# Patient Record
Sex: FEMALE | Race: WHITE | Employment: OTHER | URBAN - METROPOLITAN AREA
[De-identification: names, ages, dates, MRNs, and addresses within clinical notes are randomized per-mention and may not be internally consistent; named-entity substitution may affect disease eponyms.]

---

## 2017-08-21 ENCOUNTER — HOSPITAL ENCOUNTER (OUTPATIENT)
Dept: OTHER | Age: 67
Discharge: HOME | End: 2017-08-21
Attending: INTERNAL MEDICINE | Admitting: INTERNAL MEDICINE

## 2017-08-21 LAB
A/G RATIO: 1.7 (ref 1.1–2.2)
ALBUMIN SERPL-MCNC: 4 G/DL (ref 3.4–5)
ALP BLD-CCNC: 88 U/L (ref 40–129)
ALT SERPL-CCNC: 13 U/L (ref 10–40)
ANION GAP SERPL CALCULATED.3IONS-SCNC: 11 MMOL/L (ref 3–16)
AST SERPL-CCNC: 19 U/L (ref 15–37)
BILIRUB SERPL-MCNC: 0.6 MG/DL (ref 0–1)
BUN BLDV-MCNC: 9 MG/DL (ref 7–20)
CALCIUM SERPL-MCNC: 9.4 MG/DL (ref 8.3–10.6)
CHLORIDE BLD-SCNC: 104 MMOL/L (ref 99–110)
CHOLESTEROL, FASTING: 158 MG/DL (ref 0–199)
CO2: 25 MMOL/L (ref 21–32)
CREAT SERPL-MCNC: 0.7 MG/DL (ref 0.6–1.2)
GFR AFRICAN AMERICAN: >60
GFR NON-AFRICAN AMERICAN: >60
GLOBULIN: 2.3 G/DL
GLUCOSE FASTING: 99 MG/DL (ref 70–99)
HCT VFR BLD CALC: 44.1 % (ref 36–48)
HDLC SERPL-MCNC: 35 MG/DL (ref 40–60)
HEMOGLOBIN: 14.9 G/DL (ref 12–16)
LDL CHOLESTEROL CALCULATED: 95 MG/DL
MCH RBC QN AUTO: 30.9 PG (ref 26–34)
MCHC RBC AUTO-ENTMCNC: 33.8 G/DL (ref 31–36)
MCV RBC AUTO: 91.3 FL (ref 80–100)
PDW BLD-RTO: 13.4 % (ref 12.4–15.4)
PLATELET # BLD: 218 K/UL (ref 135–450)
PMV BLD AUTO: 7.9 FL (ref 5–10.5)
POTASSIUM SERPL-SCNC: 4.7 MMOL/L (ref 3.5–5.1)
RBC # BLD: 4.83 M/UL (ref 4–5.2)
SODIUM BLD-SCNC: 140 MMOL/L (ref 136–145)
TOTAL PROTEIN: 6.3 G/DL (ref 6.4–8.2)
TRIGLYCERIDE, FASTING: 139 MG/DL (ref 0–150)
TSH SERPL DL<=0.05 MIU/L-ACNC: 0.99 UIU/ML (ref 0.27–4.2)
VITAMIN B-12: 354 PG/ML (ref 211–911)
VLDLC SERPL CALC-MCNC: 28 MG/DL
WBC # BLD: 5.7 K/UL (ref 4–11)

## 2018-02-26 ENCOUNTER — OFFICE VISIT (OUTPATIENT)
Dept: ORTHOPEDIC SURGERY | Age: 68
End: 2018-02-26

## 2018-02-26 VITALS
SYSTOLIC BLOOD PRESSURE: 141 MMHG | WEIGHT: 148 LBS | HEART RATE: 73 BPM | BODY MASS INDEX: 25.27 KG/M2 | DIASTOLIC BLOOD PRESSURE: 65 MMHG | HEIGHT: 64 IN

## 2018-02-26 DIAGNOSIS — M75.82 TENDINITIS OF LEFT ROTATOR CUFF: Primary | ICD-10-CM

## 2018-02-26 DIAGNOSIS — M25.512 LEFT SHOULDER PAIN, UNSPECIFIED CHRONICITY: ICD-10-CM

## 2018-02-26 PROCEDURE — G8484 FLU IMMUNIZE NO ADMIN: HCPCS | Performed by: ORTHOPAEDIC SURGERY

## 2018-02-26 PROCEDURE — G8400 PT W/DXA NO RESULTS DOC: HCPCS | Performed by: ORTHOPAEDIC SURGERY

## 2018-02-26 PROCEDURE — G8419 CALC BMI OUT NRM PARAM NOF/U: HCPCS | Performed by: ORTHOPAEDIC SURGERY

## 2018-02-26 PROCEDURE — 1090F PRES/ABSN URINE INCON ASSESS: CPT | Performed by: ORTHOPAEDIC SURGERY

## 2018-02-26 PROCEDURE — 20611 DRAIN/INJ JOINT/BURSA W/US: CPT | Performed by: ORTHOPAEDIC SURGERY

## 2018-02-26 PROCEDURE — 3014F SCREEN MAMMO DOC REV: CPT | Performed by: ORTHOPAEDIC SURGERY

## 2018-02-26 PROCEDURE — 1036F TOBACCO NON-USER: CPT | Performed by: ORTHOPAEDIC SURGERY

## 2018-02-26 PROCEDURE — 99214 OFFICE O/P EST MOD 30 MIN: CPT | Performed by: ORTHOPAEDIC SURGERY

## 2018-02-26 PROCEDURE — 4040F PNEUMOC VAC/ADMIN/RCVD: CPT | Performed by: ORTHOPAEDIC SURGERY

## 2018-02-26 PROCEDURE — 3017F COLORECTAL CA SCREEN DOC REV: CPT | Performed by: ORTHOPAEDIC SURGERY

## 2018-02-26 PROCEDURE — G8427 DOCREV CUR MEDS BY ELIG CLIN: HCPCS | Performed by: ORTHOPAEDIC SURGERY

## 2018-02-26 PROCEDURE — 1123F ACP DISCUSS/DSCN MKR DOCD: CPT | Performed by: ORTHOPAEDIC SURGERY

## 2018-02-26 RX ORDER — LEVOTHYROXINE SODIUM 137 UG/1
TABLET ORAL
COMMUNITY
Start: 2017-10-10 | End: 2018-02-26 | Stop reason: ALTCHOICE

## 2018-02-26 RX ORDER — ATORVASTATIN CALCIUM 10 MG/1
20 TABLET, FILM COATED ORAL
Status: ON HOLD | COMMUNITY
Start: 2017-07-17 | End: 2022-08-31 | Stop reason: HOSPADM

## 2018-02-26 RX ORDER — MELOXICAM 15 MG/1
TABLET ORAL
COMMUNITY
Start: 2017-04-24 | End: 2018-07-05

## 2018-02-26 NOTE — PROGRESS NOTES
4CC BUPIVACAINE  NDC#-6487-9669-37  LOT#- [id number]  EXP: 2/2019    4CC XYLOCAINE  NDC#-50705-746-61  LOT#- [id number]  EXP: 9/2021    2CC DEPO  NDC#-8070-8804-27  LOT#- [id number]  EXP: 4/2020    SITE: LEFT SHOULDER

## 2018-02-26 NOTE — PROGRESS NOTES
CHIEF COMPLAINT:    Chief Complaint   Patient presents with    Shoulder Pain     LEFT SHOULDER PAIN. PT STATES PAIN ON & OFF FOR ABOUT 2 YEARS, GETTING A LITTLE WORSE. HARD TO LAY ON IT & CERTAIN ROM       HISTORY OF PRESENT ILLNESS:                The patient is a 69 y.o. female   Past Medical History:   Diagnosis Date    Arthritis     [facility]     Hyperlipidemia     Thyroid disease       This is a pleasant lady presents today with associated 2 year history of LEFT shoulder pain. She has difficulty sleeping and getting dressed. She was reaching. The pain radiates over the brachium but not into the hand. No real numbness or tingling. She started he is somewhat symptoms on the right side sutures favoring the LEFT now.     The pain assessment was noted & is as follows:  Pain Assessment  Location of Pain: Shoulder  Location Modifiers: Left  Severity of Pain: 4  Quality of Pain: Aching, Dull, Sharp  Duration of Pain: Persistent  Frequency of Pain: Intermittent  Aggravating Factors: Bending, Other (Comment) (CERTAIN ROM)  Limiting Behavior: Some  Relieving Factors: Rest  Result of Injury: No  Work-Related Injury: No  Are there other pain locations you wish to document?: No]      Work Status/Functionality:     Past Medical History: Medical history form was reviewed today & can be found in the media tab  Past Medical History:   Diagnosis Date    Arthritis     [facility]     Hyperlipidemia     Thyroid disease       Past Surgical History:     Past Surgical History:   Procedure Laterality Date    APPENDECTOMY  2009    CHOLECYSTECTOMY  2009    COLON SURGERY  2009    KIDNEY STONE SURGERY      OVARY REMOVAL       Current Medications:     Current Outpatient Prescriptions:     atorvastatin (LIPITOR) 10 MG tablet, Take 20 mg by mouth, Disp: , Rfl:     meloxicam (MOBIC) 15 MG tablet, TAKE ONE TABLET BY MOUTH DAILY, Disp: , Rfl:     levothyroxine (SYNTHROID) 137 MCG tablet, , Disp: , Rfl:    citalopram (CELEXA) 20 MG tablet, , Disp: , Rfl:     Omega-3 Fatty Acids (FISH OIL) 500 MG CAPS, Take 1 g by mouth, Disp: , Rfl:     PROBIOTIC PRODUCT PO, Take by mouth, Disp: , Rfl:   Allergies:  Tetracyclines & related  Social History:    reports that she quit smoking about 17 years ago. She has never used smokeless tobacco. She reports that she drinks alcohol. Family History:   Family History   Problem Relation Age of Onset    High Blood Pressure Sister     Cancer Sister     High Blood Pressure Brother     Heart Attack Brother     Cancer Brother        REVIEW OF SYSTEMS:   For new problems, a full review of systems will be found scanned in the patient's chart. CONSTITUTIONAL: Denies unexplained weight loss, fevers, chills   NEUROLOGICAL: Denies unsteady gait or progressive weakness  SKIN: Denies skin changes, delayed healing, rash, itching       PHYSICAL EXAM:    Vitals: Blood pressure (!) 141/65, pulse 73, height 5' 4\" (1.626 m), weight 148 lb (67.1 kg). GENERAL EXAM:  · General Apparence: Patient is adequately groomed with no evidence of malnutrition. · Orientation: The patient is oriented to time, place and person. · Mood & Affect:The patient's mood and affect are appropriate       LEFT shoulder PHYSICAL EXAMINATION:  · Inspection:  No visible asymmetry or deformity. · Palpation:  Tenderness subacromial region and down into the brachium. No tenderness over the neck. · Range of Motion: she has good range of motion which is pain with abduction external rotation and abduction internal rotation. There is also palpable crepitus with range of motion LEFT and right shoulder. · Strength: no gross motor weakness    · Special Tests:  Positive superstar sign. Positive speed's test.  Negative apprehension signbilateral            · Skin:  There are no rashes, ulcerations or lesions.     · Gait & station:       · Additional Examinations:        Neck: Examination of the neck does not show any history, exam and medical decision making and agree with all pertinent clinical information. I have also reviewed and agree with the past medical, family and social history unless otherwise noted. This dictation was performed with a verbal recognition program (DRAGON) and it was checked for errors. It is possible that there are still dictated errors within this office note. If so, please bring any errors to my attention for an addendum. All efforts were made to ensure that this office note is accurate.           Izabel Fierro MD

## 2018-10-25 ENCOUNTER — HOSPITAL ENCOUNTER (OUTPATIENT)
Age: 68
Setting detail: SPECIMEN
Discharge: HOME | End: 2018-10-25
Payer: MEDICARE

## 2018-10-25 LAB
A/G RATIO: 1.7 (ref 1.1–2.2)
ALBUMIN SERPL-MCNC: 4.4 G/DL (ref 3.4–5)
ALP BLD-CCNC: 99 U/L (ref 40–129)
ALT SERPL-CCNC: 10 U/L (ref 10–40)
ANION GAP SERPL CALCULATED.3IONS-SCNC: 9 MMOL/L (ref 3–16)
AST SERPL-CCNC: 16 U/L (ref 15–37)
BILIRUB SERPL-MCNC: 0.4 MG/DL (ref 0–1)
BUN BLDV-MCNC: 9 MG/DL (ref 7–20)
CALCIUM SERPL-MCNC: 9.9 MG/DL (ref 8.3–10.6)
CHLORIDE BLD-SCNC: 104 MMOL/L (ref 99–110)
CHOLESTEROL, FASTING: 172 MG/DL (ref 0–199)
CO2: 29 MMOL/L (ref 21–32)
CREAT SERPL-MCNC: 0.7 MG/DL (ref 0.6–1.2)
GFR AFRICAN AMERICAN: >60
GFR NON-AFRICAN AMERICAN: >60
GLOBULIN: 2.6 G/DL
GLUCOSE FASTING: 106 MG/DL (ref 70–99)
HCT VFR BLD CALC: 46.8 % (ref 36–48)
HDLC SERPL-MCNC: 39 MG/DL (ref 40–60)
HEMOGLOBIN: 15.7 G/DL (ref 12–16)
LDL CHOLESTEROL CALCULATED: 104 MG/DL
MCH RBC QN AUTO: 31.1 PG (ref 26–34)
MCHC RBC AUTO-ENTMCNC: 33.6 G/DL (ref 31–36)
MCV RBC AUTO: 92.7 FL (ref 80–100)
PDW BLD-RTO: 13.5 % (ref 12.4–15.4)
PLATELET # BLD: 252 K/UL (ref 135–450)
PMV BLD AUTO: 8.5 FL (ref 5–10.5)
POTASSIUM SERPL-SCNC: 4.4 MMOL/L (ref 3.5–5.1)
RBC # BLD: 5.05 M/UL (ref 4–5.2)
SODIUM BLD-SCNC: 142 MMOL/L (ref 136–145)
TOTAL PROTEIN: 7 G/DL (ref 6.4–8.2)
TRIGLYCERIDE, FASTING: 147 MG/DL (ref 0–150)
TSH SERPL DL<=0.05 MIU/L-ACNC: 1.99 UIU/ML (ref 0.27–4.2)
VLDLC SERPL CALC-MCNC: 29 MG/DL
WBC # BLD: 6.6 K/UL (ref 4–11)

## 2018-10-25 PROCEDURE — 80061 LIPID PANEL: CPT

## 2018-10-25 PROCEDURE — 85027 COMPLETE CBC AUTOMATED: CPT

## 2018-10-25 PROCEDURE — 82607 VITAMIN B-12: CPT

## 2018-10-25 PROCEDURE — 80053 COMPREHEN METABOLIC PANEL: CPT

## 2018-10-25 PROCEDURE — 36415 COLL VENOUS BLD VENIPUNCTURE: CPT

## 2018-10-25 PROCEDURE — 84443 ASSAY THYROID STIM HORMONE: CPT

## 2018-10-26 LAB — VITAMIN B-12: 299 PG/ML (ref 211–911)

## 2019-04-03 ENCOUNTER — OFFICE VISIT (OUTPATIENT)
Dept: ORTHOPEDIC SURGERY | Age: 69
End: 2019-04-03
Payer: MEDICARE

## 2019-04-03 VITALS
SYSTOLIC BLOOD PRESSURE: 142 MMHG | HEART RATE: 78 BPM | WEIGHT: 147.93 LBS | DIASTOLIC BLOOD PRESSURE: 88 MMHG | HEIGHT: 64 IN | BODY MASS INDEX: 25.25 KG/M2

## 2019-04-03 DIAGNOSIS — M51.36 DDD (DEGENERATIVE DISC DISEASE), LUMBAR: ICD-10-CM

## 2019-04-03 DIAGNOSIS — M54.5 LOW BACK PAIN, UNSPECIFIED BACK PAIN LATERALITY, UNSPECIFIED CHRONICITY, WITH SCIATICA PRESENCE UNSPECIFIED: Primary | ICD-10-CM

## 2019-04-03 DIAGNOSIS — M54.16 LUMBAR RADICULITIS: ICD-10-CM

## 2019-04-03 PROCEDURE — 1123F ACP DISCUSS/DSCN MKR DOCD: CPT | Performed by: PHYSICIAN ASSISTANT

## 2019-04-03 PROCEDURE — G8400 PT W/DXA NO RESULTS DOC: HCPCS | Performed by: PHYSICIAN ASSISTANT

## 2019-04-03 PROCEDURE — G8419 CALC BMI OUT NRM PARAM NOF/U: HCPCS | Performed by: PHYSICIAN ASSISTANT

## 2019-04-03 PROCEDURE — G8427 DOCREV CUR MEDS BY ELIG CLIN: HCPCS | Performed by: PHYSICIAN ASSISTANT

## 2019-04-03 PROCEDURE — 3017F COLORECTAL CA SCREEN DOC REV: CPT | Performed by: PHYSICIAN ASSISTANT

## 2019-04-03 PROCEDURE — 4040F PNEUMOC VAC/ADMIN/RCVD: CPT | Performed by: PHYSICIAN ASSISTANT

## 2019-04-03 PROCEDURE — 1036F TOBACCO NON-USER: CPT | Performed by: PHYSICIAN ASSISTANT

## 2019-04-03 PROCEDURE — 1090F PRES/ABSN URINE INCON ASSESS: CPT | Performed by: PHYSICIAN ASSISTANT

## 2019-04-03 PROCEDURE — 99213 OFFICE O/P EST LOW 20 MIN: CPT | Performed by: PHYSICIAN ASSISTANT

## 2019-04-03 RX ORDER — MELOXICAM 15 MG/1
TABLET ORAL
Status: ON HOLD | COMMUNITY
Start: 2019-02-06 | End: 2022-06-22

## 2019-04-03 RX ORDER — METHYLPREDNISOLONE 4 MG/1
TABLET ORAL
Qty: 1 KIT | Refills: 0 | Status: ON HOLD | OUTPATIENT
Start: 2019-04-03 | End: 2019-04-15

## 2019-04-03 NOTE — PROGRESS NOTES
New Patient: SPINE    CHIEF COMPLAINT:    Chief Complaint   Patient presents with    Back Pain     Pain goes down left leg. HISTORY OF PRESENT ILLNESS:                The patient is a 77 y.o. female history of colon cancer here for a history of chronic aching low back pain with a 6 month history of pain radiating into the left buttock posterior lateral thigh and more recently to the calf and foot. Symptoms are increased with walking standing or prolonged sitting. Relief with resting flat. Conservative care includes previous PT, NSAIDs. Denies any significant improvement at this time. She denies any progressive extremity weakness or recent bowel or bladder changes. No recent trauma. Pain Assessment  Location of Pain: Back  Severity of Pain: 2  Quality of Pain: Sharp, Dull, Aching  Duration of Pain: Persistent  Frequency of Pain: Constant  Aggravating Factors: Stairs, Walking, Standing, Squatting, Kneeling, Exercise, Straightening, Stretching, Bending  Limiting Behavior: Yes  Relieving Factors: Rest  Result of Injury: No  Work-Related Injury: No  Are there other pain locations you wish to document?: No    The pain assessment was noted & reviewed in the medical record today.      Current/Past Treatment:   · Physical Therapy: yes  · Chiropractic:   no  · Injection:   no  Medications:            NSAIDS: YES            Muscle relaxer:              Steriods:              Neuropathic medications:              Opioids:            Other:   · Surgery/Consult:    Work Status/Functionality: homemaker     Past Medical History: Medical history form was reviewed today & scanned into the media tab  Past Medical History:   Diagnosis Date    Arthritis     [facility]     Hyperlipidemia     Thyroid disease       Past Surgical History:     Past Surgical History:   Procedure Laterality Date    APPENDECTOMY  2009    CHOLECYSTECTOMY  2009    COLON SURGERY  2009    KIDNEY STONE SURGERY      OVARY REMOVAL Current Medications:     Current Outpatient Medications:     methylPREDNISolone (MEDROL, BOBBI,) 4 MG tablet, Take by mouth., Disp: 1 kit, Rfl: 0    atorvastatin (LIPITOR) 10 MG tablet, Take 20 mg by mouth, Disp: , Rfl:     levothyroxine (SYNTHROID) 137 MCG tablet, , Disp: , Rfl:     citalopram (CELEXA) 20 MG tablet, , Disp: , Rfl:     Omega-3 Fatty Acids (FISH OIL) 500 MG CAPS, Take 1 g by mouth, Disp: , Rfl:     PROBIOTIC PRODUCT PO, Take by mouth, Disp: , Rfl:     meloxicam (MOBIC) 15 MG tablet, , Disp: , Rfl:   Allergies:  Tetracyclines & related  Social History:    reports that she quit smoking about 19 years ago. She has never used smokeless tobacco. She reports that she drinks alcohol. Family History:   Family History   Problem Relation Age of Onset    High Blood Pressure Sister     Cancer Sister     High Blood Pressure Brother     Heart Attack Brother     Cancer Brother        REVIEW OF SYSTEMS: Full ROS noted & scanned   CONSTITUTIONAL: Denies unexplained weight loss, fevers, chills or fatigue  NEUROLOGICAL: Denies unsteady gait or progressive weakness  MUSCULOSKELETAL: Denies joint swelling or redness  PSYCHOLOGICAL: Denies anxiety, depression   SKIN: Denies skin changes, delayed healing, rash, itching   HEMATOLOGIC: Denies easy bleeding or bruising  ENDOCRINE: Denies excessive thirst, urination, heat/cold  RESPIRATORY: Denies current dyspnea, cough  GI: Denies nausea, vomiting, diarrhea   : Denies bowel or bladder issues       PHYSICAL EXAM:    Vitals: Blood pressure (!) 142/88, pulse 78, height 5' 4.49\" (1.638 m), weight 147 lb 14.9 oz (67.1 kg). GENERAL EXAM:  · General Apparence: Patient is adequately groomed with no evidence of malnutrition. · Orientation: The patient is oriented to time, place and person.    · Mood & Affect:The patient's mood and affect are appropriate   · Lymphatic: The lymphatic examination bilaterally reveals all areas to be without enlargement or induration  · Sensation: Sensation is intact without deficit  · Coordination/Balance: Good coordination   LUMBAR/SACRAL EXAMINATION:  · Inspection: Local inspection shows no step-off or bruising. Lumbar alignment is normal.  Sagittal and Coronal balance is neutral.      · Palpation:   No evidence of tenderness at the midline. No tenderness bilaterally at the paraspinal or trochanters. There is no step-off or paraspinal spasm. · Range of Motion:  30° of flexion, 10° extension  · Strength:   Strength testing is 5/5 in all muscle groups tested. · Special Tests:   Straight leg raise positive on the left. Leg length and pelvis level.  0 out of 5 Mateo's signs. · Skin: There are no rashes, ulcerations or lesions. · Reflexes: Reflexes are symmetrically 2+ at the patellar and 1+ ankle tendons. Clonus absent bilaterally at the feet. · Gait & station: Mildly antalgic unassisted  · Additional Examinations:   · RIGHT LOWER EXTREMITY: Inspection/examination of the right lower extremity does not show any tenderness, deformity or injury. Range of motion is full. There is no gross instability. There are no rashes, ulcerations or lesions. Strength and tone are normal.  ·   · LEFT LOWER EXTREMITY:  Inspection/examination of the left lower extremity does not show any tenderness, deformity or injury. Range of motion is full. There is no gross instability. There are no rashes, ulcerations or lesions.   Strength and tone are normal.    Diagnostic Testin views lumbar spine  shows severe DDD L5-S1, L>R hip OA        Impression:  1) Chronic LBP, 6mo left sciatica  2) Severe DDD L5-S1  3) H/o colon cancer       Plan:   1) Lumbar MRI WO  2) MDP  3) PT HEP provided, declining formal PT  4) F/u to review MRI        [facility]

## 2019-04-05 ENCOUNTER — HOSPITAL ENCOUNTER (OUTPATIENT)
Dept: MRI IMAGING | Age: 69
Discharge: HOME | End: 2019-04-05
Payer: MEDICARE

## 2019-04-05 DIAGNOSIS — M51.36 DDD (DEGENERATIVE DISC DISEASE), LUMBAR: ICD-10-CM

## 2019-04-05 DIAGNOSIS — M54.16 LUMBAR RADICULITIS: ICD-10-CM

## 2019-04-05 DIAGNOSIS — M54.5 LOW BACK PAIN, UNSPECIFIED BACK PAIN LATERALITY, UNSPECIFIED CHRONICITY, WITH SCIATICA PRESENCE UNSPECIFIED: ICD-10-CM

## 2019-04-05 PROCEDURE — 72148 MRI LUMBAR SPINE W/O DYE: CPT

## 2019-04-12 ENCOUNTER — OFFICE VISIT (OUTPATIENT)
Dept: ORTHOPEDIC SURGERY | Age: 69
End: 2019-04-12
Payer: MEDICARE

## 2019-04-12 VITALS
DIASTOLIC BLOOD PRESSURE: 73 MMHG | HEIGHT: 64 IN | SYSTOLIC BLOOD PRESSURE: 124 MMHG | HEART RATE: 70 BPM | WEIGHT: 147.93 LBS | BODY MASS INDEX: 25.25 KG/M2

## 2019-04-12 DIAGNOSIS — M54.5 LOW BACK PAIN, UNSPECIFIED BACK PAIN LATERALITY, UNSPECIFIED CHRONICITY, WITH SCIATICA PRESENCE UNSPECIFIED: Primary | ICD-10-CM

## 2019-04-12 DIAGNOSIS — M51.36 DDD (DEGENERATIVE DISC DISEASE), LUMBAR: ICD-10-CM

## 2019-04-12 DIAGNOSIS — M54.16 LUMBAR RADICULITIS: ICD-10-CM

## 2019-04-12 PROCEDURE — 99214 OFFICE O/P EST MOD 30 MIN: CPT | Performed by: PHYSICAL MEDICINE & REHABILITATION

## 2019-04-12 PROCEDURE — 4040F PNEUMOC VAC/ADMIN/RCVD: CPT | Performed by: PHYSICAL MEDICINE & REHABILITATION

## 2019-04-12 PROCEDURE — G8419 CALC BMI OUT NRM PARAM NOF/U: HCPCS | Performed by: PHYSICAL MEDICINE & REHABILITATION

## 2019-04-12 PROCEDURE — G8400 PT W/DXA NO RESULTS DOC: HCPCS | Performed by: PHYSICAL MEDICINE & REHABILITATION

## 2019-04-12 PROCEDURE — 1090F PRES/ABSN URINE INCON ASSESS: CPT | Performed by: PHYSICAL MEDICINE & REHABILITATION

## 2019-04-12 PROCEDURE — G8427 DOCREV CUR MEDS BY ELIG CLIN: HCPCS | Performed by: PHYSICAL MEDICINE & REHABILITATION

## 2019-04-12 PROCEDURE — 1123F ACP DISCUSS/DSCN MKR DOCD: CPT | Performed by: PHYSICAL MEDICINE & REHABILITATION

## 2019-04-12 PROCEDURE — 3017F COLORECTAL CA SCREEN DOC REV: CPT | Performed by: PHYSICAL MEDICINE & REHABILITATION

## 2019-04-12 PROCEDURE — 1036F TOBACCO NON-USER: CPT | Performed by: PHYSICAL MEDICINE & REHABILITATION

## 2019-04-12 NOTE — LETTER
[facility]  Surgery Precert & Billing Form:    DEMOGRAPHICS:                                                                                                       Patient Name:  Baldo Cervantes  Patient :  1950   Patient SS#:      Patient Phone:  [phone] (home)  Alt.  Patient Phone:    Patient Address:  [address] [place] [id number]    PCP:  Remy Ruth MD  Insurance: MEDICARE  DIAGNOSIS & PROCEDURE:                                                                                      Diagnosis: M54.16, M51.26, M48.062  Operation: left L5-S1 TX BENTLEY     SURGERY  INFORMATION  Date of Surgery:   4/15/19  Location:    [facility]  Type:    Outpatient  23 hour hold:  No  Surgeon:          Sarah Escobedo MD  19     BILLING INFORMATION:                                                                                                Physician Procedure                                            CPT Codes                      PA, or Fellow Procedure                                      CPT Codes                      Precert information:
______________________________________________________________________    POST-OPERATIVE ORDERS - DR. TAVERAS      1. Admit to Post Op Phase 2     2. Implement Standards of Care for Phase 2 Post Op     3. Check Site - May discharge when site is free of bleeding     4. Discharge to home after meets Phase 2 criteria     5. Discharge cervical patients after 30 minutes and when meets Phase 2 criteria. 6. Give discharge instruction sheet     7. For Diabetic patient, if blood sugar less than 80 in preop,          Recheck blood sugar in Post Op. 8. Discontinue IV     9.  For Nausea may give Zofran 4 MG IV/IM/ODT           ______________________________________________________________________    Marcel Thacker     1950 4/12/19 11:07 AM

## 2019-04-12 NOTE — PROGRESS NOTES
Follow-up: SPINE    CHIEF COMPLAINT:    Chief Complaint   Patient presents with    Back Pain     F/u lumbar MRI       HISTORY OF PRESENT ILLNESS:                The patient is a 77 y.o. female history of colon cancer 6 months of right radiating leg pain. She is a patient initially evaluated by Francesca [facility]. She is here to follow-up after MRI. She reports radiating leg pain on the left with standing or walking in her left buttock left posterior thigh and calf and into the foot. It limits her community ambulation   Pain Assessment  Location of Pain: Back  Severity of Pain: 5  Quality of Pain: Aching  Duration of Pain: Persistent  Frequency of Pain: Constant  Aggravating Factors: Standing, Walking, Other (Comment)  Limiting Behavior: Yes  Relieving Factors: Rest  Result of Injury: No  Work-Related Injury: No  Are there other pain locations you wish to document?: No    The pain assessment was noted & reviewed in the medical record today.      Current/Past Treatment:   · Physical Therapy: yes  · Chiropractic:   no  · Injection:   no  Medications:            NSAIDS: YES            Muscle relaxer:              Steriods:   MDP            Neuropathic medications:              Opioids:            Other:   · Surgery/Consult:    Work Status/Functionality: homemaker     Past Medical History: Medical history form was reviewed today & scanned into the media tab  Past Medical History:   Diagnosis Date    Arthritis     [facility] ([facility]     Hyperlipidemia     Thyroid disease       Past Surgical History:     Past Surgical History:   Procedure Laterality Date    APPENDECTOMY  2009    CHOLECYSTECTOMY  2009    COLON SURGERY  2009    KIDNEY STONE SURGERY      OVARY REMOVAL       Current Medications:     Current Outpatient Medications:     meloxicam (MOBIC) 15 MG tablet, , Disp: , Rfl:     methylPREDNISolone (MEDROL, BOBBI,) 4 MG tablet, Take by mouth., Disp: 1 kit, Rfl: 0    atorvastatin (LIPITOR) 10 MG tablet, Take 20 mg by mouth, Disp: , Rfl:     levothyroxine (SYNTHROID) 137 MCG tablet,   , Disp: , Rfl:     citalopram (CELEXA) 20 MG tablet,   , Disp: , Rfl:     Omega-3 Fatty Acids (FISH OIL) 500 MG CAPS, Take 1 g by mouth, Disp: , Rfl:     PROBIOTIC PRODUCT PO, Take by mouth, Disp: , Rfl:   Allergies:  Tetracyclines & related  Social History:    reports that she quit smoking about 19 years ago. She has never used smokeless tobacco. She reports that she drinks alcohol. Family History:   Family History   Problem Relation Age of Onset    High Blood Pressure Sister     Cancer Sister     High Blood Pressure Brother     Heart Attack Brother     Cancer Brother        REVIEW OF SYSTEMS: Full ROS noted & scanned   CONSTITUTIONAL: Denies unexplained weight loss, fevers, chills or fatigue  NEUROLOGICAL: Denies unsteady gait or progressive weakness  MUSCULOSKELETAL: Denies joint swelling or redness  PSYCHOLOGICAL: Denies anxiety, depression   SKIN: Denies skin changes, delayed healing, rash, itching   HEMATOLOGIC: Denies easy bleeding or bruising  ENDOCRINE: Denies excessive thirst, urination, heat/cold  RESPIRATORY: Denies current dyspnea, cough  GI: Denies nausea, vomiting, diarrhea   : Denies bowel or bladder issues       PHYSICAL EXAM:    Vitals: Blood pressure 124/73, pulse 70, height 5' 4.49\" (1.638 m), weight 147 lb 14.9 oz (67.1 kg). GENERAL EXAM:  · General Apparence: Patient is adequately groomed with no evidence of malnutrition. · Orientation: The patient is oriented to time, place and person. · Mood & Affect:The patient's mood and affect are appropriate   · Lymphatic: The lymphatic examination bilaterally reveals all areas to be without enlargement or induration  · Sensation: Sensation is intact without deficit  · Coordination/Balance: Good coordination   LUMBAR/SACRAL EXAMINATION:  · Inspection: Local inspection shows no step-off or bruising.   Lumbar alignment is normal.  Sagittal and Coronal balance is

## 2019-04-12 NOTE — H&P
well as alternatives to the procedure have been discussed with the patient and or family. The patient and or next of kin understands and agrees to proceed.     Kumar Cheema M.D.

## 2019-04-15 ENCOUNTER — TELEPHONE (OUTPATIENT)
Dept: ORTHOPEDIC SURGERY | Age: 69
End: 2019-04-15

## 2019-04-15 ENCOUNTER — HOSPITAL ENCOUNTER (OUTPATIENT)
Age: 69
Setting detail: OUTPATIENT SURGERY
Discharge: HOME | End: 2019-04-15
Attending: PHYSICAL MEDICINE & REHABILITATION | Admitting: PHYSICAL MEDICINE & REHABILITATION
Payer: MEDICARE

## 2019-04-15 VITALS
SYSTOLIC BLOOD PRESSURE: 130 MMHG | HEIGHT: 64 IN | BODY MASS INDEX: 25.27 KG/M2 | TEMPERATURE: 97.5 F | DIASTOLIC BLOOD PRESSURE: 70 MMHG | WEIGHT: 148 LBS | HEART RATE: 73 BPM | OXYGEN SATURATION: 98 % | RESPIRATION RATE: 14 BRPM

## 2019-04-15 PROCEDURE — 3600000002 HC SURGERY LEVEL 2 BASE: Performed by: PHYSICAL MEDICINE & REHABILITATION

## 2019-04-15 PROCEDURE — 2500000003 HC RX 250 WO HCPCS: Performed by: PHYSICAL MEDICINE & REHABILITATION

## 2019-04-15 PROCEDURE — 6360000002 HC RX W HCPCS: Performed by: PHYSICAL MEDICINE & REHABILITATION

## 2019-04-15 PROCEDURE — 7100000010 HC PHASE II RECOVERY - FIRST 15 MIN: Performed by: PHYSICAL MEDICINE & REHABILITATION

## 2019-04-15 PROCEDURE — 2709999900 HC NON-CHARGEABLE SUPPLY: Performed by: PHYSICAL MEDICINE & REHABILITATION

## 2019-04-15 PROCEDURE — 6360000004 HC RX CONTRAST MEDICATION: Performed by: PHYSICAL MEDICINE & REHABILITATION

## 2019-04-15 ASSESSMENT — PAIN SCALES - GENERAL: PAINLEVEL_OUTOF10: 0

## 2019-04-15 ASSESSMENT — PAIN - FUNCTIONAL ASSESSMENT
PAIN_FUNCTIONAL_ASSESSMENT: PREVENTS OR INTERFERES WITH MANY ACTIVE NOT PASSIVE ACTIVITIES
PAIN_FUNCTIONAL_ASSESSMENT: 0-10

## 2019-04-15 ASSESSMENT — PAIN DESCRIPTION - DESCRIPTORS: DESCRIPTORS: ACHING;DULL;TINGLING

## 2019-04-15 NOTE — PROGRESS NOTES
Discharge instructions given to pt and verbalized understanding, states pain is at a tolerable level, no numbness or weakness noted,vitals stable, pt wheeled out to car without complications, son driving home

## 2019-04-15 NOTE — OP NOTE
Patient:  Kanika Jeff Record #:  [id number]   Date:  4/15/2019  Physician:  Naya Hendrix M.D. Facility: [facility]       Pre-op diagnosis: Lumbar radiculitis, lumbar spondylosis  Post-op diagnosis:  same  Procedure: Left L5-S1 transforaminal epidural injection #1 with flouroscopic guidance     Procedure Note:    The patient was admitted through pre-op and written consent was obtained. The patient was advised of the risks and benefits of the procedure, including but not limited to the following: bleeding, pain, infection, temporary paralysis, nerve damage and spinal headache. The patient was given the opportunity to ask questions. There were no contraindications for this procedure. The appropriate area was prepped and draped in a sterile fashion. Landmarks were identified and marked. A 23G spinal needle was advanced to the left L5 neural foramen using fluoroscopic guidance with ideal needle tip placement confirmed by multiple views. Injection of contrast showed epidural flow. There were no signs of intravascular or intrathecal injection. 80 mg depomedrol and 1cc 1% lidocaine were then injected. There were no complications and the patient tolerated the procedure well. The patient was transferred to the recovery area and monitored. Discharge instructions were given. The patient is to contact me for any post-procedure concerns. The patient is to follow up as scheduled.     Naya Hendrix MD

## 2019-04-15 NOTE — TELEPHONE ENCOUNTER
DOS   04/15/2019  CPT   73484  DX   M54.16    M51.26   M48.062  OP SX AUTH  NPR   LEFT  LEVELS   L5 - S1   PROCEDURE   EPIDURAL INJECTION   [facility] [address]  INSURANCE:   MEDICARE

## 2019-05-02 ENCOUNTER — OFFICE VISIT (OUTPATIENT)
Dept: ORTHOPEDIC SURGERY | Age: 69
End: 2019-05-02
Payer: MEDICARE

## 2019-05-02 VITALS
HEART RATE: 81 BPM | SYSTOLIC BLOOD PRESSURE: 142 MMHG | DIASTOLIC BLOOD PRESSURE: 78 MMHG | WEIGHT: 147.93 LBS | BODY MASS INDEX: 25.25 KG/M2 | HEIGHT: 64 IN

## 2019-05-02 DIAGNOSIS — M51.36 DDD (DEGENERATIVE DISC DISEASE), LUMBAR: Primary | ICD-10-CM

## 2019-05-02 DIAGNOSIS — M54.16 LUMBAR RADICULITIS: ICD-10-CM

## 2019-05-02 PROCEDURE — G8427 DOCREV CUR MEDS BY ELIG CLIN: HCPCS | Performed by: PHYSICIAN ASSISTANT

## 2019-05-02 PROCEDURE — 99212 OFFICE O/P EST SF 10 MIN: CPT | Performed by: PHYSICIAN ASSISTANT

## 2019-05-02 PROCEDURE — 3017F COLORECTAL CA SCREEN DOC REV: CPT | Performed by: PHYSICIAN ASSISTANT

## 2019-05-02 PROCEDURE — G8400 PT W/DXA NO RESULTS DOC: HCPCS | Performed by: PHYSICIAN ASSISTANT

## 2019-05-02 PROCEDURE — 1123F ACP DISCUSS/DSCN MKR DOCD: CPT | Performed by: PHYSICIAN ASSISTANT

## 2019-05-02 PROCEDURE — 1090F PRES/ABSN URINE INCON ASSESS: CPT | Performed by: PHYSICIAN ASSISTANT

## 2019-05-02 PROCEDURE — 4040F PNEUMOC VAC/ADMIN/RCVD: CPT | Performed by: PHYSICIAN ASSISTANT

## 2019-05-02 PROCEDURE — 1036F TOBACCO NON-USER: CPT | Performed by: PHYSICIAN ASSISTANT

## 2019-05-02 PROCEDURE — G8419 CALC BMI OUT NRM PARAM NOF/U: HCPCS | Performed by: PHYSICIAN ASSISTANT

## 2019-05-02 NOTE — PROGRESS NOTES
[facility] ([facility]     Hyperlipidemia     Thyroid disease       Past Surgical History:     Past Surgical History:   Procedure Laterality Date    APPENDECTOMY  2009    CHOLECYSTECTOMY  2009    COLON SURGERY  2009    EPIDURAL STEROID INJECTION Left 4/15/2019    LEFT LUMBAR FIVE SACRAL ONE EPIDURAL STEROID INJECTION SITE CONFIRMED BY FLUOROSCOPY performed by Jaida Joiner MD at [address] [place] [address],[address]      OVARY REMOVAL       Current Medications:     Current Outpatient Medications:     aspirin 81 MG tablet, Take 81 mg by mouth daily, Disp: , Rfl:     meloxicam (MOBIC) 15 MG tablet, , Disp: , Rfl:     atorvastatin (LIPITOR) 10 MG tablet, Take 20 mg by mouth, Disp: , Rfl:     levothyroxine (SYNTHROID) 137 MCG tablet,   , Disp: , Rfl:     citalopram (CELEXA) 20 MG tablet,   , Disp: , Rfl:     Omega-3 Fatty Acids (FISH OIL) 500 MG CAPS, Take 1 g by mouth, Disp: , Rfl:     PROBIOTIC PRODUCT PO, Take by mouth, Disp: , Rfl:   Allergies:  Adhesive tape and Tetracyclines & related  Social History:    reports that she quit smoking about 19 years ago. She has never used smokeless tobacco. She reports that she drinks alcohol. She reports that she does not use drugs. Family History:   Family History   Problem Relation Age of Onset    High Blood Pressure Sister     Cancer Sister     High Blood Pressure Brother     Heart Attack Brother     Cancer Brother        REVIEW OF SYSTEMS: Full ROS noted & scanned   CONSTITUTIONAL: Denies unexplained weight loss, fevers, chills or fatigue  NEUROLOGICAL: Denies unsteady gait or progressive weakness    PHYSICAL EXAM:    Vitals: Blood pressure (!) 142/78, pulse 81, height 5' 4.02\" (1.626 m), weight 147 lb 14.9 oz (67.1 kg). GENERAL EXAM:  · General Apparence: Patient is adequately groomed with no evidence of malnutrition. · Orientation: The patient is oriented to time, place and person.    · Mood & Affect:The patient's mood and affect are

## 2019-06-11 ENCOUNTER — HOSPITAL ENCOUNTER (OUTPATIENT)
Dept: MAMMOGRAPHY | Age: 69
Discharge: HOME | End: 2019-06-11
Payer: MEDICARE

## 2019-06-11 ENCOUNTER — HOSPITAL ENCOUNTER (OUTPATIENT)
Age: 69
Discharge: HOME | End: 2019-06-11
Payer: MEDICARE

## 2019-06-11 DIAGNOSIS — Z12.31 ENCOUNTER FOR SCREENING MAMMOGRAM FOR BREAST CANCER: ICD-10-CM

## 2019-06-11 LAB
T3 FREE: 2.5 PG/ML (ref 2.3–4.2)
TSH SERPL DL<=0.05 MIU/L-ACNC: 1 UIU/ML (ref 0.27–4.2)

## 2019-06-11 PROCEDURE — 84481 FREE ASSAY (FT-3): CPT

## 2019-06-11 PROCEDURE — 84443 ASSAY THYROID STIM HORMONE: CPT

## 2019-06-11 PROCEDURE — 36415 COLL VENOUS BLD VENIPUNCTURE: CPT

## 2019-06-11 PROCEDURE — 77063 BREAST TOMOSYNTHESIS BI: CPT

## 2019-09-12 ENCOUNTER — HOSPITAL ENCOUNTER (OUTPATIENT)
Age: 69
Discharge: HOME | End: 2019-09-12
Payer: MEDICARE

## 2019-09-12 ENCOUNTER — HOSPITAL ENCOUNTER (OUTPATIENT)
Dept: GENERAL RADIOLOGY | Age: 69
Discharge: HOME | End: 2019-09-12
Payer: MEDICARE

## 2019-09-12 DIAGNOSIS — R06.09 DOE (DYSPNEA ON EXERTION): ICD-10-CM

## 2019-09-12 PROCEDURE — 71046 X-RAY EXAM CHEST 2 VIEWS: CPT

## 2019-10-30 ENCOUNTER — OFFICE VISIT (OUTPATIENT)
Dept: ORTHOPEDIC SURGERY | Age: 69
End: 2019-10-30
Payer: MEDICARE

## 2019-10-30 VITALS
SYSTOLIC BLOOD PRESSURE: 139 MMHG | BODY MASS INDEX: 25.25 KG/M2 | WEIGHT: 147.93 LBS | DIASTOLIC BLOOD PRESSURE: 97 MMHG | HEART RATE: 89 BPM | HEIGHT: 64 IN

## 2019-10-30 DIAGNOSIS — M54.16 LUMBAR RADICULITIS: ICD-10-CM

## 2019-10-30 DIAGNOSIS — M48.061 LUMBAR FORAMINAL STENOSIS: ICD-10-CM

## 2019-10-30 DIAGNOSIS — M51.36 DDD (DEGENERATIVE DISC DISEASE), LUMBAR: Primary | ICD-10-CM

## 2019-10-30 PROCEDURE — 99214 OFFICE O/P EST MOD 30 MIN: CPT | Performed by: PHYSICIAN ASSISTANT

## 2019-10-30 PROCEDURE — G8417 CALC BMI ABV UP PARAM F/U: HCPCS | Performed by: PHYSICIAN ASSISTANT

## 2019-10-30 PROCEDURE — G8400 PT W/DXA NO RESULTS DOC: HCPCS | Performed by: PHYSICIAN ASSISTANT

## 2019-10-30 PROCEDURE — 1123F ACP DISCUSS/DSCN MKR DOCD: CPT | Performed by: PHYSICIAN ASSISTANT

## 2019-10-30 PROCEDURE — 1090F PRES/ABSN URINE INCON ASSESS: CPT | Performed by: PHYSICIAN ASSISTANT

## 2019-10-30 PROCEDURE — G8427 DOCREV CUR MEDS BY ELIG CLIN: HCPCS | Performed by: PHYSICIAN ASSISTANT

## 2019-10-30 PROCEDURE — 1036F TOBACCO NON-USER: CPT | Performed by: PHYSICIAN ASSISTANT

## 2019-10-30 PROCEDURE — 4040F PNEUMOC VAC/ADMIN/RCVD: CPT | Performed by: PHYSICIAN ASSISTANT

## 2019-10-30 PROCEDURE — 3017F COLORECTAL CA SCREEN DOC REV: CPT | Performed by: PHYSICIAN ASSISTANT

## 2019-10-30 PROCEDURE — G8484 FLU IMMUNIZE NO ADMIN: HCPCS | Performed by: PHYSICIAN ASSISTANT

## 2019-10-31 ENCOUNTER — TELEPHONE (OUTPATIENT)
Dept: ORTHOPEDIC SURGERY | Age: 69
End: 2019-10-31

## 2019-11-05 ENCOUNTER — HOSPITAL ENCOUNTER (OUTPATIENT)
Age: 69
Setting detail: OUTPATIENT SURGERY
Discharge: HOME | End: 2019-11-05
Attending: PHYSICAL MEDICINE & REHABILITATION | Admitting: PHYSICAL MEDICINE & REHABILITATION
Payer: MEDICARE

## 2019-11-05 VITALS
HEIGHT: 64 IN | RESPIRATION RATE: 14 BRPM | BODY MASS INDEX: 24.59 KG/M2 | OXYGEN SATURATION: 99 % | SYSTOLIC BLOOD PRESSURE: 156 MMHG | TEMPERATURE: 97.6 F | WEIGHT: 144 LBS | DIASTOLIC BLOOD PRESSURE: 89 MMHG | HEART RATE: 66 BPM

## 2019-11-05 PROCEDURE — 6360000004 HC RX CONTRAST MEDICATION: Performed by: PHYSICAL MEDICINE & REHABILITATION

## 2019-11-05 PROCEDURE — 3600000002 HC SURGERY LEVEL 2 BASE: Performed by: PHYSICAL MEDICINE & REHABILITATION

## 2019-11-05 PROCEDURE — 6360000002 HC RX W HCPCS: Performed by: PHYSICAL MEDICINE & REHABILITATION

## 2019-11-05 PROCEDURE — 7100000010 HC PHASE II RECOVERY - FIRST 15 MIN: Performed by: PHYSICAL MEDICINE & REHABILITATION

## 2019-11-05 PROCEDURE — 2500000003 HC RX 250 WO HCPCS: Performed by: PHYSICAL MEDICINE & REHABILITATION

## 2019-11-05 PROCEDURE — 2709999900 HC NON-CHARGEABLE SUPPLY: Performed by: PHYSICAL MEDICINE & REHABILITATION

## 2019-11-05 RX ORDER — LIDOCAINE HYDROCHLORIDE 10 MG/ML
INJECTION, SOLUTION EPIDURAL; INFILTRATION; INTRACAUDAL; PERINEURAL PRN
Status: DISCONTINUED | OUTPATIENT
Start: 2019-11-05 | End: 2019-11-05 | Stop reason: ALTCHOICE

## 2019-11-05 RX ORDER — METHYLPREDNISOLONE ACETATE 40 MG/ML
INJECTION, SUSPENSION INTRA-ARTICULAR; INTRALESIONAL; INTRAMUSCULAR; SOFT TISSUE PRN
Status: DISCONTINUED | OUTPATIENT
Start: 2019-11-05 | End: 2019-11-05 | Stop reason: ALTCHOICE

## 2019-11-05 ASSESSMENT — PAIN DESCRIPTION - DESCRIPTORS: DESCRIPTORS: BURNING

## 2019-11-05 ASSESSMENT — PAIN - FUNCTIONAL ASSESSMENT
PAIN_FUNCTIONAL_ASSESSMENT: PREVENTS OR INTERFERES SOME ACTIVE ACTIVITIES AND ADLS
PAIN_FUNCTIONAL_ASSESSMENT: 0-10

## 2019-11-19 ENCOUNTER — OFFICE VISIT (OUTPATIENT)
Dept: ORTHOPEDIC SURGERY | Age: 69
End: 2019-11-19
Payer: MEDICARE

## 2019-11-19 VITALS
HEART RATE: 79 BPM | WEIGHT: 143.96 LBS | BODY MASS INDEX: 24.58 KG/M2 | SYSTOLIC BLOOD PRESSURE: 130 MMHG | DIASTOLIC BLOOD PRESSURE: 87 MMHG | HEIGHT: 64 IN

## 2019-11-19 DIAGNOSIS — M51.36 DDD (DEGENERATIVE DISC DISEASE), LUMBAR: Primary | ICD-10-CM

## 2019-11-19 DIAGNOSIS — M54.16 LUMBAR RADICULITIS: ICD-10-CM

## 2019-11-19 DIAGNOSIS — M48.061 LUMBAR FORAMINAL STENOSIS: ICD-10-CM

## 2019-11-19 PROCEDURE — 99213 OFFICE O/P EST LOW 20 MIN: CPT | Performed by: PHYSICIAN ASSISTANT

## 2019-11-19 PROCEDURE — G8427 DOCREV CUR MEDS BY ELIG CLIN: HCPCS | Performed by: PHYSICIAN ASSISTANT

## 2019-11-19 PROCEDURE — 4040F PNEUMOC VAC/ADMIN/RCVD: CPT | Performed by: PHYSICIAN ASSISTANT

## 2019-11-19 PROCEDURE — G8420 CALC BMI NORM PARAMETERS: HCPCS | Performed by: PHYSICIAN ASSISTANT

## 2019-11-19 PROCEDURE — G8400 PT W/DXA NO RESULTS DOC: HCPCS | Performed by: PHYSICIAN ASSISTANT

## 2019-11-19 PROCEDURE — 1090F PRES/ABSN URINE INCON ASSESS: CPT | Performed by: PHYSICIAN ASSISTANT

## 2019-11-19 PROCEDURE — 1036F TOBACCO NON-USER: CPT | Performed by: PHYSICIAN ASSISTANT

## 2019-11-19 PROCEDURE — G8484 FLU IMMUNIZE NO ADMIN: HCPCS | Performed by: PHYSICIAN ASSISTANT

## 2019-11-19 PROCEDURE — 3017F COLORECTAL CA SCREEN DOC REV: CPT | Performed by: PHYSICIAN ASSISTANT

## 2019-11-19 PROCEDURE — 1123F ACP DISCUSS/DSCN MKR DOCD: CPT | Performed by: PHYSICIAN ASSISTANT

## 2020-04-30 ENCOUNTER — TELEPHONE (OUTPATIENT)
Dept: ORTHOPEDIC SURGERY | Age: 70
End: 2020-04-30

## 2020-04-30 RX ORDER — GABAPENTIN 300 MG/1
300 CAPSULE ORAL 3 TIMES DAILY
Qty: 90 CAPSULE | Refills: 0 | Status: SHIPPED | OUTPATIENT
Start: 2020-04-30 | End: 2020-09-09 | Stop reason: SDUPTHER

## 2020-09-09 RX ORDER — GABAPENTIN 300 MG/1
300 CAPSULE ORAL NIGHTLY
Qty: 8 CAPSULE | Refills: 0 | Status: SHIPPED | OUTPATIENT
Start: 2020-09-09 | End: 2022-06-22

## 2020-09-17 ENCOUNTER — OFFICE VISIT (OUTPATIENT)
Dept: ORTHOPEDIC SURGERY | Age: 70
End: 2020-09-17
Payer: MEDICARE

## 2020-09-17 VITALS — BODY MASS INDEX: 24.58 KG/M2 | WEIGHT: 143.96 LBS | HEIGHT: 64 IN

## 2020-09-17 PROCEDURE — G8427 DOCREV CUR MEDS BY ELIG CLIN: HCPCS | Performed by: PHYSICIAN ASSISTANT

## 2020-09-17 PROCEDURE — 1090F PRES/ABSN URINE INCON ASSESS: CPT | Performed by: PHYSICIAN ASSISTANT

## 2020-09-17 PROCEDURE — 1123F ACP DISCUSS/DSCN MKR DOCD: CPT | Performed by: PHYSICIAN ASSISTANT

## 2020-09-17 PROCEDURE — 3017F COLORECTAL CA SCREEN DOC REV: CPT | Performed by: PHYSICIAN ASSISTANT

## 2020-09-17 PROCEDURE — 1036F TOBACCO NON-USER: CPT | Performed by: PHYSICIAN ASSISTANT

## 2020-09-17 PROCEDURE — 4040F PNEUMOC VAC/ADMIN/RCVD: CPT | Performed by: PHYSICIAN ASSISTANT

## 2020-09-17 PROCEDURE — G8400 PT W/DXA NO RESULTS DOC: HCPCS | Performed by: PHYSICIAN ASSISTANT

## 2020-09-17 PROCEDURE — 99214 OFFICE O/P EST MOD 30 MIN: CPT | Performed by: PHYSICIAN ASSISTANT

## 2020-09-17 PROCEDURE — G8420 CALC BMI NORM PARAMETERS: HCPCS | Performed by: PHYSICIAN ASSISTANT

## 2020-09-17 RX ORDER — METHYLPREDNISOLONE 4 MG/1
TABLET ORAL
Qty: 1 KIT | Refills: 0 | Status: SHIPPED | OUTPATIENT
Start: 2020-09-17 | End: 2020-09-23

## 2020-09-17 RX ORDER — GABAPENTIN 300 MG/1
300 CAPSULE ORAL 2 TIMES DAILY PRN
Qty: 60 CAPSULE | Refills: 2 | Status: SHIPPED | OUTPATIENT
Start: 2020-09-17 | End: 2022-06-22

## 2020-09-17 NOTE — PROGRESS NOTES
FOLLOW UP: SPINE    CHIEF COMPLAINT:    Chief Complaint   Patient presents with    Back Pain     Pain goes down left leg. HISTORY OF PRESENT ILLNESS:                The patient is a 65 y.o. female history of colon cancer here for follow up for subacute/chronic aching low back pain radiating into the left buttock posterolateral thigh/calf and foot. She has known severe left L5 foraminal stenosis. Symptoms are increased with walking standing or prolonged sitting. Relief with resting flat and gabapentin 300mg BID PRN. Good relief of left sciatica following left L5 TX BENTLEY last in November. She is requesting a refill of her gabapentin. She states this medication does allow her to be more functional: Perform ADLs independently, she is able to sleep 6 hours at night. She reports some mild drowsiness with this medication but tolerable. Other conservative care includes previous PT, NSAIDs, MDP. Currently denies any contralateral lower extremity radiating pain. She denies any progressive extremity weakness or recent bowel or bladder changes. No recent trauma. Pain Assessment  Location of Pain: Back  Severity of Pain: 5  Quality of Pain: Sharp, Dull, Aching  Duration of Pain: Persistent  Frequency of Pain: Constant  Aggravating Factors: Stairs, Walking, Standing, Squatting, Kneeling, Exercise, Straightening, Stretching, Bending  Limiting Behavior: Yes  Relieving Factors: Rest  Result of Injury: No  Work-Related Injury: No  Are there other pain locations you wish to document?: No    The pain assessment was noted & reviewed in the medical record today.      Current/Past Treatment:   · Physical Therapy: yes  · Chiropractic:   no  · Injection:   11/5/19 Left L5-S1 transforaminal epidural injection #2--75% improved left sciatica  4/15/19 Left L5-S1 transforaminal epidural injection #1--80% improved  Medications:            NSAIDS: YES            Muscle relaxer:              Steriods:  MDP            Neuropathic medications:   Gabapentin 300 twice daily PRN            Opioids:            Other:   · Surgery/Consult: no    Work Status/Functionality: homemaker     Function-Does the pain medication improve your ability to do:   · Personal care: Yes  · Housework: Yes   · Physical activity: Yes  · Social activity: Yes  · Improve quality of family life: Yes    Pain Scale: 1-10  With Meds:   3/10  Pain Scale: 1-10 Without Meds: 8/10    Potential aberrant drug-related behavior:  · Aberrant behavior identified? NO  · Potential aberrant behavior identified? NO  · Reports loss are stolen prescriptions? NO  · Insist on certain medications by name? NO  · Purposeful oversedation? NO  · Increased dose without authorization? NO    Objective Goals: Physically and mentally function, carry on ADLs and achieve quality of life     Rationale for medication choice and dosage: To improve physical functionality, perform ADLS and achieve quality of life.     Pain f/u information:    · Pain contract/ORT:    Side Effects: mild drowsiness     Past Medical History: Medical history form was reviewed today & scanned into the media tab  Past Medical History:   Diagnosis Date    Arthritis     Colon cancer ([facility] [address]     Hyperlipidemia     Thyroid disease       Past Surgical History:     Past Surgical History:   Procedure Laterality Date    APPENDECTOMY  2009    CHOLECYSTECTOMY  2009    COLON SURGERY  2009    EPIDURAL STEROID INJECTION Left 4/15/2019    LEFT LUMBAR FIVE SACRAL ONE EPIDURAL STEROID INJECTION SITE CONFIRMED BY FLUOROSCOPY performed by Deninse Ariza MD at [facility] Left 11/5/2019    LEFT LUMBAR FIVE SACRAL ONE EPIDURAL STEROID INJECTION SITE CONFIRMED BY FLUOROSCOPY performed by Dennise Ariza MD at [address] [place] [address],[address]      OVARY REMOVAL       Current Medications:     Current Outpatient Medications:     gabapentin (NEURONTIN) 300 MG capsule, Take 1 capsule by mouth of flexion, 10° extension--more pain with extension  · Strength:   Strength testing is 5/5 in all muscle groups tested. · Special Tests:   Straight leg raise negative today  Leg length and pelvis level.  0 out of 5 Mateo's signs. · Skin: There are no rashes, ulcerations or lesions. · Reflexes: Reflexes are symmetrically 2+ at the patellar and 1+ ankle tendons. Clonus absent bilaterally at the feet. · Gait & station: normal unassisted   · Additional Examinations:   · RIGHT LOWER EXTREMITY: Inspection/examination of the right lower extremity does not show any tenderness, deformity or injury. Range of motion is full. There is no gross instability. There are no rashes, ulcerations or lesions. Strength and tone are normal.  · LEFT LOWER EXTREMITY:  Inspection/examination of the left lower extremity does not show any tenderness, deformity or injury. Range of motion is full. There is no gross instability. There are no rashes, ulcerations or lesions. Strength and tone are normal.    Diagnostic Testin views lumbar spine 2020 shows severe L5-S1 DDD, bilateral hip OA. Overall stable compared to prior    2 views lumbar spine  shows severe DDD L5-S1, L>R hip OA    MRI shows 2019 severe left L5 foraminal stenosis due to disc bulging, consistent with her symptoms        Impression:  1) Subacute/chronic recurrent LBP, left lumbar radiculitis  2) Severe DDD L5-S1 with severe left foraminal stenosis  3) H/o colon cancer       Plan:   1) Gabapentin 300mg I po BID PRN   2) MDP   3) She will call if wishing to proceed with left L5-S1 TX BENTLEY #1 new series.   Procedure risk and benefits discussed  4) She will f/u q3mo for Gabapentin refill       Mirlande [facility]

## 2020-10-16 ENCOUNTER — TELEPHONE (OUTPATIENT)
Dept: ORTHOPEDIC SURGERY | Age: 70
End: 2020-10-16

## 2021-03-18 ENCOUNTER — IMMUNIZATION (OUTPATIENT)
Dept: PRIMARY CARE CLINIC | Age: 71
End: 2021-03-18
Payer: MEDICARE

## 2021-03-18 PROCEDURE — 0011A PR IMM ADMN SARSCOV2 100 MCG/0.5 ML 1ST DOSE: CPT | Performed by: FAMILY MEDICINE

## 2021-03-18 PROCEDURE — 91301 COVID-19, MODERNA VACCINE 100MCG/0.5ML DOSE: CPT | Performed by: FAMILY MEDICINE

## 2021-08-12 ENCOUNTER — HOSPITAL ENCOUNTER (EMERGENCY)
Age: 71
Discharge: HOME | End: 2021-08-12
Attending: EMERGENCY MEDICINE
Payer: MEDICARE

## 2021-08-12 VITALS
BODY MASS INDEX: 25.61 KG/M2 | DIASTOLIC BLOOD PRESSURE: 94 MMHG | TEMPERATURE: 98.3 F | WEIGHT: 150 LBS | OXYGEN SATURATION: 98 % | HEART RATE: 73 BPM | SYSTOLIC BLOOD PRESSURE: 172 MMHG | HEIGHT: 64 IN | RESPIRATION RATE: 18 BRPM

## 2021-08-12 DIAGNOSIS — R21 RASH AND OTHER NONSPECIFIC SKIN ERUPTION: ICD-10-CM

## 2021-08-12 DIAGNOSIS — L50.9 URTICARIA: Primary | ICD-10-CM

## 2021-08-12 DIAGNOSIS — R03.0 ELEVATED BLOOD PRESSURE READING: ICD-10-CM

## 2021-08-12 PROCEDURE — 6370000000 HC RX 637 (ALT 250 FOR IP): Performed by: EMERGENCY MEDICINE

## 2021-08-12 PROCEDURE — 99285 EMERGENCY DEPT VISIT HI MDM: CPT

## 2021-08-12 RX ORDER — LEVOCETIRIZINE DIHYDROCHLORIDE 5 MG/1
5 TABLET, FILM COATED ORAL NIGHTLY
Status: ON HOLD | COMMUNITY
Start: 2021-08-09 | End: 2022-07-28 | Stop reason: HOSPADM

## 2021-08-12 RX ORDER — DIPHENHYDRAMINE HCL 25 MG
25 CAPSULE ORAL EVERY 4 HOURS PRN
Qty: 30 CAPSULE | Refills: 0 | Status: SHIPPED | OUTPATIENT
Start: 2021-08-12 | End: 2021-08-17

## 2021-08-12 RX ORDER — PREDNISONE 20 MG/1
60 TABLET ORAL ONCE
Status: COMPLETED | OUTPATIENT
Start: 2021-08-12 | End: 2021-08-12

## 2021-08-12 RX ORDER — FAMOTIDINE 20 MG/1
20 TABLET, FILM COATED ORAL 2 TIMES DAILY
Qty: 14 TABLET | Refills: 0 | Status: SHIPPED | OUTPATIENT
Start: 2021-08-12 | End: 2022-06-22

## 2021-08-12 RX ORDER — TRIAMCINOLONE ACETONIDE 0.25 MG/G
OINTMENT TOPICAL 2 TIMES DAILY PRN
Qty: 454 G | Refills: 0 | Status: SHIPPED | OUTPATIENT
Start: 2021-08-12 | End: 2021-08-15

## 2021-08-12 RX ADMIN — PREDNISONE 60 MG: 20 TABLET ORAL at 22:59

## 2021-08-13 NOTE — ED PROVIDER NOTES
Emergency Physician Note  [id number] [address]  Dept: [phone]  Loc: [phone]  Open Note Time:  10:55 PM EDT    Chief Complaint  Rash (Started new medication Relifector OTC few days ago. Also multiple bee stings yesterday. Now with rash all over body.)       History of Present Illness  Alla Samson is a 66 y.o. female  has a past medical history of Arthritis, Colon cancer ([address] Hyperlipidemia, and Thyroid disease. who presents to the ED for rash. Patient states that the bee stings that occurred was 4 to 5 days ago. She states she will really only has concerns about 1 bee sting on the left side of her face. Since yesterday she has had increasing urticaria around her waist and her left upper arm as well as in the groin area and she is also noted swelling and itchiness of her left eye. She does night any new vision changes of her left eye no pain of the left eye. She tried Benadryl and hydrocortisone cream at home without relief. She has not changed any household products recently. She does not have any food allergies. Denies fever,  chest pain, shortness of breath, cough, abdominal pain, nausea, vomiting, diarrhea. No palliative/provocative factors. Unless otherwise stated in this report or unable to obtain because of the patient's clinical or mental status as evidenced by the medical record, this patient's positive and negative responses for review of systems, constitutional, psych, eyes, ENT, cardiovascular, respiratory, gastrointestinal, neurological, genitourinary, musculoskeletal, integument systems and systems related to the presenting problem are either stated in the preceding paragraph or were not pertinent or were negative for the symptoms and/or complaints related to the medical problem.     I have reviewed the following from the nursing documentation:      Prior to Admission medications organized,  without delusions/hallucinations,  Not responding to internal stimuli,  responds appropriately to questions    LABS and DIAGNOSTIC RESULTS    LABS  No results found for this visit on 08/12/21. SCREENINGS  NIH Score     [place]  [place] Coma Scale  Eye Opening: Spontaneous  Best Verbal Response: Oriented  Best Motor Response: Obeys commands  [place] Coma Scale Score: 15  [facility]    Heart Score       PROCEDURES    MEDICAL DECISION MAKING    Procedures/interventions/images ordered for this visit  No orders of the defined types were placed in this encounter. Medications ordered for this visit  Orders Placed This Encounter   Medications    predniSONE (DELTASONE) tablet 60 mg       ED course notes for this visit       I wore surgical mask and gloves when I evaluated the patient. I evaluated the patient in room 12/12    Differential diagnosis: Vasculitis, bacterial skin infection, viral rash, systemic infectious rash, Anaphylaxis, Urticaria, other    This is a pleasant patient with a rash of uncertain etiology. Pt is afebrile, in NAD and nontoxic in appearance. There is no petechiae or purpura. There is no angioedema or respiratory symptoms. It was explained to the pt/family that the definite etiology has not been identified. Pt was counseled to return to the ED if they develop fever, swelling, shortness of breath, or otherwise worsen. They were counseled on close follow-up with their PMD and specifically with a dermatologist if the symptoms persist.  There is no significant evidence of an allergic reaction or anaphylaxis, sepsis, meningitis or meningococcemia, vasculitis, TSS, SJS, necrotizing fasciitis, or other process requiring immediate medical or surgical intervention. Rash associated with an autoimmune disorder as not been ruled out.   It is understood that if the patient is not improving as expected, or if other new signs or symptoms of concern develop, other etiologies or diagnoses may need to be considered that may require other tests, treatments, consultations, and/or admission. In the meantime, treatment is supportive. If there is pruritis, antihistamines should be given. The diagnosis, plan, expected course, follow-up, and return precautions were discussed and all questions were answered. Final Impression    1. Urticaria    2. Rash and other nonspecific skin eruption    3. Elevated blood pressure reading        Blood pressure (!) 176/89, pulse 81, temperature 98.3 °F (36.8 °C), temperature source Oral, resp. rate 18, height 5' 4\" (1.626 m), weight 150 lb (68 kg), SpO2 98 %. Medication Summary  Patient was given scripts for the following medications. I counseled patient how to take these medications. New Prescriptions    DIPHENHYDRAMINE (BENADRYL) 25 MG CAPSULE    Take 1 capsule by mouth every 4 hours as needed for Itching or Allergies    FAMOTIDINE (PEPCID) 20 MG TABLET    Take 1 tablet by mouth 2 times daily for 7 days    TRIAMCINOLONE (KENALOG) 0.025 % OINTMENT    Apply topically 2 times daily as needed (Rash, itchiness)       Patient had scripts modified or refilled for the following medications. I counseled patient how to take these medications. Modified Medications    No medications on file       Patient had scripts discontinues for the following medications. I counseled patient to stop taking these medications. Discontinued Medications    No medications on file         Disposition  At this point I do not feel the patient requires further work up and it is reasonable to discharge the patient. I had a discussion with the patient and/or their surrogate regarding diagnosis, diagnostic testing results, treatment/ plan of care, and follow up. Patient and/or companions verbalized understanding of the ED workup, any relevant findings as well as any incidental findings, and the disposition and plan.   There was shared decision-making between myself as well as the

## 2021-08-14 ENCOUNTER — HOSPITAL ENCOUNTER (EMERGENCY)
Age: 71
Discharge: HOME | End: 2021-08-14
Attending: STUDENT IN AN ORGANIZED HEALTH CARE EDUCATION/TRAINING PROGRAM
Payer: MEDICARE

## 2021-08-14 VITALS
HEIGHT: 64 IN | TEMPERATURE: 97.3 F | DIASTOLIC BLOOD PRESSURE: 70 MMHG | RESPIRATION RATE: 18 BRPM | BODY MASS INDEX: 25.61 KG/M2 | OXYGEN SATURATION: 92 % | SYSTOLIC BLOOD PRESSURE: 131 MMHG | HEART RATE: 62 BPM | WEIGHT: 150 LBS

## 2021-08-14 DIAGNOSIS — T78.40XD ALLERGIC REACTION, SUBSEQUENT ENCOUNTER: Primary | ICD-10-CM

## 2021-08-14 DIAGNOSIS — T78.3XXA ANGIOEDEMA, INITIAL ENCOUNTER: ICD-10-CM

## 2021-08-14 LAB
A/G RATIO: 1.8 (ref 1.1–2.2)
ALBUMIN SERPL-MCNC: 4.1 G/DL (ref 3.4–5)
ALP BLD-CCNC: 90 U/L (ref 40–129)
ALT SERPL-CCNC: 13 U/L (ref 10–40)
ANION GAP SERPL CALCULATED.3IONS-SCNC: 9 MMOL/L (ref 3–16)
AST SERPL-CCNC: 21 U/L (ref 15–37)
BASOPHILS ABSOLUTE: 0 K/UL (ref 0–0.2)
BASOPHILS RELATIVE PERCENT: 0.3 %
BILIRUB SERPL-MCNC: 0.4 MG/DL (ref 0–1)
BUN BLDV-MCNC: 6 MG/DL (ref 7–20)
CALCIUM SERPL-MCNC: 9 MG/DL (ref 8.3–10.6)
CHLORIDE BLD-SCNC: 101 MMOL/L (ref 99–110)
CO2: 25 MMOL/L (ref 21–32)
CREAT SERPL-MCNC: 0.7 MG/DL (ref 0.6–1.2)
EOSINOPHILS ABSOLUTE: 0.1 K/UL (ref 0–0.6)
EOSINOPHILS RELATIVE PERCENT: 1.2 %
GFR AFRICAN AMERICAN: >60
GFR NON-AFRICAN AMERICAN: >60
GLOBULIN: 2.3 G/DL
GLUCOSE BLD-MCNC: 91 MG/DL (ref 70–99)
HCT VFR BLD CALC: 37.5 % (ref 36–48)
HEMOGLOBIN: 12.5 G/DL (ref 12–16)
LYMPHOCYTES ABSOLUTE: 2.2 K/UL (ref 1–5.1)
LYMPHOCYTES RELATIVE PERCENT: 27.2 %
MCH RBC QN AUTO: 30.3 PG (ref 26–34)
MCHC RBC AUTO-ENTMCNC: 33.5 G/DL (ref 31–36)
MCV RBC AUTO: 90.3 FL (ref 80–100)
MONOCYTES ABSOLUTE: 0.5 K/UL (ref 0–1.3)
MONOCYTES RELATIVE PERCENT: 6.6 %
NEUTROPHILS ABSOLUTE: 5.2 K/UL (ref 1.7–7.7)
NEUTROPHILS RELATIVE PERCENT: 64.7 %
PDW BLD-RTO: 14 % (ref 12.4–15.4)
PLATELET # BLD: 237 K/UL (ref 135–450)
PMV BLD AUTO: 7.7 FL (ref 5–10.5)
POTASSIUM REFLEX MAGNESIUM: 4.2 MMOL/L (ref 3.5–5.1)
RBC # BLD: 4.15 M/UL (ref 4–5.2)
SODIUM BLD-SCNC: 135 MMOL/L (ref 136–145)
TOTAL PROTEIN: 6.4 G/DL (ref 6.4–8.2)
WBC # BLD: 8 K/UL (ref 4–11)

## 2021-08-14 PROCEDURE — 99284 EMERGENCY DEPT VISIT MOD MDM: CPT

## 2021-08-14 PROCEDURE — 2580000003 HC RX 258: Performed by: STUDENT IN AN ORGANIZED HEALTH CARE EDUCATION/TRAINING PROGRAM

## 2021-08-14 PROCEDURE — 2500000003 HC RX 250 WO HCPCS: Performed by: STUDENT IN AN ORGANIZED HEALTH CARE EDUCATION/TRAINING PROGRAM

## 2021-08-14 PROCEDURE — 85025 COMPLETE CBC W/AUTO DIFF WBC: CPT

## 2021-08-14 PROCEDURE — 6360000002 HC RX W HCPCS: Performed by: STUDENT IN AN ORGANIZED HEALTH CARE EDUCATION/TRAINING PROGRAM

## 2021-08-14 PROCEDURE — 80053 COMPREHEN METABOLIC PANEL: CPT

## 2021-08-14 PROCEDURE — 96374 THER/PROPH/DIAG INJ IV PUSH: CPT

## 2021-08-14 PROCEDURE — 6370000000 HC RX 637 (ALT 250 FOR IP): Performed by: STUDENT IN AN ORGANIZED HEALTH CARE EDUCATION/TRAINING PROGRAM

## 2021-08-14 PROCEDURE — 96361 HYDRATE IV INFUSION ADD-ON: CPT

## 2021-08-14 PROCEDURE — 96375 TX/PRO/DX INJ NEW DRUG ADDON: CPT

## 2021-08-14 RX ORDER — DIPHENHYDRAMINE HYDROCHLORIDE 50 MG/ML
25 INJECTION INTRAMUSCULAR; INTRAVENOUS ONCE
Status: COMPLETED | OUTPATIENT
Start: 2021-08-14 | End: 2021-08-14

## 2021-08-14 RX ORDER — PREDNISONE 20 MG/1
40 TABLET ORAL DAILY
Qty: 8 TABLET | Refills: 0 | Status: SHIPPED | OUTPATIENT
Start: 2021-08-14 | End: 2021-08-18

## 2021-08-14 RX ORDER — 0.9 % SODIUM CHLORIDE 0.9 %
500 INTRAVENOUS SOLUTION INTRAVENOUS ONCE
Status: COMPLETED | OUTPATIENT
Start: 2021-08-14 | End: 2021-08-14

## 2021-08-14 RX ORDER — PREDNISONE 20 MG/1
60 TABLET ORAL ONCE
Status: COMPLETED | OUTPATIENT
Start: 2021-08-14 | End: 2021-08-14

## 2021-08-14 RX ADMIN — DIPHENHYDRAMINE HYDROCHLORIDE 25 MG: 50 INJECTION, SOLUTION INTRAMUSCULAR; INTRAVENOUS at 10:00

## 2021-08-14 RX ADMIN — SODIUM CHLORIDE 500 ML: 9 INJECTION, SOLUTION INTRAVENOUS at 10:00

## 2021-08-14 RX ADMIN — PREDNISONE 60 MG: 20 TABLET ORAL at 10:01

## 2021-08-14 RX ADMIN — FAMOTIDINE 40 MG: 10 INJECTION, SOLUTION INTRAVENOUS at 10:00

## 2021-08-14 ASSESSMENT — PAIN DESCRIPTION - PAIN TYPE: TYPE: ACUTE PAIN

## 2021-08-14 ASSESSMENT — PAIN SCALES - GENERAL: PAINLEVEL_OUTOF10: 3

## 2021-08-14 ASSESSMENT — PAIN DESCRIPTION - LOCATION: LOCATION: FACE

## 2021-08-14 NOTE — ED PROVIDER NOTES
[address] ED      CHIEF COMPLAINT  Facial swelling following yellowjacket sting     HISTORY OF PRESENT ILLNESS  Marija Ferraro is a 66 y.o. female with a past medical history of hyperlipidemia, arthritis, thyroid disease, and colon cancer, who presents to the ED complaining of facial swelling. Was stung by yellow jackets on 8/9. Has taken benadryl, hydrocortisone. Has not taken anything this morning. Reports itching, facial swelling. Denies numbness, weakness, tingling. Reports itching of face, bilateral sides, and neck. Started new medication Relief-factor OTC 5d ago- stopped after being seen 2d ago. Patient was seen on 8/12 after developing urticaria on her abdomen. This has improved. Rash Red Flags:  Fever: denies  Toxic appearance: denies  Hypotension: denies   Mucosal lesions: denies   Severe pain: denies   Very old or young age: no   Immunosuppressed: denies  New Medication: rellief-factor- stopped 2d ago. Denies no other new exposures. Old records reviewed: Patient seen on 8/12 for urticaria. No acute concerns at that time. Discharged on Benadryl, Pepcid, and triamcinolone cream.    No other complaints, modifying factors or associated symptoms. I have reviewed the following from the nursing documentation.     Past Medical History:   Diagnosis Date    Arthritis     [facility] cancer ([facility] [address]     Hyperlipidemia     Thyroid disease      Past Surgical History:   Procedure Laterality Date    APPENDECTOMY  2009    CHOLECYSTECTOMY  2009    COLON SURGERY  2009    EPIDURAL STEROID INJECTION Left 4/15/2019    LEFT LUMBAR FIVE SACRAL ONE EPIDURAL STEROID INJECTION SITE CONFIRMED BY FLUOROSCOPY performed by Josee Dunbar MD at [facility] Left 11/5/2019    LEFT LUMBAR FIVE SACRAL ONE EPIDURAL STEROID INJECTION SITE CONFIRMED BY FLUOROSCOPY performed by Josee Dunbar MD at [address] [place] [address] Family History   Problem Relation Age of Onset    High Blood Pressure Sister     Cancer Sister     High Blood Pressure Brother     Heart Attack Brother     Cancer Brother      Social History     Socioeconomic History    Marital status:      Spouse name: Not on file    Number of children: Not on file    Years of education: Not on file    Highest education level: Not on file   Occupational History    Not on file   Tobacco Use    Smoking status: Former Smoker     Quit date: 3/17/2000     Years since quittin.4    Smokeless tobacco: Never Used   Vaping Use    Vaping Use: Never used   Substance and Sexual Activity    Alcohol use: Yes     Alcohol/week: 0.0 standard drinks     Comment: socially    Drug use: Never    Sexual activity: Not on file   Other Topics Concern    Not on file   Social History Narrative    Not on file     Social Determinants of Health     Financial Resource Strain:     Difficulty of Paying Living Expenses:    Food Insecurity:     Worried About Running Out of Food in the Last Year:     [address] in the Last Year:    Transportation Needs:     Lack of Transportation (Medical):  Lack of Transportation (Non-Medical):    Physical Activity:     Days of Exercise per Week:     Minutes of Exercise per Session:    Stress:     Feeling of Stress :    Social Connections:     Frequency of Communication with Friends and Family:     Frequency of Social Gatherings with Friends and Family:     Attends [employer] Services:     Active Member of Clubs or Organizations:     Attends Club or Organization Meetings:     Marital Status:    Intimate Partner Violence:     Fear of Current or Ex-Partner:     Emotionally Abused:     Physically Abused:     Sexually Abused:      No current facility-administered medications for this encounter.      Current Outpatient Medications   Medication Sig Dispense Refill    predniSONE (DELTASONE) 20 MG tablet Take 2 tablets by mouth daily for 4 days 8 tablet 0    levocetirizine (XYZAL) 5 MG tablet       diphenhydrAMINE (BENADRYL) 25 MG capsule Take 1 capsule by mouth every 4 hours as needed for Itching or Allergies 30 capsule 0    famotidine (PEPCID) 20 MG tablet Take 1 tablet by mouth 2 times daily for 7 days 14 tablet 0    gabapentin (NEURONTIN) 300 MG capsule Take 1 capsule by mouth 2 times daily as needed (prn) for up to 30 days. 60 capsule 2    gabapentin (NEURONTIN) 300 MG capsule Take 1 capsule by mouth nightly for 8 days. Will discussed refills at follow-up appt 8 capsule 0    aspirin 81 MG tablet Take 81 mg by mouth daily      meloxicam (MOBIC) 15 MG tablet       atorvastatin (LIPITOR) 10 MG tablet Take 20 mg by mouth      levothyroxine (SYNTHROID) 137 MCG tablet       citalopram (CELEXA) 20 MG tablet       Omega-3 Fatty Acids (FISH OIL) 500 MG CAPS Take 1 g by mouth      PROBIOTIC PRODUCT PO Take by mouth       Allergies   Allergen Reactions    Adhesive Tape Other (See Comments)     Skin redness      Bee Venom     Tetracyclines & Related Other (See Comments)    Sulfa Antibiotics Nausea And Vomiting       REVIEW OF SYSTEMS  All systems reviewed, pertinent positives per HPI otherwise noted to be negative. PHYSICAL EXAM  /70   Pulse 62   Temp 97.3 °F (36.3 °C)   Resp 18   Ht 5' 4\" (1.626 m)   Wt 150 lb (68 kg)   SpO2 92%   BMI 25.75 kg/m²    GENERAL APPEARANCE: Awake and alert. Cooperative. no distress. HENT: Normocephalic. Atraumatic. Mucous membranes are moist.  No posterior oropharyngeal swelling. Patient does have swelling of her lips. NECK: Supple. Full range of motion of the neck without stiffness or pain. No stridor. EYES: PERRL. EOM's grossly intact. HEART/CHEST: RRR. No murmurs. Chest wall is not tender to palpation. LUNGS: Respirations unlabored. CTAB. Good air exchange. Speaking comfortably in full sentences. No wheezing. ABDOMEN: No tenderness. Soft. Non-distended. No masses.  No organomegaly. No guarding or rebound. MUSCULOSKELETAL: No extremity edema. Compartments soft. No deformity. No tenderness in the extremities. All extremities neurovascularly intact. SKIN: Warm and dry. Patient still has minimal signs of previous urticaria, significantly improved from previous pictures Nikolsky sign negative  NEUROLOGICAL: Alert and oriented. CN's 2-12 intact. No gross facial drooping. Strength 5/5, sensation intact. NIH stroke scale of 0.  PSYCHIATRIC: Normal mood and affect. LABS  I have reviewed all labs for this visit.    Results for orders placed or performed during the hospital encounter of 08/14/21   CBC Auto Differential   Result Value Ref Range    WBC 8.0 4.0 - 11.0 K/uL    RBC 4.15 4.00 - 5.20 M/uL    Hemoglobin 12.5 12.0 - 16.0 g/dL    Hematocrit 37.5 36.0 - 48.0 %    MCV 90.3 80.0 - 100.0 fL    MCH 30.3 26.0 - 34.0 pg    MCHC 33.5 31.0 - 36.0 g/dL    RDW 14.0 12.4 - 15.4 %    Platelets 561 823 - 691 K/uL    MPV 7.7 5.0 - 10.5 fL    Neutrophils % 64.7 %    Lymphocytes % 27.2 %    Monocytes % 6.6 %    Eosinophils % 1.2 %    Basophils % 0.3 %    Neutrophils Absolute 5.2 1.7 - 7.7 K/uL    Lymphocytes Absolute 2.2 1.0 - 5.1 K/uL    Monocytes Absolute 0.5 0.0 - 1.3 K/uL    Eosinophils Absolute 0.1 0.0 - 0.6 K/uL    Basophils Absolute 0.0 0.0 - 0.2 K/uL   Comprehensive Metabolic Panel w/ Reflex to MG   Result Value Ref Range    Sodium 135 (L) 136 - 145 mmol/L    Potassium reflex Magnesium 4.2 3.5 - 5.1 mmol/L    Chloride 101 99 - 110 mmol/L    CO2 25 21 - 32 mmol/L    Anion Gap 9 3 - 16    Glucose 91 70 - 99 mg/dL    BUN 6 (L) 7 - 20 mg/dL    CREATININE 0.7 0.6 - 1.2 mg/dL    GFR Non-African American >60 >60    GFR African American >60 >60    Calcium 9.0 8.3 - 10.6 mg/dL    Total Protein 6.4 6.4 - 8.2 g/dL    Albumin 4.1 3.4 - 5.0 g/dL    Albumin/Globulin Ratio 1.8 1.1 - 2.2    Total Bilirubin 0.4 0.0 - 1.0 mg/dL    Alkaline Phosphatase 90 40 - 129 U/L    ALT 13 10 - 40 U/L    AST 21 15 - 37 U/L    Globulin 2.3 g/dL       RADIOLOGY    No orders to display          ED COURSE / MDM  Patient seen and evaluated. Old records reviewed and pertinent information included in HPI. Labs and imaging reviewed and results discussed with patient. Overall well appearing patient, in no acute distress, presenting for facial swelling. Patient had multiple insect stings multiple days ago, her symptoms have been waxing and waning. Physical exam remarkable for left-sided facial swelling and upper and lower lip swelling. Differential diagnosis includes but is not limited to: Allergic reaction, angioedema, contact dermatitis, atopic dermatitis, urticaria, viral exanthem, cellulitis, low suspicion for SJS, TEN, dress syndrome, shingles, pemphigus vulgaris, bullous pemphigoid, ITP, staph scalded skin syndrome    Workup showed:  ED Course as of Aug 16 0729   Sat Aug 14, 2021   1011 No leukocytosis, anemia, thrombocytopenia    [ER]   1017 No electrolyte abnormalities or evidence of kidney dysfunction.    [ER]   1017 Liver function testing unremarkable. [ER]      ED Course User Index  [ER] Kori Ferrer MD          During the patient's ED course, the patient was given:  Medications   famotidine (PEPCID) injection 40 mg (40 mg Intravenous Given 8/14/21 1000)   diphenhydrAMINE (BENADRYL) injection 25 mg (25 mg Intravenous Given 8/14/21 1000)   predniSONE (DELTASONE) tablet 60 mg (60 mg Oral Given 8/14/21 1001)   0.9 % sodium chloride bolus (0 mLs Intravenous Stopped 8/14/21 1111)      Patient denies any new medications other than the one she started 5 days ago and stopped 2 days ago, her symptoms started after being stung by yellow jackets. Nikolsky negative. Low suspicion for SJS, TENS, dress syndrome. Patient still has some evidence of previous urticaria, but no raised lesions at this time. She has mostly resolved at this time. Rash is not consistent with petechiae.   Rash is not in a dermatomal distribution, low suspicion for shingles. Patient has no evidence of rash on her face. Rash is not on the face, no rash in the ear canals or on the nose, low suspicion for [place] Stafford syndrome. Patient is afebrile, in NAD and nontoxic in appearance. There is no petechiae or purpura. There is no respiratory symptoms. Pt was counseled to return to the ED if they develop fever, swelling, shortness of breath, or otherwise worsen. They were counseled on close follow-up with their PMD and specifically with a dermatologist if the symptoms persist.  There is no significant evidence of anaphylaxis, sepsis, meningitis or meningococcemia, vasculitis, TSS, SJS, necrotizing fasciitis, or other process requiring immediate medical or surgical intervention. Patient does have some left-sided facial swelling, she had been stung on the left side of her face. It has progressed since she was seen 2 days ago, however patient has not taken allergy medication today. Patient does have swelling of her lips, but no evidence of swelling of the posterior oropharynx. She is tolerating secretions, no difficulty breathing. Patient received Benadryl, Pepcid, fluids, and steroids in the emergency department. Symptoms have mildly improved. This patient's second presentation for allergy symptoms. No evidence of anaphylaxis at this time. She does have lip swelling, but there is no evidence of airway compromise at this time. I did offer admission given the protracted course of swelling and the fact that the swelling involves the patient's lips but patient declines at this time. We discussed that symptoms could worsen and could cause airway compromise, however she continues to decline admission at this time. She would prefer to trial further medications on an outpatient basis and follow-up with her primary care doctor.   At this time given there is no evidence of airway compromise and patient appears reliable and understands strict return precautions, do feel that this is a reasonable course of action. At this time, feel the patient is appropriate for discharge to follow-up with a primary care doctor. Patient feels comfortable with discharge at this time. Patient was provided with prescriptions for prednisone. Patient encouraged to continue to take Benadryl and Pepcid already prescribed. Strict return precautions given. Encouraged PCP follow-up in 2 days. Patient discharged in stable condition. CLINICAL IMPRESSION  1. Allergic reaction, subsequent encounter    2. Angioedema, initial encounter        Blood pressure 131/70, pulse 62, temperature 97.3 °F (36.3 °C), resp. rate 18, height 5' 4\" (1.626 m), weight 150 lb (68 kg), SpO2 92 %. DISPOSITION  Latrice Alexander was discharged to home in stable condition. Patient was given scripts for the following medications. I counseled patient how to take these medications. Discharge Medication List as of 8/14/2021  1:05 PM      START taking these medications    Details   predniSONE (DELTASONE) 20 MG tablet Take 2 tablets by mouth daily for 4 days, Disp-8 tablet, R-0Normal             Follow-up with:  Alphonso Ferraro, APRN - CNP  [facility] [address]  [phone]    Schedule an appointment as soon as possible for a visit on 8/16/2021      [facility] [place] ED  [address] [place] [address]  Go to   As needed, If symptoms worsen      DISCLAIMER: This chart was created using Dragon dictation software. Efforts were made by me to ensure accuracy, however some errors may be present due to limitations of this technology and occasionally words are not transcribed correctly.           Margo Mcallister MD  08/16/21 [id number]

## 2021-08-14 NOTE — ED NOTES
Patient being discharged home, discharge instructions printed and reviewed with patient, denies any questions. Patient ambulated off unit with no signs of distress.      Janes Cao RN  08/14/21 1725

## 2022-06-20 LAB — SARS-COV-2: DETECTED

## 2022-06-22 ENCOUNTER — APPOINTMENT (OUTPATIENT)
Dept: GENERAL RADIOLOGY | Age: 72
DRG: 177 | End: 2022-06-22
Payer: MEDICARE

## 2022-06-22 ENCOUNTER — HOSPITAL ENCOUNTER (INPATIENT)
Age: 72
LOS: 2 days | Discharge: HOME | DRG: 177 | End: 2022-06-24
Attending: INTERNAL MEDICINE | Admitting: INTERNAL MEDICINE
Payer: MEDICARE

## 2022-06-22 ENCOUNTER — APPOINTMENT (OUTPATIENT)
Dept: CT IMAGING | Age: 72
DRG: 177 | End: 2022-06-22
Payer: MEDICARE

## 2022-06-22 DIAGNOSIS — U07.1 COVID: Primary | ICD-10-CM

## 2022-06-22 DIAGNOSIS — J96.01 ACUTE RESPIRATORY FAILURE WITH HYPOXIA (HCC): ICD-10-CM

## 2022-06-22 LAB
A/G RATIO: 1.7 (ref 1.1–2.2)
ALBUMIN SERPL-MCNC: 4.3 G/DL (ref 3.4–5)
ALP BLD-CCNC: 100 U/L (ref 40–129)
ALT SERPL-CCNC: 15 U/L (ref 10–40)
ANION GAP SERPL CALCULATED.3IONS-SCNC: 12 MMOL/L (ref 3–16)
AST SERPL-CCNC: 23 U/L (ref 15–37)
BACTERIA: ABNORMAL /HPF
BASOPHILS ABSOLUTE: 0 K/UL (ref 0–0.2)
BASOPHILS RELATIVE PERCENT: 0.5 %
BILIRUB SERPL-MCNC: 0.5 MG/DL (ref 0–1)
BILIRUBIN URINE: NEGATIVE
BLOOD, URINE: ABNORMAL
BUN BLDV-MCNC: 6 MG/DL (ref 7–20)
CALCIUM SERPL-MCNC: 8.9 MG/DL (ref 8.3–10.6)
CHLORIDE BLD-SCNC: 99 MMOL/L (ref 99–110)
CLARITY: CLEAR
CO2: 23 MMOL/L (ref 21–32)
COLOR: ABNORMAL
CREAT SERPL-MCNC: 0.7 MG/DL (ref 0.6–1.2)
EOSINOPHILS ABSOLUTE: 0.1 K/UL (ref 0–0.6)
EOSINOPHILS RELATIVE PERCENT: 1.5 %
EPITHELIAL CELLS, UA: ABNORMAL /HPF (ref 0–5)
GFR AFRICAN AMERICAN: >60
GFR NON-AFRICAN AMERICAN: >60
GLUCOSE BLD-MCNC: 103 MG/DL (ref 70–99)
GLUCOSE URINE: NEGATIVE MG/DL
HCT VFR BLD CALC: 41.2 % (ref 36–48)
HEMOGLOBIN: 14.1 G/DL (ref 12–16)
KETONES, URINE: NEGATIVE MG/DL
LEUKOCYTE ESTERASE, URINE: NEGATIVE
LIPASE: 22 U/L (ref 13–60)
LYMPHOCYTES ABSOLUTE: 1.6 K/UL (ref 1–5.1)
LYMPHOCYTES RELATIVE PERCENT: 30.1 %
MCH RBC QN AUTO: 29.8 PG (ref 26–34)
MCHC RBC AUTO-ENTMCNC: 34.2 G/DL (ref 31–36)
MCV RBC AUTO: 87.1 FL (ref 80–100)
MICROSCOPIC EXAMINATION: YES
MONOCYTES ABSOLUTE: 0.5 K/UL (ref 0–1.3)
MONOCYTES RELATIVE PERCENT: 8.7 %
NEUTROPHILS ABSOLUTE: 3.1 K/UL (ref 1.7–7.7)
NEUTROPHILS RELATIVE PERCENT: 59.2 %
NITRITE, URINE: NEGATIVE
PDW BLD-RTO: 14.6 % (ref 12.4–15.4)
PH UA: 7 (ref 5–8)
PLATELET # BLD: 176 K/UL (ref 135–450)
PMV BLD AUTO: 8 FL (ref 5–10.5)
POTASSIUM REFLEX MAGNESIUM: 4.2 MMOL/L (ref 3.5–5.1)
PROTEIN UA: NEGATIVE MG/DL
RBC # BLD: 4.73 M/UL (ref 4–5.2)
RBC UA: ABNORMAL /HPF (ref 0–4)
SODIUM BLD-SCNC: 134 MMOL/L (ref 136–145)
SPECIFIC GRAVITY UA: <=1.005 (ref 1–1.03)
TOTAL PROTEIN: 6.8 G/DL (ref 6.4–8.2)
URINE REFLEX TO CULTURE: ABNORMAL
URINE TYPE: ABNORMAL
UROBILINOGEN, URINE: 0.2 E.U./DL
WBC # BLD: 5.2 K/UL (ref 4–11)
WBC UA: ABNORMAL /HPF (ref 0–5)

## 2022-06-22 PROCEDURE — 93005 ELECTROCARDIOGRAM TRACING: CPT | Performed by: INTERNAL MEDICINE

## 2022-06-22 PROCEDURE — 83690 ASSAY OF LIPASE: CPT

## 2022-06-22 PROCEDURE — 71046 X-RAY EXAM CHEST 2 VIEWS: CPT

## 2022-06-22 PROCEDURE — 2700000000 HC OXYGEN THERAPY PER DAY

## 2022-06-22 PROCEDURE — 85025 COMPLETE CBC W/AUTO DIFF WBC: CPT

## 2022-06-22 PROCEDURE — 6360000004 HC RX CONTRAST MEDICATION: Performed by: NURSE PRACTITIONER

## 2022-06-22 PROCEDURE — 6370000000 HC RX 637 (ALT 250 FOR IP): Performed by: INTERNAL MEDICINE

## 2022-06-22 PROCEDURE — 2580000003 HC RX 258: Performed by: INTERNAL MEDICINE

## 2022-06-22 PROCEDURE — 81001 URINALYSIS AUTO W/SCOPE: CPT

## 2022-06-22 PROCEDURE — 94761 N-INVAS EAR/PLS OXIMETRY MLT: CPT

## 2022-06-22 PROCEDURE — 71260 CT THORAX DX C+: CPT

## 2022-06-22 PROCEDURE — 80053 COMPREHEN METABOLIC PANEL: CPT

## 2022-06-22 PROCEDURE — 99285 EMERGENCY DEPT VISIT HI MDM: CPT

## 2022-06-22 PROCEDURE — 36415 COLL VENOUS BLD VENIPUNCTURE: CPT

## 2022-06-22 PROCEDURE — 6370000000 HC RX 637 (ALT 250 FOR IP): Performed by: NURSE PRACTITIONER

## 2022-06-22 PROCEDURE — 1200000000 HC SEMI PRIVATE

## 2022-06-22 PROCEDURE — 6360000002 HC RX W HCPCS: Performed by: INTERNAL MEDICINE

## 2022-06-22 RX ORDER — ONDANSETRON 4 MG/1
4 TABLET, ORALLY DISINTEGRATING ORAL EVERY 8 HOURS PRN
Status: DISCONTINUED | OUTPATIENT
Start: 2022-06-22 | End: 2022-06-24 | Stop reason: HOSPADM

## 2022-06-22 RX ORDER — MELOXICAM 15 MG/1
15 TABLET ORAL DAILY
Status: DISCONTINUED | OUTPATIENT
Start: 2022-06-22 | End: 2022-06-24

## 2022-06-22 RX ORDER — ACETAMINOPHEN 325 MG/1
650 TABLET ORAL EVERY 6 HOURS PRN
Status: DISCONTINUED | OUTPATIENT
Start: 2022-06-22 | End: 2022-06-24 | Stop reason: HOSPADM

## 2022-06-22 RX ORDER — CITALOPRAM 20 MG/1
20 TABLET ORAL DAILY
Status: DISCONTINUED | OUTPATIENT
Start: 2022-06-22 | End: 2022-06-24 | Stop reason: HOSPADM

## 2022-06-22 RX ORDER — ONDANSETRON 2 MG/ML
4 INJECTION INTRAMUSCULAR; INTRAVENOUS EVERY 6 HOURS PRN
Status: DISCONTINUED | OUTPATIENT
Start: 2022-06-22 | End: 2022-06-24 | Stop reason: HOSPADM

## 2022-06-22 RX ORDER — SODIUM CHLORIDE 0.9 % (FLUSH) 0.9 %
5-40 SYRINGE (ML) INJECTION EVERY 12 HOURS SCHEDULED
Status: DISCONTINUED | OUTPATIENT
Start: 2022-06-22 | End: 2022-06-24 | Stop reason: HOSPADM

## 2022-06-22 RX ORDER — POTASSIUM CHLORIDE 7.45 MG/ML
10 INJECTION INTRAVENOUS PRN
Status: DISCONTINUED | OUTPATIENT
Start: 2022-06-22 | End: 2022-06-24 | Stop reason: HOSPADM

## 2022-06-22 RX ORDER — POTASSIUM CHLORIDE 20 MEQ/1
40 TABLET, EXTENDED RELEASE ORAL PRN
Status: DISCONTINUED | OUTPATIENT
Start: 2022-06-22 | End: 2022-06-24 | Stop reason: HOSPADM

## 2022-06-22 RX ORDER — MAGNESIUM SULFATE 1 G/100ML
1000 INJECTION INTRAVENOUS PRN
Status: DISCONTINUED | OUTPATIENT
Start: 2022-06-22 | End: 2022-06-24 | Stop reason: HOSPADM

## 2022-06-22 RX ORDER — ASPIRIN 81 MG/1
81 TABLET, CHEWABLE ORAL DAILY
Status: DISCONTINUED | OUTPATIENT
Start: 2022-06-22 | End: 2022-06-24 | Stop reason: HOSPADM

## 2022-06-22 RX ORDER — POLYETHYLENE GLYCOL 3350 17 G/17G
17 POWDER, FOR SOLUTION ORAL DAILY PRN
Status: DISCONTINUED | OUTPATIENT
Start: 2022-06-22 | End: 2022-06-24 | Stop reason: HOSPADM

## 2022-06-22 RX ORDER — ENOXAPARIN SODIUM 100 MG/ML
30 INJECTION SUBCUTANEOUS 2 TIMES DAILY
Status: DISCONTINUED | OUTPATIENT
Start: 2022-06-22 | End: 2022-06-24 | Stop reason: HOSPADM

## 2022-06-22 RX ORDER — SODIUM CHLORIDE 0.9 % (FLUSH) 0.9 %
5-40 SYRINGE (ML) INJECTION PRN
Status: DISCONTINUED | OUTPATIENT
Start: 2022-06-22 | End: 2022-06-24 | Stop reason: HOSPADM

## 2022-06-22 RX ORDER — OMEGA-3/DHA/EPA/FISH OIL 60 MG-90MG
1000 CAPSULE ORAL DAILY
Status: DISCONTINUED | OUTPATIENT
Start: 2022-06-22 | End: 2022-06-22 | Stop reason: CLARIF

## 2022-06-22 RX ORDER — SODIUM CHLORIDE 9 MG/ML
INJECTION, SOLUTION INTRAVENOUS PRN
Status: DISCONTINUED | OUTPATIENT
Start: 2022-06-22 | End: 2022-06-24 | Stop reason: HOSPADM

## 2022-06-22 RX ORDER — ATORVASTATIN CALCIUM 10 MG/1
20 TABLET, FILM COATED ORAL NIGHTLY
Status: DISCONTINUED | OUTPATIENT
Start: 2022-06-22 | End: 2022-06-24 | Stop reason: HOSPADM

## 2022-06-22 RX ORDER — DEXAMETHASONE SODIUM PHOSPHATE 10 MG/ML
6 INJECTION, SOLUTION INTRAMUSCULAR; INTRAVENOUS EVERY 24 HOURS
Status: DISCONTINUED | OUTPATIENT
Start: 2022-06-22 | End: 2022-06-24 | Stop reason: HOSPADM

## 2022-06-22 RX ORDER — ACETAMINOPHEN 325 MG/1
650 TABLET ORAL ONCE
Status: COMPLETED | OUTPATIENT
Start: 2022-06-22 | End: 2022-06-22

## 2022-06-22 RX ORDER — ACETAMINOPHEN 650 MG/1
650 SUPPOSITORY RECTAL EVERY 6 HOURS PRN
Status: DISCONTINUED | OUTPATIENT
Start: 2022-06-22 | End: 2022-06-24 | Stop reason: HOSPADM

## 2022-06-22 RX ORDER — LACTOBACILLUS RHAMNOSUS GG 10B CELL
1 CAPSULE ORAL DAILY
Status: DISCONTINUED | OUTPATIENT
Start: 2022-06-22 | End: 2022-06-24 | Stop reason: HOSPADM

## 2022-06-22 RX ORDER — CETIRIZINE HYDROCHLORIDE 10 MG/1
10 TABLET ORAL DAILY
Status: DISCONTINUED | OUTPATIENT
Start: 2022-06-22 | End: 2022-06-24 | Stop reason: HOSPADM

## 2022-06-22 RX ADMIN — IOPAMIDOL 85 ML: 755 INJECTION, SOLUTION INTRAVENOUS at 16:45

## 2022-06-22 RX ADMIN — ACETAMINOPHEN 650 MG: 325 TABLET ORAL at 20:55

## 2022-06-22 RX ADMIN — ONDANSETRON HYDROCHLORIDE 4 MG: 2 INJECTION, SOLUTION INTRAMUSCULAR; INTRAVENOUS at 20:56

## 2022-06-22 RX ADMIN — Medication 10 ML: at 21:04

## 2022-06-22 RX ADMIN — DEXAMETHASONE SODIUM PHOSPHATE 6 MG: 10 INJECTION, SOLUTION INTRAMUSCULAR; INTRAVENOUS at 21:00

## 2022-06-22 RX ADMIN — ACETAMINOPHEN 650 MG: 325 TABLET ORAL at 19:01

## 2022-06-22 RX ADMIN — ENOXAPARIN SODIUM 30 MG: 100 INJECTION SUBCUTANEOUS at 20:59

## 2022-06-22 ASSESSMENT — PAIN DESCRIPTION - LOCATION: LOCATION: GENERALIZED

## 2022-06-22 ASSESSMENT — LIFESTYLE VARIABLES
HOW OFTEN DO YOU HAVE A DRINK CONTAINING ALCOHOL: MONTHLY OR LESS
HOW MANY STANDARD DRINKS CONTAINING ALCOHOL DO YOU HAVE ON A TYPICAL DAY: 1 OR 2

## 2022-06-22 ASSESSMENT — ENCOUNTER SYMPTOMS
ABDOMINAL PAIN: 0
SORE THROAT: 0
RHINORRHEA: 0
SHORTNESS OF BREATH: 1
COLOR CHANGE: 0
FACIAL SWELLING: 0

## 2022-06-22 ASSESSMENT — PAIN DESCRIPTION - PAIN TYPE: TYPE: ACUTE PAIN

## 2022-06-22 ASSESSMENT — PAIN SCALES - GENERAL
PAINLEVEL_OUTOF10: 10
PAINLEVEL_OUTOF10: 5

## 2022-06-22 ASSESSMENT — PAIN DESCRIPTION - DESCRIPTORS: DESCRIPTORS: ACHING

## 2022-06-22 ASSESSMENT — PAIN - FUNCTIONAL ASSESSMENT: PAIN_FUNCTIONAL_ASSESSMENT: 0-10

## 2022-06-22 NOTE — ED NOTES
1636 - Perfect serve sent to Dr. Andreina Sampson  06/22/22 0692 66 - Dr. Luis Denis called back.       Phyllistine Dancer  06/22/22 1797

## 2022-06-22 NOTE — ED PROVIDER NOTES
Evaluated by Advanced Practice Provider          Mesha 48        Pt Name: Socorro Russell  MRN: [id number]  [place] 1950  Dateof evaluation: 6/22/2022  Provider: ILAN Prater CNP  PCP: ILAN Aviles CNP  ED Attending: No att. providers found    [address] [place] [address]       Chief Complaint   Patient presents with    Positive For Covid-19     tested postive for covid last week. c/o worsening cough/congestion, fatigue, NVD. HISTORY OF PRESENTILLNESS   (Location/Symptom, Timing/Onset, Context/Setting, Quality, Duration, Modifying Factors, Severity)  Note limiting factors. Socorro Russell is a 74 y.o. female for increased shortness of breath. Onset was 1. Context includes patient states that she tested positive for COVID 1 week ago. Patient states that she has had continued increased shortness of breath since her diagnosis. Patient reports that when she walks to the mailbox she does become short of breath. She denies any chest pain. Alleviating factors include nothing. Aggravating factors include elevation. Pain is 0/10. Nothing has been used for pain today. Nursing Notes were all reviewed and agreed with or any disagreements were addressed  in the HPI. REVIEW OF SYSTEMS    (2-9 systems for level 4, 10 or more for level 5)     Review of Systems   Constitutional: Negative for activity change, appetite change and fever. Positive covid   HENT: Negative for congestion, facial swelling, rhinorrhea and sore throat. Eyes: Negative for visual disturbance. Respiratory: Positive for shortness of breath. Cardiovascular: Negative for chest pain. Gastrointestinal: Negative for abdominal pain. Genitourinary: Negative for difficulty urinating. Musculoskeletal: Negative for arthralgias and myalgias. Skin: Negative for color change and rash. Neurological: Negative for dizziness and light-headedness. Psychiatric/Behavioral: Negative for agitation. All other systems reviewed and are negative. Positives and Pertinent negatives as per HPI. Except as noted above in the ROS, all other systems were reviewed and negative.        PAST MEDICAL HISTORY     Past Medical History:   Diagnosis Date    Arthritis     Colon cancer ([address]     Hyperlipidemia     Thyroid disease          SURGICAL HISTORY       Past Surgical History:   Procedure Laterality Date    APPENDECTOMY  2009    CHOLECYSTECTOMY  2009    COLON SURGERY  2009    EPIDURAL STEROID INJECTION Left 4/15/2019    LEFT LUMBAR FIVE SACRAL ONE EPIDURAL STEROID INJECTION SITE CONFIRMED BY FLUOROSCOPY performed by Marques Morales MD at [facility] Left 11/5/2019    LEFT LUMBAR FIVE SACRAL ONE EPIDURAL STEROID INJECTION SITE CONFIRMED BY FLUOROSCOPY performed by Marques Morales MD at [facility] REMOVAL           CURRENT MEDICATIONS       Current Discharge Medication List      CONTINUE these medications which have NOT CHANGED    Details   PAXLOVID 20 x 150 MG & 10 x 100MG       levocetirizine (XYZAL) 5 MG tablet       aspirin 81 MG tablet Take 81 mg by mouth daily      atorvastatin (LIPITOR) 10 MG tablet Take 20 mg by mouth      levothyroxine (SYNTHROID) 137 MCG tablet       citalopram (CELEXA) 20 MG tablet       Omega-3 Fatty Acids (FISH OIL) 500 MG CAPS Take 1 g by mouth      PROBIOTIC PRODUCT PO Take by mouth               ALLERGIES     Bee venom, Adhesive tape, Tetracyclines & related, and Sulfa antibiotics    FAMILY HISTORY       Family History   Problem Relation Age of Onset    High Blood Pressure Sister     Cancer Sister     High Blood Pressure Brother     Heart Attack Brother     Cancer Brother           SOCIAL HISTORY       Social History     Socioeconomic History    Marital status:      Spouse name: Kelly Bravo Number of children: 3    Years of education: 15    Highest education level: None   Occupational History    None   Tobacco Use    Smoking status: Former Smoker     Quit date: 3/17/2000     Years since quittin.2    Smokeless tobacco: Never Used   Vaping Use    Vaping Use: Never used   Substance and Sexual Activity    Alcohol use: Yes     Alcohol/week: 0.0 standard drinks     Comment: socially- glass of wine    Drug use: Never    Sexual activity: Yes     Partners: Male   Other Topics Concern    None   Social History Narrative    None     Social Determinants of Health     Financial Resource Strain:     Difficulty of Paying Living Expenses: Not on file   Food Insecurity:     Worried About Running Out of Food in the Last Year: Not on file    Nabil of Food in the Last Year: Not on file   Transportation Needs:     Lack of Transportation (Medical): Not on file    Lack of Transportation (Non-Medical):  Not on file   Physical Activity:     Days of Exercise per Week: Not on file    Minutes of Exercise per Session: Not on file   Stress:     Feeling of Stress : Not on file   Social Connections:     Frequency of Communication with Friends and Family: Not on file    Frequency of Social Gatherings with Friends and Family: Not on file    Attends Mu-ism Services: Not on file    Active Member of [address] [place] [address] [employer] or Organizations: Not on file    Attends Club or Organization Meetings: Not on file    Marital Status: Not on file   Intimate Partner Violence:     Fear of Current or Ex-Partner: Not on file    Emotionally Abused: Not on file    Physically Abused: Not on file    Sexually Abused: Not on file   Housing Stability:     Unable to Pay for Housing in the Last Year: Not on file    Number of [place] in the Last Year: Not on file    Unstable Housing in the Last Year: Not on file       SCREENINGS    [place] Coma Scale  Eye Opening: Spontaneous  Best Verbal Response: Oriented  Best Motor Response: Obeys commands  [place] Coma Scale Score: 15        PHYSICAL EXAM  (up to 7 for level 4, 8 or more for level 5)     ED Triage Vitals   BP Temp Temp Source Heart Rate Resp SpO2 Height Weight   06/22/22 1257 06/22/22 1302 06/22/22 1302 06/22/22 1257 06/22/22 1257 06/22/22 1257 06/22/22 1257 06/22/22 1257   (!) 153/92 97.9 °F (36.6 °C) Oral 82 16 92 % 5' 4\" (1.626 m) 145 lb (65.8 kg)       Physical Exam  Constitutional:       Appearance: She is well-developed. HENT:      Head: Normocephalic and atraumatic. Cardiovascular:      Rate and Rhythm: Normal rate. Pulmonary:      Effort: Accessory muscle usage and respiratory distress present. Breath sounds: Examination of the right-middle field reveals decreased breath sounds. Examination of the left-middle field reveals decreased breath sounds. Examination of the right-lower field reveals decreased breath sounds. Examination of the left-lower field reveals decreased breath sounds. Decreased breath sounds present. Abdominal:      General: There is no distension. Palpations: Abdomen is soft. Tenderness: There is no abdominal tenderness. Musculoskeletal:         General: Normal range of motion. Cervical back: Normal range of motion. Skin:     General: Skin is warm and dry. Neurological:      Mental Status: She is alert and oriented to person, place, and time.          DIAGNOSTIC RESULTS   LABS:    Labs Reviewed   COMPREHENSIVE METABOLIC PANEL W/ REFLEX TO MG FOR LOW K - Abnormal; Notable for the following components:       Result Value    Sodium 134 (*)     Glucose 103 (*)     BUN 6 (*)     All other components within normal limits   URINALYSIS WITH REFLEX TO CULTURE - Abnormal; Notable for the following components:    Blood, Urine TRACE-LYSED (*)     All other components within normal limits   MICROSCOPIC URINALYSIS - Abnormal; Notable for the following components:    Bacteria, UA Rare (*)     All other components within normal limits   CBC WITH AUTO DIFFERENTIAL   LIPASE   CBC WITH AUTO DIFFERENTIAL   VITAMIN D 25 HYDROXY   COMPREHENSIVE METABOLIC PANEL W/ REFLEX TO MG FOR LOW K       All other labs werewithin normal range or not returned as of this dictation. EKG: All EKG's are interpreted by the Emergency Department Physician who either signs or Co-signs this chart in the absence of a cardiologist.  Please see their note for interpretation of EKG. RADIOLOGY:     Chest x-ray interpreted by radiologist for  Impression:    No acute process. Ray Levans is no change from prior examination. CT chest pulmonary embolism with contrast currently pending. Interpretation per the Radiologist below, if available at the time of this note:    CT CHEST PULMONARY EMBOLISM W CONTRAST   Final Result   No evidence of a pulmonary embolus. Mildly dilated and moderately atherosclerotic thoracic aorta which is most   prominent along the descending thoracic and upper abdominal aorta with no   aneurysm seen. Mild aneurysmal dilatation of the ascending aorta measuring 3.5 cm with no   dissection. Recommend follow-up to assure stability. Hazy ground-glass opacities scattered along the upper lobes and into the lung   bases anteriorly which could represent early multisegmental bronchopneumonia. This pattern of pneumonia has been described with COVID-19 disease. Recommend correlating with lab values. Status post cholecystectomy and a small hepatic cyst.         XR CHEST (2 VW)   Final Result   No acute process. There is no change from prior examination. XR CHEST (2 VW)    Result Date: 6/22/2022  EXAMINATION: TWO XRAY VIEWS OF THE CHEST 6/22/2022 1:11 pm COMPARISON: 09/12/2019 HISTORY: ORDERING SYSTEM PROVIDED HISTORY: cough TECHNOLOGIST PROVIDED HISTORY: Reason for exam:->cough Reason for exam:->Shortness of breath Reason for Exam: cough; congestion; fatigue; COVID Positive FINDINGS: The lungs are without acute focal process. There is no effusion or pneumothorax.  The cardiomediastinal silhouette is without acute process. The osseous structures are without acute process. No acute process. There is no change from prior examination.          PROCEDURES   Unless otherwise noted below, none     Procedures     CRITICAL CARE TIME   N/A    CONSULTS:  None      EMERGENCYDEPARTMENT COURSE and DIFFERENTIAL DIAGNOSIS/MDM:   Vitals:    Vitals:    06/22/22 1601 06/22/22 1622 06/22/22 1731 06/22/22 1806   BP: (!) 142/86  (!) 151/87 (!) 147/86   Pulse: 77  83 78   Resp: 19 17 18   Temp:       TempSrc:       SpO2: (!) 89% (!) 89% 91% 93%   Weight:       Height:           Patient was given the following medications:  Medications   levothyroxine (SYNTHROID) tablet 137 mcg (has no administration in time range)   citalopram (CELEXA) tablet 20 mg (has no administration in time range)   lactobacillus (CULTURELLE) capsule 1 capsule (has no administration in time range)   atorvastatin (LIPITOR) tablet 20 mg (has no administration in time range)   aspirin chewable tablet 81 mg (has no administration in time range)   cetirizine (ZYRTEC) tablet 10 mg (has no administration in time range)   nirmatrelvir/ritonavir (PAXLOVID) 3 tablet (has no administration in time range)   Fish Oil CAPS 1,000 mg (has no administration in time range)   meloxicam (MOBIC) tablet 15 mg (has no administration in time range)   sodium chloride flush 0.9 % injection 5-40 mL (has no administration in time range)   sodium chloride flush 0.9 % injection 5-40 mL (has no administration in time range)   0.9 % sodium chloride infusion (has no administration in time range)   potassium chloride (KLOR-CON M) extended release tablet 40 mEq (has no administration in time range)     Or   potassium bicarb-citric acid (EFFER-K) effervescent tablet 40 mEq (has no administration in time range)     Or   potassium chloride 10 mEq/100 mL IVPB (Peripheral Line) (has no administration in time range)   magnesium sulfate 1000 mg in dextrose 5% 100 mL IVPB (has no administration in time range)   ondansetron (ZOFRAN-ODT) disintegrating tablet 4 mg (has no administration in time range)     Or   ondansetron (ZOFRAN) injection 4 mg (has no administration in time range)   polyethylene glycol (GLYCOLAX) packet 17 g (has no administration in time range)   acetaminophen (TYLENOL) tablet 650 mg (has no administration in time range)     Or   acetaminophen (TYLENOL) suppository 650 mg (has no administration in time range)   enoxaparin Sodium (LOVENOX) injection 30 mg (has no administration in time range)   dexamethasone (PF) (DECADRON) injection 6 mg (has no administration in time range)   iopamidol (ISOVUE-370) 76 % injection 85 mL (85 mLs IntraVENous Given 6/22/22 1645)   acetaminophen (TYLENOL) tablet 650 mg (650 mg Oral Given 6/22/22 1901)       Patient was seen and evaluated by myself. Patient here for complaints of increased shortness of breath. Patient reports that she was tested positive for COVID 1 week ago. Patient states that over the last week she has had progressive increased shortness of breath. She reports that her rest of breath is is worse with exertion. She reports walking to the mailbox is very difficult because she becomes short of breath. She denies any chest pain. On exam she is awake and alert hemodynamically stable she does have a little bit of accessory muscles with breathing. Her oxygen saturations are 91. Family reports they do have an oximeter at home and they were checking her and she did drop to 87%. Patient did have an episode of hypoxia when she ambulated. Patient's oxygen saturation dropped to 88%. Patient was placed on 2 L nasal cannula. Consult was placed to the hospitalist for admission. CT of her chest pulmonary embolism was obtained however due to the power outage in the ED reads are delayed. Hospitalist has accepted the patient. Patient's care was transferred to the hospitalist at this time.     The patient tolerated

## 2022-06-22 NOTE — ED PROVIDER NOTES
I was available for consultation on the patient if deemed necessary by advanced practice provider. I was not involved in the care of this patient nor had any participation in the medical decision making process. I was the attending on duty when the patient came to the ER was seen independently by the SHERICE. The patient was discharged or admitted from the ER without my knowledge. I was available for consultation but was not requested to evaluate the patient, nor asked supervised the case. I did not see the patient and I am signing this chart administratively after the fact. EKG  The Ekg interpreted by me shows  normal sinus rhythm with a rate of 74  Axis is   Normal  QTc is  within an acceptable range  Intervals and Durations are unremarkable. ST Segments: normal  Delta waves, Brugada Syndrome, and Short PA are not present. Prior EKG to compare with was not available.         Marcelina Shaw MD  06/22/22 9666

## 2022-06-23 PROBLEM — J12.82 PNEUMONIA DUE TO 2019 NOVEL CORONAVIRUS: Status: ACTIVE | Noted: 2022-06-22

## 2022-06-23 PROBLEM — M54.9 CHRONIC BACK PAIN: Status: ACTIVE | Noted: 2022-06-23

## 2022-06-23 PROBLEM — E03.9 HYPOTHYROIDISM: Status: ACTIVE | Noted: 2022-06-23

## 2022-06-23 PROBLEM — G89.29 CHRONIC BACK PAIN: Status: ACTIVE | Noted: 2022-06-23

## 2022-06-23 LAB
A/G RATIO: 1.7 (ref 1.1–2.2)
ALBUMIN SERPL-MCNC: 4 G/DL (ref 3.4–5)
ALP BLD-CCNC: 90 U/L (ref 40–129)
ALT SERPL-CCNC: 12 U/L (ref 10–40)
ANION GAP SERPL CALCULATED.3IONS-SCNC: 11 MMOL/L (ref 3–16)
AST SERPL-CCNC: 18 U/L (ref 15–37)
BASOPHILS ABSOLUTE: 0 K/UL (ref 0–0.2)
BASOPHILS RELATIVE PERCENT: 0.1 %
BILIRUB SERPL-MCNC: 0.3 MG/DL (ref 0–1)
BUN BLDV-MCNC: 8 MG/DL (ref 7–20)
CALCIUM SERPL-MCNC: 9 MG/DL (ref 8.3–10.6)
CHLORIDE BLD-SCNC: 99 MMOL/L (ref 99–110)
CO2: 22 MMOL/L (ref 21–32)
CREAT SERPL-MCNC: 0.7 MG/DL (ref 0.6–1.2)
EKG ATRIAL RATE: 74 BPM
EKG DIAGNOSIS: NORMAL
EKG P AXIS: 64 DEGREES
EKG P-R INTERVAL: 146 MS
EKG Q-T INTERVAL: 416 MS
EKG QRS DURATION: 86 MS
EKG QTC CALCULATION (BAZETT): 461 MS
EKG R AXIS: 40 DEGREES
EKG T AXIS: 67 DEGREES
EKG VENTRICULAR RATE: 74 BPM
EOSINOPHILS ABSOLUTE: 0 K/UL (ref 0–0.6)
EOSINOPHILS RELATIVE PERCENT: 0 %
GFR AFRICAN AMERICAN: >60
GFR NON-AFRICAN AMERICAN: >60
GLUCOSE BLD-MCNC: 164 MG/DL (ref 70–99)
HCT VFR BLD CALC: 38.9 % (ref 36–48)
HEMOGLOBIN: 13.3 G/DL (ref 12–16)
LYMPHOCYTES ABSOLUTE: 0.7 K/UL (ref 1–5.1)
LYMPHOCYTES RELATIVE PERCENT: 29.2 %
MCH RBC QN AUTO: 29.9 PG (ref 26–34)
MCHC RBC AUTO-ENTMCNC: 34.2 G/DL (ref 31–36)
MCV RBC AUTO: 87.3 FL (ref 80–100)
MONOCYTES ABSOLUTE: 0 K/UL (ref 0–1.3)
MONOCYTES RELATIVE PERCENT: 1.6 %
NEUTROPHILS ABSOLUTE: 1.7 K/UL (ref 1.7–7.7)
NEUTROPHILS RELATIVE PERCENT: 69.1 %
PDW BLD-RTO: 14.5 % (ref 12.4–15.4)
PLATELET # BLD: 189 K/UL (ref 135–450)
PMV BLD AUTO: 8.6 FL (ref 5–10.5)
POTASSIUM REFLEX MAGNESIUM: 4.3 MMOL/L (ref 3.5–5.1)
PROCALCITONIN: 0.03 NG/ML (ref 0–0.15)
RBC # BLD: 4.45 M/UL (ref 4–5.2)
SODIUM BLD-SCNC: 132 MMOL/L (ref 136–145)
TOTAL PROTEIN: 6.4 G/DL (ref 6.4–8.2)
VITAMIN D 25-HYDROXY: 37.3 NG/ML
WBC # BLD: 2.5 K/UL (ref 4–11)

## 2022-06-23 PROCEDURE — 36415 COLL VENOUS BLD VENIPUNCTURE: CPT

## 2022-06-23 PROCEDURE — 87205 SMEAR GRAM STAIN: CPT

## 2022-06-23 PROCEDURE — 93010 ELECTROCARDIOGRAM REPORT: CPT | Performed by: INTERNAL MEDICINE

## 2022-06-23 PROCEDURE — 82306 VITAMIN D 25 HYDROXY: CPT

## 2022-06-23 PROCEDURE — 6370000000 HC RX 637 (ALT 250 FOR IP)

## 2022-06-23 PROCEDURE — 6370000000 HC RX 637 (ALT 250 FOR IP): Performed by: INTERNAL MEDICINE

## 2022-06-23 PROCEDURE — 85025 COMPLETE CBC W/AUTO DIFF WBC: CPT

## 2022-06-23 PROCEDURE — 6360000002 HC RX W HCPCS: Performed by: INTERNAL MEDICINE

## 2022-06-23 PROCEDURE — 2580000003 HC RX 258: Performed by: INTERNAL MEDICINE

## 2022-06-23 PROCEDURE — 99223 1ST HOSP IP/OBS HIGH 75: CPT | Performed by: INTERNAL MEDICINE

## 2022-06-23 PROCEDURE — 2700000000 HC OXYGEN THERAPY PER DAY

## 2022-06-23 PROCEDURE — 99222 1ST HOSP IP/OBS MODERATE 55: CPT | Performed by: INTERNAL MEDICINE

## 2022-06-23 PROCEDURE — 84145 PROCALCITONIN (PCT): CPT

## 2022-06-23 PROCEDURE — 1200000000 HC SEMI PRIVATE

## 2022-06-23 PROCEDURE — 94761 N-INVAS EAR/PLS OXIMETRY MLT: CPT

## 2022-06-23 PROCEDURE — 87070 CULTURE OTHR SPECIMN AEROBIC: CPT

## 2022-06-23 PROCEDURE — 80053 COMPREHEN METABOLIC PANEL: CPT

## 2022-06-23 RX ORDER — PROCHLORPERAZINE EDISYLATE 5 MG/ML
10 INJECTION INTRAMUSCULAR; INTRAVENOUS EVERY 6 HOURS PRN
Status: DISCONTINUED | OUTPATIENT
Start: 2022-06-24 | End: 2022-06-24 | Stop reason: HOSPADM

## 2022-06-23 RX ORDER — PROCHLORPERAZINE EDISYLATE 5 MG/ML
10 INJECTION INTRAMUSCULAR; INTRAVENOUS ONCE
Status: COMPLETED | OUTPATIENT
Start: 2022-06-23 | End: 2022-06-23

## 2022-06-23 RX ORDER — GUAIFENESIN 600 MG/1
600 TABLET, EXTENDED RELEASE ORAL 2 TIMES DAILY
Status: DISCONTINUED | OUTPATIENT
Start: 2022-06-23 | End: 2022-06-24 | Stop reason: HOSPADM

## 2022-06-23 RX ORDER — ALBUTEROL SULFATE 90 UG/1
2 AEROSOL, METERED RESPIRATORY (INHALATION) EVERY 6 HOURS PRN
Status: DISCONTINUED | OUTPATIENT
Start: 2022-06-23 | End: 2022-06-24 | Stop reason: HOSPADM

## 2022-06-23 RX ORDER — SODIUM CHLORIDE 9 MG/ML
INJECTION, SOLUTION INTRAVENOUS CONTINUOUS
Status: DISCONTINUED | OUTPATIENT
Start: 2022-06-23 | End: 2022-06-24 | Stop reason: HOSPADM

## 2022-06-23 RX ORDER — KETOROLAC TROMETHAMINE 30 MG/ML
15 INJECTION, SOLUTION INTRAMUSCULAR; INTRAVENOUS ONCE
Status: COMPLETED | OUTPATIENT
Start: 2022-06-23 | End: 2022-06-23

## 2022-06-23 RX ORDER — HYDROCODONE BITARTRATE AND ACETAMINOPHEN 5; 325 MG/1; MG/1
1 TABLET ORAL EVERY 6 HOURS PRN
Status: DISCONTINUED | OUTPATIENT
Start: 2022-06-23 | End: 2022-06-24 | Stop reason: HOSPADM

## 2022-06-23 RX ORDER — IPRATROPIUM BROMIDE AND ALBUTEROL SULFATE 2.5; .5 MG/3ML; MG/3ML
1 SOLUTION RESPIRATORY (INHALATION)
Status: DISCONTINUED | OUTPATIENT
Start: 2022-06-23 | End: 2022-06-23

## 2022-06-23 RX ORDER — DIPHENHYDRAMINE HYDROCHLORIDE 50 MG/ML
25 INJECTION INTRAMUSCULAR; INTRAVENOUS ONCE
Status: COMPLETED | OUTPATIENT
Start: 2022-06-23 | End: 2022-06-23

## 2022-06-23 RX ADMIN — HYDROCODONE BITARTRATE AND ACETAMINOPHEN 1 TABLET: 5; 325 TABLET ORAL at 12:17

## 2022-06-23 RX ADMIN — DIPHENHYDRAMINE HYDROCHLORIDE 25 MG: 50 INJECTION, SOLUTION INTRAMUSCULAR; INTRAVENOUS at 22:18

## 2022-06-23 RX ADMIN — Medication 10 ML: at 12:16

## 2022-06-23 RX ADMIN — ACETAMINOPHEN 650 MG: 325 TABLET ORAL at 01:29

## 2022-06-23 RX ADMIN — PROCHLORPERAZINE EDISYLATE 10 MG: 5 INJECTION INTRAMUSCULAR; INTRAVENOUS at 22:17

## 2022-06-23 RX ADMIN — MELOXICAM 15 MG: 15 TABLET ORAL at 12:17

## 2022-06-23 RX ADMIN — ASPIRIN 81 MG: 81 TABLET, CHEWABLE ORAL at 12:17

## 2022-06-23 RX ADMIN — Medication 10 ML: at 20:48

## 2022-06-23 RX ADMIN — SODIUM CHLORIDE: 9 INJECTION, SOLUTION INTRAVENOUS at 14:44

## 2022-06-23 RX ADMIN — GUAIFENESIN 600 MG: 600 TABLET, EXTENDED RELEASE ORAL at 12:17

## 2022-06-23 RX ADMIN — CETIRIZINE HYDROCHLORIDE 10 MG: 10 TABLET ORAL at 12:16

## 2022-06-23 RX ADMIN — Medication 1 CAPSULE: at 12:17

## 2022-06-23 RX ADMIN — CITALOPRAM HYDROBROMIDE 20 MG: 20 TABLET ORAL at 12:17

## 2022-06-23 RX ADMIN — DEXAMETHASONE SODIUM PHOSPHATE 6 MG: 10 INJECTION, SOLUTION INTRAMUSCULAR; INTRAVENOUS at 20:46

## 2022-06-23 RX ADMIN — KETOROLAC TROMETHAMINE 15 MG: 30 INJECTION, SOLUTION INTRAMUSCULAR at 22:15

## 2022-06-23 RX ADMIN — ENOXAPARIN SODIUM 30 MG: 100 INJECTION SUBCUTANEOUS at 20:47

## 2022-06-23 RX ADMIN — ONDANSETRON 4 MG: 4 TABLET, ORALLY DISINTEGRATING ORAL at 14:40

## 2022-06-23 RX ADMIN — ACETAMINOPHEN 650 MG: 650 SUPPOSITORY RECTAL at 21:03

## 2022-06-23 RX ADMIN — LEVOTHYROXINE SODIUM 137 MCG: 0.11 TABLET ORAL at 06:13

## 2022-06-23 RX ADMIN — HYDROCODONE BITARTRATE AND ACETAMINOPHEN 1 TABLET: 5; 325 TABLET ORAL at 18:31

## 2022-06-23 ASSESSMENT — PAIN DESCRIPTION - DESCRIPTORS
DESCRIPTORS: ACHING
DESCRIPTORS: ACHING

## 2022-06-23 ASSESSMENT — PAIN DESCRIPTION - LOCATION
LOCATION: HEAD
LOCATION: HEAD
LOCATION: HEAD;NECK
LOCATION: HEAD
LOCATION: HEAD

## 2022-06-23 ASSESSMENT — PAIN SCALES - WONG BAKER: WONGBAKER_NUMERICALRESPONSE: 0

## 2022-06-23 ASSESSMENT — PAIN SCALES - GENERAL
PAINLEVEL_OUTOF10: 8
PAINLEVEL_OUTOF10: 10
PAINLEVEL_OUTOF10: 8
PAINLEVEL_OUTOF10: 10
PAINLEVEL_OUTOF10: 10

## 2022-06-23 ASSESSMENT — PAIN DESCRIPTION - PAIN TYPE: TYPE: ACUTE PAIN

## 2022-06-23 NOTE — PROGRESS NOTES
Admission assessment complete. See doc flow. A/O x4. Covid positive. Patient O2 sats 92-94% on room air at rest. Patient desats with ambulation. Patient with a congested cough present. Expiratory wheezing present. Patient unable to take nightly meds due to nausea, PRN Zofran given by another RN. Tylenol given for a headache. Patient c/o generalized weakness and fatigue. Patient able to ambulate to the [facility] only independently, otherwise patient will call for assistance. Call light and bedside table within easy reach.

## 2022-06-23 NOTE — H&P
Hospital Medicine History & Physical      PCP: Brenda Avila, APRN - CNP    Date of Admission: 6/22/2022    Date of Service: Pt seen/examined on 6/23/2022     Chief Complaint:    Chief Complaint   Patient presents with    Positive For Covid-19     tested postive for covid last week. c/o worsening cough/congestion, fatigue, NVD. History Of Present Illness: The patient is a 74 y.o. female with pmhx of colon cancer hypothyroidism, HLD who presented to [facility] ED with complaint of testing positive for covid last week and not having improvement in symptoms. Patient has had fever, chills, myalgias for the last week. Unsure of tmax at home. She has also had persistent productive cough with clear/yellow phlegm. She has felt short of breath with exertion. Pt has felt extremely fatigued and has not had an appetite. She denies any nausea, vomiting, diarrhea, dizziness, syncope, hematuria, dysuria. She quit smoking in 1999  She does not smoke any more or have any known lung disease. PCP put her on paxlovid last week and taken only 2 days  She is vaccinated x 2.      Workup showed mild hypoxia and covid pna and was admitted    Past Medical History:        Diagnosis Date    Arthritis     Colon cancer ([address]     Hyperlipidemia     Thyroid disease        Past Surgical History:        Procedure Laterality Date    APPENDECTOMY  2009    CHOLECYSTECTOMY  2009    COLON SURGERY  2009    EPIDURAL STEROID INJECTION Left 4/15/2019    LEFT LUMBAR FIVE SACRAL ONE EPIDURAL STEROID INJECTION SITE CONFIRMED BY FLUOROSCOPY performed by Tyronne Snellen, MD at [facility] Left 11/5/2019    LEFT LUMBAR FIVE SACRAL ONE EPIDURAL STEROID INJECTION SITE CONFIRMED BY FLUOROSCOPY performed by Tyronne Snellen, MD at [address] [place] [address],[address]      OVARY REMOVAL         Medications Prior to Admission:    Prior to Admission medications    Medication Sig Start Date End Date Taking? Authorizing Provider   PAXLOVID 20 x 150 MG & 10 x 100MG  6/20/22   Historical Provider, MD   levocetirizine (XYZAL) 5 MG tablet 5 mg nightly  8/9/21   Historical Provider, MD   aspirin 81 MG tablet Take 81 mg by mouth daily    Historical Provider, MD   atorvastatin (LIPITOR) 10 MG tablet Take 20 mg by mouth 7/17/17   Historical Provider, MD   levothyroxine (SYNTHROID) 137 MCG tablet 125 mcg Daily  6/28/15   Historical Provider, MD   citalopram (CELEXA) 20 MG tablet 20 mg daily  5/8/15   Historical Provider, MD   Omega-3 Fatty Acids (FISH OIL) 500 MG CAPS Take 1 g by mouth daily     Historical Provider, MD   PROBIOTIC PRODUCT PO Take by mouth    Historical Provider, MD       Allergies:  Bee venom, Adhesive tape, Tetracyclines & related, and Sulfa antibiotics    Social History:  The patient currently lives at home     TOBACCO:   reports that she quit smoking about 22 years ago. She has never used smokeless tobacco.  ETOH:   reports current alcohol use. Family History:   Positive as follows:        Problem Relation Age of Onset    High Blood Pressure Sister     Cancer Sister     High Blood Pressure Brother     Heart Attack Brother     Cancer Brother        REVIEW OF SYSTEMS:       Constitutional: + fever   HENT: + sore throat   Eyes: Negative for redness   Respiratory: + dyspnea, + cough   Cardiovascular: Negative for chest pain   Gastrointestinal: Negative for vomiting, diarrhea   Genitourinary: Negative for hematuria   Musculoskeletal: Negative for arthralgias, + myalgias    Skin: Negative for rash   Neurological: Negative for syncope   Hematological: Negative for adenopathy   Psychiatric/Behavorial: Negative for anxiety    PHYSICAL EXAM:    BP (!) 149/79   Pulse 81   Temp 97.1 °F (36.2 °C) (Oral)   Resp 16   Ht 5' 4\" (1.626 m)   Wt 145 lb (65.8 kg)   SpO2 94%   BMI 24.89 kg/m²     Gen:  No distress. Alert. Elderly female   Eyes: PERRL. No sclera icterus. No conjunctival injection. Neck: No JVD. No Carotid Bruit. Trachea midline. Resp: No accessory muscle use. No crackles. + diffuse wheezing in lungs bilaterally with scattered rhonchi   CV: Regular rate. Regular rhythm. No murmur. No rub. No edema. Peripheral Pulses: +2 palpable, equal bilaterally   GI: Non-tender. Non-distended. No masses. No organomegaly. Normal bowel sounds. No hernia. Skin: Warm and dry. No nodule on exposed extremities. No rash on exposed extremities. M/S: No cyanosis. No joint deformity. No clubbing. Neuro: Awake. Grossly nonfocal    Psych: Oriented x 3. No anxiety or agitation. Erin Calixto MD have reviewed the chart on [facility]an and personally interviewed and examined patient, reviewed the data (labs and imaging) and after discussion with my PA formulated the plan. Agree with note with the following edits. HPI:     I reviewed the patient's Past Medical History, Past Surgical History, Medications, and Allergies. 74 y.o. female with pmhx of colon cancer hypothyroidism, HLD who presented to [facility] ED with complaint of testing positive for covid last week and not having improvement in symptoms. Patient has had fever, chills, myalgias for the last week. Unsure of tmax at home. She has also had persistent productive cough with clear/yellow phlegm. She has felt short of breath with exertion. Pt has felt extremely fatigued and has not had an appetite. She denies any nausea, vomiting, diarrhea, dizziness, syncope, hematuria, dysuria. She does not smoke or have any known lung disease. PCP put her on paxlovid last week. She quit smoking in 1999  She does not smoke any more or have any known lung disease. PCP put her on paxlovid last week and taken only 2 days  She is vaccinated x 2. Workup showed mild hypoxia and covid pna and was admitted        General:  Elderly female, healthy appearing  Awake, alert and oriented.  Appears to be not in any distress  Mucous Membranes:  Pink , anicteric  Neck: No JVD, no carotid bruit, no thyromegaly  Chest:  Clear to auscultation bilaterally, right basilar crackles  Cardiovascular:  RRR S1S2 heard, no murmurs or gallops  Abdomen:  Soft, undistended, non tender, no organomegaly, BS present  Extremities: No edema or cyanosis. Distal pulses well felt  Neurological : grossly normal with mild gen weakness            Lab Results   Component Value Date    WBC 2.5 (L) 06/23/2022    HGB 13.3 06/23/2022    HCT 38.9 06/23/2022    MCV 87.3 06/23/2022     06/23/2022     Lab Results   Component Value Date     (L) 06/23/2022    K 4.3 06/23/2022    CL 99 06/23/2022    CO2 22 06/23/2022    BUN 8 06/23/2022    CREATININE 0.7 06/23/2022    GLUCOSE 164 (H) 06/23/2022    CALCIUM 9.0 06/23/2022    PROT 6.4 06/23/2022    LABALBU 4.0 06/23/2022    BILITOT 0.3 06/23/2022    ALKPHOS 90 06/23/2022    AST 18 06/23/2022    ALT 12 06/23/2022    LABGLOM >60 06/23/2022    GFRAA >60 06/23/2022    AGRATIO 1.7 06/23/2022    GLOB 2.3 08/14/2021         U/A:    Lab Results   Component Value Date    COLORU Straw 06/22/2022    WBCUA 0-2 06/22/2022    RBCUA 3-4 06/22/2022    BACTERIA Rare 06/22/2022    CLARITYU Clear 06/22/2022    SPECGRAV <=1.005 06/22/2022    LEUKOCYTESUR Negative 06/22/2022    BLOODU TRACE-LYSED 06/22/2022    GLUCOSEU Negative 06/22/2022         CULTURES  COVID +      EKG: Normal sinus rhythmNonspecific ST and T wave abnormalityBaseline artifactConfirmed by HATTIE Blake MD ([id number]) on 6/23/2022 7:08:34 AM    RADIOLOGY  CT CHEST PULMONARY EMBOLISM W CONTRAST   Final Result   No evidence of a pulmonary embolus. Mildly dilated and moderately atherosclerotic thoracic aorta which is most   prominent along the descending thoracic and upper abdominal aorta with no   aneurysm seen. Mild aneurysmal dilatation of the ascending aorta measuring 3.5 cm with no   dissection. Recommend follow-up to assure stability.       Hazy ground-glass opacities scattered along the upper lobes and into the lung   bases anteriorly which could represent early multisegmental bronchopneumonia. This pattern of pneumonia has been described with COVID-19 disease. Recommend correlating with lab values. Status post cholecystectomy and a small hepatic cyst.         XR CHEST (2 VW)   Final Result   No acute process. There is no change from prior examination. ASSESSMENT/PLAN:    #COVID+ Pneumonia   #Acute hypoxia   -hypoxic on 2 L O2 NC at adm, no baseline home O2  - is on RA today, will ambulate and check pulse ox   -leukopenia mild sec to viral infection   -on decadron and paxlovid   -droplet +  -added mucinex      #Hyponatremia   -hypovolemic? Encourage fluids - start IVF  -will monitor     #HTN   -BP has been elevated during stay   -could be 2/2 to above  -needs to follow up with PCP       #Hypothyroidism   -continue synthroid     #Ascending Aorta Aneurysm   -3.5 cm   -recommend follow up with PCP or vascular     #Hx of rectal cancer   -s/p resection 2009  -completed chemo and radiation     #OA  -on mobic   -follows with ortho       DVT Prophylaxis: lovenox   Diet: ADULT DIET;  Regular  Code Status: Full Code    LENNOX Olivarez  06/23/22  9:22 AM    Agree with above  Changes made to note    Puneet Stewart MD,6/23/2022 10:39 AM

## 2022-06-23 NOTE — CONSULTS
Patient is being seen at the request of Dr. Timi Arango for a consultation for [id number] and respiratory failure   Hospital Day: 2     HISTORY OF PRESENT ILLNESS: Socorro Russell is a 74 y.o. female with no known lung disease who presented to the ED on 6/22/2022 with a known COVID-19 diagnosis the week prior now with a 1-week history of progressive moderate shortness of breath, worse with ambulation and not improved with Paxlovid from PCP started on 6/20/22; associated with hypoxia to 87% & viral symptoms that began Tues 6/14/22 including fevers, myalgias, cough with yellow sputum, chest congestion, severe fatigue & nausea. States she tested COVID+ at [id number] but unable to find record of this. Her baseline is RA. In the ER, pt was in mild respiratory distress and did desat with ambulation requiring 2 L O2 with improvement. She is vaccinated for COVID-19 x2. She is currently down to RA. PAST MEDICAL HISTORY:  Past Medical History:   Diagnosis Date    Arthritis     [address] [facility] ([facility] [address]     Hyperlipidemia     Thyroid disease      PAST SURGICAL HISTORY:  Past Surgical History:   Procedure Laterality Date    APPENDECTOMY  2009    CHOLECYSTECTOMY  2009    COLON SURGERY  2009    EPIDURAL STEROID INJECTION Left 4/15/2019    LEFT LUMBAR FIVE SACRAL ONE EPIDURAL STEROID INJECTION SITE CONFIRMED BY FLUOROSCOPY performed by Ras Salomon MD at [facility] Left 11/5/2019    LEFT LUMBAR FIVE SACRAL ONE EPIDURAL STEROID INJECTION SITE CONFIRMED BY FLUOROSCOPY performed by Ras Salomon MD at [address] [place] [address],[address]      OVARY REMOVAL         FAMILY HISTORY:  family history includes Cancer in her brother and sister; Heart Attack in her brother; High Blood Pressure in her brother and sister. SOCIAL HISTORY:   reports that she quit smoking about 22 years ago.  She has never used smokeless tobacco.    Scheduled Meds:   guaiFENesin  600 mg Oral BID    levothyroxine  137 mcg Oral Daily    citalopram  20 mg Oral Daily    lactobacillus  1 capsule Oral Daily    atorvastatin  20 mg Oral Nightly    aspirin  81 mg Oral Daily    cetirizine  10 mg Oral Daily    nirmatrelvir/ritonavir  3 tablet Oral Q12H    meloxicam  15 mg Oral Daily    sodium chloride flush  5-40 mL IntraVENous 2 times per day    enoxaparin  30 mg SubCUTAneous BID    dexamethasone  6 mg IntraVENous Q24H     Continuous Infusions:   sodium chloride       PRN Meds:  albuterol sulfate HFA, sodium chloride flush, sodium chloride, potassium chloride **OR** potassium alternative oral replacement **OR** potassium chloride, magnesium sulfate, ondansetron **OR** ondansetron, polyethylene glycol, acetaminophen **OR** acetaminophen    ALLERGIES:  Patient is allergic to bee venom, adhesive tape, tetracyclines & related, and sulfa antibiotics. REVIEW OF SYSTEMS:  Constitutional: + for fever  HENT: Negative for sore throat  Eyes: Negative for redness   Respiratory: + for dyspnea, + cough  Cardiovascular: Negative for chest pain  Gastrointestinal: Negative for vomiting, +diarrhea  Genitourinary: Negative for hematuria   Musculoskeletal: +for myalgias   Skin: Negative for rash  Neurological: Negative for syncope  Hematological: Negative for adenopathy  Psychiatric/Behavorial: Negative for anxiety    PHYSICAL EXAM:  Blood pressure (!) 147/89, pulse 74, temperature 97 °F (36.1 °C), temperature source Oral, resp. rate 16, height 5' 4\" (1.626 m), weight 145 lb (65.8 kg), SpO2 92 %.' on RA  Gen:  No distress. Eyes:  PERRL. No sclera icterus. No conjunctival injection. ENT:  No discharge. Pharynx clear. Neck:  Trachea midline. No obvious mass. Resp:  No accessory muscle use. No crackles. No wheezes. + rhonchi. No dullness on percussion. CV:  Regular rate. Regular rhythm. No murmur or rub. No edema. Peripheral pulses are 2+. Capillary refill is less than 3 seconds. GI:  Non-tender. Non-distended.  No hernia. Skin:  Warm and dry. No nodule on exposed extremities. Lymph:  No cervical LAD. No supraclavicular LAD. M/S:  No cyanosis. No joint deformity. No clubbing. Neuro:  Awake. Alert. Moves all four extremities. Psych:  Oriented x 3. No anxiety. I independently performed the physical exam  Carmina Thao MD on 6/23/22, 12:22 PM EDT     LABS:  CBC:   Recent Labs     06/22/22  1413 06/23/22  0518   WBC 5.2 2.5*   HGB 14.1 13.3   HCT 41.2 38.9   MCV 87.1 87.3    189     BMP:   Recent Labs     06/22/22  1413 06/23/22  0518   * 132*   K 4.2 4.3   CL 99 99   CO2 23 22   BUN 6* 8   CREATININE 0.7 0.7     LIVER PROFILE:   Recent Labs     06/22/22  1413 06/23/22  0518   AST 23 18   ALT 15 12   LIPASE 22.0  --    BILITOT 0.5 0.3   ALKPHOS 100 90     PT/INR: No results for input(s): PROTIME, INR in the last 72 hours. APTT: No results for input(s): APTT in the last 72 hours. UA:  Recent Labs     06/22/22  1931   COLORU Straw   PHUR 7.0   WBCUA 0-2   RBCUA 3-4   BACTERIA Rare*   CLARITYU Clear   SPECGRAV <=1.005   LEUKOCYTESUR Negative   UROBILINOGEN 0.2   BILIRUBINUR Negative   BLOODU TRACE-LYSED*   GLUCOSEU Negative     No results for input(s): PHART, LHC7MTY, PO2ART in the last 72 hours. Micro:  SARS-CoV-2 positive last week    Imaging: Chest imaging was reviewed by me and showed   CTPA 6/22/2022  Mediastinum: No evidence of mediastinal lymphadenopathy.  The heart and   pericardium demonstrate no acute abnormality.  There is mild calcified plaque   throughout the aorta with which is mildly dilated throughout with mild   aneurysmal dilatation of the ascending aorta measuring up to 3.5 cm with no   dissection       Lungs/pleura: The lungs are hyperinflated and emphysematous.  There are mild   hazy subpleural ground-glass opacities along the upper lobes extending into   the right middle lobe anteriorly and into the lingula inferiorly.  There is   no pulmonary nodule or mass.  No effusion is seen. Impression   No evidence of a pulmonary embolus.       Mildly dilated and moderately atherosclerotic thoracic aorta which is most   prominent along the descending thoracic and upper abdominal aorta with no   aneurysm seen.       Mild aneurysmal dilatation of the ascending aorta measuring 3.5 cm with no   dissection.  Recommend follow-up to assure stability.       Hazy ground-glass opacities scattered along the upper lobes and into the lung   bases anteriorly which could represent early multisegmental bronchopneumonia. This pattern of pneumonia has been described with COVID-19 disease. Recommend correlating with lab values.       Status post cholecystectomy and a small hepatic cyst.     ASSESSMENT:  · COVID-19 pneumonia in partially vaccinated patient (x2)  · Acute hypoxemic respiratory failure; baseline RA  · Pulmonary emphysema on imaging  · H/O colon cancer s/p chemo, radiation & surgery 2009  · Former smoker, quit 1989    PLAN:   COVID-19 isolation, droplet plus   Supplemental O2 to maintain SaO2 >92%; monitor sats closely    Decadron D#2, 6 mg daily    Complete Paxlovid course (started 6/20/22)   Inhaled bronchodilators only as needed, MDI preferred     Patient was seen with my attending physician Dr. Magnolia Welch. I contributed to updating portions of this encounter note.    Josse Pedraza PA-C on 6/23/22 at 10:56 AM EDT

## 2022-06-23 NOTE — PLAN OF CARE
Problem: Discharge Planning  Goal: Discharge to home or other facility with appropriate resources  Outcome: Progressing  Flowsheets (Taken 6/22/2022 2041 by Balwinder Deleon RN)  Discharge to home or other facility with appropriate resources:   Identify barriers to discharge with patient and caregiver   Identify discharge learning needs (meds, wound care, etc)   Refer to discharge planning if patient needs post-hospital services based on physician order or complex needs related to functional status, cognitive ability or social support system     Problem: Pain  Goal: Verbalizes/displays adequate comfort level or baseline comfort level  Outcome: Progressing     Problem: Safety - Adult  Goal: Free from fall injury  Outcome: Progressing     Problem: Breathing Pattern - Ineffective  Goal: Use of effective breathing techniques  Description: Patient  will indicate an effective breathing pattern as evidenced by the absence of respiratory distress each shift during the inpatient hospice stay. Outcome: Progressing     Problem: Nausea/Vomiting (Adult)  Goal: Control of nausea/vomiting  Description: Patient  will exhibit decreased or eliminated episodes of nausea/vomiting as evidenced by less than 2 PRN antiemetic medication administrations each shift during the inpatient hospice stay.     Outcome: Progressing

## 2022-06-23 NOTE — ACP (ADVANCE CARE PLANNING)
Advance Care Planning     General Advance Care Planning (ACP) Conversation    Date of Conversation: 6/22/2022  Conducted with: Patient with Decision Making Capacity    Healthcare Decision Maker:    Primary Decision Maker: Maritza Holter - Spouse - [phone]    Secondary Decision Maker: Vaishali Casillas - [phone]  Click here to complete Healthcare Decision Makers including selection of the Healthcare Decision Maker Relationship (ie \"Primary\"). Today we documented Decision Maker(s) consistent with Legal Next of Kin hierarchy.     Content/Action Overview:  Has NO ACP documents/care preferences - information provided, considering goals and options  Reviewed DNR/DNI and patient elects Full Code (Attempt Resuscitation)      Length of Voluntary ACP Conversation in minutes:  <16 minutes (Non-Billable)    Ramya Cam RN

## 2022-06-23 NOTE — PROGRESS NOTES
Consult has been called to Dr. Elle Perez on 6/23/22. Spoke with logan.  10:29 AM    Jorgito Florez  6/23/2022

## 2022-06-23 NOTE — PROGRESS NOTES
4 Eyes Skin Assessment     The patient is being assess for   Admission    I agree that 2 RN's have performed a thorough Head to Toe Skin Assessment on the patient. ALL assessment sites listed below have been assessed. Areas assessed for pressure by both nurses:   [x]   Head, Face, and Ears   [x]   Shoulders, Back, and Chest, Abdomen  [x]   Arms, Elbows, and Hands   [x]   Coccyx, Sacrum, and Ischium  [x]   Legs, Feet, and Heels        Scattered bruises. Skin Assessed Under all Medical Devices by both nurses:  N/A              All Mepilex Borders were peeled back and area peeked at by both nurses:  No: N/A  Please list where Mepilex Borders are located:  N/A             **SHARE this note so that the co-signing nurse is able to place an eSignature**    Co-signer eSignature: Electronically signed by Regina Danielson RN on 6/23/22 at 5:01 AM EDT    Does the Patient have Skin Breakdown related to pressure?   No          Will Prevention initiated:  No   Wound Care Orders initiated:  No      [facility] nurse consulted for Pressure Injury (Stage 3,4, Unstageable, DTI, NWPT, Complex wounds)and New or Established Ostomies:  No      Primary Nurse eSignature: Electronically signed by Trinh Jones RN on 6/23/22 at 4:46 AM EDT

## 2022-06-23 NOTE — PROGRESS NOTES
RT Inhaler-Nebulizer Bronchodilator Protocol Note    There is a bronchodilator order in the chart from a provider indicating to follow the RT Bronchodilator Protocol and there is an Initiate RT Inhaler-Nebulizer Bronchodilator Protocol order as well (see protocol at bottom of note). CXR Findings:  No results found. The findings from the last RT Protocol Assessment were as follows:   History Pulmonary Disease: (P) Smoker 15 pack years or more  Respiratory Pattern: (P) Regular pattern and RR 12-20 bpm  Breath Sounds: (P) Clear breath sounds  Cough: (P) Strong, spontaneous, non-productive  Indication for Bronchodilator Therapy: (P) Decreased or absent breath sounds  Bronchodilator Assessment Score: (P) 1    Aerosolized bronchodilator medication orders have been revised according to the RT Inhaler-Nebulizer Bronchodilator Protocol below. Respiratory Therapist to perform RT Therapy Protocol Assessment initially then follow the protocol. Repeat RT Therapy Protocol Assessment PRN for score 0-3 or on second treatment, BID, and PRN for scores above 3. No Indications - adjust the frequency to every 6 hours PRN wheezing or bronchospasm, if no treatments needed after 48 hours then discontinue using Per Protocol order mode. If indication present, adjust the RT bronchodilator orders based on the Bronchodilator Assessment Score as indicated below. Use Inhaler orders unless patient has one or more of the following: on home nebulizer, not able to hold breath for 10 seconds, is not alert and oriented, cannot activate and use MDI correctly, or respiratory rate 25 breaths per minute or more, then use the equivalent nebulizer order(s) with same Frequency and PRN reasons based on the score. If a patient is on this medication at home then do not decrease Frequency below that used at home.     0-3 - enter or revise RT bronchodilator order(s) to equivalent RT Bronchodilator order with Frequency of every 4 hours PRN for wheezing or increased work of breathing using Per Protocol order mode. 4-6 - enter or revise RT Bronchodilator order(s) to two equivalent RT bronchodilator orders with one order with BID Frequency and one order with Frequency of every 4 hours PRN wheezing or increased work of breathing using Per Protocol order mode. 7-10 - enter or revise RT Bronchodilator order(s) to two equivalent RT bronchodilator orders with one order with TID Frequency and one order with Frequency of every 4 hours PRN wheezing or increased work of breathing using Per Protocol order mode. 11-13 - enter or revise RT Bronchodilator order(s) to one equivalent RT bronchodilator order with QID Frequency and an Albuterol order with Frequency of every 4 hours PRN wheezing or increased work of breathing using Per Protocol order mode. Greater than 13 - enter or revise RT Bronchodilator order(s) to one equivalent RT bronchodilator order with every 4 hours Frequency and an Albuterol order with Frequency of every 2 hours PRN wheezing or increased work of breathing using Per Protocol order mode.          Electronically signed by Prentice Harada, RCP on 6/23/2022 at 10:45 AM

## 2022-06-23 NOTE — CARE COORDINATION
Case Management Assessment  Initial Evaluation      Patient Name: Meagan Hernandez  YOB: 1950  Diagnosis: Acute respiratory failure with hypoxia ([facility] [address] [J96.01]  COVID [U07.1]  SUOJX-01 [U07.1]  Date / Time: 6/22/2022  1:04 PM    Admission status/Date: Inpatient 6/22/2022  Tremayne Jackson Reviewed: Yes      Patient Interviewed: Yes   /Family Interviewed:  No      Hospitalization in the last 30 days:  No      Health Care Decision Maker :   Primary Decision Maker: Ashlee Whitney Spouse - [phone]    Secondary Decision Maker: Christoluz Eliane - [phone]     Who do you trust or have selected to make healthcare decisions for you    Met with: Patient at bedside. Current PCP: ILAN Templeton - CNP    Financial  Medicare and [employer]  Precert required for SNF : N          3 night stay required - N    ADLS  Support Systems/Care Needs: Spouse/Significant Other,Family Members  Transportation: self    Meal Preparation: self    Housing  Living Arrangements: Lives with spouse; IPTA  Steps: [address] for return to present living arrangements: Yes  Identified Issues: Patient on supplemental o2 with no home use.     Home Care Information  Active with Home Health Care : No Agency:(Services)  Type of Home Care Services: None  Passport/Waiver : No  :                      Phone Number:    Passport/Waiver Services: n/a           Durable Medical Equiptment   DME Provider: n/a   Equipment:   Walker___Cane___RTS___ BSC___Shower Chair_x__Hospital Bed___W/C____Other________  02 at ____Liter(s)---wears(frequency)_______ HHN ___ CPAP___ BiPap___   N/A____      Home O2 Use :  No    If No for home O2---if presently on O2 during hospitalization:  Yes  if yes CM to follow for potential DC O2 need  Informed of need for care provider to bring portable home O2 tank on day of discharge for nursing to connect prior to leaving:   Not Indicated  Verbalized agreement/Understanding:   Not Indicated    Community Service Affiliation  Dialysis:  No    · Agency:  · Location:  · Dialysis Schedule:  · Phone:   · Fax: Other Community Services: (ex:PT/OT,Mental Health,Wound Clinic, Cardio/Pul [address]) none    DISCHARGE PLAN: Explained Case Management role/services. CM reviewed chart and met with patient at bedside to discuss discharge needs and plan. Patient lives with spouse. IPTA and active . Plans to return home at discharge. Patient has required supplemental o2 during hospitalization with no home use. CM to follow for possible home o2 needs.  Sticky note left for treatment team for o2 walk test.    Cindy Sample, RN

## 2022-06-24 ENCOUNTER — TELEPHONE (OUTPATIENT)
Dept: PULMONOLOGY | Age: 72
End: 2022-06-24

## 2022-06-24 VITALS
RESPIRATION RATE: 16 BRPM | BODY MASS INDEX: 25.01 KG/M2 | HEIGHT: 64 IN | WEIGHT: 146.5 LBS | HEART RATE: 71 BPM | TEMPERATURE: 97.2 F | DIASTOLIC BLOOD PRESSURE: 86 MMHG | OXYGEN SATURATION: 92 % | SYSTOLIC BLOOD PRESSURE: 174 MMHG

## 2022-06-24 DIAGNOSIS — R93.89 ABNORMAL CT OF THE CHEST: Primary | ICD-10-CM

## 2022-06-24 PROCEDURE — 99232 SBSQ HOSP IP/OBS MODERATE 35: CPT | Performed by: INTERNAL MEDICINE

## 2022-06-24 PROCEDURE — 6360000002 HC RX W HCPCS: Performed by: INTERNAL MEDICINE

## 2022-06-24 PROCEDURE — 2580000003 HC RX 258: Performed by: INTERNAL MEDICINE

## 2022-06-24 PROCEDURE — 6370000000 HC RX 637 (ALT 250 FOR IP): Performed by: INTERNAL MEDICINE

## 2022-06-24 PROCEDURE — 6370000000 HC RX 637 (ALT 250 FOR IP)

## 2022-06-24 PROCEDURE — 99238 HOSP IP/OBS DSCHRG MGMT 30/<: CPT | Performed by: INTERNAL MEDICINE

## 2022-06-24 RX ORDER — DEXAMETHASONE 6 MG/1
6 TABLET ORAL
Qty: 8 TABLET | Refills: 0 | Status: ON HOLD
Start: 2022-06-24 | End: 2022-07-28 | Stop reason: HOSPADM

## 2022-06-24 RX ORDER — BENZONATATE 100 MG/1
100 CAPSULE ORAL 3 TIMES DAILY PRN
Status: DISCONTINUED | OUTPATIENT
Start: 2022-06-24 | End: 2022-06-24 | Stop reason: HOSPADM

## 2022-06-24 RX ORDER — BENZONATATE 100 MG/1
100 CAPSULE ORAL 3 TIMES DAILY PRN
Qty: 20 CAPSULE | Refills: 0 | Status: ON HOLD
Start: 2022-06-24 | End: 2022-07-28 | Stop reason: HOSPADM

## 2022-06-24 RX ORDER — KETOROLAC TROMETHAMINE 30 MG/ML
15 INJECTION, SOLUTION INTRAMUSCULAR; INTRAVENOUS ONCE
Status: COMPLETED | OUTPATIENT
Start: 2022-06-24 | End: 2022-06-24

## 2022-06-24 RX ORDER — ALBUTEROL SULFATE 90 UG/1
2 AEROSOL, METERED RESPIRATORY (INHALATION) EVERY 6 HOURS PRN
Qty: 18 G | Refills: 3 | Status: SHIPPED | OUTPATIENT
Start: 2022-06-24

## 2022-06-24 RX ORDER — PROMETHAZINE HYDROCHLORIDE AND CODEINE PHOSPHATE 6.25; 1 MG/5ML; MG/5ML
5 SYRUP ORAL EVERY 4 HOURS PRN
Qty: 120 ML | Refills: 0 | Status: ON HOLD
Start: 2022-06-24 | End: 2022-07-28 | Stop reason: HOSPADM

## 2022-06-24 RX ORDER — PROMETHAZINE HYDROCHLORIDE 25 MG/1
25 TABLET ORAL ONCE
Status: COMPLETED | OUTPATIENT
Start: 2022-06-24 | End: 2022-06-24

## 2022-06-24 RX ADMIN — CETIRIZINE HYDROCHLORIDE 10 MG: 10 TABLET ORAL at 10:44

## 2022-06-24 RX ADMIN — HYDROCODONE BITARTRATE AND ACETAMINOPHEN 1 TABLET: 5; 325 TABLET ORAL at 16:42

## 2022-06-24 RX ADMIN — CITALOPRAM HYDROBROMIDE 20 MG: 20 TABLET ORAL at 10:44

## 2022-06-24 RX ADMIN — Medication 10 ML: at 10:46

## 2022-06-24 RX ADMIN — BENZONATATE 100 MG: 100 CAPSULE ORAL at 10:45

## 2022-06-24 RX ADMIN — GUAIFENESIN 600 MG: 600 TABLET, EXTENDED RELEASE ORAL at 10:44

## 2022-06-24 RX ADMIN — Medication 1 CAPSULE: at 10:44

## 2022-06-24 RX ADMIN — KETOROLAC TROMETHAMINE 15 MG: 30 INJECTION, SOLUTION INTRAMUSCULAR; INTRAVENOUS at 10:45

## 2022-06-24 RX ADMIN — LEVOTHYROXINE SODIUM 137 MCG: 0.11 TABLET ORAL at 06:15

## 2022-06-24 RX ADMIN — PROMETHAZINE HYDROCHLORIDE 25 MG: 25 TABLET ORAL at 10:44

## 2022-06-24 RX ADMIN — ASPIRIN 81 MG: 81 TABLET, CHEWABLE ORAL at 10:43

## 2022-06-24 RX ADMIN — ENOXAPARIN SODIUM 30 MG: 100 INJECTION SUBCUTANEOUS at 10:46

## 2022-06-24 ASSESSMENT — PAIN DESCRIPTION - LOCATION: LOCATION: HEAD

## 2022-06-24 ASSESSMENT — PAIN SCALES - GENERAL
PAINLEVEL_OUTOF10: 8
PAINLEVEL_OUTOF10: 10

## 2022-06-24 ASSESSMENT — PAIN DESCRIPTION - ORIENTATION: ORIENTATION: MID;OUTER

## 2022-06-24 ASSESSMENT — PAIN DESCRIPTION - DESCRIPTORS: DESCRIPTORS: SHARP

## 2022-06-24 NOTE — TELEPHONE ENCOUNTER
Inpatient Notes  Received:  Today  MD Aleshia Ramirez MA  CT CHEST 8-10 weeks with CMT, diagnosis abnormal CT chest

## 2022-06-24 NOTE — PROGRESS NOTES
PULMONARY PROGRESS NOTE  Hospital Day: 3   CC: COVID-19 & respiratory failure    Subjective:   N/V overnight. IV line: Peripheral    PHYSICAL EXAM:   BP (!) 158/82   Pulse 67   Temp 97.1 °F (36.2 °C) (Oral)   Resp 16   Ht 5' 4\" (1.626 m)   Wt 146 lb 8 oz (66.5 kg)   SpO2 91%   BMI 25.15 kg/m² ' on RA  Constitutional:  No acute distress. HENT:  Oropharynx is clear and moist.   Neck: No tracheal deviation present. Cardiovascular: Normal heart sounds. No lower extremity edema. Pulmonary/Chest: No wheezes. Few ronchi. No rales. No decreased breath sounds. No accessory muscle usage or stridor. Musculoskeletal: No cyanosis. No clubbing. Skin: Skin is warm and dry. Psychiatric: Normal mood and affect.   Neurologic: speech fluent, alert and oriented, strength symmetric      I independently performed the physical exam  Cesar Tsang MD on 6/24/22, 10:06 AM EDT   Scheduled Meds:   guaiFENesin  600 mg Oral BID    levothyroxine  137 mcg Oral Daily    citalopram  20 mg Oral Daily    lactobacillus  1 capsule Oral Daily    atorvastatin  20 mg Oral Nightly    aspirin  81 mg Oral Daily    cetirizine  10 mg Oral Daily    nirmatrelvir/ritonavir  3 tablet Oral Q12H    meloxicam  15 mg Oral Daily    sodium chloride flush  5-40 mL IntraVENous 2 times per day    enoxaparin  30 mg SubCUTAneous BID    dexamethasone  6 mg IntraVENous Q24H     Continuous Infusions:   sodium chloride 100 mL/hr at 06/23/22 1444    sodium chloride       PRN Meds:  albuterol sulfate HFA, HYDROcodone 5 mg - acetaminophen, prochlorperazine, sodium chloride flush, sodium chloride, potassium chloride **OR** potassium alternative oral replacement **OR** potassium chloride, magnesium sulfate, ondansetron **OR** ondansetron, polyethylene glycol, acetaminophen **OR** acetaminophen    Labs:  CBC:   Recent Labs     06/22/22  1413 06/23/22  0518   WBC 5.2 2.5*   HGB 14.1 13.3   HCT 41.2 38.9   MCV 87.1 87.3    189     BMP: Recent Labs     06/22/22  1413 06/23/22  0518   * 132*   K 4.2 4.3   CL 99 99   CO2 23 22   BUN 6* 8   CREATININE 0.7 0.7     Micro:  SARS-CoV-2 positive last week; sx onset 6/14/22  Procalcitonin 0.03    Imaging: Chest imaging was reviewed by me and showed   CTPA 6/22/2022  Mediastinum: No evidence of mediastinal lymphadenopathy.  The heart and   pericardium demonstrate no acute abnormality.  There is mild calcified plaque   throughout the aorta with which is mildly dilated throughout with mild   aneurysmal dilatation of the ascending aorta measuring up to 3.5 cm with no   dissection       Lungs/pleura: The lungs are hyperinflated and emphysematous.  There are mild   hazy subpleural ground-glass opacities along the upper lobes extending into   the right middle lobe anteriorly and into the lingula inferiorly.  There is   no pulmonary nodule or mass.  No effusion is seen. Impression   No evidence of a pulmonary embolus.       Mildly dilated and moderately atherosclerotic thoracic aorta which is most   prominent along the descending thoracic and upper abdominal aorta with no   aneurysm seen.       Mild aneurysmal dilatation of the ascending aorta measuring 3.5 cm with no   dissection.  Recommend follow-up to assure stability.       Hazy ground-glass opacities scattered along the upper lobes and into the lung   bases anteriorly which could represent early multisegmental bronchopneumonia. This pattern of pneumonia has been described with COVID-19 disease.    Recommend correlating with lab values.       Status post cholecystectomy and a small hepatic cyst.     ASSESSMENT:  · COVID-19 pneumonia in partially vaccinated patient (x2), sx onset 6/14/22  · Acute hypoxemic respiratory failure; baseline RA  · Pulmonary emphysema on imaging  · Abnormal CT CHEST   · H/O colon cancer s/p chemo, radiation & surgery 2009  · Former smoker, quit 1989    PLAN:   COVID-19 isolation, droplet plus   Decadron D#3/10, 6 mg daily    Finish full Paxlovid     Inhaled bronchodilators only as needed    D/C planning okay with me, f/u CT CHEST no IV dye in 10-12 weeks - our office will arrange. I d/w Dr. Bay Vargas. Call with questions     Patient was seen with my attending physician Dr. Fernando Winters. I contributed to updating portions of this encounter note.    Josse Pedraza PA-C on 6/24/22 at 7:17 AM EDT

## 2022-06-24 NOTE — PROGRESS NOTES
Discharge instructions read and explained to patient and her . All questions were answered. Belongings packed and taken to car by her     IV has been removed without complications, heart tele removed (Monitor tech aware of discharge).  Pt is alert and oriented waiting for transport

## 2022-06-24 NOTE — PROGRESS NOTES
Pt A/O in bed and quiet. Denies any needs at this time. SR up x2, bed in lowest position,   wheels locked,  bed alarm on/Call light and bedside table in easy reach.    Juanita Norwood RN

## 2022-06-24 NOTE — PROGRESS NOTES
Comprehensive Nutrition Assessment    Type and Reason for Visit:  Initial,Positive Nutrition Screen    Nutrition Recommendations/Plan:   1. Continue regular diet   2. Added enusre HP BID      Malnutrition Assessment:  Malnutrition Status: At risk for malnutrition (Comment) (06/24/22 1329)    Context:  Acute Illness     Findings of the 6 clinical characteristics of malnutrition:  Energy Intake:  Mild decrease in energy intake (Comment)  Weight Loss:  No significant weight loss     Body Fat Loss:  Unable to assess     Muscle Mass Loss:  Unable to assess    Fluid Accumulation:  Unable to assess     Strength:  Not Performed    Nutrition Assessment:    Pt. nutritionally compromised AEB pt c/o decreased po pta r/t not feeling well . At risk for further nutrition compromise r/t + COVID 19 . Will add ensure HP BID . Nutrition Related Findings:    decreasednappetiet with Covud infection; no sig wt loss; accepting ~ 50% of meals Wound Type: None       Current Nutrition Intake & Therapies:    Average Meal Intake: 26-50%,51-75%  Average Supplements Intake: None Ordered  ADULT DIET; Regular    Anthropometric Measures:  Height: 5' 4\" (162.6 cm)  Ideal Body Weight (IBW): 120 lbs (55 kg)    Admission Body Weight: 146 lb (66.2 kg)  Current Body Weight: 146 lb (66.2 kg), 121.7 % IBW. Weight Source: Bed Scale  Current BMI (kg/m2): 25  Usual Body Weight: 148 lb (67.1 kg)  % Weight Change (Calculated): -1.4                    BMI Categories: Overweight (BMI 25.0-29. 9)    Estimated Daily Nutrient Needs:  Energy Requirements Based On: Kcal/kg  Weight Used for Energy Requirements: Current  Energy (kcal/day): 7176-1751 Based ~ 20-25 kcal/kg cbw  Weight Used for Protein Requirements: Current  Protein (g/day): 66-79 based ~ 1-1.3 gr/kg cbw  Method Used for Fluid Requirements: 1 ml/kcal  Fluid (ml/day): 6990-9197    Nutrition Diagnosis:   · Inadequate oral intake related to catabolic illness,altered GI function as evidenced by poor intake prior to admission      Nutrition Interventions:   Food and/or Nutrient Delivery: Continue Current Diet,Start Oral Nutrition Supplement  Nutrition Education/Counseling: No recommendation at this time  Coordination of Nutrition Care: Continue to monitor while inpatient       Goals:     Goals: Meet at least 75% of estimated needs,prior to discharge       Nutrition Monitoring and Evaluation:   Behavioral-Environmental Outcomes: None Identified  Food/Nutrient Intake Outcomes: Food and Nutrient Intake,Supplement Intake  Physical Signs/Symptoms Outcomes: Weight,Biochemical Data,GI Status    Discharge Planning:     Too soon to determine     Winfield Gosselin, [address] [place] [address],   Contact: [phone]

## 2022-06-24 NOTE — PLAN OF CARE
Problem: Discharge Planning  Goal: Discharge to home or other facility with appropriate resources  6/24/2022 0803 by Seth Williamson RN  Outcome: Progressing  6/24/2022 0445 by Felicitas Bamberger, RN  Outcome: Progressing     Problem: Pain  Goal: Verbalizes/displays adequate comfort level or baseline comfort level  6/24/2022 0803 by Seth Williamson RN  Outcome: Progressing  6/24/2022 0445 by Felicitas Bamberger, RN  Outcome: Progressing     Problem: Breathing Pattern - Ineffective  Goal: Use of effective breathing techniques  Description: Patient  will indicate an effective breathing pattern as evidenced by the absence of respiratory distress each shift during the inpatient hospice stay.     6/24/2022 0445 by Felicitas Bamberger, RN  Outcome: Progressing

## 2022-06-24 NOTE — PLAN OF CARE
Problem: Discharge Planning  Goal: Discharge to home or other facility with appropriate resources  6/24/2022 1458 by Rhae Gosselin, RN  Outcome: Adequate for Discharge  6/24/2022 0803 by Rhae Gosselin, RN  Outcome: Progressing  6/24/2022 0445 by Michelle Sanchez RN  Outcome: Progressing     Problem: Pain  Goal: Verbalizes/displays adequate comfort level or baseline comfort level  6/24/2022 1458 by Rhae Gosselin, RN  Outcome: Adequate for Discharge  6/24/2022 0803 by Rhae Gosselin, RN  Outcome: Progressing  6/24/2022 0445 by Michelle Sanchez RN  Outcome: Progressing     Problem: Safety - Adult  Goal: Free from fall injury  6/24/2022 1458 by Rhae Gosselin, RN  Outcome: Adequate for Discharge  6/24/2022 0803 by Rhae Gosselin, RN  Outcome: Progressing  6/24/2022 0445 by Michelle Sanchez RN  Outcome: Progressing

## 2022-06-24 NOTE — CARE COORDINATION
DISCHARGE ORDER  Date/Time 2022 3:40 PM  Completed by: Mary Anne De RN, Case Management    Patient Name: Hernan West      : 1950  Admitting Diagnosis: Acute respiratory failure with hypoxia ([facility] [address] [J96.01]  COVID [U07.1]  COVID-19 [U07.1]      Admit order Date and Status:  22 inpt  (verify MD's last order for status of admission)      Noted discharge order. If applicable PT/OT recommendation at Discharge:   DME recommendation by PT/OT:N/A  Confirmed discharge plan  : Yes  with whom patient  If pt confirmed DC plan does family need to be contacted by CM No  Discharge Plan: Order for dc noted. Spoke with pt via [employer] as she is in covid isolation. Plan is to return home and states has  assist. Discussed [facility] and pt declines. Spoke with nsg who will give pulse oximeter. Date of Last IMM Given: 22    Reviewed chart. Role of discharge planner explained and patient verbalized understanding. Discharge order is noted. Has Home O2 in place on admit:  No  Informed of need to bring portable home O2 tank on day of discharge for nursing to connect prior to leaving:   Not Indicated  Verbalized agreement/Understanding:   Not Indicated  Pt is being d/c'd to home today. Pt's O2 sats are 92% on RA at reest    Discharge timeout done with  Nsg, CM and pt. All discharge needs and concerns addressed.

## 2022-06-24 NOTE — PROGRESS NOTES
IM Progress Note    Admit Date:  6/22/2022  2    Interval history:  covid positive, mild hypoxia  No fevers  Emesis and headache overnight noted    Subjective:  Ms. Denise French seen up in bed, reports headache is most concerning . Minimal sob with ambulation. No fevers  No further emesis since last night . tolerating diet    Objective:   BP (!) 158/82   Pulse 67   Temp 97.1 °F (36.2 °C) (Oral)   Resp 16   Ht 5' 4\" (1.626 m)   Wt 146 lb 8 oz (66.5 kg)   SpO2 91%   BMI 25.15 kg/m²     Intake/Output Summary (Last 24 hours) at 6/24/2022 0743  Last data filed at 6/23/2022 1216  Gross per 24 hour   Intake 480 ml   Output --   Net 480 ml       Physical Exam:         General:  Elderly female, healthy appearing  Awake, alert and oriented. Appears to be not in any distress  Mucous Membranes:  Pink , anicteric  EOM intact  Neck: No JVD, no carotid bruit, no thyromegaly  Chest:  Clear to auscultation bilaterally, right basilar crackles  Cardiovascular:  RRR S1S2 heard, no murmurs or gallops  Abdomen:  Soft, undistended, non tender, no organomegaly, BS present  Extremities: No edema or cyanosis.  Distal pulses well felt  Neurological : grossly normal with mild gen weakness       Medications:   Scheduled Medications:    guaiFENesin  600 mg Oral BID    levothyroxine  137 mcg Oral Daily    citalopram  20 mg Oral Daily    lactobacillus  1 capsule Oral Daily    atorvastatin  20 mg Oral Nightly    aspirin  81 mg Oral Daily    cetirizine  10 mg Oral Daily    nirmatrelvir/ritonavir  3 tablet Oral Q12H    meloxicam  15 mg Oral Daily    sodium chloride flush  5-40 mL IntraVENous 2 times per day    enoxaparin  30 mg SubCUTAneous BID    dexamethasone  6 mg IntraVENous Q24H     I   sodium chloride 100 mL/hr at 06/23/22 1444    sodium chloride       benzonatate, albuterol sulfate HFA, HYDROcodone 5 mg - acetaminophen, prochlorperazine, sodium chloride flush, sodium chloride, potassium chloride **OR** potassium alternative oral replacement **OR** potassium chloride, magnesium sulfate, ondansetron **OR** ondansetron, polyethylene glycol, acetaminophen **OR** acetaminophen    Lab Data:  Recent Labs     06/22/22  1413 06/23/22  0518   WBC 5.2 2.5*   HGB 14.1 13.3   HCT 41.2 38.9   MCV 87.1 87.3    189     Recent Labs     06/22/22  1413 06/23/22  0518   * 132*   K 4.2 4.3   CL 99 99   CO2 23 22   BUN 6* 8   CREATININE 0.7 0.7     No results for input(s): CKTOTAL, CKMB, CKMBINDEX, TROPONINI in the last 72 hours. Coagulation: No results found for: INR, APTT  Cardiac markers: No results found for: CKMB, CKTOTAL, TROPONINI, MYOGLOBIN      Lab Results   Component Value Date    ALT 12 06/23/2022    AST 18 06/23/2022    ALKPHOS 90 06/23/2022    BILITOT 0.3 06/23/2022         CULTURES  COVID +        EKG: Normal sinus rhythmNonspecific ST and T wave abnormalityBaseline artifactConfirmed by Annetta Hodges MD, Val Vladimir ([id number]) on 6/23/2022 7:08:34 AM     RADIOLOGY  CT CHEST PULMONARY EMBOLISM W CONTRAST   Final Result   No evidence of a pulmonary embolus.       Mildly dilated and moderately atherosclerotic thoracic aorta which is most   prominent along the descending thoracic and upper abdominal aorta with no   aneurysm seen.       Mild aneurysmal dilatation of the ascending aorta measuring 3.5 cm with no   dissection. Recommend follow-up to assure stability.       Hazy ground-glass opacities scattered along the upper lobes and into the lung   bases anteriorly which could represent early multisegmental bronchopneumonia. This pattern of pneumonia has been described with COVID-19 disease. Recommend correlating with lab values.       Status post cholecystectomy and a small hepatic cyst.           XR CHEST (2 VW)   Final Result   No acute process.   There is no change from prior examination.                 ASSESSMENT/PLAN:     #COVID+ Pneumonia   #Acute hypoxia   -hypoxic on 2 L O2 NC at adm, no baseline home O2  - is on RA today,  -leukopenia mild sec to viral infection   -on decadron and paxlovid   -droplet +precautions  -added mucinex        #Hyponatremia   -hypovolemic? Encourage fluids - given IVF       #HTN   -BP has been elevated during stay   -could be 2/2 to above  -needs to follow up with PCP      # headache - likely with covid infection   - given toradol and compazine with good relief  - resume as needed    #Hypothyroidism   -continue synthroid      #Ascending Aorta Aneurysm   -3.5 cm   -recommend follow up with PCP or vascular      #Hx of rectal cancer   -s/p resection 2009  -completed chemo and radiation      #OA  -on mobic   -follows with ortho         DVT Prophylaxis: lovenox   Diet: ADULT DIET;  Regular  Code Status: Full Code       Cristopher Loo MD, 6/24/2022 7:43 AM

## 2022-06-24 NOTE — PROGRESS NOTES
Shift assessment complete - See flow sheet. Medications given - See [facility]     Patient with no complaints of pain at this time. Patient denies any further needs.    Bed alarm is set for patient safety/Deferred for low/med risk, A/O with steady gait   Call light in reach

## 2022-06-24 NOTE — PLAN OF CARE
Problem: Discharge Planning  Goal: Discharge to home or other facility with appropriate resources  6/24/2022 1547 by Krunal Osorio RN  Outcome: Adequate for Discharge  6/24/2022 1458 by Krunal Osorio RN  Outcome: Adequate for Discharge  6/24/2022 0803 by Krunal Osorio RN  Outcome: Progressing  6/24/2022 0445 by Miko Mercado RN  Outcome: Progressing     Problem: Pain  Goal: Verbalizes/displays adequate comfort level or baseline comfort level  6/24/2022 1547 by Krunal Osorio RN  Outcome: Adequate for Discharge  6/24/2022 1458 by Krunal Osorio RN  Outcome: Adequate for Discharge  6/24/2022 0803 by Krunal Osorio RN  Outcome: Progressing  6/24/2022 0445 by Miko Mercado RN  Outcome: Progressing     Problem: Safety - Adult  Goal: Free from fall injury  6/24/2022 1547 by Krunal Osorio RN  Outcome: Adequate for Discharge  6/24/2022 1458 by Krunal Osorio RN  Outcome: Adequate for Discharge  6/24/2022 0803 by Krunal Osorio RN  Outcome: Progressing  6/24/2022 0445 by Miko Mercado RN  Outcome: Progressing     Problem: Breathing Pattern - Ineffective  Goal: Use of effective breathing techniques  Description: Patient  will indicate an effective breathing pattern as evidenced by the absence of respiratory distress each shift during the inpatient hospice stay. 6/24/2022 1547 by Krunal Osorio RN  Outcome: Adequate for Discharge  6/24/2022 0445 by Miko Mercado RN  Outcome: Progressing     Problem: Nausea/Vomiting (Adult)  Goal: Control of nausea/vomiting  Description: Patient  will exhibit decreased or eliminated episodes of nausea/vomiting as evidenced by less than 2 PRN antiemetic medication administrations each shift during the inpatient hospice stay.     6/24/2022 1547 by Krunal Osorio RN  Outcome: Adequate for Discharge  6/24/2022 0445 by Miko Mercado RN  Outcome: Progressing     Problem: Nutrition Deficit:  Goal: Optimize nutritional status  Outcome: Adequate for Discharge

## 2022-06-26 LAB
CULTURE, RESPIRATORY: ABNORMAL
CULTURE, RESPIRATORY: ABNORMAL
GRAM STAIN RESULT: ABNORMAL
ORGANISM: ABNORMAL

## 2022-06-27 ENCOUNTER — CARE COORDINATION (OUTPATIENT)
Dept: CASE MANAGEMENT | Age: 72
End: 2022-06-27

## 2022-06-27 ENCOUNTER — APPOINTMENT (OUTPATIENT)
Dept: CT IMAGING | Age: 72
DRG: 326 | End: 2022-06-27
Payer: MEDICARE

## 2022-06-27 ENCOUNTER — HOSPITAL ENCOUNTER (EMERGENCY)
Age: 72
Discharge: HOME | DRG: 326 | End: 2022-06-27
Attending: STUDENT IN AN ORGANIZED HEALTH CARE EDUCATION/TRAINING PROGRAM
Payer: MEDICARE

## 2022-06-27 ENCOUNTER — APPOINTMENT (OUTPATIENT)
Dept: GENERAL RADIOLOGY | Age: 72
DRG: 326 | End: 2022-06-27
Payer: MEDICARE

## 2022-06-27 VITALS
WEIGHT: 140 LBS | TEMPERATURE: 98.4 F | BODY MASS INDEX: 23.32 KG/M2 | DIASTOLIC BLOOD PRESSURE: 94 MMHG | HEIGHT: 65 IN | RESPIRATION RATE: 16 BRPM | SYSTOLIC BLOOD PRESSURE: 192 MMHG | HEART RATE: 77 BPM | OXYGEN SATURATION: 94 %

## 2022-06-27 DIAGNOSIS — U07.1 COVID-19: Primary | ICD-10-CM

## 2022-06-27 DIAGNOSIS — R51.9 ACUTE NONINTRACTABLE HEADACHE, UNSPECIFIED HEADACHE TYPE: ICD-10-CM

## 2022-06-27 DIAGNOSIS — K29.00 ACUTE GASTRITIS, PRESENCE OF BLEEDING UNSPECIFIED, UNSPECIFIED GASTRITIS TYPE: ICD-10-CM

## 2022-06-27 LAB
A/G RATIO: 1.9 (ref 1.1–2.2)
ALBUMIN SERPL-MCNC: 4.4 G/DL (ref 3.4–5)
ALP BLD-CCNC: 86 U/L (ref 40–129)
ALT SERPL-CCNC: 15 U/L (ref 10–40)
ANION GAP SERPL CALCULATED.3IONS-SCNC: 12 MMOL/L (ref 3–16)
AST SERPL-CCNC: 27 U/L (ref 15–37)
BACTERIA: ABNORMAL /HPF
BASOPHILS ABSOLUTE: 0 K/UL (ref 0–0.2)
BASOPHILS RELATIVE PERCENT: 0.4 %
BILIRUB SERPL-MCNC: 0.4 MG/DL (ref 0–1)
BILIRUBIN URINE: NEGATIVE
BLOOD, URINE: ABNORMAL
BUN BLDV-MCNC: 14 MG/DL (ref 7–20)
CALCIUM SERPL-MCNC: 9.5 MG/DL (ref 8.3–10.6)
CHLORIDE BLD-SCNC: 99 MMOL/L (ref 99–110)
CLARITY: CLEAR
CO2: 25 MMOL/L (ref 21–32)
COLOR: YELLOW
CREAT SERPL-MCNC: 0.6 MG/DL (ref 0.6–1.2)
D DIMER: 0.91 UG/ML FEU (ref 0–0.6)
EOSINOPHILS ABSOLUTE: 0 K/UL (ref 0–0.6)
EOSINOPHILS RELATIVE PERCENT: 0.1 %
EPITHELIAL CELLS, UA: ABNORMAL /HPF (ref 0–5)
GFR AFRICAN AMERICAN: >60
GFR NON-AFRICAN AMERICAN: >60
GLUCOSE BLD-MCNC: 117 MG/DL (ref 70–99)
GLUCOSE URINE: NEGATIVE MG/DL
HCT VFR BLD CALC: 43 % (ref 36–48)
HEMOGLOBIN: 14.1 G/DL (ref 12–16)
KETONES, URINE: NEGATIVE MG/DL
LEUKOCYTE ESTERASE, URINE: ABNORMAL
LIPASE: 20 U/L (ref 13–60)
LYMPHOCYTES ABSOLUTE: 0.9 K/UL (ref 1–5.1)
LYMPHOCYTES RELATIVE PERCENT: 7.1 %
MCH RBC QN AUTO: 28.6 PG (ref 26–34)
MCHC RBC AUTO-ENTMCNC: 32.8 G/DL (ref 31–36)
MCV RBC AUTO: 87.2 FL (ref 80–100)
MICROSCOPIC EXAMINATION: YES
MONOCYTES ABSOLUTE: 0.6 K/UL (ref 0–1.3)
MONOCYTES RELATIVE PERCENT: 5.2 %
MUCUS: ABNORMAL /LPF
NEUTROPHILS ABSOLUTE: 10.5 K/UL (ref 1.7–7.7)
NEUTROPHILS RELATIVE PERCENT: 87.2 %
NITRITE, URINE: NEGATIVE
PDW BLD-RTO: 14.5 % (ref 12.4–15.4)
PH UA: 7.5 (ref 5–8)
PLATELET # BLD: 399 K/UL (ref 135–450)
PMV BLD AUTO: 8.3 FL (ref 5–10.5)
POTASSIUM REFLEX MAGNESIUM: 5 MMOL/L (ref 3.5–5.1)
PROTEIN UA: 30 MG/DL
RBC # BLD: 4.93 M/UL (ref 4–5.2)
RBC UA: ABNORMAL /HPF (ref 0–4)
RENAL EPITHELIAL, UA: ABNORMAL /HPF (ref 0–1)
SODIUM BLD-SCNC: 136 MMOL/L (ref 136–145)
SPECIFIC GRAVITY UA: 1.01 (ref 1–1.03)
TOTAL PROTEIN: 6.7 G/DL (ref 6.4–8.2)
URINE TYPE: ABNORMAL
UROBILINOGEN, URINE: 0.2 E.U./DL
WBC # BLD: 12.1 K/UL (ref 4–11)
WBC UA: ABNORMAL /HPF (ref 0–5)

## 2022-06-27 PROCEDURE — 96374 THER/PROPH/DIAG INJ IV PUSH: CPT

## 2022-06-27 PROCEDURE — 2580000003 HC RX 258: Performed by: STUDENT IN AN ORGANIZED HEALTH CARE EDUCATION/TRAINING PROGRAM

## 2022-06-27 PROCEDURE — 74177 CT ABD & PELVIS W/CONTRAST: CPT

## 2022-06-27 PROCEDURE — 6360000004 HC RX CONTRAST MEDICATION: Performed by: STUDENT IN AN ORGANIZED HEALTH CARE EDUCATION/TRAINING PROGRAM

## 2022-06-27 PROCEDURE — 85025 COMPLETE CBC W/AUTO DIFF WBC: CPT

## 2022-06-27 PROCEDURE — 83690 ASSAY OF LIPASE: CPT

## 2022-06-27 PROCEDURE — 6360000002 HC RX W HCPCS: Performed by: STUDENT IN AN ORGANIZED HEALTH CARE EDUCATION/TRAINING PROGRAM

## 2022-06-27 PROCEDURE — 80053 COMPREHEN METABOLIC PANEL: CPT

## 2022-06-27 PROCEDURE — 71260 CT THORAX DX C+: CPT

## 2022-06-27 PROCEDURE — 6370000000 HC RX 637 (ALT 250 FOR IP): Performed by: STUDENT IN AN ORGANIZED HEALTH CARE EDUCATION/TRAINING PROGRAM

## 2022-06-27 PROCEDURE — 70450 CT HEAD/BRAIN W/O DYE: CPT

## 2022-06-27 PROCEDURE — 36415 COLL VENOUS BLD VENIPUNCTURE: CPT

## 2022-06-27 PROCEDURE — 81001 URINALYSIS AUTO W/SCOPE: CPT

## 2022-06-27 PROCEDURE — 71045 X-RAY EXAM CHEST 1 VIEW: CPT

## 2022-06-27 PROCEDURE — 85379 FIBRIN DEGRADATION QUANT: CPT

## 2022-06-27 PROCEDURE — 99285 EMERGENCY DEPT VISIT HI MDM: CPT

## 2022-06-27 PROCEDURE — 87086 URINE CULTURE/COLONY COUNT: CPT

## 2022-06-27 PROCEDURE — 96361 HYDRATE IV INFUSION ADD-ON: CPT

## 2022-06-27 RX ORDER — ONDANSETRON 2 MG/ML
4 INJECTION INTRAMUSCULAR; INTRAVENOUS ONCE
Status: COMPLETED | OUTPATIENT
Start: 2022-06-27 | End: 2022-06-27

## 2022-06-27 RX ORDER — 0.9 % SODIUM CHLORIDE 0.9 %
1000 INTRAVENOUS SOLUTION INTRAVENOUS ONCE
Status: COMPLETED | OUTPATIENT
Start: 2022-06-27 | End: 2022-06-27

## 2022-06-27 RX ORDER — FAMOTIDINE 10 MG
20 TABLET ORAL 2 TIMES DAILY
Qty: 120 TABLET | Refills: 0 | Status: SHIPPED
Start: 2022-06-27 | End: 2022-07-28

## 2022-06-27 RX ORDER — BUTALBITAL, ACETAMINOPHEN AND CAFFEINE 50; 325; 40 MG/1; MG/1; MG/1
1 TABLET ORAL ONCE
Status: COMPLETED | OUTPATIENT
Start: 2022-06-27 | End: 2022-06-27

## 2022-06-27 RX ORDER — SUCRALFATE 1 G/1
1 TABLET ORAL 4 TIMES DAILY
Qty: 60 TABLET | Refills: 0 | Status: SHIPPED
Start: 2022-06-27 | End: 2022-07-28

## 2022-06-27 RX ORDER — ZINC SULFATE 50(220)MG
50 CAPSULE ORAL DAILY
Qty: 7 CAPSULE | Refills: 0 | Status: SHIPPED
Start: 2022-06-27 | End: 2022-07-28

## 2022-06-27 RX ORDER — ONDANSETRON 4 MG/1
4 TABLET, ORALLY DISINTEGRATING ORAL EVERY 8 HOURS PRN
Qty: 20 TABLET | Refills: 0 | Status: ON HOLD
Start: 2022-06-27 | End: 2022-07-28 | Stop reason: HOSPADM

## 2022-06-27 RX ORDER — ASCORBIC ACID 500 MG
500 TABLET ORAL 2 TIMES DAILY
Qty: 14 TABLET | Refills: 0 | Status: SHIPPED
Start: 2022-06-27 | End: 2022-07-28

## 2022-06-27 RX ORDER — NAPROXEN 500 MG/1
500 TABLET ORAL 2 TIMES DAILY PRN
Qty: 20 TABLET | Refills: 0 | Status: SHIPPED
Start: 2022-06-27 | End: 2022-07-28

## 2022-06-27 RX ORDER — BUTALBITAL, ACETAMINOPHEN AND CAFFEINE 50; 325; 40 MG/1; MG/1; MG/1
1 TABLET ORAL EVERY 6 HOURS PRN
Qty: 12 TABLET | Refills: 0 | Status: SHIPPED
Start: 2022-06-27 | End: 2022-07-28

## 2022-06-27 RX ORDER — GUAIFENESIN 600 MG/1
600 TABLET, EXTENDED RELEASE ORAL 2 TIMES DAILY
Qty: 20 TABLET | Refills: 0 | Status: ON HOLD
Start: 2022-06-27 | End: 2022-07-28 | Stop reason: HOSPADM

## 2022-06-27 RX ADMIN — ONDANSETRON HYDROCHLORIDE 4 MG: 2 INJECTION, SOLUTION INTRAMUSCULAR; INTRAVENOUS at 12:54

## 2022-06-27 RX ADMIN — IOPAMIDOL 85 ML: 755 INJECTION, SOLUTION INTRAVENOUS at 13:05

## 2022-06-27 RX ADMIN — SODIUM CHLORIDE 1000 ML: 9 INJECTION, SOLUTION INTRAVENOUS at 11:19

## 2022-06-27 RX ADMIN — BUTALBITAL, ACETAMINOPHEN, AND CAFFEINE 1 TABLET: 50; 325; 40 TABLET ORAL at 11:05

## 2022-06-27 ASSESSMENT — PAIN SCALES - GENERAL
PAINLEVEL_OUTOF10: 9
PAINLEVEL_OUTOF10: 10

## 2022-06-27 ASSESSMENT — PAIN DESCRIPTION - LOCATION
LOCATION: HEAD
LOCATION: HEAD

## 2022-06-27 NOTE — ED NOTES
Patient discharged in stable, ambulatory condition with all documented belongings. This nurse reviewed discharge instructions, pt verbalized understanding.        Odilon Dong RN  06/27/22 0677

## 2022-06-27 NOTE — TELEPHONE ENCOUNTER
Patient seen in [facility] ER 6/27/22 had repeat CT chest pulm. Repeat again in 8-10wks?

## 2022-06-27 NOTE — DISCHARGE SUMMARY
Name:  David James  Room:  0214/0214-01  MRN:    [id number]    Discharge Summary      This discharge summary is in conjunction with a complete physical exam done on the day of discharge. Discharging Physician: Dr. Yi Che: 6/22/2022  Discharge:  6/24/2022    HPI:    The patient is a 74 y.o. female with pmhx of colon cancer hypothyroidism, HLD who presented to [facility] ED with complaint of testing positive for covid last week and not having improvement in symptoms. Patient has had fever, chills, myalgias for the last week. Unsure of tmax at home. She has also had persistent productive cough with clear/yellow phlegm. She has felt short of breath with exertion. Pt has felt extremely fatigued and has not had an appetite. She denies any nausea, vomiting, diarrhea, dizziness, syncope, hematuria, dysuria. She quit smoking in 1999  She does not smoke any more or have any known lung disease. PCP put her on paxlovid last week and taken only 2 days  She is vaccinated x 2.      Workup showed mild hypoxia and covid pna and was admitted       Diagnoses this Admission and Hospital Course   #COVID+ Pneumonia   #Acute hypoxia   -hypoxic on 2 L O2 NC at adm, no baseline home O2  - is on RA today,  -leukopenia mild sec to viral infection   -on decadron and paxlovid   -droplet +precautions  -added mucinex  - improved symptoms and back to normal        #Hyponatremia   -hypovolemic?  Encourage fluids - given IVF        #HTN   -BP has been elevated during stay   -could be 2/2 to above  -needs to follow up with PCP      # headache - likely with covid infection   - given toradol and compazine with good relief  - resume as needed     #Hypothyroidism   -continue synthroid      #Ascending Aorta Aneurysm   -3.5 cm   -recommend follow up with PCP or vascular      #Hx of rectal cancer   -s/p resection 2009  -completed chemo and radiation      #OA  -on mobic   -follows with ortho         DVT Prophylaxis: lovenox     Procedures (Please Review Full Report for Details)  N/A    Consults    Pulmonology       Physical Exam at Discharge:    BP (!) 174/86   Pulse 71   Temp 97.2 °F (36.2 °C) (Oral)   Resp 16   Ht 5' 4\" (1.626 m)   Wt 146 lb 8 oz (66.5 kg)   SpO2 92%   BMI 25.15 kg/m²     See today's progress note    CBC: Recent Labs     06/27/22  1122   WBC 12.1*   HGB 14.1   HCT 43.0   MCV 87.2        BMP:   Recent Labs     06/27/22  1122      K 5.0   CL 99   CO2 25   BUN 14   CREATININE 0.6     LIVER PROFILE:   Recent Labs     06/27/22  1122   AST 27   ALT 15   LIPASE 20.0   BILITOT 0.4   ALKPHOS 86     UA:  Recent Labs     06/27/22  1112   COLORU Yellow   PHUR 7.5   WBCUA 10-20*   RBCUA 3-4   MUCUS 2+*   BACTERIA 1+*   CLARITYU Clear   SPECGRAV 1.010   LEUKOCYTESUR SMALL*   UROBILINOGEN 0.2   BILIRUBINUR Negative   BLOODU TRACE-INTACT*   GLUCOSEU Negative         CULTURES  Sputum: aspergillus  COVID +       RADIOLOGY  CT CHEST PULMONARY EMBOLISM W CONTRAST   Final Result   No evidence of a pulmonary embolus. Mildly dilated and moderately atherosclerotic thoracic aorta which is most   prominent along the descending thoracic and upper abdominal aorta with no   aneurysm seen. Mild aneurysmal dilatation of the ascending aorta measuring 3.5 cm with no   dissection. Recommend follow-up to assure stability. Hazy ground-glass opacities scattered along the upper lobes and into the lung   bases anteriorly which could represent early multisegmental bronchopneumonia. This pattern of pneumonia has been described with COVID-19 disease. Recommend correlating with lab values. Status post cholecystectomy and a small hepatic cyst.         XR CHEST (2 VW)   Final Result   No acute process. There is no change from prior examination.                Discharge Medications     Medication List      START taking these medications    albuterol sulfate  (90 Base) MCG/ACT inhaler  Commonly known as: PROVENTIL;VENTOLIN;PROAIR  Inhale 2 puffs into the lungs every 6 hours as needed for Wheezing     benzonatate 100 MG capsule  Commonly known as: TESSALON  Take 1 capsule by mouth 3 times daily as needed for Cough     dexamethasone 6 MG tablet  Commonly known as: DECADRON  Take 1 tablet by mouth daily (with breakfast) for 8 days     promethazine-codeine 6.25-10 MG/5ML syrup  Commonly known as: PHENERGAN with CODEINE  Take 5 mLs by mouth every 4 hours as needed for Cough for up to 7 days. CONTINUE taking these medications    aspirin 81 MG tablet     atorvastatin 10 MG tablet  Commonly known as: LIPITOR     citalopram 20 MG tablet  Commonly known as: CELEXA     Fish Oil 500 MG Caps     levocetirizine 5 MG tablet  Commonly known as: XYZAL     levothyroxine 137 MCG tablet  Commonly known as: SYNTHROID     Paxlovid 20 x 150 MG & 10 x 100MG  Generic drug: nirmatrelvir/ritonavir     PROBIOTIC PRODUCT PO           Where to Get Your Medications      These medications were sent to [facility] [id number] [facility] [address] [facility] - P [phone] - F [phone]  [address] [facility] [address]    Phone: [phone]   · albuterol sulfate  (90 Base) MCG/ACT inhaler  · benzonatate 100 MG capsule  · dexamethasone 6 MG tablet  · promethazine-codeine 6.25-10 MG/5ML syrup           Discharged in stable condition to home. Follow Up: Follow up with PCP.

## 2022-06-28 ENCOUNTER — APPOINTMENT (OUTPATIENT)
Dept: CT IMAGING | Age: 72
DRG: 326 | End: 2022-06-28
Payer: MEDICARE

## 2022-06-28 ENCOUNTER — APPOINTMENT (OUTPATIENT)
Dept: GENERAL RADIOLOGY | Age: 72
DRG: 326 | End: 2022-06-28
Payer: MEDICARE

## 2022-06-28 ENCOUNTER — HOSPITAL ENCOUNTER (INPATIENT)
Age: 72
LOS: 30 days | Discharge: NURSING, REHAB OR LONG-TERM CARE | DRG: 326 | End: 2022-07-28
Attending: EMERGENCY MEDICINE | Admitting: SURGERY
Payer: MEDICARE

## 2022-06-28 ENCOUNTER — ANESTHESIA (OUTPATIENT)
Dept: OPERATING ROOM | Age: 72
DRG: 326 | End: 2022-06-28
Payer: MEDICARE

## 2022-06-28 ENCOUNTER — ANESTHESIA EVENT (OUTPATIENT)
Dept: OPERATING ROOM | Age: 72
DRG: 326 | End: 2022-06-28
Payer: MEDICARE

## 2022-06-28 DIAGNOSIS — U07.1 COVID-19: ICD-10-CM

## 2022-06-28 DIAGNOSIS — J96.01 ACUTE RESPIRATORY FAILURE WITH HYPOXIA (HCC): ICD-10-CM

## 2022-06-28 DIAGNOSIS — N17.9 AKI (ACUTE KIDNEY INJURY) (HCC): ICD-10-CM

## 2022-06-28 DIAGNOSIS — K25.5 GASTRIC PERFORATION (HCC): Primary | ICD-10-CM

## 2022-06-28 PROBLEM — K31.89 GASTRIC PERFORATION, ACUTE: Status: ACTIVE | Noted: 2022-06-28

## 2022-06-28 PROBLEM — K27.5 PERFORATED PEPTIC ULCER (HCC): Status: ACTIVE | Noted: 2022-06-28

## 2022-06-28 LAB
A/G RATIO: 2.1 (ref 1.1–2.2)
ALBUMIN SERPL-MCNC: 3.8 G/DL (ref 3.4–5)
ALP BLD-CCNC: 72 U/L (ref 40–129)
ALT SERPL-CCNC: 12 U/L (ref 10–40)
ANION GAP SERPL CALCULATED.3IONS-SCNC: 14 MMOL/L (ref 3–16)
AST SERPL-CCNC: 18 U/L (ref 15–37)
BASOPHILS ABSOLUTE: 0 K/UL (ref 0–0.2)
BASOPHILS RELATIVE PERCENT: 0.1 %
BILIRUB SERPL-MCNC: 0.5 MG/DL (ref 0–1)
BUN BLDV-MCNC: 26 MG/DL (ref 7–20)
CALCIUM SERPL-MCNC: 8.6 MG/DL (ref 8.3–10.6)
CHLORIDE BLD-SCNC: 103 MMOL/L (ref 99–110)
CO2: 20 MMOL/L (ref 21–32)
CREAT SERPL-MCNC: 1.2 MG/DL (ref 0.6–1.2)
EKG ATRIAL RATE: 90 BPM
EKG DIAGNOSIS: NORMAL
EKG P AXIS: 29 DEGREES
EKG P-R INTERVAL: 128 MS
EKG Q-T INTERVAL: 350 MS
EKG QRS DURATION: 82 MS
EKG QTC CALCULATION (BAZETT): 428 MS
EKG R AXIS: 44 DEGREES
EKG T AXIS: 63 DEGREES
EKG VENTRICULAR RATE: 90 BPM
EOSINOPHILS ABSOLUTE: 0 K/UL (ref 0–0.6)
EOSINOPHILS RELATIVE PERCENT: 0.1 %
GFR AFRICAN AMERICAN: 53
GFR NON-AFRICAN AMERICAN: 44
GLUCOSE BLD-MCNC: 172 MG/DL (ref 70–99)
HCT VFR BLD CALC: 50.1 % (ref 36–48)
HEMOGLOBIN: 16.3 G/DL (ref 12–16)
LACTIC ACID, SEPSIS: 2.2 MMOL/L (ref 0.4–1.9)
LACTIC ACID, SEPSIS: 2.3 MMOL/L (ref 0.4–1.9)
LIPASE: 123 U/L (ref 13–60)
LYMPHOCYTES ABSOLUTE: 0.8 K/UL (ref 1–5.1)
LYMPHOCYTES RELATIVE PERCENT: 8.2 %
MCH RBC QN AUTO: 28.5 PG (ref 26–34)
MCHC RBC AUTO-ENTMCNC: 32.6 G/DL (ref 31–36)
MCV RBC AUTO: 87.6 FL (ref 80–100)
MONOCYTES ABSOLUTE: 0.4 K/UL (ref 0–1.3)
MONOCYTES RELATIVE PERCENT: 4.5 %
NEUTROPHILS ABSOLUTE: 8.5 K/UL (ref 1.7–7.7)
NEUTROPHILS RELATIVE PERCENT: 87.1 %
PDW BLD-RTO: 14.8 % (ref 12.4–15.4)
PLATELET # BLD: 477 K/UL (ref 135–450)
PMV BLD AUTO: 8.4 FL (ref 5–10.5)
POTASSIUM REFLEX MAGNESIUM: 4 MMOL/L (ref 3.5–5.1)
RBC # BLD: 5.72 M/UL (ref 4–5.2)
SARS-COV-2, NAAT: DETECTED
SODIUM BLD-SCNC: 137 MMOL/L (ref 136–145)
TOTAL PROTEIN: 5.6 G/DL (ref 6.4–8.2)
TROPONIN: <0.01 NG/ML
URINE CULTURE, ROUTINE: NORMAL
WBC # BLD: 9.8 K/UL (ref 4–11)

## 2022-06-28 PROCEDURE — 6360000002 HC RX W HCPCS

## 2022-06-28 PROCEDURE — 96375 TX/PRO/DX INJ NEW DRUG ADDON: CPT

## 2022-06-28 PROCEDURE — 85025 COMPLETE CBC W/AUTO DIFF WBC: CPT

## 2022-06-28 PROCEDURE — C1892 INTRO/SHEATH,FIXED,PEEL-AWAY: HCPCS | Performed by: SURGERY

## 2022-06-28 PROCEDURE — 87635 SARS-COV-2 COVID-19 AMP PRB: CPT

## 2022-06-28 PROCEDURE — 2580000003 HC RX 258: Performed by: SURGERY

## 2022-06-28 PROCEDURE — 49905 OMENTAL FLAP INTRA-ABDOM: CPT | Performed by: SURGERY

## 2022-06-28 PROCEDURE — 2580000003 HC RX 258: Performed by: EMERGENCY MEDICINE

## 2022-06-28 PROCEDURE — 6360000002 HC RX W HCPCS: Performed by: INTERNAL MEDICINE

## 2022-06-28 PROCEDURE — 2709999900 HC NON-CHARGEABLE SUPPLY: Performed by: SURGERY

## 2022-06-28 PROCEDURE — 80053 COMPREHEN METABOLIC PANEL: CPT

## 2022-06-28 PROCEDURE — 96361 HYDRATE IV INFUSION ADD-ON: CPT

## 2022-06-28 PROCEDURE — 94761 N-INVAS EAR/PLS OXIMETRY MLT: CPT

## 2022-06-28 PROCEDURE — C9113 INJ PANTOPRAZOLE SODIUM, VIA: HCPCS | Performed by: NURSE PRACTITIONER

## 2022-06-28 PROCEDURE — 96374 THER/PROPH/DIAG INJ IV PUSH: CPT

## 2022-06-28 PROCEDURE — 93010 ELECTROCARDIOGRAM REPORT: CPT | Performed by: INTERNAL MEDICINE

## 2022-06-28 PROCEDURE — A4217 STERILE WATER/SALINE, 500 ML: HCPCS | Performed by: SURGERY

## 2022-06-28 PROCEDURE — 6360000002 HC RX W HCPCS: Performed by: NURSE PRACTITIONER

## 2022-06-28 PROCEDURE — 99285 EMERGENCY DEPT VISIT HI MDM: CPT

## 2022-06-28 PROCEDURE — 2580000003 HC RX 258: Performed by: NURSE PRACTITIONER

## 2022-06-28 PROCEDURE — C2626 INFUSION PUMP, NON-PROG,TEMP: HCPCS | Performed by: SURGERY

## 2022-06-28 PROCEDURE — 1200000000 HC SEMI PRIVATE

## 2022-06-28 PROCEDURE — 87040 BLOOD CULTURE FOR BACTERIA: CPT

## 2022-06-28 PROCEDURE — 3600000004 HC SURGERY LEVEL 4 BASE: Performed by: SURGERY

## 2022-06-28 PROCEDURE — 93005 ELECTROCARDIOGRAM TRACING: CPT | Performed by: EMERGENCY MEDICINE

## 2022-06-28 PROCEDURE — 71045 X-RAY EXAM CHEST 1 VIEW: CPT

## 2022-06-28 PROCEDURE — 74176 CT ABD & PELVIS W/O CONTRAST: CPT

## 2022-06-28 PROCEDURE — 44015 INSERT NEEDLE CATH BOWEL: CPT | Performed by: SURGERY

## 2022-06-28 PROCEDURE — 7100000000 HC PACU RECOVERY - FIRST 15 MIN: Performed by: SURGERY

## 2022-06-28 PROCEDURE — 2500000003 HC RX 250 WO HCPCS: Performed by: SURGERY

## 2022-06-28 PROCEDURE — 3700000001 HC ADD 15 MINUTES (ANESTHESIA): Performed by: SURGERY

## 2022-06-28 PROCEDURE — 0DQ70ZZ REPAIR STOMACH, PYLORUS, OPEN APPROACH: ICD-10-PCS | Performed by: SURGERY

## 2022-06-28 PROCEDURE — 84484 ASSAY OF TROPONIN QUANT: CPT

## 2022-06-28 PROCEDURE — 36415 COLL VENOUS BLD VENIPUNCTURE: CPT

## 2022-06-28 PROCEDURE — 83605 ASSAY OF LACTIC ACID: CPT

## 2022-06-28 PROCEDURE — 83690 ASSAY OF LIPASE: CPT

## 2022-06-28 PROCEDURE — 43840 GSTRRPHY SUTR DUOL/GSTR ULCR: CPT | Performed by: SURGERY

## 2022-06-28 PROCEDURE — 0DHA0UZ INSERTION OF FEEDING DEVICE INTO JEJUNUM, OPEN APPROACH: ICD-10-PCS | Performed by: SURGERY

## 2022-06-28 PROCEDURE — 2700000000 HC OXYGEN THERAPY PER DAY

## 2022-06-28 PROCEDURE — 3600000014 HC SURGERY LEVEL 4 ADDTL 15MIN: Performed by: SURGERY

## 2022-06-28 PROCEDURE — 6360000002 HC RX W HCPCS: Performed by: EMERGENCY MEDICINE

## 2022-06-28 PROCEDURE — 0DU707Z SUPPLEMENT STOMACH, PYLORUS WITH AUTOLOGOUS TISSUE SUBSTITUTE, OPEN APPROACH: ICD-10-PCS | Performed by: SURGERY

## 2022-06-28 PROCEDURE — 2500000003 HC RX 250 WO HCPCS

## 2022-06-28 PROCEDURE — 3700000000 HC ANESTHESIA ATTENDED CARE: Performed by: SURGERY

## 2022-06-28 PROCEDURE — 7100000001 HC PACU RECOVERY - ADDTL 15 MIN: Performed by: SURGERY

## 2022-06-28 RX ORDER — ESMOLOL HYDROCHLORIDE 10 MG/ML
INJECTION INTRAVENOUS PRN
Status: DISCONTINUED | OUTPATIENT
Start: 2022-06-28 | End: 2022-06-28 | Stop reason: SDUPTHER

## 2022-06-28 RX ORDER — ONDANSETRON 2 MG/ML
4 INJECTION INTRAMUSCULAR; INTRAVENOUS
Status: CANCELLED | OUTPATIENT
Start: 2022-06-28 | End: 2022-06-28

## 2022-06-28 RX ORDER — 0.9 % SODIUM CHLORIDE 0.9 %
1000 INTRAVENOUS SOLUTION INTRAVENOUS ONCE
Status: COMPLETED | OUTPATIENT
Start: 2022-06-28 | End: 2022-06-28

## 2022-06-28 RX ORDER — SODIUM CHLORIDE 0.9 % (FLUSH) 0.9 %
5-40 SYRINGE (ML) INJECTION EVERY 12 HOURS SCHEDULED
Status: CANCELLED | OUTPATIENT
Start: 2022-06-28

## 2022-06-28 RX ORDER — MORPHINE SULFATE 2 MG/ML
2 INJECTION, SOLUTION INTRAMUSCULAR; INTRAVENOUS ONCE
Status: COMPLETED | OUTPATIENT
Start: 2022-06-28 | End: 2022-06-28

## 2022-06-28 RX ORDER — SODIUM CHLORIDE 0.9 % (FLUSH) 0.9 %
5-40 SYRINGE (ML) INJECTION PRN
Status: CANCELLED | OUTPATIENT
Start: 2022-06-28

## 2022-06-28 RX ORDER — HYDROMORPHONE HCL 110MG/55ML
PATIENT CONTROLLED ANALGESIA SYRINGE INTRAVENOUS PRN
Status: DISCONTINUED | OUTPATIENT
Start: 2022-06-28 | End: 2022-06-28 | Stop reason: SDUPTHER

## 2022-06-28 RX ORDER — ROCURONIUM BROMIDE 10 MG/ML
INJECTION, SOLUTION INTRAVENOUS PRN
Status: DISCONTINUED | OUTPATIENT
Start: 2022-06-28 | End: 2022-06-28 | Stop reason: SDUPTHER

## 2022-06-28 RX ORDER — NALOXONE HYDROCHLORIDE 0.4 MG/ML
0.4 INJECTION, SOLUTION INTRAMUSCULAR; INTRAVENOUS; SUBCUTANEOUS PRN
Status: DISCONTINUED | OUTPATIENT
Start: 2022-06-28 | End: 2022-07-28 | Stop reason: HOSPADM

## 2022-06-28 RX ORDER — FENTANYL CITRATE 50 UG/ML
INJECTION, SOLUTION INTRAMUSCULAR; INTRAVENOUS PRN
Status: DISCONTINUED | OUTPATIENT
Start: 2022-06-28 | End: 2022-06-28 | Stop reason: SDUPTHER

## 2022-06-28 RX ORDER — SODIUM CHLORIDE 9 MG/ML
INJECTION, SOLUTION INTRAVENOUS PRN
Status: CANCELLED | OUTPATIENT
Start: 2022-06-28

## 2022-06-28 RX ORDER — SODIUM CHLORIDE 9 MG/ML
25 INJECTION, SOLUTION INTRAVENOUS PRN
Status: CANCELLED | OUTPATIENT
Start: 2022-06-28

## 2022-06-28 RX ORDER — ENOXAPARIN SODIUM 100 MG/ML
40 INJECTION SUBCUTANEOUS DAILY
Status: DISCONTINUED | OUTPATIENT
Start: 2022-06-28 | End: 2022-06-29

## 2022-06-28 RX ORDER — SODIUM CHLORIDE 0.9 % (FLUSH) 0.9 %
5-40 SYRINGE (ML) INJECTION PRN
Status: DISCONTINUED | OUTPATIENT
Start: 2022-06-28 | End: 2022-07-28 | Stop reason: HOSPADM

## 2022-06-28 RX ORDER — SODIUM CHLORIDE 9 MG/ML
INJECTION, SOLUTION INTRAVENOUS PRN
Status: DISCONTINUED | OUTPATIENT
Start: 2022-06-28 | End: 2022-07-28 | Stop reason: HOSPADM

## 2022-06-28 RX ORDER — ONDANSETRON 2 MG/ML
4 INJECTION INTRAMUSCULAR; INTRAVENOUS EVERY 6 HOURS PRN
Status: DISCONTINUED | OUTPATIENT
Start: 2022-06-28 | End: 2022-07-28 | Stop reason: HOSPADM

## 2022-06-28 RX ORDER — SODIUM CHLORIDE 0.9 % (FLUSH) 0.9 %
5-40 SYRINGE (ML) INJECTION EVERY 12 HOURS SCHEDULED
Status: DISCONTINUED | OUTPATIENT
Start: 2022-06-28 | End: 2022-07-28 | Stop reason: HOSPADM

## 2022-06-28 RX ORDER — MORPHINE SULFATE 2 MG/ML
2 INJECTION, SOLUTION INTRAMUSCULAR; INTRAVENOUS
Status: DISCONTINUED | OUTPATIENT
Start: 2022-06-28 | End: 2022-07-08

## 2022-06-28 RX ORDER — ONDANSETRON 2 MG/ML
INJECTION INTRAMUSCULAR; INTRAVENOUS PRN
Status: DISCONTINUED | OUTPATIENT
Start: 2022-06-28 | End: 2022-06-28 | Stop reason: SDUPTHER

## 2022-06-28 RX ORDER — PROPOFOL 10 MG/ML
INJECTION, EMULSION INTRAVENOUS PRN
Status: DISCONTINUED | OUTPATIENT
Start: 2022-06-28 | End: 2022-06-28 | Stop reason: SDUPTHER

## 2022-06-28 RX ORDER — BUPIVACAINE HYDROCHLORIDE 5 MG/ML
INJECTION, SOLUTION EPIDURAL; INTRACAUDAL PRN
Status: DISCONTINUED | OUTPATIENT
Start: 2022-06-28 | End: 2022-06-28 | Stop reason: ALTCHOICE

## 2022-06-28 RX ORDER — SODIUM CHLORIDE 9 MG/ML
INJECTION, SOLUTION INTRAVENOUS CONTINUOUS
Status: DISCONTINUED | OUTPATIENT
Start: 2022-06-28 | End: 2022-06-29

## 2022-06-28 RX ORDER — ONDANSETRON 4 MG/1
4 TABLET, ORALLY DISINTEGRATING ORAL EVERY 8 HOURS PRN
Status: DISCONTINUED | OUTPATIENT
Start: 2022-06-28 | End: 2022-07-28 | Stop reason: HOSPADM

## 2022-06-28 RX ORDER — SODIUM CHLORIDE, SODIUM LACTATE, POTASSIUM CHLORIDE, CALCIUM CHLORIDE 600; 310; 30; 20 MG/100ML; MG/100ML; MG/100ML; MG/100ML
INJECTION, SOLUTION INTRAVENOUS CONTINUOUS
Status: CANCELLED | OUTPATIENT
Start: 2022-06-28

## 2022-06-28 RX ORDER — MAGNESIUM HYDROXIDE 1200 MG/15ML
LIQUID ORAL CONTINUOUS PRN
Status: COMPLETED | OUTPATIENT
Start: 2022-06-28 | End: 2022-06-28

## 2022-06-28 RX ORDER — OXYCODONE HYDROCHLORIDE 5 MG/1
10 TABLET ORAL PRN
Status: CANCELLED | OUTPATIENT
Start: 2022-06-28 | End: 2022-06-28

## 2022-06-28 RX ORDER — PROCHLORPERAZINE EDISYLATE 5 MG/ML
5 INJECTION INTRAMUSCULAR; INTRAVENOUS ONCE
Status: COMPLETED | OUTPATIENT
Start: 2022-06-28 | End: 2022-06-28

## 2022-06-28 RX ORDER — MEPERIDINE HYDROCHLORIDE 25 MG/ML
12.5 INJECTION INTRAMUSCULAR; INTRAVENOUS; SUBCUTANEOUS EVERY 5 MIN PRN
Status: CANCELLED | OUTPATIENT
Start: 2022-06-28

## 2022-06-28 RX ORDER — MORPHINE SULFATE 2 MG/ML
1 INJECTION, SOLUTION INTRAMUSCULAR; INTRAVENOUS
Status: DISCONTINUED | OUTPATIENT
Start: 2022-06-28 | End: 2022-07-28

## 2022-06-28 RX ORDER — OXYCODONE HYDROCHLORIDE 5 MG/1
5 TABLET ORAL PRN
Status: CANCELLED | OUTPATIENT
Start: 2022-06-28 | End: 2022-06-28

## 2022-06-28 RX ORDER — SUCCINYLCHOLINE CHLORIDE 20 MG/ML
INJECTION INTRAMUSCULAR; INTRAVENOUS PRN
Status: DISCONTINUED | OUTPATIENT
Start: 2022-06-28 | End: 2022-06-28 | Stop reason: SDUPTHER

## 2022-06-28 RX ADMIN — HYDROMORPHONE HYDROCHLORIDE 1 MG: 2 INJECTION, SOLUTION INTRAMUSCULAR; INTRAVENOUS; SUBCUTANEOUS at 17:26

## 2022-06-28 RX ADMIN — MORPHINE SULFATE 2 MG: 2 INJECTION, SOLUTION INTRAMUSCULAR; INTRAVENOUS at 09:19

## 2022-06-28 RX ADMIN — FENTANYL CITRATE 50 MCG: 50 INJECTION INTRAMUSCULAR; INTRAVENOUS at 16:46

## 2022-06-28 RX ADMIN — SODIUM CHLORIDE: 9 INJECTION, SOLUTION INTRAVENOUS at 12:16

## 2022-06-28 RX ADMIN — PROCHLORPERAZINE EDISYLATE 5 MG: 5 INJECTION INTRAMUSCULAR; INTRAVENOUS at 09:17

## 2022-06-28 RX ADMIN — ONDANSETRON HYDROCHLORIDE 4 MG: 2 INJECTION, SOLUTION INTRAMUSCULAR; INTRAVENOUS at 16:37

## 2022-06-28 RX ADMIN — PANTOPRAZOLE SODIUM 8 MG/HR: 40 INJECTION, POWDER, LYOPHILIZED, FOR SOLUTION INTRAVENOUS at 22:24

## 2022-06-28 RX ADMIN — MORPHINE SULFATE 1 MG: 2 INJECTION, SOLUTION INTRAMUSCULAR; INTRAVENOUS at 12:58

## 2022-06-28 RX ADMIN — SODIUM CHLORIDE 1000 ML: 9 INJECTION, SOLUTION INTRAVENOUS at 11:12

## 2022-06-28 RX ADMIN — PIPERACILLIN AND TAZOBACTAM 3375 MG: 3; .375 INJECTION, POWDER, LYOPHILIZED, FOR SOLUTION INTRAVENOUS at 10:38

## 2022-06-28 RX ADMIN — PROPOFOL 100 MG: 10 INJECTION, EMULSION INTRAVENOUS at 16:23

## 2022-06-28 RX ADMIN — SODIUM CHLORIDE: 9 INJECTION, SOLUTION INTRAVENOUS at 17:04

## 2022-06-28 RX ADMIN — SODIUM CHLORIDE: 9 INJECTION, SOLUTION INTRAVENOUS at 16:44

## 2022-06-28 RX ADMIN — MORPHINE SULFATE 2 MG: 2 INJECTION, SOLUTION INTRAMUSCULAR; INTRAVENOUS at 10:43

## 2022-06-28 RX ADMIN — ESMOLOL HYDROCHLORIDE 20 MG: 10 INJECTION, SOLUTION INTRAVENOUS at 17:14

## 2022-06-28 RX ADMIN — ENOXAPARIN SODIUM 40 MG: 100 INJECTION SUBCUTANEOUS at 15:13

## 2022-06-28 RX ADMIN — SODIUM CHLORIDE 80 MG: 9 INJECTION, SOLUTION INTRAVENOUS at 11:28

## 2022-06-28 RX ADMIN — SODIUM CHLORIDE 1000 ML: 9 INJECTION, SOLUTION INTRAVENOUS at 09:15

## 2022-06-28 RX ADMIN — HYDROMORPHONE HYDROCHLORIDE 0.5 MG: 1 INJECTION, SOLUTION INTRAMUSCULAR; INTRAVENOUS; SUBCUTANEOUS at 18:19

## 2022-06-28 RX ADMIN — SUGAMMADEX 200 MG: 100 INJECTION, SOLUTION INTRAVENOUS at 17:11

## 2022-06-28 RX ADMIN — PIPERACILLIN SODIUM AND TAZOBACTAM SODIUM 3375 MG: 3; .375 INJECTION, POWDER, LYOPHILIZED, FOR SOLUTION INTRAVENOUS at 16:34

## 2022-06-28 RX ADMIN — SUCCINYLCHOLINE CHLORIDE 100 MG: 20 INJECTION, SOLUTION INTRAMUSCULAR; INTRAVENOUS at 16:23

## 2022-06-28 RX ADMIN — HYDROMORPHONE HYDROCHLORIDE 1 MG: 2 INJECTION, SOLUTION INTRAMUSCULAR; INTRAVENOUS; SUBCUTANEOUS at 17:34

## 2022-06-28 RX ADMIN — PIPERACILLIN SODIUM AND TAZOBACTAM SODIUM 3375 MG: 3; .375 INJECTION, POWDER, LYOPHILIZED, FOR SOLUTION INTRAVENOUS at 16:08

## 2022-06-28 RX ADMIN — HYDROMORPHONE HYDROCHLORIDE 0.5 MG: 1 INJECTION, SOLUTION INTRAMUSCULAR; INTRAVENOUS; SUBCUTANEOUS at 18:17

## 2022-06-28 RX ADMIN — SODIUM CHLORIDE, PRESERVATIVE FREE 10 ML: 5 INJECTION INTRAVENOUS at 12:57

## 2022-06-28 RX ADMIN — ROCURONIUM BROMIDE 30 MG: 10 INJECTION, SOLUTION INTRAVENOUS at 16:30

## 2022-06-28 RX ADMIN — PANTOPRAZOLE SODIUM 8 MG/HR: 40 INJECTION, POWDER, LYOPHILIZED, FOR SOLUTION INTRAVENOUS at 12:13

## 2022-06-28 RX ADMIN — NALOXONE HYDROCHLORIDE 0.4 MG: 0.4 INJECTION, SOLUTION INTRAMUSCULAR; INTRAVENOUS; SUBCUTANEOUS at 20:40

## 2022-06-28 RX ADMIN — SODIUM CHLORIDE, PRESERVATIVE FREE 10 ML: 5 INJECTION INTRAVENOUS at 20:24

## 2022-06-28 RX ADMIN — FENTANYL CITRATE 50 MCG: 50 INJECTION INTRAMUSCULAR; INTRAVENOUS at 16:23

## 2022-06-28 ASSESSMENT — PAIN DESCRIPTION - FREQUENCY
FREQUENCY: CONTINUOUS
FREQUENCY: CONTINUOUS

## 2022-06-28 ASSESSMENT — PAIN DESCRIPTION - PAIN TYPE
TYPE: ACUTE PAIN;SURGICAL PAIN
TYPE: ACUTE PAIN
TYPE: ACUTE PAIN;SURGICAL PAIN

## 2022-06-28 ASSESSMENT — PAIN SCALES - GENERAL
PAINLEVEL_OUTOF10: 9
PAINLEVEL_OUTOF10: 5
PAINLEVEL_OUTOF10: 8
PAINLEVEL_OUTOF10: 9
PAINLEVEL_OUTOF10: 5
PAINLEVEL_OUTOF10: 10
PAINLEVEL_OUTOF10: 5
PAINLEVEL_OUTOF10: 9
PAINLEVEL_OUTOF10: 8

## 2022-06-28 ASSESSMENT — PAIN - FUNCTIONAL ASSESSMENT: PAIN_FUNCTIONAL_ASSESSMENT: 0-10

## 2022-06-28 ASSESSMENT — PAIN DESCRIPTION - ORIENTATION
ORIENTATION: RIGHT;LEFT
ORIENTATION: OUTER;MID
ORIENTATION: RIGHT;LEFT;MID;LOWER
ORIENTATION: RIGHT;LEFT
ORIENTATION: MID;OUTER

## 2022-06-28 ASSESSMENT — ENCOUNTER SYMPTOMS: SHORTNESS OF BREATH: 1

## 2022-06-28 ASSESSMENT — PAIN DESCRIPTION - DESCRIPTORS
DESCRIPTORS: ACHING;SHARP
DESCRIPTORS: SHARP;ACHING
DESCRIPTORS: JABBING
DESCRIPTORS: ACHING;SHARP
DESCRIPTORS: ACHING;SHARP
DESCRIPTORS: JABBING

## 2022-06-28 ASSESSMENT — PAIN DESCRIPTION - LOCATION
LOCATION_3: ABDOMEN
LOCATION: ABDOMEN

## 2022-06-28 ASSESSMENT — LIFESTYLE VARIABLES: SMOKING_STATUS: 0

## 2022-06-28 NOTE — PROGRESS NOTES
Called report to Lisbeth Brown. Patient on 3 L O2 support, face mask in place patient a mouth breather. NG tube needs to be hooked up to LIS.

## 2022-06-28 NOTE — ED NOTES
Resting quietly in bed ou closed. Surgery consent obtained by daughter. No further needs or discomforts noted at this time. Marie Staton RN       Marie Staton RN  06/28/22 [id number]

## 2022-06-28 NOTE — PROGRESS NOTES
Patient back up to floor from surgery. Groggy and lethargic d/t medications. NG , jejunostomy, waddell, and JERRI drains patent.

## 2022-06-28 NOTE — PROGRESS NOTES
Patient had very cool extremities, place minstral air warmer blanket on patient. Patient expressed this helped her feel warmer.

## 2022-06-28 NOTE — ANESTHESIA POSTPROCEDURE EVALUATION
Department of Anesthesiology  Postprocedure Note    Patient: Jessica Lemus  MRN: [id number]  YOB: 1950  Date of evaluation: 6/28/2022      Procedure Summary     Date: 06/28/22 Room / Location: [place] [address] / [facility]    Anesthesia Start: 8960 Anesthesia Stop: 8619    Procedure: LAPAROTOMY, REPAIR OF PERFORATED ULCER with jejunostomy insertion (N/A Abdomen) Diagnosis:       Ulcer, duodenal, acute, with obstruction      (PERFORATED ULCER)    Surgeons: Cheikh Hobson MD Responsible Provider: Karlie Quintanilla MD    Anesthesia Type: general ASA Status: 3          Anesthesia Type: No value filed.     Dea Phase I:      Dea Phase II:      Vitals:    06/28/22 1251 06/28/22 1731 06/28/22 1817 06/28/22 1832   BP: 109/76 (!) 168/69  (!) 94/55   Pulse: 95 (!) 110  (!) 117   Resp: 17 15 14 19   Temp: 97.3 °F (36.3 °C) (!) 96 °F (35.6 °C)  96.9 °F (36.1 °C)   TempSrc: Axillary Temporal  Temporal   SpO2: 90% 96%  99%   Weight:       Height:         Anesthesia Post Evaluation    Patient location during evaluation: bedside  Patient participation: complete - patient participated  Level of consciousness: awake and alert  Airway patency: patent  Nausea & Vomiting: no nausea  Complications: no  Cardiovascular status: hemodynamically stable  Respiratory status: acceptable and nasal cannula (as preop)  Hydration status: euvolemic

## 2022-06-28 NOTE — ED NOTES
[address] Surgery for consult for free air in abd. Roxy Garcia  06/28/22 [id number] - January came down to see pt she's with Dr. Alexandria Cox today.       Roxy Garcia  06/28/22 [id number]

## 2022-06-28 NOTE — PROGRESS NOTES
[facility] Dr. Jarrett Tolentino.

## 2022-06-28 NOTE — BRIEF OP NOTE
Brief Postoperative Note      Patient: Kalen Slater  YOB: 1950  MRN: [id number]    Date of Procedure: 6/28/2022    Pre-Op Diagnosis: PERFORATED ULCER    Post-Op Diagnosis: Same       Procedure(s):  LAPAROTOMY, REPAIR OF PERFORATED ULCER with jejunostomy insertion    Surgeon(s):  Kassandra [place] MD    Assistant:  Surgical Assistant: Marin Ferraro    Anesthesia: General    Estimated Blood Loss (mL): less than 50     Complications: None    Specimens:   * No specimens in log *    Implants:  * No implants in log *      Drains:   Closed/Suction Drain Superior;Midline Stomach Bulb (Active)       NG/OG/NJ/NE Tube Nasogastric 18 fr Right nostril (Active)       Urinary Catheter Curiel (Active)       Findings: as above    Electronically signed by Erwin Doss MD on 6/28/2022 at 5:40 PM

## 2022-06-28 NOTE — ED PROVIDER NOTES
[facility] [place]      CHIEF COMPLAINT  Abdominal Pain (Pt from home via EMS for abdominal pain since yesterday. EMS states that she was evaluated yesterday for something unrelated. Pt states diarrhea last week and nausea.)       HISTORY OF PRESENT ILLNESS  Latrice Urrutia is a 74 y.o. female  who presents to the ED complaining of  abdominal pain for the past 3 days, generalized in nature. Pain waxes and wanes no worsening or remitting factors. Describes it as an aching sensation with occasional stabbing in the mid abdomen. Was seen here yesterday, had a negative CT however was shown to have thickening of the gastric antrum. Was given medications for gastritis and discharged home, pain worsened throughout the night she has had intractable vomiting. Has been taking Zofran at home without any relief. Denies hematemesis. She has not had a bowel movement in the past week. Was not able to sleep due to vomiting, EMS was called this morning they gave her Zofran and fentanyl and the patient continues to have nausea and pain. She has had a cholecystectomy and appendectomy. Denies chest pain or shortness of breath but is requiring 2 L nasal cannula here was 89% on arrival.  Denies fevers. Feels very weak. No other complaints, modifying factors or associated symptoms. I have reviewed the following from the nursing documentation.     Past Medical History:   Diagnosis Date    Arthritis     Colon cancer ([facility] [address]     Hyperlipidemia     Thyroid disease      Past Surgical History:   Procedure Laterality Date    APPENDECTOMY  2009    CHOLECYSTECTOMY  2009    COLON SURGERY  2009    EPIDURAL STEROID INJECTION Left 4/15/2019    LEFT LUMBAR FIVE SACRAL ONE EPIDURAL STEROID INJECTION SITE CONFIRMED BY FLUOROSCOPY performed by Shari Benjamin MD at [facility] Left 11/5/2019    LEFT LUMBAR FIVE SACRAL ONE EPIDURAL STEROID INJECTION SITE CONFIRMED BY FLUOROSCOPY performed by Patricia Beltran Mary Dowell MD at [address] [place] [address],[address]      OVARY REMOVAL       Family History   Problem Relation Age of Onset    High Blood Pressure Sister     Cancer Sister     High Blood Pressure Brother     Heart Attack Brother     Cancer Brother      Social History     Socioeconomic History    Marital status:      Spouse name: Prince Barrera Number of children: 3    Years of education: 15    Highest education level: Not on file   Occupational History    Not on file   Tobacco Use    Smoking status: Former Smoker     Quit date: 3/17/2000     Years since quittin.2    Smokeless tobacco: Never Used   Vaping Use    Vaping Use: Never used   Substance and Sexual Activity    Alcohol use: Yes     Alcohol/week: 0.0 standard drinks     Comment: socially- glass of wine    Drug use: Never    Sexual activity: Yes     Partners: Male   Other Topics Concern    Not on file   Social History Narrative    Not on file     Social Determinants of Health     Financial Resource Strain:     Difficulty of Paying Living Expenses: Not on file   Food Insecurity:     Worried About Running Out of Food in the Last Year: Not on file    Nabil of Food in the Last Year: Not on file   Transportation Needs:     Lack of Transportation (Medical): Not on file    Lack of Transportation (Non-Medical):  Not on file   Physical Activity:     Days of Exercise per Week: Not on file    Minutes of Exercise per Session: Not on file   Stress:     Feeling of Stress : Not on file   Social Connections:     Frequency of Communication with Friends and Family: Not on file    Frequency of Social Gatherings with Friends and Family: Not on file    Attends Temple Services: Not on file    Active Member of Clubs or Organizations: Not on file    Attends Club or Organization Meetings: Not on file    Marital Status: Not on file   Intimate Partner Violence:     Fear of Current or Ex-Partner: Not on file    Emotionally Abused: Not exchange  Abdomen: diffuse tenderness to palpation with mild distension but abdomen is non rigid; guarding present. Normal bowel sounds   Musculoskeletal: No extremity edema. Compartments soft. No deformity. No tenderness in the extremities. All extremities neurovascularly intact. Radial, Dp, and PT pulses +2/4 bilaterally  Neurological: fatigued but opens eyes to voice and answer questions appropriately. No focal deficits. No aphasia or dysarthria. Psychiatric: Normal mood and affect. LABS  I have reviewed all labs for this visit.    Results for orders placed or performed during the hospital encounter of 06/28/22   COVID-19, Rapid    Specimen: Nasopharyngeal Swab   Result Value Ref Range    SARS-CoV-2, NAAT DETECTED (AA) Not Detected   CBC with Auto Differential   Result Value Ref Range    WBC 9.8 4.0 - 11.0 K/uL    RBC 5.72 (H) 4.00 - 5.20 M/uL    Hemoglobin 16.3 (H) 12.0 - 16.0 g/dL    Hematocrit 50.1 (H) 36.0 - 48.0 %    MCV 87.6 80.0 - 100.0 fL    MCH 28.5 26.0 - 34.0 pg    MCHC 32.6 31.0 - 36.0 g/dL    RDW 14.8 12.4 - 15.4 %    Platelets 092 (H) 973 - 450 K/uL    MPV 8.4 5.0 - 10.5 fL    Neutrophils % 87.1 %    Lymphocytes % 8.2 %    Monocytes % 4.5 %    Eosinophils % 0.1 %    Basophils % 0.1 %    Neutrophils Absolute 8.5 (H) 1.7 - 7.7 K/uL    Lymphocytes Absolute 0.8 (L) 1.0 - 5.1 K/uL    Monocytes Absolute 0.4 0.0 - 1.3 K/uL    Eosinophils Absolute 0.0 0.0 - 0.6 K/uL    Basophils Absolute 0.0 0.0 - 0.2 K/uL   Comprehensive Metabolic Panel w/ Reflex to MG   Result Value Ref Range    Sodium 137 136 - 145 mmol/L    Potassium reflex Magnesium 4.0 3.5 - 5.1 mmol/L    Chloride 103 99 - 110 mmol/L    CO2 20 (L) 21 - 32 mmol/L    Anion Gap 14 3 - 16    Glucose 172 (H) 70 - 99 mg/dL    BUN 26 (H) 7 - 20 mg/dL    CREATININE 1.2 0.6 - 1.2 mg/dL    GFR Non- 44 (A) >60    GFR  53 (A) >60    Calcium 8.6 8.3 - 10.6 mg/dL    Total Protein 5.6 (L) 6.4 - 8.2 g/dL    Albumin 3.8 3.4 - 5.0 g/dL    Albumin/Globulin Ratio 2.1 1.1 - 2.2    Total Bilirubin 0.5 0.0 - 1.0 mg/dL    Alkaline Phosphatase 72 40 - 129 U/L    ALT 12 10 - 40 U/L    AST 18 15 - 37 U/L   Lipase   Result Value Ref Range    Lipase 123.0 (H) 13.0 - 60.0 U/L   Lactate, Sepsis   Result Value Ref Range    Lactic Acid, Sepsis 2.3 (H) 0.4 - 1.9 mmol/L   Lactate, Sepsis   Result Value Ref Range    Lactic Acid, Sepsis 2.2 (H) 0.4 - 1.9 mmol/L   Troponin   Result Value Ref Range    Troponin <0.01 <0.01 ng/mL   EKG 12 Lead   Result Value Ref Range    Ventricular Rate 90 BPM    Atrial Rate 90 BPM    P-R Interval 128 ms    QRS Duration 82 ms    Q-T Interval 350 ms    QTc Calculation (Bazett) 428 ms    P Axis 29 degrees    R Axis 44 degrees    T Axis 63 degrees    Diagnosis       Normal sinus rhythmSeptal infarct , age undeterminedAbnormal ECGWhen compared with ECG of 22-JUN-2022 13:48,Septal infarct is now PresentConfirmed by Alexandre Edmondson MD, [address] ([id number]) on 6/28/2022 2:00:07 PM       ECG  The Ekg interpreted by me shows  normal sinus rhythm with a rate of 90  Axis is   Normal  QTc is  within an acceptable range  Intervals and Durations are unremarkable. ST Segments: normal      RADIOLOGY  CT ABDOMEN PELVIS WO CONTRAST Additional Contrast? None    Result Date: 6/28/2022  EXAMINATION: CT OF THE ABDOMEN AND PELVIS WITHOUT CONTRAST 6/28/2022 8:43 am TECHNIQUE: CT of the abdomen and pelvis was performed without the administration of intravenous contrast. Multiplanar reformatted images are provided for review. Automated exposure control, iterative reconstruction, and/or weight based adjustment of the mA/kV was utilized to reduce the radiation dose to as low as reasonably achievable.  COMPARISON: 06/27/2022 HISTORY: ORDERING SYSTEM PROVIDED HISTORY: worsening diffuse abd pain since yesterday, elevated lipase TECHNOLOGIST PROVIDED HISTORY: Reason for exam:->worsening diffuse abd pain since yesterday, elevated lipase Additional Contrast?->None Decision Support Exception - unselect if not a suspected or confirmed emergency medical condition->Emergency Medical Condition (MA) Reason for Exam: was seen here yesterday for abd pain and pain all over, covid +  multiple scans doen yesterday, FINDINGS: Lower Chest: There is a new trace left pleural effusion with bibasilar atelectasis. Emphysema involves the lungs. Organs: The liver, spleen, pancreas, and adrenal glands are unremarkable. No change in the simple right hepatic cyst.  Status post cholecystectomy with unchanged mild-to-moderate intrahepatic ductal dilatation. There are bilateral punctate nonobstructing nephrolithiasis with residual cortical renal contrast likely due to renal failure. GI/Bowel: There is a large perforation within the posterior aspect of the gastric antrum there is no bowel obstruction. The appendix is not visualized. Status post sigmoid resection with reanastomosis. Pelvis: The bladder is moderately distended with small diverticula either due to a neurogenic bladder or chronic bladder outlet obstruction. Small fat containing inguinal hernia is noted. Peritoneum/Retroperitoneum: However, there is new moderate amount of pneumoperitoneum and ascites throughout the abdomen and pelvis. There is no adenopathy. Moderate to severe atherosclerosis involves the abdominal aorta and bilateral common iliac arteries. Bones/Soft Tissues: Degenerative changes involve the thoracolumbar spine and bilateral hips. The bones are demineralized. 1. New large perforation involving the posterior gastric antrum likely from ulcerative disease causing a moderate amount of pneumoperitoneum and ascites throughout the abdomen and pelvis. 2. New trace left pleural effusion. 3. Residual contrast within the bilateral kidneys can be seen in setting of renal failure. Findings were discussed with BUCKY ROOT at 9:22 am on 6/28/2022.      XR CHEST (2 VW)    Result Date: 6/22/2022  EXAMINATION: TWO XRAY VIEWS OF THE CHEST 6/22/2022 1:11 pm COMPARISON: 09/12/2019 HISTORY: ORDERING SYSTEM PROVIDED HISTORY: cough TECHNOLOGIST PROVIDED HISTORY: Reason for exam:->cough Reason for exam:->Shortness of breath Reason for Exam: cough; congestion; fatigue; COVID Positive FINDINGS: The lungs are without acute focal process. There is no effusion or pneumothorax. The cardiomediastinal silhouette is without acute process. The osseous structures are without acute process. No acute process. There is no change from prior examination. CT HEAD WO CONTRAST    Result Date: 6/27/2022  EXAMINATION: CT OF THE HEAD WITHOUT CONTRAST  6/27/2022 12:51 pm TECHNIQUE: CT of the head was performed without the administration of intravenous contrast. Automated exposure control, iterative reconstruction, and/or weight based adjustment of the mA/kV was utilized to reduce the radiation dose to as low as reasonably achievable. COMPARISON: None. HISTORY: ORDERING SYSTEM PROVIDED HISTORY: headache, HTN TECHNOLOGIST PROVIDED HISTORY: Reason for exam:->headache, HTN Has a \"code stroke\" or \"stroke alert\" been called? ->No Decision Support Exception - unselect if not a suspected or confirmed emergency medical condition->Emergency Medical Condition (MA) FINDINGS: BRAIN/VENTRICLES: There is no acute intracranial hemorrhage, mass effect or midline shift. No abnormal extra-axial fluid collection. No CT evidence of acute, large territory infarct. Confluent areas of hypoattenuation in the subcortical and periventricular white matter are consistent with chronic small vessel ischemic disease. Age related cortical atrophy with associated sulcal and ventricular prominence. There is no evidence of hydrocephalus. ORBITS: The visualized portion of the orbits demonstrate no acute abnormality. SINUSES: Partial patchy opacification of the left mastoid air cells. The right mastoid air cells are clear. The bilateral middle ears are clear.   The remaining paranasal sinuses are clear. SOFT TISSUES/SKULL:  No acute abnormality of the visualized skull or soft tissues. No acute intracranial abnormality. CT ABDOMEN PELVIS W IV CONTRAST Additional Contrast? None    Result Date: 6/27/2022  EXAMINATION: CTA OF THE CHEST; CT OF THE ABDOMEN AND PELVIS WITH CONTRAST 6/27/2022 12:51 pm TECHNIQUE: CTA of the chest was performed after the administration of intravenous contrast.  Multiplanar reformatted images are provided for review. MIP images are provided for review. Automated exposure control, iterative reconstruction, and/or weight based adjustment of the mA/kV was utilized to reduce the radiation dose to as low as reasonably achievable.; CT of the abdomen and pelvis was performed with the administration of intravenous contrast. Multiplanar reformatted images are provided for review. Automated exposure control, iterative reconstruction, and/or weight based adjustment of the mA/kV was utilized to reduce the radiation dose to as low as reasonably achievable. COMPARISON: Chest x-ray, today. CTA PA, 06/22/2022 HISTORY: ORDERING SYSTEM PROVIDED HISTORY: r/o Pulmonary Embolism TECHNOLOGIST PROVIDED HISTORY: Reason for exam:->r/o Pulmonary Embolism Decision Support Exception - unselect if not a suspected or confirmed emergency medical condition->Emergency Medical Condition (MA) Upper abdominal pain FINDINGS: Chest- Pulmonary Arteries: Pulmonary arteries are adequately opacified for evaluation. No evidence of intraluminal filling defect to suggest pulmonary embolism. Main pulmonary artery is normal in caliber. Mediastinum: Thoracic aorta is tortuous, without aneurysm. There is moderate fibro calcified plaque of the descending segment. There is no mediastinal or hilar adenopathy. Lungs/pleura: Primarily in the upper lobes and middle lobe/lingula, there are multifocal ground-glass opacities, with no significant change since 06/22/2022. No pleural effusion or pneumothorax is identified.  Soft Tissues/Bones: No acute bone or soft tissue abnormality. --- Abdomen and pelvis- Organs: There is a 1.8 cm cyst in the right lobe of the liver; no follow-up is recommended. There is post cholecystectomy prominence of the intra and extra hepatic biliary ducts. The pancreas, spleen and adrenal glands are unremarkable. The kidneys enhance symmetrically. There is no hydronephrosis. There are nonobstructing caliceal calculi measuring up to 4 mm bilaterally. Renal subcentimeter hypodensities are too small to characterize, likely benign; no follow-up recommended. GI/Bowel: The stomach is in a collapsed state. There is mural thickening of the gastric antrum. No protrusion or perigastric edema is seen. Duodenum is unremarkable. There is a right inguinal hernia with ostium of 2.5 cm and ileum content. There is no incarceration. There is no focal mural thickening of the colon. A colo rectal anastomosis is present and unremarkable. The mesenteric vasculature enhances in normal fashion. Pelvis: Urinary bladder is unremarkable. Uterus is atrophic with prominent parauterine veins. Peritoneum/Retroperitoneum: There is moderate fibro calcified plaque in the aortoiliac vessels. Infrarenal abdominal aorta is 2.8 cm, dilated compared to juxtarenal segment. No adenopathy is identified. Bones/Soft Tissues: There is moderate spondylosis of the lumbar spine. No acute osseous abnormality. Abdominal wall unremarkable. Chest- 1. No pulmonary embolus. 2. Multifocal ground-glass opacities, primarily in the mid and upper lungs. Atypical pneumonia, including COVID etiology is considered. Other causes of pneumonia and pneumonitis, such as hypersensitivity, are also possible. --- Abdomen and pelvis- 1. Mural thickening of the gastric antrum. Gastritis is considered. 2. Right inguinal hernia with small bowel content; no incarceration. 3. 2.8 cm aneurysm of the infrarenal abdominal aorta.   5 year follow-up imaging is recommended. 4. Nonobstructive nephrolithiasis. References: Chel Host Radiol 2013; 87(99):664-617; J Vasc Surg. 2018; 67:2-77     XR CHEST PORTABLE    Result Date: 6/28/2022  EXAMINATION: ONE XRAY VIEW OF THE CHEST 6/28/2022 8:34 am COMPARISON: Chest x-ray dated 06/27/2022. HISTORY: ORDERING SYSTEM PROVIDED HISTORY: hypoxic TECHNOLOGIST PROVIDED HISTORY: Reason for exam:->hypoxic Reason for Exam: Hypoxic/abdominal pain FINDINGS: HEART/MEDIASTINUM: The cardiomediastinal silhouette is within normal limits. PLEURA/LUNGS: There are no focal consolidations or pleural effusions. There is no appreciable pneumothorax. BONES/SOFT TISSUE: No acute abnormality. Curvilinear lucency noted under the right hemidiaphragm concerning for free intraperitoneal air. No radiographic evidence of acute pulmonary disease. Findings concerning for free air under the right hemidiaphragm. CT abdomen and pelvis is in process. XR CHEST PORTABLE    Result Date: 6/27/2022  EXAMINATION: ONE XRAY VIEW OF THE CHEST 6/27/2022 10:56 am COMPARISON: 06/22/2022 HISTORY: ORDERING SYSTEM PROVIDED HISTORY: COVID TECHNOLOGIST PROVIDED HISTORY: Reason for exam:->COVID Reason for Exam: postive for covid, low 02 stats on RA     The lungs are without acute focal process. There is no effusion or pneumothorax. The cardiomediastinal silhouette is stable. The osseous structures are stable. IMPRESSION: No acute process. CT CHEST PULMONARY EMBOLISM W CONTRAST    Result Date: 6/27/2022  EXAMINATION: CTA OF THE CHEST; CT OF THE ABDOMEN AND PELVIS WITH CONTRAST 6/27/2022 12:51 pm TECHNIQUE: CTA of the chest was performed after the administration of intravenous contrast.  Multiplanar reformatted images are provided for review. MIP images are provided for review.  Automated exposure control, iterative reconstruction, and/or weight based adjustment of the mA/kV was utilized to reduce the radiation dose to as low as reasonably achievable.; CT of the abdomen and pelvis was performed with the administration of intravenous contrast. Multiplanar reformatted images are provided for review. Automated exposure control, iterative reconstruction, and/or weight based adjustment of the mA/kV was utilized to reduce the radiation dose to as low as reasonably achievable. COMPARISON: Chest x-ray, today. CTA PA, 06/22/2022 HISTORY: ORDERING SYSTEM PROVIDED HISTORY: r/o Pulmonary Embolism TECHNOLOGIST PROVIDED HISTORY: Reason for exam:->r/o Pulmonary Embolism Decision Support Exception - unselect if not a suspected or confirmed emergency medical condition->Emergency Medical Condition (MA) Upper abdominal pain FINDINGS: Chest- Pulmonary Arteries: Pulmonary arteries are adequately opacified for evaluation. No evidence of intraluminal filling defect to suggest pulmonary embolism. Main pulmonary artery is normal in caliber. Mediastinum: Thoracic aorta is tortuous, without aneurysm. There is moderate fibro calcified plaque of the descending segment. There is no mediastinal or hilar adenopathy. Lungs/pleura: Primarily in the upper lobes and middle lobe/lingula, there are multifocal ground-glass opacities, with no significant change since 06/22/2022. No pleural effusion or pneumothorax is identified. Soft Tissues/Bones: No acute bone or soft tissue abnormality. --- Abdomen and pelvis- Organs: There is a 1.8 cm cyst in the right lobe of the liver; no follow-up is recommended. There is post cholecystectomy prominence of the intra and extra hepatic biliary ducts. The pancreas, spleen and adrenal glands are unremarkable. The kidneys enhance symmetrically. There is no hydronephrosis. There are nonobstructing caliceal calculi measuring up to 4 mm bilaterally. Renal subcentimeter hypodensities are too small to characterize, likely benign; no follow-up recommended. GI/Bowel: The stomach is in a collapsed state. There is mural thickening of the gastric antrum.   No protrusion or perigastric edema is seen. Duodenum is unremarkable. There is a right inguinal hernia with ostium of 2.5 cm and ileum content. There is no incarceration. There is no focal mural thickening of the colon. A colo rectal anastomosis is present and unremarkable. The mesenteric vasculature enhances in normal fashion. Pelvis: Urinary bladder is unremarkable. Uterus is atrophic with prominent parauterine veins. Peritoneum/Retroperitoneum: There is moderate fibro calcified plaque in the aortoiliac vessels. Infrarenal abdominal aorta is 2.8 cm, dilated compared to juxtarenal segment. No adenopathy is identified. Bones/Soft Tissues: There is moderate spondylosis of the lumbar spine. No acute osseous abnormality. Abdominal wall unremarkable. Chest- 1. No pulmonary embolus. 2. Multifocal ground-glass opacities, primarily in the mid and upper lungs. Atypical pneumonia, including COVID etiology is considered. Other causes of pneumonia and pneumonitis, such as hypersensitivity, are also possible. --- Abdomen and pelvis- 1. Mural thickening of the gastric antrum. Gastritis is considered. 2. Right inguinal hernia with small bowel content; no incarceration. 3. 2.8 cm aneurysm of the infrarenal abdominal aorta. 5 year follow-up imaging is recommended. 4. Nonobstructive nephrolithiasis. References: Hilaria Polite Radiol 2013; 70(54):347-003; J Vasc Surg. 2018; 67:2-77     CT CHEST PULMONARY EMBOLISM W CONTRAST    Result Date: 6/22/2022  EXAMINATION: CTA OF THE CHEST 6/22/2022 4:45 pm TECHNIQUE: CTA of the chest was performed after the administration of intravenous contrast.  Multiplanar reformatted images are provided for review. MIP images are provided for review. Automated exposure control, iterative reconstruction, and/or weight based adjustment of the mA/kV was utilized to reduce the radiation dose to as low as reasonably achievable. COMPARISON: None.  HISTORY: ORDERING SYSTEM PROVIDED HISTORY: covid TECHNOLOGIST PROVIDED HISTORY: Reason for exam:->covid Decision Support Exception - unselect if not a suspected or confirmed emergency medical condition->Emergency Medical Condition (MA) Reason for Exam: covid positive, cough FINDINGS: Pulmonary Arteries: Pulmonary arteries are adequately opacified for evaluation. No evidence of intraluminal filling defect to suggest pulmonary embolism. Main pulmonary artery is normal in caliber. Mediastinum: No evidence of mediastinal lymphadenopathy. The heart and pericardium demonstrate no acute abnormality. There is mild calcified plaque throughout the aorta with which is mildly dilated throughout with mild aneurysmal dilatation of the ascending aorta measuring up to 3.5 cm with no dissection Lungs/pleura: The lungs are hyperinflated and emphysematous. There are mild hazy subpleural ground-glass opacities along the upper lobes extending into the right middle lobe anteriorly and into the lingula inferiorly. There is no pulmonary nodule or mass. No effusion is seen. Upper Abdomen: The adrenals are normal.  The gallbladder has been removed with clips in the gallbladder fossa. The visualized bile ducts are normal. There is a 1.7 cm cyst in the right lobe of the liver medially which does not enhance. Soft Tissues/Bones: No acute bone or soft tissue abnormality. No evidence of a pulmonary embolus. Mildly dilated and moderately atherosclerotic thoracic aorta which is most prominent along the descending thoracic and upper abdominal aorta with no aneurysm seen. Mild aneurysmal dilatation of the ascending aorta measuring 3.5 cm with no dissection. Recommend follow-up to assure stability. Hazy ground-glass opacities scattered along the upper lobes and into the lung bases anteriorly which could represent early multisegmental bronchopneumonia. This pattern of pneumonia has been described with COVID-19 disease. Recommend correlating with lab values.  Status post cholecystectomy and a small hepatic cyst.     No 0.9 % sodium chloride infusion ( IntraVENous Stopped 6/28/22 1727)   sodium chloride flush 0.9 % injection 5-40 mL (10 mLs IntraVENous Given 6/28/22 1257)   sodium chloride flush 0.9 % injection 5-40 mL (has no administration in time range)   0.9 % sodium chloride infusion (has no administration in time range)   ondansetron (ZOFRAN-ODT) disintegrating tablet 4 mg (has no administration in time range)     Or   ondansetron (ZOFRAN) injection 4 mg (has no administration in time range)   enoxaparin (LOVENOX) injection 40 mg (40 mg SubCUTAneous Given 6/28/22 1513)   morphine (PF) injection 1 mg (1 mg IntraVENous Given 6/28/22 1258)     Or   morphine (PF) injection 2 mg ( IntraVENous See Alternative 6/28/22 1258)   piperacillin-tazobactam (ZOSYN) 3,375 mg in sodium chloride 0.9 % 100 mL IVPB (mini-bag) (3,375 mg IntraVENous Bolus 6/28/22 1634)   pantoprazole (PROTONIX) 80 mg in sodium chloride 0.9 % 100 mL infusion (8 mg/hr IntraVENous NoRateChange 6/28/22 1614)   sodium chloride 0.9 % irrigation (1,000 mLs Irrigation New Bag 6/28/22 1651)   bupivacaine (MARCAINE) 0.5 % injection (30 mLs IntraDERmal Given 6/28/22 1656)   bupivacaine 0.5% (MARCAINE) elastomeric infusion 270 mL (has no administration in time range)   0.9 % sodium chloride bolus (0 mLs IntraVENous Stopped 6/28/22 1111)   prochlorperazine (COMPAZINE) injection 5 mg (5 mg IntraVENous Given 6/28/22 0917)   morphine (PF) injection 2 mg (2 mg IntraVENous Given 6/28/22 0919)   piperacillin-tazobactam (ZOSYN) 3,375 mg in sodium chloride 0.9 % 100 mL IVPB (mini-bag) (0 mg IntraVENous Stopped 6/28/22 1209)   morphine (PF) injection 2 mg (2 mg IntraVENous Given 6/28/22 1043)   0.9 % sodium chloride bolus (0 mLs IntraVENous Stopped 6/28/22 1216)   pantoprazole (PROTONIX) 80 mg in sodium chloride 0.9 % 50 mL bolus (0 mg IntraVENous Stopped 6/28/22 1200)        CLINICAL IMPRESSION  1. Gastric perforation ([address]    2. DA (acute kidney injury) ([address]    3.  Acute respiratory failure with hypoxia (HCC)    4. COVID-19        Blood pressure 109/76, pulse 95, temperature 97.3 °F (36.3 °C), temperature source Axillary, resp. rate 17, height 5' 4\" (1.626 m), weight 140 lb (63.5 kg), SpO2 90 %. Patient was given scripts for the following medications. I counseled patient how to take these medications. Current Discharge Medication List          Follow-up with:  No follow-up provider specified. DISCLAIMER: This chart was created using Dragon dictation software. Efforts were made by me to ensure accuracy, however some errors may be present due to limitations of this technology and occasionally words are not transcribed correctly.        Mychal [place]  06/28/22 1625

## 2022-06-28 NOTE — H&P
Surgery History and Physical  January ILAN Davis - CNP, CNP  Pt Name: Warren Edouard  MRN: [id number]  YOB: 1950  Date of evaluation: 6/28/2022  Primary Care Physician: ILAN Merritt CNP  Patient evaluated at the request of  Dr. Antonio Deng  Reason for evaluation: Gastric antrum perf  IMPRESSIONS:   1. CT abd and pelvis: New large perforation involving the posterior gastric antrum likely from ulcerative disease causing a moderate amount of free air and ascites t/o the abdomen  2. ABD: soft, + peritonitis on examination, + N, + dry heaves, + diarrhea (pt is incontinent of liquid stool at times)  3. Cr: 0.6->1.2  4. Leuks 9.8  5. VS stable  6. + Covid 2 weeks ago roughly per family: on Paxlovid and decadron at home (discharged date 6/24)  7. Smokes 1/2 PPD, her  doesn't know this. 8. Sees pain management for back pain. 9. S/P LAR with removal of right fallopian tube and ovary, appe, [facility] on 12[facility]211[facility]09 with Dr. Caesar Rivera s/p chemo and radiation. 10. Principal Problem:  11.   Gastric perforation, acute  12. Resolved Problems:  13.   * No resolved hospital problems. *  14. PLANS:   1. Admit type: Inpatient  2. It is expected this patient's LOS will be: Greater than 2 midnights  3. Anticipated Disposition Upon Discharge: Home  4. Analgesics and antiemetics on a prn basis  5. IV hydration  6. Protonix gtt/bolus  7. NPO  8. Empiric antibiotic coverage: zosyn  9. DVT prophylaxis with Lovenox    10. Treatment consent for exploratory laparotomy, gastric resection, jejunostomy tube placement with Dr. Alois Moritz. All the risks, benefits and alternatives were discussed with the patient, and her two daughters (one at the bedside and one over the phone). All questions were answered and they agreed to proceed with the procedure. SUBJECTIVE:     History of Chief Complaint:    Warren Edouard is a 74 y.o. female who presented to the ER with severe, worsening, constant abdominal pain. The patient states she was + for COVID and admitted to the hospital. She improved and was discharged home on 6/22. She was on steroids at home. She developed upper abdominal pain around 6/25. She had a decreased appetite and \"felt full quickly. \" She had dry heaves. A CT was performed and demonstrated gastritis. She was discharged home on Naprosyn and Pepcid. Per the patient's daughter, the pepcid was unavailable at the pharmacy but, they were able to get some later. In addition, the patient was taken tylenol and Advil (around the clock as directed), along with the Naprosyn. She drinks 2 glasses of wine per week. She states yesterday, the pain became worse. She couldn't get comfortable. She tried Zofran and Naprosyn but, this did not help. She had chills without fevers, she had nausea and dry heaves. She came back to the ER because the pain was so much worse. In the ER, a CT was performed and demonstrates a gastric perforation. We will admit this patient and plan for surgery to treat her perforation. Past Medical History   has a past medical history of Arthritis, Colon cancer ([facility] [address] Hyperlipidemia, and Thyroid disease. Past Surgical History   has a past surgical history that includes Colon surgery (2009); Kidney stone surgery; Ovary removal; Appendectomy (2009); Cholecystectomy (2009); epidural steroid injection (Left, 4/15/2019); and epidural steroid injection (Left, 11/5/2019). Medications  Prior to Admission medications    Medication Sig Start Date End Date Taking?  Authorizing Provider   ascorbic acid (VITAMIN C) 500 MG tablet Take 1 tablet by mouth 2 times daily for 7 days 6/27/22 7/4/22  Alea Silvestre MD   zinc sulfate (ZINCATE) 220 (50 Zn) MG capsule Take 1 capsule by mouth daily for 7 days 6/27/22 7/4/22  Alea Silvestre MD   guaiFENesin [facility]) 600 MG extended release tablet Take 1 tablet by mouth 2 times daily for 10 days 6/27/22 7/7/22  Alea Silvestre MD   naproxen (NAPROSYN) 500 MG tablet Take 1 tablet by mouth 2 times daily as needed for Pain 6/27/22 7/7/22  Lissette Edmonds MD   ondansetron (ZOFRAN-ODT) 4 MG disintegrating tablet Place 1 tablet under the tongue every 8 hours as needed for Nausea or Vomiting May Sub regular tablet (non-ODT) if insurance does not cover ODT. 6/27/22 7/4/22  Lissette Edmonds MD   sucralfate (CARAFATE) 1 GM tablet Take 1 tablet by mouth 4 times daily for 15 days 6/27/22 7/12/22  Lissette Edmonds MD   famotidine (PEPCID) 10 MG tablet Take 2 tablets by mouth 2 times daily 6/27/22 7/27/22  Lissette Edmonds MD   butalbital-acetaminophen-caffeine [facility]) -26 MG per tablet Take 1 tablet by mouth every 6 hours as needed for Headaches 6/27/22 6/30/22  Lissette Edmonds MD   albuterol sulfate HFA (PROVENTIL;VENTOLIN;PROAIR) 108 (90 Base) MCG/ACT inhaler Inhale 2 puffs into the lungs every 6 hours as needed for Wheezing 6/24/22   Nick Andrews MD   benzonatate (TESSALON) 100 MG capsule Take 1 capsule by mouth 3 times daily as needed for Cough 6/24/22 7/1/22  Nick Andrews MD   dexamethasone (DECADRON) 6 MG tablet Take 1 tablet by mouth daily (with breakfast) for 8 days 6/24/22 7/2/22  Nick Andrews MD   promethazine-codeine [facility] WITH CODEINE) 6.25-10 MG/5ML syrup Take 5 mLs by mouth every 4 hours as needed for Cough for up to 7 days.  6/24/22 7/1/22  Nick Andrews MD   PAXLOVID 20 x 150 MG & 10 x 100MG  6/20/22   Historical Provider, MD   levocetirizine (XYZAL) 5 MG tablet 5 mg nightly  8/9/21   Historical Provider, MD   aspirin 81 MG tablet Take 81 mg by mouth daily    Historical Provider, MD   atorvastatin (LIPITOR) 10 MG tablet Take 20 mg by mouth 7/17/17   Historical Provider, MD   levothyroxine (SYNTHROID) 137 MCG tablet 125 mcg Daily  6/28/15   Historical Provider, MD   citalopram (CELEXA) 20 MG tablet 20 mg daily  5/8/15   Historical Provider, MD   Omega-3 Fatty Acids (FISH OIL) 500 MG CAPS Take 1 g by mouth daily     Historical Provider, MD PROBIOTIC PRODUCT PO Take by mouth    Historical Provider, MD    Scheduled Meds:   piperacillin-tazobactam  3,375 mg IntraVENous Once    morphine  2 mg IntraVENous Once    sodium chloride  1,000 mL IntraVENous Once    sodium chloride flush  5-40 mL IntraVENous 2 times per day    enoxaparin  40 mg SubCUTAneous Daily    piperacillin-tazobactam  3,375 mg IntraVENous Q6H    pantoprazole (PROTONIX) bolus  80 mg IntraVENous Once     Continuous Infusions:   sodium chloride      sodium chloride      pantoprazole       PRN Meds:.sodium chloride flush, sodium chloride, ondansetron **OR** ondansetron, morphine **OR** morphine    Allergies  is allergic to bee venom, adhesive tape, tetracyclines & related, and sulfa antibiotics. Family History  family history includes Cancer in her brother and sister; Heart Attack in her brother; High Blood Pressure in her brother and sister. Social History   reports that she quit smoking about 22 years ago. She has never used smokeless tobacco. She reports current alcohol use. She reports that she does not use drugs. Review of Systems: Pt is in pain and has received pain medication, she only reported some answers for ROS. Daughters staying with her reported some ROS information. General positive for  chills, sweats and malaise  HEENT Denies any diplopia, tinnitus or vertigo  Resp Denies any shortness of breath, cough or wheezing  Cardiac Denies any chest pain, palpitations, claudication or edema  GI Positive for diarrhea, nausea and dry heaves and severe abdominal pain  Denies any melena, hematochezia, hematemesis or pyrosis   negative  Heme Denies bruising or bleeding easily  Endocrine hypothyroid  Neuro Denies any focal motor or sensory deficits    OBJECTIVE:   VITALS:  height is 5' 4\" (1.626 m) and weight is 140 lb (63.5 kg). Her oral temperature is 97.7 °F (36.5 °C). Her blood pressure is 97/77 and her pulse is 88. Her respiration is 17 and oxygen saturation is 95%. CONSTITUTIONAL: Alert and oriented times 3, appears uncomfortable. Answers some yes and no questions. SKIN: Skin color, texture, turgor normal. No rashes or lesions. HEENT: Head is normocephalic, atraumatic. EOMI, PERRLA. NECK: supple, symmetrical, trachea midline. RESPIRATORY chest symmetric with normal A/P diameter, normal respiratory rate and rhythm, lungs clear to auscultation without wheezes, rales or rhonchi but, decreased in bilateral lower lobes. No accessory muscle use. CARDIOVASCULAR: Heart sounds are normal.  Regular rate and rhythm without murmur, gallop or rub. Normal S1 and S2.  GI: distended large midline scar scar(s) present. Abnormal bowel sounds: absent . As above. no evidence of hernia. RECTAL: deferred, not clinically indicated  Psychiatric: Pt received pain meds but, normal affect and mood for situation  NEUROLOGIC: Awake, alert, oriented to name, place and time. EXTREMITIES: no cyanosis, no clubbing and no edema.     LABS:     Recent Labs     06/27/22  1112 06/27/22  1122 06/28/22  0705   WBC  --  12.1* 9.8   HGB  --  14.1 16.3*   HCT  --  43.0 50.1*   PLT  --  399 477*   NA  --  136 137   K  --  5.0 4.0   CL  --  99 103   CO2  --  25 20*   BUN  --  14 26*   CREATININE  --  0.6 1.2   CALCIUM  --  9.5 8.6   AST  --  27 18   ALT  --  15 12   BILITOT  --  0.4 0.5   NITRU Negative  --   --    COLORU Yellow  --   --    BACTERIA 1+*  --   --      Recent Labs     06/28/22  0705   ALKPHOS 72   ALT 12   AST 18   BILITOT 0.5   LABALBU 3.8   LIPASE 123.0*     CBC with Differential:    Lab Results   Component Value Date    WBC 9.8 06/28/2022    RBC 5.72 06/28/2022    RBC 4.41 08/02/2016    HGB 16.3 06/28/2022    HCT 50.1 06/28/2022     06/28/2022    MCV 87.6 06/28/2022    MCH 28.5 06/28/2022    MCHC 32.6 06/28/2022    RDW 14.8 06/28/2022    LYMPHOPCT 8.2 06/28/2022    LYMPHOPCT 25.3 08/02/2016    MONOPCT 4.5 06/28/2022    BASOPCT 0.1 06/28/2022    MONOSABS 0.4 06/28/2022    LYMPHSABS

## 2022-06-28 NOTE — ED PROVIDER NOTES
[address] ED      CHIEF COMPLAINT  Positive For Covid-19        HISTORY OF PRESENT ILLNESS  Lissette Jacobo is a 74 y.o. female with past medical history of current COVID, colon cancer in remission, chronic back pain, and arthritis who presents to the ED complaining of upper abdominal pain. Onset of pain: 2 days  Location: Generalized  Radiation: Denies  Quality: Aching  Severity: Moderate  Timing: Intermittent  Palliative Factors: Denies  Provocative Factors: Vomiting    Nausea/Vomiting: Nonbloody nonbilious emesis  Diarrhea/Constipation: Denies; Last BM: 3 days ago  Vaginal Discharge/Bleeding: Denies  Dysuria/urinary frequency: Denies    Patient was recently admitted for COVID-19 with hypoxia and was discharged on 6/24. Patient reports she is continue to have some shortness of breath and generalized body aches. She does report headache. No head trauma or blood thinners. She denies any numbness, weakness, tingling. Her blood pressure is noted to be elevated. No other complaints, modifying factors or associated symptoms. I have reviewed the following from the nursing documentation.     Past Medical History:   Diagnosis Date    Arthritis     Colon cancer ([address]     Hyperlipidemia     Thyroid disease      Past Surgical History:   Procedure Laterality Date    APPENDECTOMY  2009    CHOLECYSTECTOMY  2009    COLON SURGERY  2009    EPIDURAL STEROID INJECTION Left 4/15/2019    LEFT LUMBAR FIVE SACRAL ONE EPIDURAL STEROID INJECTION SITE CONFIRMED BY FLUOROSCOPY performed by Rafa Jaimes MD at [address] Left 11/5/2019    LEFT LUMBAR FIVE SACRAL ONE EPIDURAL STEROID INJECTION SITE CONFIRMED BY FLUOROSCOPY performed by Rafa Jaimes MD at [address] [place] [address]      OVARY REMOVAL       Family History   Problem Relation Age of Onset    High Blood Pressure Sister     Cancer Sister     High Blood Pressure Brother     Heart Attack Brother     Cancer Brother      Social History     Socioeconomic History    Marital status:      Spouse name: Kimberley Nguyen Number of children: 3    Years of education: 15    Highest education level: Not on file   Occupational History    Not on file   Tobacco Use    Smoking status: Former Smoker     Quit date: 3/17/2000     Years since quittin.2    Smokeless tobacco: Never Used   Vaping Use    Vaping Use: Never used   Substance and Sexual Activity    Alcohol use: Yes     Alcohol/week: 0.0 standard drinks     Comment: socially- glass of wine    Drug use: Never    Sexual activity: Yes     Partners: Male   Other Topics Concern    Not on file   Social History Narrative    Not on file     Social Determinants of Health     Financial Resource Strain:     Difficulty of Paying Living Expenses: Not on file   Food Insecurity:     Worried About Running Out of Food in the Last Year: Not on file    Nabil of Food in the Last Year: Not on file   Transportation Needs:     Lack of Transportation (Medical): Not on file    Lack of Transportation (Non-Medical):  Not on file   Physical Activity:     Days of Exercise per Week: Not on file    Minutes of Exercise per Session: Not on file   Stress:     Feeling of Stress : Not on file   Social Connections:     Frequency of Communication with Friends and Family: Not on file    Frequency of Social Gatherings with Friends and Family: Not on file    Attends Yazidi Services: Not on file    Active Member of Clubs or Organizations: Not on file    Attends Club or Organization Meetings: Not on file    Marital Status: Not on file   Intimate Partner Violence:     Fear of Current or Ex-Partner: Not on file    Emotionally Abused: Not on file    Physically Abused: Not on file    Sexually Abused: Not on file   Housing Stability:     Unable to Pay for Housing in the Last Year: Not on file    Number of [place] in the Last Year: Not on file    Unstable Housing in the Last Year: Not on file     No current facility-administered medications for this encounter. Current Outpatient Medications   Medication Sig Dispense Refill    ascorbic acid (VITAMIN C) 500 MG tablet Take 1 tablet by mouth 2 times daily for 7 days 14 tablet 0    zinc sulfate (ZINCATE) 220 (50 Zn) MG capsule Take 1 capsule by mouth daily for 7 days 7 capsule 0    guaiFENesin (MUCINEX) 600 MG extended release tablet Take 1 tablet by mouth 2 times daily for 10 days 20 tablet 0    naproxen (NAPROSYN) 500 MG tablet Take 1 tablet by mouth 2 times daily as needed for Pain 20 tablet 0    ondansetron (ZOFRAN-ODT) 4 MG disintegrating tablet Place 1 tablet under the tongue every 8 hours as needed for Nausea or Vomiting May Sub regular tablet (non-ODT) if insurance does not cover ODT. 20 tablet 0    sucralfate (CARAFATE) 1 GM tablet Take 1 tablet by mouth 4 times daily for 15 days 60 tablet 0    famotidine (PEPCID) 10 MG tablet Take 2 tablets by mouth 2 times daily 120 tablet 0    butalbital-acetaminophen-caffeine (FIORICET, ESGIC) -40 MG per tablet Take 1 tablet by mouth every 6 hours as needed for Headaches 12 tablet 0    albuterol sulfate HFA (PROVENTIL;VENTOLIN;PROAIR) 108 (90 Base) MCG/ACT inhaler Inhale 2 puffs into the lungs every 6 hours as needed for Wheezing 18 g 3    benzonatate (TESSALON) 100 MG capsule Take 1 capsule by mouth 3 times daily as needed for Cough 20 capsule 0    dexamethasone (DECADRON) 6 MG tablet Take 1 tablet by mouth daily (with breakfast) for 8 days 8 tablet 0    promethazine-codeine (PHENERGAN WITH CODEINE) 6.25-10 MG/5ML syrup Take 5 mLs by mouth every 4 hours as needed for Cough for up to 7 days.  120 mL 0    PAXLOVID 20 x 150 MG & 10 x 100MG       levocetirizine (XYZAL) 5 MG tablet 5 mg nightly       aspirin 81 MG tablet Take 81 mg by mouth daily      atorvastatin (LIPITOR) 10 MG tablet Take 20 mg by mouth      levothyroxine (SYNTHROID) 137 MCG tablet 125 mcg Daily       citalopram (CELEXA) 20 MG tablet 20 mg daily       Omega-3 Fatty Acids (FISH OIL) 500 MG CAPS Take 1 g by mouth daily       PROBIOTIC PRODUCT PO Take by mouth       Allergies   Allergen Reactions    Bee Venom Swelling     Attacked by swarm of bees and required er visit    Adhesive Tape Other (See Comments)     Skin redness      Tetracyclines & Related Other (See Comments)     Mouth fungus      Sulfa Antibiotics Nausea And Vomiting       REVIEW OF SYSTEMS  All systems reviewed, pertinent positives per HPI otherwise noted to be negative. PHYSICAL EXAM  BP (!) 206/99   Pulse 71   Temp 98.4 °F (36.9 °C)   Resp 16   Ht 5' 4.5\" (1.638 m)   Wt 140 lb (63.5 kg)   SpO2 95%   BMI 23.66 kg/m²    GENERAL APPEARANCE: Awake and alert. Cooperative. No distress. HENT: Normocephalic. Atraumatic. Mucous membranes are moist  NECK: Supple. Full range of motion without pain or stiffness  EYES: PERRL. EOM's grossly intact. HEART/CHEST: RRR. No murmurs. LUNGS: Respirations unlabored. CTAB. Good air exchange. Speaking comfortably in full sentences. ABDOMEN: Tender diffusely. Soft. Non-distended. No masses. No organomegaly. No guarding or rebound. MUSCULOSKELETAL: No extremity edema. Compartments soft. No deformity. No tenderness in the extremities. All extremities neurovascularly intact. SKIN: Warm and dry. No acute rashes. NEUROLOGICAL: Alert and oriented. No gross facial drooping. Strength 5/5, sensation intact. NIH stroke scale of 0. GCS 15. PSYCHIATRIC: Normal mood and affect. LABS  I have reviewed all labs for this visit.    Results for orders placed or performed during the hospital encounter of 06/27/22   CBC with Auto Differential   Result Value Ref Range    WBC 12.1 (H) 4.0 - 11.0 K/uL    RBC 4.93 4.00 - 5.20 M/uL    Hemoglobin 14.1 12.0 - 16.0 g/dL    Hematocrit 43.0 36.0 - 48.0 %    MCV 87.2 80.0 - 100.0 fL    MCH 28.6 26.0 - 34.0 pg    MCHC 32.8 31.0 - 36.0 g/dL RDW 14.5 12.4 - 15.4 %    Platelets 761 038 - 057 K/uL    MPV 8.3 5.0 - 10.5 fL    Neutrophils % 87.2 %    Lymphocytes % 7.1 %    Monocytes % 5.2 %    Eosinophils % 0.1 %    Basophils % 0.4 %    Neutrophils Absolute 10.5 (H) 1.7 - 7.7 K/uL    Lymphocytes Absolute 0.9 (L) 1.0 - 5.1 K/uL    Monocytes Absolute 0.6 0.0 - 1.3 K/uL    Eosinophils Absolute 0.0 0.0 - 0.6 K/uL    Basophils Absolute 0.0 0.0 - 0.2 K/uL   Comprehensive Metabolic Panel w/ Reflex to MG   Result Value Ref Range    Sodium 136 136 - 145 mmol/L    Potassium reflex Magnesium 5.0 3.5 - 5.1 mmol/L    Chloride 99 99 - 110 mmol/L    CO2 25 21 - 32 mmol/L    Anion Gap 12 3 - 16    Glucose 117 (H) 70 - 99 mg/dL    BUN 14 7 - 20 mg/dL    CREATININE 0.6 0.6 - 1.2 mg/dL    GFR Non-African American >60 >60    GFR African American >60 >60    Calcium 9.5 8.3 - 10.6 mg/dL    Total Protein 6.7 6.4 - 8.2 g/dL    Albumin 4.4 3.4 - 5.0 g/dL    Albumin/Globulin Ratio 1.9 1.1 - 2.2    Total Bilirubin 0.4 0.0 - 1.0 mg/dL    Alkaline Phosphatase 86 40 - 129 U/L    ALT 15 10 - 40 U/L    AST 27 15 - 37 U/L   Lipase   Result Value Ref Range    Lipase 20.0 13.0 - 60.0 U/L   Urinalysis with Microscopic   Result Value Ref Range    Color, UA Yellow Straw/Yellow    Clarity, UA Clear Clear    Glucose, Ur Negative Negative mg/dL    Bilirubin Urine Negative Negative    Ketones, Urine Negative Negative mg/dL    Specific Gravity, UA 1.010 1.005 - 1.030    Blood, Urine TRACE-INTACT (A) Negative    pH, UA 7.5 5.0 - 8.0    Protein, UA 30 (A) Negative mg/dL    Urobilinogen, Urine 0.2 <2.0 E.U./dL    Nitrite, Urine Negative Negative    Leukocyte Esterase, Urine SMALL (A) Negative    Microscopic Examination YES     Urine Type NotGiven     Mucus, UA 2+ (A) None Seen /LPF    WBC, UA 10-20 (A) 0 - 5 /HPF    RBC, UA 3-4 0 - 4 /HPF    Epithelial Cells, UA 6-10 (A) 0 - 5 /HPF    Renal Epithelial, UA 0-1 0 - 1 /HPF    Bacteria, UA 1+ (A) None Seen /HPF   D-Dimer, Quantitative   Result Value Ref Range    D-Dimer, Quant 0.91 (H) 0.00 - 0.60 ug/mL FEU       RADIOLOGY  CT CHEST PULMONARY EMBOLISM W CONTRAST   Final Result   Chest-      1. No pulmonary embolus. 2. Multifocal ground-glass opacities, primarily in the mid and upper lungs. Atypical pneumonia, including COVID etiology is considered. Other causes of   pneumonia and pneumonitis, such as hypersensitivity, are also possible. ---      Abdomen and pelvis-      1. Mural thickening of the gastric antrum. Gastritis is considered. 2. Right inguinal hernia with small bowel content; no incarceration. 3. 2.8 cm aneurysm of the infrarenal abdominal aorta. 5 year follow-up   imaging is recommended. 4. Nonobstructive nephrolithiasis. References: [facility]iol 2013; 00(30):017-859; J Vasc Surg. 2018; 67:2-77         CT HEAD WO CONTRAST   Final Result   No acute intracranial abnormality. CT ABDOMEN PELVIS W IV CONTRAST Additional Contrast? None   Final Result   Chest-      1. No pulmonary embolus. 2. Multifocal ground-glass opacities, primarily in the mid and upper lungs. Atypical pneumonia, including COVID etiology is considered. Other causes of   pneumonia and pneumonitis, such as hypersensitivity, are also possible. ---      Abdomen and pelvis-      1. Mural thickening of the gastric antrum. Gastritis is considered. 2. Right inguinal hernia with small bowel content; no incarceration. 3. 2.8 cm aneurysm of the infrarenal abdominal aorta. 5 year follow-up   imaging is recommended. 4. Nonobstructive nephrolithiasis. References: [facility] 2013; 34(76):225-413; J Vasc Surg. 2018; 67:2-77         XR CHEST PORTABLE   Final Result   The lungs are without acute focal process. There is no effusion or   pneumothorax. The cardiomediastinal silhouette is stable. The osseous   structures are stable. IMPRESSION:   No acute process.              Covid Decompensation Risk Scale  Clinical management tool, COVID-19 risk of decompensation    Adult with COVID-19 and no other condition requiring admission    Severe respiratory distress YES/NO: No   Unstable by clinical judgment YES/NO: No   Needs O2 to keep from SPO2 greater than 90 YES/NO: No   Acute delirium YES/NO: No     If yes to any, high risk    If no to all proceed to clinical risk score    Must perform CXR if Sp02 <96%     Clinical risk score  CHF, COPD, age >57 Yes +2   Chest x-ray moderate, non-lobar No   Chest x-ray 1 lobar infiltrate No   Chest x-ray severe bilateral or 2+ lobar infiltrates** (+8)  No   Heart rate greater than 100 at disposition ** (+8) No   SPO2 less than 92% ORA No   Respiratory rate greater than 20 No   Total: 2         ED COURSE / MDM  Patient seen and evaluated. Old records reviewed and pertinent information included in HPI. Labs and imaging reviewed and results discussed with patient. Overall well appearing patient, in no acute distress, presenting for continued COVID symptoms  She reports some vomiting and generalized abdominal pain. She also reports headache, myalgias, and occasional shortness of breath. physical exam remarkable for intact neurologic exam, generalized abdominal tenderness. Differential diagnosis includes but is not limited to: COVID, migraine, tension headache, hypertension gastroenteritis, peptic ulcer disease, cholecystitis, pancreatitis, hepatitis or other liver disease, low suspicion for Appendicitis, bowel obstruction,  diverticulitis, hernia,  UTI, AAA, intracranial hemorrhage, PE    Laboratory studies and imaging obtained. ED Course as of 06/27/22 2158   Mon Jun 27, 2022   1152 CXR: IMPRESSION:  No acute process. [ER]   1202 Lipase within normal limits, low suspicion for pancreatitis [ER]   1203 Leukocytosis to 12. 1. no anemia or thrombocytopenia [ER]   1208 No electrolyte abnormalities or evidence of kidney dysfunction [ER]   1208 Liver function testing unremarkable, low suspicion for hepatitis or acute liver dysfunction [ER]   1208 D-dimer is elevated to 0.91, patient does have COVID but is reporting some shortness of breath. Will obtain CT PE study. [ER]   1208 Urinalysis shows small leukocyte esterase, and trace blood. Denies dysuria. Low suspicion for UTI.  [ER]   5483 CT Head: IMPRESSION:  No acute intracranial abnormality. [ER]   1406 CT PE/Abd/Pelvis: IMPRESSION:  Chest-     1. No pulmonary embolus. 2. Multifocal ground-glass opacities, primarily in the mid and upper lungs. Atypical pneumonia, including COVID etiology is considered. Other causes of pneumonia and pneumonitis, such as hypersensitivity, are also possible. ---     Abdomen and pelvis-     1. Mural thickening of the gastric antrum. Gastritis is considered. 2. Right inguinal hernia with small bowel content; no incarceration. 3. 2.8 cm aneurysm of the infrarenal abdominal aorta. 5 year follow-up imaging is recommended. 4. Nonobstructive nephrolithiasis. [ER]      ED Course User Index  [ER] James Orellana MD      Is this patient to be included in the SEP-1 Core Measure due to severe sepsis or septic shock? No   Exclusion criteria - the patient is NOT to be included for SEP-1 Core Measure due to:  2+ SIRS criteria are not met    During the patient's ED course, the patient was given:  Medications   0.9 % sodium chloride bolus (0 mLs IntraVENous Stopped 6/27/22 1224)   butalbital-acetaminophen-caffeine (FIORICET, ESGIC) per tablet 1 tablet (1 tablet Oral Given 6/27/22 1105)   ondansetron (ZOFRAN) injection 4 mg (4 mg IntraVENous Given 6/27/22 1254)   iopamidol (ISOVUE-370) 76 % injection 85 mL (85 mLs IntraVENous Given 6/27/22 1305)        No acute abnormal abdominal pathology noted. CT does show some evidence of gastritis which is consistent with patient's history. No evidence of pulmonary embolism. Chest x-ray showed no evidence of pneumonia, pleural effusion, or pneumothorax.    Patient is not hypoxic in the emergency department. Suspect headache related to COVID. Patient does not have fever, altered mental status, or meningismus on exam.  Low suspicion for bacterial meningitis at this time. Patient denies any trauma. They are not on blood thinners. Low suspicion for intracranial hemorrhage at this time. Patient does not have focal pain over the temples, no tenderness to palpation of the temples. Low suspicion for temporal arteritis. No focal neurologic deficits identified on history or exam.  Low suspicion for stroke. The patient was specifically advised their symptoms are consistent with COVID-19 infection, and they need to self-isolate at home and restrict any activities outside of their home except for seeking medical care, and to wear a facemask whenever around others. The patient was provided specific instructions related to COVID-19, along with recommendations for proper respiratory hygiene and instructions on prevention of transmission of infection to others. The patient was counseled to closely monitor their symptoms, and to return immediately to the Emergency Department for any difficulty breathing, confusion, dizziness or passing out, vomiting, chest pain, high fevers, weakness, or any other new or concerning symptoms. There is no evidence of emergent medical condition at this time, and the patient will be discharged in good condition. Work-up overall reassuring. At this time, feel the patient is appropriate for discharge to follow-up with a primary care doctor. Patient feels comfortable with discharge at this time. Patient was provided with prescriptions for vitamin C, zinc, fioricet, pepcid, mucinex, naproxen, zofran, carafate. Return precautions given. Encouraged PCP follow-up in 2d. Patient discharged in stable condition.     I estimate there is LOW risk for ACUTE APPENDICITIS, BOWEL OBSTRUCTION, ACUTE CHOLECYSTITIS, RUPTURED DIVERTICULITIS, INCARCERATED or STRANGULATED HERNIA, HEMMORHAGIC PANCREATITIS, PERFORATED BOWEL/ULCER, ECTOPIC PREGNANCY, OVARIAN TORSION or TUBO-OVARIAN ABSCESS thus I consider the discharge disposition reasonable. Laura Pavon and I have discussed the diagnosis and risks, and we agree with discharging home with close follow-up. We also discussed returning to the Emergency Department immediately if new or worsening symptoms occur. We have discussed the symptoms which are most concerning that necessitate immediate return. CLINICAL IMPRESSION  1. COVID-19    2. Acute gastritis, presence of bleeding unspecified, unspecified gastritis type    3. Acute nonintractable headache, unspecified headache type        Blood pressure (!) 192/94, pulse 77, temperature 98.4 °F (36.9 °C), resp. rate 16, height 5' 4.5\" (1.638 m), weight 140 lb (63.5 kg), SpO2 94 %. DISPOSITION  Latrice Bazan was discharged to home in stable condition. Patient was given scripts for the following medications. I counseled patient how to take these medications.    Discharge Medication List as of 6/27/2022  2:40 PM      START taking these medications    Details   ascorbic acid (VITAMIN C) 500 MG tablet Take 1 tablet by mouth 2 times daily for 7 days, Disp-14 tablet, R-0Normal      zinc sulfate (ZINCATE) 220 (50 Zn) MG capsule Take 1 capsule by mouth daily for 7 days, Disp-7 capsule, R-0Normal      guaiFENesin (MUCINEX) 600 MG extended release tablet Take 1 tablet by mouth 2 times daily for 10 days, Disp-20 tablet, R-0Normal      naproxen (NAPROSYN) 500 MG tablet Take 1 tablet by mouth 2 times daily as needed for Pain, Disp-20 tablet, R-0Normal      ondansetron (ZOFRAN-ODT) 4 MG disintegrating tablet Place 1 tablet under the tongue every 8 hours as needed for Nausea or Vomiting May Sub regular tablet (non-ODT) if insurance does not cover ODT., Disp-20 tablet, R-0Normal      sucralfate (CARAFATE) 1 GM tablet Take 1 tablet by mouth 4 times daily for 15 days, Disp-60 tablet, R-0Normal      famotidine (PEPCID) 10 MG tablet Take 2 tablets by mouth 2 times daily, Disp-120 tablet, R-0Normal      butalbital-acetaminophen-caffeine (FIORICET, ESGIC) -40 MG per tablet Take 1 tablet by mouth every 6 hours as needed for Headaches, Disp-12 tablet, R-0Normal             Follow-up with:  Cordell Han, APRN - CNP  [address] [facility] [address] [facility]  [phone]    Call in 2 days      [facility] [place] [facility]  [address] [facility]  Go to   As needed, If symptoms worsen      DISCLAIMER: This chart was created using Dragon dictation software. Efforts were made by me to ensure accuracy, however some errors may be present due to limitations of this technology and occasionally words are not transcribed correctly.        Clotilde Thao MD  06/27/22 2511

## 2022-06-28 NOTE — ANESTHESIA PRE PROCEDURE
Department of Anesthesiology  Preprocedure Note       Name:  Christina Elias   Age:  74 y.o.  :  1950                                          MRN:  [id number]         Date:  2022      Surgeon: Sola Harden):  Tiffany Bradshaw MD    Procedure: Procedure(s):  LAPAROTOMY, REPAIR OF PERFORATED ULCER    Medications prior to admission:   Prior to Admission medications    Medication Sig Start Date End Date Taking? Authorizing Provider   ascorbic acid (VITAMIN C) 500 MG tablet Take 1 tablet by mouth 2 times daily for 7 days 22  Omero Diego MD   zinc sulfate (ZINCATE) 220 (50 Zn) MG capsule Take 1 capsule by mouth daily for 7 days 22  Omero Diego MD   guaiFENesin [facility]) 600 MG extended release tablet Take 1 tablet by mouth 2 times daily for 10 days 22  Omero Diego MD   naproxen (NAPROSYN) 500 MG tablet Take 1 tablet by mouth 2 times daily as needed for Pain 22  Omero Diego MD   ondansetron (ZOFRAN-ODT) 4 MG disintegrating tablet Place 1 tablet under the tongue every 8 hours as needed for Nausea or Vomiting May Sub regular tablet (non-ODT) if insurance does not cover ODT.  22  Omero Diego MD   sucralfate (CARAFATE) 1 GM tablet Take 1 tablet by mouth 4 times daily for 15 days 22  Omero Diego MD   famotidine (PEPCID) 10 MG tablet Take 2 tablets by mouth 2 times daily 22  Omero Diego MD   butalbital-acetaminophen-caffeine Heather So, [facility]) -21 MG per tablet Take 1 tablet by mouth every 6 hours as needed for Headaches 22  Omero Diego MD   albuterol sulfate HFA (PROVENTIL;VENTOLIN;PROAIR) 108 (90 Base) MCG/ACT inhaler Inhale 2 puffs into the lungs every 6 hours as needed for Wheezing 22   Elizabeth Chung MD   benzonatate (TESSALON) 100 MG capsule Take 1 capsule by mouth 3 times daily as needed for Cough 22  Elizabeth Chung MD   dexamethasone (DECADRON) 6 MG tablet Take 1 tablet by mouth daily (with breakfast) for 8 days 6/24/22 7/2/22  Christine Butler MD   promethazine-codeine [facility] WITH CODEINE) 6.25-10 MG/5ML syrup Take 5 mLs by mouth every 4 hours as needed for Cough for up to 7 days.  6/24/22 7/1/22  Christine Butler MD   PAXLOVID 20 x 150 MG & 10 x 100MG  6/20/22   Historical Provider, MD   levocetirizine (XYZAL) 5 MG tablet 5 mg nightly  8/9/21   Historical Provider, MD   aspirin 81 MG tablet Take 81 mg by mouth daily    Historical Provider, MD   atorvastatin (LIPITOR) 10 MG tablet Take 20 mg by mouth 7/17/17   Historical Provider, MD   levothyroxine (SYNTHROID) 137 MCG tablet 125 mcg Daily  6/28/15   Historical Provider, MD   citalopram (CELEXA) 20 MG tablet 20 mg daily  5/8/15   Historical Provider, MD   Omega-3 Fatty Acids (FISH OIL) 500 MG CAPS Take 1 g by mouth daily     Historical Provider, MD   PROBIOTIC PRODUCT PO Take by mouth    Historical Provider, MD       Current medications:    Current Facility-Administered Medications   Medication Dose Route Frequency Provider Last Rate Last Admin    0.9 % sodium chloride infusion   IntraVENous Continuous January Jyl Art, APRN -  mL/hr at 06/28/22 1216 New Bag at 06/28/22 1216    sodium chloride flush 0.9 % injection 5-40 mL  5-40 mL IntraVENous 2 times per day Mariam Manrique, APRN - CNP   10 mL at 06/28/22 1257    sodium chloride flush 0.9 % injection 5-40 mL  5-40 mL IntraVENous PRN January Jyl Art, APRN - CNP        0.9 % sodium chloride infusion   IntraVENous PRN January Jyl Art, APRN - CNP        ondansetron (ZOFRAN-ODT) disintegrating tablet 4 mg  4 mg Oral Q8H PRN January Jyl Art, APRN - CNP        Or    ondansetron (ZOFRAN) injection 4 mg  4 mg IntraVENous Q6H PRN January Jyl Art, APRN - CNP        enoxaparin (LOVENOX) injection 40 mg  40 mg SubCUTAneous Daily January Hannah Frame, APRN - CNP        morphine (PF) injection 1 mg  1 mg IntraVENous Q2H PRN January Jyl Art, APRN - CNP   1 mg at 22 1258    Or    morphine (PF) injection 2 mg  2 mg IntraVENous Q2H PRN January Rojo APRN - CNP        piperacillin-tazobactam (ZOSYN) 3,375 mg in sodium chloride 0.9 % 100 mL IVPB (mini-bag)  3,375 mg IntraVENous q8h January Rojo APRN - CNP        pantoprazole (PROTONIX) 80 mg in sodium chloride 0.9 % 100 mL infusion  8 mg/hr IntraVENous Continuous January Rojo APRN - CNP 10 mL/hr at 22 1213 8 mg/hr at 22 1213       Allergies:     Allergies   Allergen Reactions    Bee Venom Swelling     Attacked by swarm of bees and required er visit    Adhesive Tape Other (See Comments)     Skin redness      Tetracyclines & Related Other (See Comments)     Mouth fungus      Sulfa Antibiotics Nausea And Vomiting       Problem List:    Patient Active Problem List   Diagnosis Code    Pneumonia due to 2019 novel coronavirus U07.1, J12.82    Hypothyroidism E03.9    Chronic back pain M54.9, G89.29    Hypoxia R09.02    COVID-19 U07.1    Pneumonia due to COVID-19 virus U07.1, J12.82    Gastric perforation, acute K31.89       Past Medical History:        Diagnosis Date    Arthritis     [facility] ([facility]     Hyperlipidemia     Thyroid disease        Past Surgical History:        Procedure Laterality Date    APPENDECTOMY      CHOLECYSTECTOMY  2009    COLON SURGERY      EPIDURAL STEROID INJECTION Left 4/15/2019    LEFT LUMBAR FIVE SACRAL ONE EPIDURAL STEROID INJECTION SITE CONFIRMED BY FLUOROSCOPY performed by Josee Dunbar MD at [facility] Left 2019    LEFT LUMBAR FIVE SACRAL ONE EPIDURAL STEROID INJECTION SITE CONFIRMED BY FLUOROSCOPY performed by Josee Dunbar MD at [address] [place] [address],[address]      OVARY REMOVAL         Social History:    Social History     Tobacco Use    Smoking status: Former Smoker     Quit date: 3/17/2000     Years since quittin.2    Smokeless tobacco: Never Used   Substance Use Topics    Alcohol use: Yes     Alcohol/week: 0.0 standard drinks     Comment: socially- glass of wine                                Counseling given: Not Answered      Vital Signs (Current):   Vitals:    06/28/22 1000 06/28/22 1030 06/28/22 1100 06/28/22 1251   BP: 112/86 110/82 114/71 109/76   Pulse: 92 92 80 95   Resp: 17 17 17 17   Temp:    97.3 °F (36.3 °C)   TempSrc:    Axillary   SpO2: 96% 96% 97% 90%   Weight:       Height:                                                  BP Readings from Last 3 Encounters:   06/28/22 109/76   06/27/22 (!) 192/94   06/24/22 (!) 174/86       NPO Status:  mn+, see mar                                                                               BMI:   Wt Readings from Last 3 Encounters:   06/28/22 140 lb (63.5 kg)   06/27/22 140 lb (63.5 kg)   06/24/22 146 lb 8 oz (66.5 kg)     Body mass index is 24.03 kg/m². CBC:   Lab Results   Component Value Date    WBC 9.8 06/28/2022    RBC 5.72 06/28/2022    RBC 4.41 08/02/2016    HGB 16.3 06/28/2022    HCT 50.1 06/28/2022    MCV 87.6 06/28/2022    RDW 14.8 06/28/2022     06/28/2022       CMP:   Lab Results   Component Value Date     06/28/2022    K 4.0 06/28/2022     06/28/2022    CO2 20 06/28/2022    BUN 26 06/28/2022    CREATININE 1.2 06/28/2022    GFRAA 53 06/28/2022    AGRATIO 2.1 06/28/2022    LABGLOM 44 06/28/2022    GLUCOSE 172 06/28/2022    GLUCOSE 85 08/02/2016    PROT 5.6 06/28/2022    PROT 5.7 08/02/2016    CALCIUM 8.6 06/28/2022    BILITOT 0.5 06/28/2022    ALKPHOS 72 06/28/2022    AST 18 06/28/2022    ALT 12 06/28/2022       POC Tests: No results for input(s): POCGLU, POCNA, POCK, POCCL, POCBUN, POCHEMO, POCHCT in the last 72 hours.     Coags: No results found for: PROTIME, INR, APTT    HCG (If Applicable): No results found for: PREGTESTUR, PREGSERUM, HCG, HCGQUANT     ABGs: No results found for: PHART, PO2ART, NNS7BBU, CRP9NJB, BEART, D6WKTRMB     Type & Screen (If Applicable):  No results found for: [facility]    Drug/Infectious Status (If Applicable):  No results found for: HIV, HEPCAB    COVID-19 Screening (If Applicable):   Lab Results   Component Value Date    COVID19 DETECTED 06/28/2022    COVID19 detected 06/20/2022           Anesthesia Evaluation  Patient summary reviewed and Nursing notes reviewed no history of anesthetic complications:   Airway: Mallampati: III  TM distance: <3 FB   Neck ROM: limited  Mouth opening: > = 3 FB   Dental:          Pulmonary: breath sounds clear to auscultation  (+) pneumonia (covid, 2 weeks ago dx.):  shortness of breath (post covid):      (-) COPD, asthma, sleep apnea and not a current smoker          Patient did not smoke on day of surgery. Cardiovascular:    (+) hyperlipidemia    (-) pacemaker, hypertension, past MI, CAD, CABG/stent, dysrhythmias,  angina and  CHF    ECG reviewed  Rhythm: regular  Rate: abnormal           Beta Blocker:  Not on Beta Blocker        PE comment: tachy   Neuro/Psych:   (+) neuromuscular disease (chronic back pain):,    (-) seizures, TIA and CVA           GI/Hepatic/Renal:   (+) PUD (acute perf.),      (-) liver disease, no renal disease, bowel prep and no morbid obesity       Endo/Other:    (+) hypothyroidism: arthritis:., malignancy/cancer (colon, s/p resection). (-) diabetes mellitus, blood dyscrasia               Abdominal:       Abdomen: soft and tender. Vascular: Other Findings: protonx infusion, nco2          Anesthesia Plan      general     ASA 3     (Possible post op vent/icu)  Induction: intravenous. MIPS: Postoperative opioids intended and Prophylactic antiemetics administered. Anesthetic plan and risks discussed with spouse and patient. Plan discussed with CRNA. This pre-anesthesia assessment may be used as a history and physical.    DOS STAFF ADDENDUM:    Pt seen and examined, chart reviewed (including anesthesia, drug and allergy history).   No interval changes to history and physical examination. Anesthetic plan, risks, benefits, alternatives, and personnel involved discussed with patient. Patient verbalized an understanding and agrees to proceed.       Siobhan Bull MD  June 28, 2022  3:18 PM      Siobhan Bull MD   6/28/2022

## 2022-06-29 LAB
A/G RATIO: 0.8 (ref 1.1–2.2)
ALBUMIN SERPL-MCNC: 2.1 G/DL (ref 3.4–5)
ALP BLD-CCNC: 84 U/L (ref 40–129)
ALT SERPL-CCNC: 48 U/L (ref 10–40)
ANION GAP SERPL CALCULATED.3IONS-SCNC: 15 MMOL/L (ref 3–16)
AST SERPL-CCNC: 49 U/L (ref 15–37)
BASOPHILS ABSOLUTE: 0 K/UL (ref 0–0.2)
BASOPHILS RELATIVE PERCENT: 0 %
BILIRUB SERPL-MCNC: 0.6 MG/DL (ref 0–1)
BUN BLDV-MCNC: 42 MG/DL (ref 7–20)
CALCIUM SERPL-MCNC: 7.4 MG/DL (ref 8.3–10.6)
CHLORIDE BLD-SCNC: 114 MMOL/L (ref 99–110)
CO2: 14 MMOL/L (ref 21–32)
CREAT SERPL-MCNC: 2.4 MG/DL (ref 0.6–1.2)
EOSINOPHILS ABSOLUTE: 0 K/UL (ref 0–0.6)
EOSINOPHILS RELATIVE PERCENT: 0 %
GFR AFRICAN AMERICAN: 24
GFR NON-AFRICAN AMERICAN: 20
GLUCOSE BLD-MCNC: 102 MG/DL (ref 70–99)
GLUCOSE BLD-MCNC: 103 MG/DL (ref 70–99)
GLUCOSE BLD-MCNC: 103 MG/DL (ref 70–99)
GLUCOSE BLD-MCNC: 106 MG/DL (ref 70–99)
GLUCOSE BLD-MCNC: 98 MG/DL (ref 70–99)
HCT VFR BLD CALC: 39.3 % (ref 36–48)
HEMOGLOBIN: 12.8 G/DL (ref 12–16)
INR BLD: 1.57 (ref 0.87–1.14)
LIPASE: 47 U/L (ref 13–60)
LYMPHOCYTES ABSOLUTE: 0.4 K/UL (ref 1–5.1)
LYMPHOCYTES RELATIVE PERCENT: 3.3 %
MCH RBC QN AUTO: 29.5 PG (ref 26–34)
MCHC RBC AUTO-ENTMCNC: 32.7 G/DL (ref 31–36)
MCV RBC AUTO: 90.3 FL (ref 80–100)
MONOCYTES ABSOLUTE: 0.5 K/UL (ref 0–1.3)
MONOCYTES RELATIVE PERCENT: 3.8 %
NEUTROPHILS ABSOLUTE: 11.9 K/UL (ref 1.7–7.7)
NEUTROPHILS RELATIVE PERCENT: 92.9 %
PDW BLD-RTO: 15.2 % (ref 12.4–15.4)
PERFORMED ON: ABNORMAL
PERFORMED ON: NORMAL
PLATELET # BLD: 295 K/UL (ref 135–450)
PMV BLD AUTO: 8.3 FL (ref 5–10.5)
POTASSIUM REFLEX MAGNESIUM: 3.8 MMOL/L (ref 3.5–5.1)
PROTHROMBIN TIME: 18.6 SEC (ref 11.7–14.5)
RBC # BLD: 4.35 M/UL (ref 4–5.2)
SODIUM BLD-SCNC: 143 MMOL/L (ref 136–145)
TOTAL PROTEIN: 4.7 G/DL (ref 6.4–8.2)
WBC # BLD: 12.8 K/UL (ref 4–11)

## 2022-06-29 PROCEDURE — 83690 ASSAY OF LIPASE: CPT

## 2022-06-29 PROCEDURE — 2580000003 HC RX 258: Performed by: INTERNAL MEDICINE

## 2022-06-29 PROCEDURE — C9113 INJ PANTOPRAZOLE SODIUM, VIA: HCPCS | Performed by: NURSE PRACTITIONER

## 2022-06-29 PROCEDURE — 36415 COLL VENOUS BLD VENIPUNCTURE: CPT

## 2022-06-29 PROCEDURE — 1200000000 HC SEMI PRIVATE

## 2022-06-29 PROCEDURE — 80053 COMPREHEN METABOLIC PANEL: CPT

## 2022-06-29 PROCEDURE — 2700000000 HC OXYGEN THERAPY PER DAY

## 2022-06-29 PROCEDURE — 2500000003 HC RX 250 WO HCPCS: Performed by: INTERNAL MEDICINE

## 2022-06-29 PROCEDURE — 85025 COMPLETE CBC W/AUTO DIFF WBC: CPT

## 2022-06-29 PROCEDURE — 2580000003 HC RX 258: Performed by: NURSE PRACTITIONER

## 2022-06-29 PROCEDURE — 6360000002 HC RX W HCPCS: Performed by: NURSE PRACTITIONER

## 2022-06-29 PROCEDURE — 99024 POSTOP FOLLOW-UP VISIT: CPT | Performed by: NURSE PRACTITIONER

## 2022-06-29 PROCEDURE — 94761 N-INVAS EAR/PLS OXIMETRY MLT: CPT

## 2022-06-29 PROCEDURE — 85610 PROTHROMBIN TIME: CPT

## 2022-06-29 RX ORDER — ENOXAPARIN SODIUM 100 MG/ML
30 INJECTION SUBCUTANEOUS DAILY
Status: DISCONTINUED | OUTPATIENT
Start: 2022-06-30 | End: 2022-07-01

## 2022-06-29 RX ADMIN — PIPERACILLIN SODIUM AND TAZOBACTAM SODIUM 3375 MG: 3; .375 INJECTION, POWDER, LYOPHILIZED, FOR SOLUTION INTRAVENOUS at 08:48

## 2022-06-29 RX ADMIN — SODIUM CHLORIDE 500 ML: 4.5 INJECTION, SOLUTION INTRAVENOUS at 14:13

## 2022-06-29 RX ADMIN — SODIUM CHLORIDE: 9 INJECTION, SOLUTION INTRAVENOUS at 06:08

## 2022-06-29 RX ADMIN — ONDANSETRON HYDROCHLORIDE 4 MG: 2 INJECTION, SOLUTION INTRAMUSCULAR; INTRAVENOUS at 09:20

## 2022-06-29 RX ADMIN — PIPERACILLIN SODIUM AND TAZOBACTAM SODIUM 3375 MG: 3; .375 INJECTION, POWDER, LYOPHILIZED, FOR SOLUTION INTRAVENOUS at 16:44

## 2022-06-29 RX ADMIN — PIPERACILLIN SODIUM AND TAZOBACTAM SODIUM 3375 MG: 3; .375 INJECTION, POWDER, LYOPHILIZED, FOR SOLUTION INTRAVENOUS at 00:41

## 2022-06-29 RX ADMIN — MORPHINE SULFATE 1 MG: 2 INJECTION, SOLUTION INTRAMUSCULAR; INTRAVENOUS at 20:11

## 2022-06-29 RX ADMIN — PANTOPRAZOLE SODIUM 8 MG/HR: 40 INJECTION, POWDER, LYOPHILIZED, FOR SOLUTION INTRAVENOUS at 06:54

## 2022-06-29 RX ADMIN — PANTOPRAZOLE SODIUM 8 MG/HR: 40 INJECTION, POWDER, LYOPHILIZED, FOR SOLUTION INTRAVENOUS at 20:02

## 2022-06-29 RX ADMIN — ENOXAPARIN SODIUM 40 MG: 100 INJECTION SUBCUTANEOUS at 08:47

## 2022-06-29 RX ADMIN — SODIUM BICARBONATE: 84 INJECTION, SOLUTION INTRAVENOUS at 08:43

## 2022-06-29 ASSESSMENT — PAIN SCALES - WONG BAKER: WONGBAKER_NUMERICALRESPONSE: 0

## 2022-06-29 ASSESSMENT — PAIN SCALES - GENERAL
PAINLEVEL_OUTOF10: 6
PAINLEVEL_OUTOF10: 0

## 2022-06-29 ASSESSMENT — PAIN - FUNCTIONAL ASSESSMENT: PAIN_FUNCTIONAL_ASSESSMENT: PREVENTS OR INTERFERES SOME ACTIVE ACTIVITIES AND ADLS

## 2022-06-29 ASSESSMENT — PAIN DESCRIPTION - DESCRIPTORS: DESCRIPTORS: DULL;SHARP;SHOOTING

## 2022-06-29 NOTE — PLAN OF CARE
Problem: Discharge Planning  Goal: Discharge to home or other facility with appropriate resources  Outcome: Progressing  Flowsheets (Taken 6/28/2022 2329)  Discharge to home or other facility with appropriate resources: Identify barriers to discharge with patient and caregiver     Problem: Pain  Goal: Verbalizes/displays adequate comfort level or baseline comfort level  Outcome: Progressing  Flowsheets (Taken 6/28/2022 2329)  Verbalizes/displays adequate comfort level or baseline comfort level: Encourage patient to monitor pain and request assistance     Problem: Safety - Adult  Goal: Free from fall injury  Outcome: Progressing  Flowsheets (Taken 6/28/2022 2329)  Free From Fall Injury: Instruct family/caregiver on patient safety     Problem: Skin/Tissue Integrity  Goal: Absence of new skin breakdown  Description: 1. Monitor for areas of redness and/or skin breakdown  2. Assess vascular access sites hourly  3. Every 4-6 hours minimum:  Change oxygen saturation probe site  4. Every 4-6 hours:  If on nasal continuous positive airway pressure, respiratory therapy assess nares and determine need for appliance change or resting period.   Outcome: Progressing

## 2022-06-29 NOTE — PROGRESS NOTES
2023 MD Doshi messaged:    Regina Reed! i just came in to assess this pt because she just got back to the floor from OR and she is very lethargic. she is breathing 9 breaths per minute and she is barely waking up with even vigorious stimulation. she opened her eyes minimally for a brief second and was able to mumble her last name but goes right back to sleep. her BP is 90/66 and HR is 118, o2 94% on 4L. would you want to give her a one time dose of narcan? \"

## 2022-06-29 NOTE — DISCHARGE INSTRUCTIONS
[facility] find a Physician line [phone]. Please call them to find a physician. They accept patients that have insurance. Patient Discharge Instructions      Discharge Date:  7/28/2022    Discharged To:  SNF    RESUME ACTIVITY:      BATHING: May shower over the incision but, do not submerge the incision in water (no bathing, swimming, or hot-tubs). WALKING: May ambulate as tolerated    SEXUAL ACTIVITY: As pain tolerates    STAIRS:  Yes    LIFTING: Less than 15-20 pounds for 4 weeks    DIET: Full liquids PO only. J-tube feeds as ordered on NERY    SPECIAL INSTRUCTIONS:     Call the office at [phone] if you have a fever > 100 F, chills, persistent nausea or vomiting, increased abdominal pain, or if your incision becomes red, tender, or drains more than a small amount of clear fluid. Follow-up with Dr. Alois Moritz in 1 week call [phone] for an appointment. Patient may not take any NSAIDS every again. Thank you. [facility] AND [facility]. Zakiya Robles M.D. [address] [place] [address] [address] [place]                [address] Dar[address]le Ginger, M.D. [address]         [place], [address] [place] [address] Alois Moritz, M.D                         [phone]          [facility] Josse Bhatia M.D. Paige Guo        HOW TO EMPTY YOUR DRAIN    Pull the stopper out of the top of the drain. Pour the fluid into the container you were provided. After the drain is empty, squeeze the drain flat and push the stopper back into the top of the drain. Remember to record the date, time, amount and color of fluid collected. IF you see a change in color of your drain, please let the office know.                                         RECORD YOUR DRAIN OUTPUT(S) HERE:   DRAIN #1   r R  r L DRAIN # 2  r R  r L    Date/Time Amount  Color Date/Time Amount  Color

## 2022-06-29 NOTE — PROGRESS NOTES
General Surgery - January Romano, APRN - CNP, CNP  Daily Progress Note    Pt Name: Kishna Mcqueen Record Number: [id number]  Date of Birth 1950   Today's Date: 6/29/2022    ASSESSMENT  1. POD #1 s/p repair of perf ulcer, j-tube insertion  2. ABD: J-tube in place, JERRI drain in place, pain ball in place, dressign dry and intact, + diffuse tenderness which appears improved form yesterday, mild distention, NGT in place, no N/V, +flatus   3. ARF: Cr 1.2->2.4 today: IV contrast 6/27 and 6/22, vomiting and poor PO intake. 4. Leuks 12.8  5. VSS  6. NGT: 275/300  7. F/C 275/250  8. JERRI 165: old blood drainage  9. Pt states \"I want water, I still hurt but, not as bad as yesterday. \"    PLAN  1. NGT to CLWS  2. 0.45 500 ml bolus over 2 hours  3. Strict I&Os, f/c: please call for UO < 100 ml over 4 hours. 4. Pain control   5. Sit on edge of bed/up to chair today  6. Protonix gtt  7. IVF  8. Labs in the am.   9. Pt looks OK POD #1: if Cr increased in the am will consult renal. If pt has return of bowel function may start trophic tube feeds via J-tube tomorrow. Marko Landau has slightly improved from yesterday. Pain is well controlled. She has no nausea and no vomiting. She has passed flatus and has not had a bowel movement. She is NPO. Current activity is up with assistance    OBJECTIVE  VITALS:  height is 5' 4\" (1.626 m) and weight is 149 lb (67.6 kg). Her axillary temperature is 98 °F (36.7 °C). Her blood pressure is 122/81 and her pulse is 100. Her respiration is 13 and oxygen saturation is 95%.    VITALS:  /81   Pulse 100   Temp 98 °F (36.7 °C) (Axillary)   Resp 13   Ht 5' 4\" (1.626 m)   Wt 149 lb (67.6 kg)   SpO2 95%   BMI 25.58 kg/m²   INTAKE/OUTPUT:    Intake/Output Summary (Last 24 hours) at 6/29/2022 1802  Last data filed at 6/29/2022 1729  Gross per 24 hour   Intake 0 ml   Output 1250 ml   Net -1250 ml     URINARY CATHETER OUTPUT (Curiel):     GENERAL: alert, cooperative, no distress  I/O last 3 completed shifts: In: 2310.8 [I.V.:2200; IV Piggyback:110.8]  Out: 710 [Urine:275; Emesis/NG output:275; Drains:160]  I/O this shift: In: 0   Out: 615 [Urine:250; Emesis/NG output:300; Drains:65]    LABS  Recent Labs     06/27/22  1112 06/27/22  1122 06/29/22  0519   WBC  --    < > 12.8*   HGB  --    < > 12.8   HCT  --    < > 39.3   PLT  --    < > 295   NA  --    < > 143   K  --    < > 3.8   CL  --    < > 114*   CO2  --    < > 14*   BUN  --    < > 42*   CREATININE  --    < > 2.4*   CALCIUM  --    < > 7.4*   INR  --   --  1.57*   AST  --    < > 49*   ALT  --    < > 48*   BILITOT  --    < > 0.6   NITRU Negative  --   --    COLORU Yellow  --   --    BACTERIA 1+*  --   --     < > = values in this interval not displayed.      CBC with Differential:    Lab Results   Component Value Date/Time    WBC 12.8 06/29/2022 05:19 AM    RBC 4.35 06/29/2022 05:19 AM    RBC 4.41 08/02/2016 10:34 AM    HGB 12.8 06/29/2022 05:19 AM    HCT 39.3 06/29/2022 05:19 AM     06/29/2022 05:19 AM    MCV 90.3 06/29/2022 05:19 AM    MCH 29.5 06/29/2022 05:19 AM    MCHC 32.7 06/29/2022 05:19 AM    RDW 15.2 06/29/2022 05:19 AM    LYMPHOPCT 3.3 06/29/2022 05:19 AM    LYMPHOPCT 25.3 08/02/2016 10:34 AM    MONOPCT 3.8 06/29/2022 05:19 AM    BASOPCT 0.0 06/29/2022 05:19 AM    MONOSABS 0.5 06/29/2022 05:19 AM    LYMPHSABS 0.4 06/29/2022 05:19 AM    EOSABS 0.0 06/29/2022 05:19 AM    BASOSABS 0.0 06/29/2022 05:19 AM     CMP:    Lab Results   Component Value Date/Time     06/29/2022 05:19 AM    K 3.8 06/29/2022 05:19 AM     06/29/2022 05:19 AM    CO2 14 06/29/2022 05:19 AM    BUN 42 06/29/2022 05:19 AM    CREATININE 2.4 06/29/2022 05:19 AM    GFRAA 24 06/29/2022 05:19 AM    AGRATIO 0.8 06/29/2022 05:19 AM    LABGLOM 20 06/29/2022 05:19 AM    GLUCOSE 102 06/29/2022 05:19 AM    GLUCOSE 85 08/02/2016 10:34 AM    PROT 4.7 06/29/2022 05:19 AM    PROT 5.7 08/02/2016 10:34 AM    LABALBU 2.1 06/29/2022 05:19 AM    CALCIUM 7.4 06/29/2022 05:19 AM    BILITOT 0.6 06/29/2022 05:19 AM    ALKPHOS 84 06/29/2022 05:19 AM    AST 49 06/29/2022 05:19 AM    ALT 48 06/29/2022 05:19 AM         ILAN Szymanski CNP  Electronically signed 6/29/2022 at 5:55 PM

## 2022-06-29 NOTE — PROGRESS NOTES
DVT Prophy dose appropriate.   Platelets 058  Renal function <30  Change to Lovenox 30mg daily  Kerry RADER 6/29/20222:05 PM  .

## 2022-06-29 NOTE — CARE COORDINATION
Case Management Assessment  Initial Evaluation      Patient Name: Damian Kim  YOB: 1950  Diagnosis: Gastric perforation, acute [K31.89]  [facility] [K27.5]  Date / Time: 6/28/2022  6:58 AM    Admission status/Date:06/28/2022 inpatient  Chart Reviewed: Yes      Patient Interviewed: Yes   Family Interviewed:  No      Hospitalization in the last 30 days:  Yes    Health Care Decision Maker :   Primary Decision Maker: Tamara Myers [facility] [phone]    (CM - must 1st enter selection under Navigator - emergency contact[facility] Relationship and pick relationship)   Who do you trust or have selected to make healthcare decisions for you    Met with: pt and  at bedside    Current PCP: ILAN Knapp CNP; information placed on the AVS for other MD's per  request    Financial  Medicare and [employer]  Precert required for SNF :  N          3 night stay required - N    ADLS  Support Systems/Care Needs:    Transportation: self    Meal Preparation: self    Housing  Living Arrangements: pt lives at home with her  in a one level house  Steps: 0  Intent for return to present living arrangements: Yes  Identified Issues: [id number] [address] [facility] with [address] : No Agency:(Services)     Passport/Waiver : No  :                      Phone Number:    Passport/Waiver Services: n/a          Durable Medical Equiptment   DME Provider: n/a  Equipment:   Walker___Cane___RTS___ BSC___Shower Chair___Hospital Bed___W/C____Other________  02 at ____Liter(s)---wears(frequency)_______ HHN ___ CPAP___ BiPap___   N/A__x__    Home O2 Use :  No      Community Service Affiliation  Dialysis:  No    · Agency:  · Location:  · Dialysis Schedule:  · Phone:   · Fax: Other Community Services: n/a    DISCHARGE PLAN: Explained Case Management role/services. Chart review completed. Met with pt and pt's  at bedside.  Pt is independent at

## 2022-06-29 NOTE — PROGRESS NOTES
Throat spray ordered. Isolation discontinued. New NG tape to nose bridge. Respiratory paged for Incentive Spirometry.

## 2022-06-30 ENCOUNTER — APPOINTMENT (OUTPATIENT)
Dept: GENERAL RADIOLOGY | Age: 72
DRG: 326 | End: 2022-06-30
Payer: MEDICARE

## 2022-06-30 LAB
ANION GAP SERPL CALCULATED.3IONS-SCNC: 18 MMOL/L (ref 3–16)
BACTERIA: ABNORMAL /HPF
BANDED NEUTROPHILS RELATIVE PERCENT: 3 % (ref 0–7)
BASOPHILS ABSOLUTE: 0 K/UL (ref 0–0.2)
BASOPHILS RELATIVE PERCENT: 0 %
BILIRUBIN URINE: NEGATIVE
BLOOD, URINE: ABNORMAL
BUN BLDV-MCNC: 60 MG/DL (ref 7–20)
CALCIUM SERPL-MCNC: 7.7 MG/DL (ref 8.3–10.6)
CHLORIDE BLD-SCNC: 112 MMOL/L (ref 99–110)
CHLORIDE URINE RANDOM: 27 MMOL/L
CLARITY: ABNORMAL
CO2: 13 MMOL/L (ref 21–32)
COARSE CASTS, UA: ABNORMAL /LPF (ref 0–2)
COLOR: YELLOW
CREAT SERPL-MCNC: 3.9 MG/DL (ref 0.6–1.2)
CREATININE URINE: 78.9 MG/DL (ref 28–259)
EKG ATRIAL RATE: 120 BPM
EKG DIAGNOSIS: NORMAL
EKG Q-T INTERVAL: 320 MS
EKG QRS DURATION: 92 MS
EKG QTC CALCULATION (BAZETT): 461 MS
EKG R AXIS: 20 DEGREES
EKG T AXIS: 207 DEGREES
EKG VENTRICULAR RATE: 125 BPM
EOSINOPHILS ABSOLUTE: 0 K/UL (ref 0–0.6)
EOSINOPHILS RELATIVE PERCENT: 0 %
EPITHELIAL CELLS, UA: ABNORMAL /HPF (ref 0–5)
GFR AFRICAN AMERICAN: 14
GFR NON-AFRICAN AMERICAN: 11
GLUCOSE BLD-MCNC: 70 MG/DL (ref 70–99)
GLUCOSE BLD-MCNC: 71 MG/DL (ref 70–99)
GLUCOSE BLD-MCNC: 76 MG/DL (ref 70–99)
GLUCOSE BLD-MCNC: 81 MG/DL (ref 70–99)
GLUCOSE BLD-MCNC: 85 MG/DL (ref 70–99)
GLUCOSE BLD-MCNC: 89 MG/DL (ref 70–99)
GLUCOSE BLD-MCNC: 90 MG/DL (ref 70–99)
GLUCOSE URINE: NEGATIVE MG/DL
HCT VFR BLD CALC: 34.5 % (ref 36–48)
HEMOGLOBIN: 11.4 G/DL (ref 12–16)
KETONES, URINE: NEGATIVE MG/DL
LEUKOCYTE ESTERASE, URINE: NEGATIVE
LYMPHOCYTES ABSOLUTE: 0.5 K/UL (ref 1–5.1)
LYMPHOCYTES RELATIVE PERCENT: 4 %
MAGNESIUM: 1.9 MG/DL (ref 1.8–2.4)
MCH RBC QN AUTO: 29.4 PG (ref 26–34)
MCHC RBC AUTO-ENTMCNC: 33 G/DL (ref 31–36)
MCV RBC AUTO: 88.9 FL (ref 80–100)
MICROSCOPIC EXAMINATION: YES
MONOCYTES ABSOLUTE: 0.4 K/UL (ref 0–1.3)
MONOCYTES RELATIVE PERCENT: 3 %
NEUTROPHILS ABSOLUTE: 12.4 K/UL (ref 1.7–7.7)
NEUTROPHILS RELATIVE PERCENT: 90 %
NITRITE, URINE: NEGATIVE
PDW BLD-RTO: 15.5 % (ref 12.4–15.4)
PERFORMED ON: NORMAL
PH UA: 6 (ref 5–8)
PHOSPHORUS: 5 MG/DL (ref 2.5–4.9)
PLATELET # BLD: 265 K/UL (ref 135–450)
PLATELET SLIDE REVIEW: ADEQUATE
PMV BLD AUTO: 8.1 FL (ref 5–10.5)
POTASSIUM SERPL-SCNC: 3.7 MMOL/L (ref 3.5–5.1)
POTASSIUM, UR: 30 MMOL/L
PROTEIN UA: 100 MG/DL
RBC # BLD: 3.88 M/UL (ref 4–5.2)
RBC UA: >100 /HPF (ref 0–4)
SLIDE REVIEW: ABNORMAL
SODIUM BLD-SCNC: 143 MMOL/L (ref 136–145)
SODIUM URINE: 62 MMOL/L
SPECIFIC GRAVITY UA: 1.02 (ref 1–1.03)
TROPONIN: <0.01 NG/ML
TROPONIN: <0.01 NG/ML
TSH REFLEX FT4: 0.49 UIU/ML (ref 0.27–4.2)
URINE TYPE: ABNORMAL
UROBILINOGEN, URINE: 0.2 E.U./DL
WBC # BLD: 13.3 K/UL (ref 4–11)
WBC UA: ABNORMAL /HPF (ref 0–5)

## 2022-06-30 PROCEDURE — 93010 ELECTROCARDIOGRAM REPORT: CPT | Performed by: INTERNAL MEDICINE

## 2022-06-30 PROCEDURE — 6360000002 HC RX W HCPCS: Performed by: INTERNAL MEDICINE

## 2022-06-30 PROCEDURE — 36415 COLL VENOUS BLD VENIPUNCTURE: CPT

## 2022-06-30 PROCEDURE — 81001 URINALYSIS AUTO W/SCOPE: CPT

## 2022-06-30 PROCEDURE — 84484 ASSAY OF TROPONIN QUANT: CPT

## 2022-06-30 PROCEDURE — 6360000002 HC RX W HCPCS: Performed by: SURGERY

## 2022-06-30 PROCEDURE — 84100 ASSAY OF PHOSPHORUS: CPT

## 2022-06-30 PROCEDURE — C9113 INJ PANTOPRAZOLE SODIUM, VIA: HCPCS | Performed by: NURSE PRACTITIONER

## 2022-06-30 PROCEDURE — 87086 URINE CULTURE/COLONY COUNT: CPT

## 2022-06-30 PROCEDURE — 2580000003 HC RX 258: Performed by: INTERNAL MEDICINE

## 2022-06-30 PROCEDURE — 85025 COMPLETE CBC W/AUTO DIFF WBC: CPT

## 2022-06-30 PROCEDURE — 99223 1ST HOSP IP/OBS HIGH 75: CPT | Performed by: INTERNAL MEDICINE

## 2022-06-30 PROCEDURE — 93005 ELECTROCARDIOGRAM TRACING: CPT | Performed by: INTERNAL MEDICINE

## 2022-06-30 PROCEDURE — 84300 ASSAY OF URINE SODIUM: CPT

## 2022-06-30 PROCEDURE — 84133 ASSAY OF URINE POTASSIUM: CPT

## 2022-06-30 PROCEDURE — 80048 BASIC METABOLIC PNL TOTAL CA: CPT

## 2022-06-30 PROCEDURE — 6360000002 HC RX W HCPCS: Performed by: NURSE PRACTITIONER

## 2022-06-30 PROCEDURE — 2580000003 HC RX 258: Performed by: NURSE PRACTITIONER

## 2022-06-30 PROCEDURE — 83735 ASSAY OF MAGNESIUM: CPT

## 2022-06-30 PROCEDURE — 71045 X-RAY EXAM CHEST 1 VIEW: CPT

## 2022-06-30 PROCEDURE — 2500000003 HC RX 250 WO HCPCS: Performed by: INTERNAL MEDICINE

## 2022-06-30 PROCEDURE — 84443 ASSAY THYROID STIM HORMONE: CPT

## 2022-06-30 PROCEDURE — 82436 ASSAY OF URINE CHLORIDE: CPT

## 2022-06-30 PROCEDURE — 1200000000 HC SEMI PRIVATE

## 2022-06-30 PROCEDURE — 82570 ASSAY OF URINE CREATININE: CPT

## 2022-06-30 RX ORDER — DILTIAZEM HYDROCHLORIDE 5 MG/ML
10 INJECTION INTRAVENOUS ONCE
Status: COMPLETED | OUTPATIENT
Start: 2022-06-30 | End: 2022-06-30

## 2022-06-30 RX ORDER — METOPROLOL TARTRATE 5 MG/5ML
2.5 INJECTION INTRAVENOUS EVERY 6 HOURS PRN
Status: DISCONTINUED | OUTPATIENT
Start: 2022-06-30 | End: 2022-07-28 | Stop reason: HOSPADM

## 2022-06-30 RX ORDER — DIGOXIN 0.25 MG/ML
125 INJECTION INTRAMUSCULAR; INTRAVENOUS DAILY
Status: DISCONTINUED | OUTPATIENT
Start: 2022-07-01 | End: 2022-07-01

## 2022-06-30 RX ORDER — DIGOXIN 0.25 MG/ML
250 INJECTION INTRAMUSCULAR; INTRAVENOUS EVERY 4 HOURS
Status: COMPLETED | OUTPATIENT
Start: 2022-06-30 | End: 2022-06-30

## 2022-06-30 RX ADMIN — DILTIAZEM HYDROCHLORIDE 10 MG: 5 INJECTION INTRAVENOUS at 05:04

## 2022-06-30 RX ADMIN — PANTOPRAZOLE SODIUM 8 MG/HR: 40 INJECTION, POWDER, LYOPHILIZED, FOR SOLUTION INTRAVENOUS at 21:44

## 2022-06-30 RX ADMIN — DIGOXIN 250 MCG: 0.25 INJECTION INTRAMUSCULAR; INTRAVENOUS at 14:42

## 2022-06-30 RX ADMIN — ONDANSETRON HYDROCHLORIDE 4 MG: 2 INJECTION, SOLUTION INTRAMUSCULAR; INTRAVENOUS at 20:25

## 2022-06-30 RX ADMIN — ENOXAPARIN SODIUM 30 MG: 100 INJECTION SUBCUTANEOUS at 09:56

## 2022-06-30 RX ADMIN — PIPERACILLIN SODIUM AND TAZOBACTAM SODIUM 3375 MG: 3; .375 INJECTION, POWDER, LYOPHILIZED, FOR SOLUTION INTRAVENOUS at 09:56

## 2022-06-30 RX ADMIN — SODIUM BICARBONATE: 84 INJECTION, SOLUTION INTRAVENOUS at 16:19

## 2022-06-30 RX ADMIN — PIPERACILLIN SODIUM AND TAZOBACTAM SODIUM 3375 MG: 3; .375 INJECTION, POWDER, LYOPHILIZED, FOR SOLUTION INTRAVENOUS at 00:43

## 2022-06-30 RX ADMIN — PANTOPRAZOLE SODIUM 8 MG/HR: 40 INJECTION, POWDER, LYOPHILIZED, FOR SOLUTION INTRAVENOUS at 13:01

## 2022-06-30 RX ADMIN — SODIUM BICARBONATE: 84 INJECTION, SOLUTION INTRAVENOUS at 10:51

## 2022-06-30 RX ADMIN — SODIUM CHLORIDE, PRESERVATIVE FREE 10 ML: 5 INJECTION INTRAVENOUS at 20:24

## 2022-06-30 RX ADMIN — PIPERACILLIN AND TAZOBACTAM 3375 MG: 3; .375 INJECTION, POWDER, LYOPHILIZED, FOR SOLUTION INTRAVENOUS at 21:43

## 2022-06-30 RX ADMIN — SODIUM BICARBONATE: 84 INJECTION, SOLUTION INTRAVENOUS at 07:28

## 2022-06-30 RX ADMIN — SODIUM BICARBONATE: 84 INJECTION, SOLUTION INTRAVENOUS at 19:01

## 2022-06-30 RX ADMIN — DIGOXIN 250 MCG: 0.25 INJECTION INTRAMUSCULAR; INTRAVENOUS at 09:56

## 2022-06-30 RX ADMIN — PANTOPRAZOLE SODIUM 8 MG/HR: 40 INJECTION, POWDER, LYOPHILIZED, FOR SOLUTION INTRAVENOUS at 06:01

## 2022-06-30 ASSESSMENT — PAIN SCALES - GENERAL: PAINLEVEL_OUTOF10: 0

## 2022-06-30 NOTE — PROGRESS NOTES
Comprehensive Nutrition Assessment    Type and Reason for Visit:  Initial,Consult (TF recs)         Nutrition Recommendations/Plan:   Trpohic Feeds to start per J tube in am 7/1/22 per surgery    Recommendations are: Adult Tube feeding standard without fiber (Osmolite 1.2) @ 10 ml/hr  w/ 30 ml water flush q 6 hr               Malnutrition Assessment:  Malnutrition Status: At risk for malnutrition (Comment) (06/30/22 1957)    Context:  Acute Illness     Findings of the 6 clinical characteristics of malnutrition:  Energy Intake:  50% or less of estimated energy requirements for 5 or more days  Weight Loss:  No significant weight loss     Body Fat Loss:  Unable to assess     Muscle Mass Loss:  Unable to assess    Fluid Accumulation:  Unable to assess     Strength:  Not Performed    Nutrition Assessment:    Pt. nutritionally compromised AEB admitted with gastric perforation with surgical repair and J tube insertion . At risk for further nutrition compromise r/t NPO and no eneteral feeding. Will recommend trophic feeds as noted above . Nutrition Related Findings:    POD #2 s/p repair of perf ulcer, j-tube insertion;POD #2 s/p repair of perf ulcer, j-tube insertion;mild distention, NGT in place, no N/V, +flatus, + BM Wound Type: Surgical Incision       Current Nutrition Intake & Therapies:    Average Meal Intake: NPO  Average Supplements Intake: NPO  Diet NPO Exceptions are: Ice Chips    Anthropometric Measures:  Height: 5' 4\" (162.6 cm)  Ideal Body Weight (IBW): 120 lbs (55 kg)    Admission Body Weight: 19 lb (8.618 kg)  Current Body Weight: 149 lb (67.6 kg), 124.2 % IBW. Weight Source: Bed Scale  Current BMI (kg/m2): 25.6  Usual Body Weight: 147 lb (66.7 kg)  % Weight Change (Calculated): 1.4                    BMI Categories: Overweight (BMI 25.0-29. 9)    Estimated Daily Nutrient Needs:  Energy Requirements Based On: Kcal/kg  Weight Used for Energy Requirements: Current  Energy (kcal/day): 1827-5994  Weight Used for Protein Requirements: Ideal  Protein (g/day): 70-76  Method Used for Fluid Requirements: 1 ml/kcal  Fluid (ml/day): 1802-9551    Nutrition Diagnosis:   · Inadequate oral intake related to altered GI function,altered GI structure,inadequate protein-energy intake as evidenced by NPO or clear liquid status due to medical condition,nutrition support - enteral nutrition,GI abnormality      Nutrition Interventions:   Food and/or Nutrient Delivery: Continue NPO,Start Tube Feeding  Nutrition Education/Counseling: No recommendation at this time          Goals:     Goals: Initiate nutrition support,by next RD assessment       Nutrition Monitoring and Evaluation:   Behavioral-Environmental Outcomes: None Identified  Food/Nutrient Intake Outcomes: Enteral Nutrition Intake/Tolerance  Physical Signs/Symptoms Outcomes: Biochemical Data,Nutrition Focused Physical Findings,Weight,GI Status    Discharge Planning:     Too soon to determine     Iram Quiles, [address] [place] [address],   Contact: [phone]

## 2022-06-30 NOTE — FLOWSHEET NOTE
06/30/22 1440   Vital Signs   Temp 97.9 °F (36.6 °C)   Temp Source Oral   Heart Rate (!) 108   Heart Rate Source Monitor   Resp 16   BP (!) 153/83   BP Location Left upper arm   MAP (Calculated) 106.33   Patient Position Semi fowlers   Level of Consciousness Alert (0)   MEWS Score 2   Oxygen Therapy   SpO2 97 %   Pulse Oximetry Type Intermittent   Pulse Oximeter Device Mode Intermittent   Pulse Oximeter Device Location Finger   O2 Device None (Room air)   Patient resting. Family at bedside. Patient denies any needs.

## 2022-06-30 NOTE — PROGRESS NOTES
Pt flipped in to afib from Sinus -130's. MD notified EKG ordered to confirm. EKG completed. MD notified. No new orders at this time.

## 2022-06-30 NOTE — CONSULTS
[address] [facility]  [phone]        Reason for Consultation/Chief Complaint: \"I have been asked to see her for afib RVR . \"    History of Present Illness:  Eliezer Alvares is a 74 y.o. patient who presented to the hospital with complaints of worsening cough/congestion, fatigue, NVD  She had to have laparotomy yesterday for perforated viscous. I am asked to see her for afib RVR  She has no prior h/o of heart disease. At home she is on asa lipitor levothyroxine citralopram  Denies chest pain, shortness of breath, edema, dizziness, palpitations and syncope. I have been asked to provide consultation regarding further management and testing. H/o colon cancer hypothyroidism HLD   COVID +ve since mid June  She has been short of breath cough fatigued   Surgical evaluation as follows 6.29.22  CT shows pneumoperitoneum likely secondary to PUD     A/P: Pneumoperitoneum secondary to perforated viscus, likely PUD. PT has been started on IVF resuscitation, antibiotics, and PPI. Plan for emergent surgery with repair of perforation, primary vs resection, and likely jejunostomy insertion as well. I explained the procedure including risks, benefits, and alternatives. Questions were answered and the patient agrees to proceed. Past Medical History:   has a past medical history of Arthritis, Colon cancer ([address] Hyperlipidemia, and Thyroid disease. Surgical History:   has a past surgical history that includes Colon surgery (2009); Kidney stone surgery; Ovary removal; Appendectomy (2009); Cholecystectomy (2009); epidural steroid injection (Left, 4/15/2019); and epidural steroid injection (Left, 11/5/2019). Social History:   reports that she quit smoking about 22 years ago. She has never used smokeless tobacco. She reports current alcohol use. She reports that she does not use drugs. Family History:  family history includes Cancer in her brother and sister;  Heart Attack in her brother; High Blood Pressure in her brother and sister. Home Medications:  Were reviewed and are listed in nursing record. and/or listed below  Prior to Admission medications    Medication Sig Start Date End Date Taking? Authorizing Provider   ascorbic acid (VITAMIN C) 500 MG tablet Take 1 tablet by mouth 2 times daily for 7 days 6/27/22 7/4/22  Wilner Allen MD   zinc sulfate (ZINCATE) 220 (50 Zn) MG capsule Take 1 capsule by mouth daily for 7 days 6/27/22 7/4/22  Wilner Allen MD   guaiFENesin [facility]) 600 MG extended release tablet Take 1 tablet by mouth 2 times daily for 10 days 6/27/22 7/7/22  Wilner Allen MD   naproxen (NAPROSYN) 500 MG tablet Take 1 tablet by mouth 2 times daily as needed for Pain 6/27/22 7/7/22  Wilner Allen MD   ondansetron (ZOFRAN-ODT) 4 MG disintegrating tablet Place 1 tablet under the tongue every 8 hours as needed for Nausea or Vomiting May Sub regular tablet (non-ODT) if insurance does not cover ODT.  6/27/22 7/4/22  Wilner Allen MD   sucralfate (CARAFATE) 1 GM tablet Take 1 tablet by mouth 4 times daily for 15 days 6/27/22 7/12/22  Wilner Allen MD   famotidine (PEPCID) 10 MG tablet Take 2 tablets by mouth 2 times daily 6/27/22 7/27/22  Wilner Allen MD   butalbital-acetaminophen-caffeine [facility]) -54 MG per tablet Take 1 tablet by mouth every 6 hours as needed for Headaches 6/27/22 6/30/22  Wilner Allen MD   albuterol sulfate HFA (PROVENTIL;VENTOLIN;PROAIR) 108 (90 Base) MCG/ACT inhaler Inhale 2 puffs into the lungs every 6 hours as needed for Wheezing 6/24/22   Keira Sharif MD   benzonatate (TESSALON) 100 MG capsule Take 1 capsule by mouth 3 times daily as needed for Cough 6/24/22 7/1/22  Keira Sharif MD   dexamethasone (DECADRON) 6 MG tablet Take 1 tablet by mouth daily (with breakfast) for 8 days 6/24/22 7/2/22  Keira Sharif MD   promethazine-codeine [facility] WITH CODEINE) 6.25-10 MG/5ML syrup Take 5 mLs by mouth every 4 hours as needed for Cough for up to 7 days.  6/24/22 7/1/22  Yamila Kim MD   PAXLOVID 20 x 150 MG & 10 x 100MG  6/20/22   Historical Provider, MD   levocetirizine (XYZAL) 5 MG tablet 5 mg nightly  8/9/21   Historical Provider, MD   aspirin 81 MG tablet Take 81 mg by mouth daily    Historical Provider, MD   atorvastatin (LIPITOR) 10 MG tablet Take 20 mg by mouth 7/17/17   Historical Provider, MD   levothyroxine (SYNTHROID) 137 MCG tablet 125 mcg Daily  6/28/15   Historical Provider, MD   citalopram (CELEXA) 20 MG tablet 20 mg daily  5/8/15   Historical Provider, MD   Omega-3 Fatty Acids (FISH OIL) 500 MG CAPS Take 1 g by mouth daily     Historical Provider, MD   PROBIOTIC PRODUCT PO Take by mouth    Historical Provider, MD        Allergies:  Bee venom, Adhesive tape, Tetracyclines & related, and Sulfa antibiotics     Review of Systems:   12 point ROS negative in all areas as listed below except as in [place]  Constitutional, EENT, , Musculoskeletal, skin, neurological, hematological, endocrine, Psychiatric    Physical Examination:    Vitals:    06/30/22 0502   BP: 134/89   Pulse: (!) 128   Resp:    Temp:    SpO2:     Weight: 149 lb 8 oz (67.8 kg)         General Appearance:  Alert, cooperative, no distress, appears stated age  Appears acutely ill   Head:  Normocephalic, without obvious abnormality, atraumatic   Eyes:  PERRL, conjunctiva/corneas clear       Nose: Nares normal, no drainage or sinus tenderness NG tune in place   Throat: Lips, mucosa, and tongue normal but dry    Neck: Supple, symmetrical, trachea midline, no adenopathy, thyroid: not enlarged, symmetric, no tenderness/mass/nodules, no carotid bruit or JVD       Lungs:   Clear to auscultation bilaterally, respirations unlabored   Chest Wall:  No tenderness or deformity   Heart:  irreg irreg  rate and rhythm, S1, S2 normal, no murmur, rub or gallop   Abdomen:    she has bulky bandage on her belly in midline   No bowel sounds  Drains in place           Extremities: Extremities normal, atraumatic, no cyanosis or edema   Pulses: 2+ and symmetric   Skin: Skin color, texture, turgor normal, no rashes or lesions   Pysch: Normal mood and affect   Neurologic: Normal gross motor and sensory exam.         Labs  CBC:   Lab Results   Component Value Date/Time    WBC 13.3 06/30/2022 05:07 AM    RBC 3.88 06/30/2022 05:07 AM    RBC 4.41 08/02/2016 10:34 AM    HGB 11.4 06/30/2022 05:07 AM    HCT 34.5 06/30/2022 05:07 AM    MCV 88.9 06/30/2022 05:07 AM    RDW 15.5 06/30/2022 05:07 AM     06/30/2022 05:07 AM     CMP:    Lab Results   Component Value Date/Time     06/30/2022 05:07 AM    K 3.7 06/30/2022 05:07 AM    K 3.8 06/29/2022 05:19 AM     06/30/2022 05:07 AM    CO2 13 06/30/2022 05:07 AM    BUN 60 06/30/2022 05:07 AM    CREATININE 3.9 06/30/2022 05:07 AM    GFRAA 14 06/30/2022 05:07 AM    AGRATIO 0.8 06/29/2022 05:19 AM    LABGLOM 11 06/30/2022 05:07 AM    GLUCOSE 76 06/30/2022 05:07 AM    GLUCOSE 85 08/02/2016 10:34 AM    PROT 4.7 06/29/2022 05:19 AM    PROT 5.7 08/02/2016 10:34 AM    CALCIUM 7.7 06/30/2022 05:07 AM    BILITOT 0.6 06/29/2022 05:19 AM    ALKPHOS 84 06/29/2022 05:19 AM    AST 49 06/29/2022 05:19 AM    ALT 48 06/29/2022 05:19 AM     PT/INR:  No results found for: PTINR  Lab Results   Component Value Date    TROPONINI <0.01 06/28/2022       EKG:  I have reviewed EKG with the following interpretation:  Impression:     Atrial fibrillation with rapid ventricular responseST & T wave abnormality, consider inferolateral ischemiaAbnormal ECGWhen compared with ECG of 28-JUN-2022 08:57,Atrial fibrillation has replaced Sinus rhythmT wave inversion more evident in Anterolateral leadsConfirmed by Cheri Ohara MD, [address] (1988) on 6/30/2022 7:05:43 AM Normal sinus rhythmSeptal infarct , age undeterminedAbnormal ECGWhen compared with ECG of 22-JUN-2022 13:48,Septal infarct is now PresentConfirmed by Cheri Ohara MD, [address] (1988) on 6/28/2022 2:00:07 PM    CXRAY 6.28.22     No radiographic evidence of acute pulmonary disease.       Findings concerning for free air under the right hemidiaphragm.  CT abdomen   and pelvis is in process.             Assessment  Atrial fibrillations with rapid ventricular response  Acutely perforated viscous s/p exploratory lap 6.29.22  Recent COVID 19 illness  [id number] Score for Atrial Fibrillation Stroke Risk   Risk   Factors  Component Value   C CHF No 0   H HTN No 0   A2 Age >= 75 No,  (69 y.o.) 0   D DM No 0   S2 Prior Stroke/TIA No 0   V Vascular Disease No 0   A Age 74-69 Yes,  (69 y.o.) 1   Sc Sex female 1    [id number]  Score  2   Score last updated 6/30/22 7:06 AM EDT    Click here for a link to the UpToDate guideline \"Atrial Fibrillation: Anticoagulation therapy to prevent embolization    Disclaimer: Risk Score calculation is dependent on accuracy of patient problem list and past encounter diagnosis. Recommend  Serial troponin  Digoxin for rate control  TSH  DVT prophylaxis  She is low risk for thromboembolism from afib as her CHADSVASC score is two  But if she remains in afib then consider full dose anticoagulation with Lovenox  Echo of heart when she is able to lay on her side. Patient Active Problem List   Diagnosis    Pneumonia due to 2019 novel coronavirus    Hypothyroidism    Chronic back pain    Hypoxia    COVID-19    Pneumonia due to COVID-19 virus    Gastric perforation ([address]    Perforated peptic ulcer ([facility]       Thank you for allowing to us to participate in the care or [place]. Further evaluation will be based upon the patient's clinical course and testing results. All questions and concerns were addressed to the patient/family. Alternatives to my treatment were discussed. The note was completed using EMR. Every effort was made to ensure accuracy; however, inadvertent computerized transcription errors may be present.

## 2022-06-30 NOTE — PROGRESS NOTES
Bedside report given to Radha Serrato [facility]. Care transferred.

## 2022-06-30 NOTE — PROGRESS NOTES
General Surgery - January Russell, APRN - CNP, CNP  Daily Progress Note    Pt Name: Kishan Mcqueen Record Number: [id number]  Date of Birth 1950   Today's Date: 6/30/2022    ASSESSMENT  1. POD #2 s/p repair of perf ulcer, j-tube insertion  2. ABD: J-tube in place, JERRI drain in place, pain ball in place, dressign removed: small amount of bleeding from the midline, + diffuse tenderness which appears unchanged from yesterday, mild distention, NGT in place, no N/V, +flatus, + BM   3. ARF: Cr 1.2->2.4->3.9: IV contrast 6/27 and 6/22, vomiting and poor PO intake. 4. Leuks 13.3  5. VSS  6. NGT: 300  7. F/C 550  8. JERRI 80 ml out : old blood drainage  9. Pt states \"I still hurt but, it's not quite as bad. \"    PLAN  1. NGT to CLWS  2. Renal consultation  3. Strict I&Os. 4. Pain control   5. PT/OT: Sit on edge of bed/up to chair today  6. Protonix gtt  7. IVF  8. Labs in the am.   9. Nutrition for TF recs: plan on starting trophic feeds in the am.   10. Pt looks OK POD #2:  If pt has return of bowel function may start trophic tube feeds via J-tube tomorrow. Melissa Garcia has slightly improved in some ways and worsened in others from yesterday. Pain is well controlled. She has no nausea and no vomiting. She has passed flatus and has had a bowel movement. She is NPO. Current activity is up with assistance    OBJECTIVE  VITALS:  height is 5' 4\" (1.626 m) and weight is 149 lb 8 oz (67.8 kg). Her oral temperature is 97.9 °F (36.6 °C). Her blood pressure is 153/83 (abnormal) and her pulse is 108 (abnormal). Her respiration is 16 and oxygen saturation is 97%.    VITALS:  BP (!) 153/83   Pulse (!) 108   Temp 97.9 °F (36.6 °C) (Oral)   Resp 16   Ht 5' 4\" (1.626 m)   Wt 149 lb 8 oz (67.8 kg)   SpO2 97%   BMI 25.66 kg/m²   INTAKE/OUTPUT:      Intake/Output Summary (Last 24 hours) at 6/30/2022 1724  Last data filed at 6/30/2022 1151  Gross per 24 hour   Intake 0 ml   Output 1005 ml   Net -1005 ml URINARY CATHETER OUTPUT (Curiel):     GENERAL: alert, cooperative, no distress  I/O last 3 completed shifts: In: 0   Out: 1565 [Urine:825; Emesis/NG output:500; Drains:240]  I/O this shift: In: 0   Out: 175 [Urine:175]    LABS  Recent Labs     06/29/22  0519 06/29/22  0519 06/30/22  0507 06/30/22  1311   WBC 12.8*   < > 13.3*  --    HGB 12.8   < > 11.4*  --    HCT 39.3   < > 34.5*  --       < > 265  --       < > 143  --    K 3.8  --  3.7  --    *   < > 112*  --    CO2 14*   < > 13*  --    BUN 42*   < > 60*  --    CREATININE 2.4*   < > 3.9*  --    MG  --   --  1.90  --    PHOS  --   --  5.0*  --    CALCIUM 7.4*   < > 7.7*  --    INR 1.57*  --   --   --    AST 49*  --   --   --    ALT 48*  --   --   --    BILITOT 0.6  --   --   --    NITRU  --   --   --  Negative   COLORU  --   --   --  Yellow   BACTERIA  --   --   --  1+*    < > = values in this interval not displayed.      CBC with Differential:    Lab Results   Component Value Date/Time    WBC 13.3 06/30/2022 05:07 AM    RBC 3.88 06/30/2022 05:07 AM    RBC 4.41 08/02/2016 10:34 AM    HGB 11.4 06/30/2022 05:07 AM    HCT 34.5 06/30/2022 05:07 AM     06/30/2022 05:07 AM    MCV 88.9 06/30/2022 05:07 AM    MCH 29.4 06/30/2022 05:07 AM    MCHC 33.0 06/30/2022 05:07 AM    RDW 15.5 06/30/2022 05:07 AM    BANDSPCT 3 06/30/2022 05:07 AM    LYMPHOPCT 4.0 06/30/2022 05:07 AM    LYMPHOPCT 25.3 08/02/2016 10:34 AM    MONOPCT 3.0 06/30/2022 05:07 AM    BASOPCT 0.0 06/30/2022 05:07 AM    MONOSABS 0.4 06/30/2022 05:07 AM    LYMPHSABS 0.5 06/30/2022 05:07 AM    EOSABS 0.0 06/30/2022 05:07 AM    BASOSABS 0.0 06/30/2022 05:07 AM     CMP:    Lab Results   Component Value Date/Time     06/30/2022 05:07 AM    K 3.7 06/30/2022 05:07 AM    K 3.8 06/29/2022 05:19 AM     06/30/2022 05:07 AM    CO2 13 06/30/2022 05:07 AM    BUN 60 06/30/2022 05:07 AM    CREATININE 3.9 06/30/2022 05:07 AM    GFRAA 14 06/30/2022 05:07 AM    AGRATIO 0.8 06/29/2022 05:19 AM    LABGLOM 11 06/30/2022 05:07 AM    GLUCOSE 76 06/30/2022 05:07 AM    GLUCOSE 85 08/02/2016 10:34 AM    PROT 4.7 06/29/2022 05:19 AM    PROT 5.7 08/02/2016 10:34 AM    LABALBU 2.1 06/29/2022 05:19 AM    CALCIUM 7.7 06/30/2022 05:07 AM    BILITOT 0.6 06/29/2022 05:19 AM    ALKPHOS 84 06/29/2022 05:19 AM    AST 49 06/29/2022 05:19 AM    ALT 48 06/29/2022 05:19 AM         January Hilario, APRN - CNP  Electronically signed 6/30/2022 at 5:24 PM

## 2022-06-30 NOTE — PROGRESS NOTES
Physician Progress Note      Selina John  CSN #:                  [id number]  :                       1950  ADMIT DATE:       2022 6:58 AM  DISCH DATE:  RESPONDING  PROVIDER #:        TYRA Martinez CNP          QUERY TEXT:    Pt admitted with perforated prepyloric ulcer and peritonitis . Pt noted to   have wbc-12.1 on arrival with lactic-2.2. If possible, please document in the   progress notes and discharge summary if you are evaluating and /or treating   any of the following: The medical record reflects the following:  Risk Factors: advanced age, perforated prepyloric ulcer with peritonitis,   covid 19  Clinical Indicators: wbc-12.1, lactic-2.2, bands-3, afib, DA  Treatment: lactic, blood cultures, Zosyn, surgical repair, 2000cc total IVF   bolus, continuous IVF infusion    Thank you for your assistance,  Michelle Collins RN,BSN,CCDS,CRCR  Options provided:  -- Evolving Severe Sepsis on admission with perforated prepyloric ulcer with   peritonitis and DA  -- Severe Sepsis, not present on admission with perforated prepyloric ulcer   with peritonitis and DA  -- Evolving Sepsis on admission with perforated prepyloric ulcer with   peritonitis with no associated organ dysfunction  -- No Sepsis  -- Other - I will add my own diagnosis  -- Disagree - Not applicable / Not valid  -- Disagree - Clinically unable to determine / Unknown  -- Refer to Clinical Documentation Reviewer    PROVIDER RESPONSE TEXT:    This patient has evolving severe sepsis on admission with perforated   prepyloric ulcer with peritonitis and DA.     Query created by: Fadi Cardenas on 2022 12:28 PM      Electronically signed by:  Yung Martinez CNP 2022 12:43 PM

## 2022-06-30 NOTE — CONSULTS
[employer]  Nephrology Consult Note           Reason for Consult: DA   Requesting Physician:  Dr. Brigid Riedel    Chief Complaint:    Chief Complaint   Patient presents with    Abdominal Pain     Pt from home via EMS for abdominal pain since yesterday. EMS states that she was evaluated yesterday for something unrelated. Pt states diarrhea last week and nausea. History of Present Illness on 6/30/22:    74 y.o. yo female with PMH of colon cancer hypothyroidism, HLD, OA on mobic who is admitted for abd pain and bowel perf  Pt had been at [facility] from 6/23-24/22 for COVID pneumonia. Was treated with dex. Was also on meloxicam  Presented to ED on 6/27 for abd pain had CTA of chest/a/p and was discharged on naproxen  Came again on 6/28 and was noted to have bowel perf and underwent immediate surgery  Cr was 0.6 on 6/27, was 1.2 on 6/28 which increased to 2.4 on 6/29 and 3.9 on 6/30 when nephrology has been consulted, pt has been getting zosyn after surgery  Her BP dropped in 6/28 to 90s few times.  On 6/30 has had afib rvr    Past Medical History:        Diagnosis Date    Arthritis     [facility] ([facility] [address]     Hyperlipidemia     Thyroid disease        Past Surgical History:        Procedure Laterality Date    APPENDECTOMY  2009    CHOLECYSTECTOMY  2009    COLON SURGERY  2009    EPIDURAL STEROID INJECTION Left 4/15/2019    LEFT LUMBAR FIVE SACRAL ONE EPIDURAL STEROID INJECTION SITE CONFIRMED BY FLUOROSCOPY performed by Tres Roberts MD at [facility] Left 11/5/2019    LEFT LUMBAR FIVE SACRAL ONE EPIDURAL STEROID INJECTION SITE CONFIRMED BY FLUOROSCOPY performed by Tres Roberts MD at [facility] N/A 6/28/2022    LAPAROTOMY, REPAIR OF PERFORATED ULCER with jejunostomy insertion performed by Rob Leyva MD at [address] [facility] Medications:    No current facility-administered medications on file prior to encounter. Current Outpatient Medications on File Prior to Encounter   Medication Sig Dispense Refill    ascorbic acid (VITAMIN C) 500 MG tablet Take 1 tablet by mouth 2 times daily for 7 days 14 tablet 0    zinc sulfate (ZINCATE) 220 (50 Zn) MG capsule Take 1 capsule by mouth daily for 7 days 7 capsule 0    guaiFENesin (MUCINEX) 600 MG extended release tablet Take 1 tablet by mouth 2 times daily for 10 days 20 tablet 0    naproxen (NAPROSYN) 500 MG tablet Take 1 tablet by mouth 2 times daily as needed for Pain 20 tablet 0    ondansetron (ZOFRAN-ODT) 4 MG disintegrating tablet Place 1 tablet under the tongue every 8 hours as needed for Nausea or Vomiting May Sub regular tablet (non-ODT) if insurance does not cover ODT. 20 tablet 0    sucralfate (CARAFATE) 1 GM tablet Take 1 tablet by mouth 4 times daily for 15 days 60 tablet 0    famotidine (PEPCID) 10 MG tablet Take 2 tablets by mouth 2 times daily 120 tablet 0    butalbital-acetaminophen-caffeine (FIORICET, ESGIC) -40 MG per tablet Take 1 tablet by mouth every 6 hours as needed for Headaches 12 tablet 0    albuterol sulfate HFA (PROVENTIL;VENTOLIN;PROAIR) 108 (90 Base) MCG/ACT inhaler Inhale 2 puffs into the lungs every 6 hours as needed for Wheezing 18 g 3    benzonatate (TESSALON) 100 MG capsule Take 1 capsule by mouth 3 times daily as needed for Cough 20 capsule 0    dexamethasone (DECADRON) 6 MG tablet Take 1 tablet by mouth daily (with breakfast) for 8 days 8 tablet 0    promethazine-codeine (PHENERGAN WITH CODEINE) 6.25-10 MG/5ML syrup Take 5 mLs by mouth every 4 hours as needed for Cough for up to 7 days.  120 mL 0    PAXLOVID 20 x 150 MG & 10 x 100MG       levocetirizine (XYZAL) 5 MG tablet 5 mg nightly       aspirin 81 MG tablet Take 81 mg by mouth daily      atorvastatin (LIPITOR) 10 MG tablet Take 20 mg by mouth      levothyroxine (SYNTHROID) 137 MCG tablet 125 mcg Daily       citalopram (CELEXA) 20 MG tablet 20 Abused: Not on file    Sexually Abused: Not on file   Housing Stability:     Unable to Pay for Housing in the Last Year: Not on file    Number of Places Lived in the Last Year: Not on file    Unstable Housing in the Last Year: Not on file       Family History:   Family History   Problem Relation Age of Onset    High Blood Pressure Sister     Cancer Sister     High Blood Pressure Brother     Heart Attack Brother     Cancer Brother      Review of Systems:   Pertinent positives stated above in HPI. All other 10 systems were reviewed and were negative. Physical exam:   Constitutional:  VITALS:  BP (!) 140/79   Pulse (!) 118   Temp 97.9 °F (36.6 °C) (Oral)   Resp 18   Ht 5' 4\" (1.626 m)   Wt 149 lb 8 oz (67.8 kg)   SpO2 94%   BMI 25.66 kg/m²   Gen: alert, awake  Neck: No JVD  Skin: Unremarkable  Cardiovascular:  S1, S2 without m/r/g   Respiratory: diminished  Abdomen:  soft, nt, nd,   Extremities: no lower extremity edema  Neuro/Psy: AAoriented times 3 ; moves all 4 ext    Data/  Recent Labs     06/28/22  0705 06/29/22  0519 06/30/22  0507   WBC 9.8 12.8* 13.3*   HGB 16.3* 12.8 11.4*   HCT 50.1* 39.3 34.5*   MCV 87.6 90.3 88.9   * 295 265     Recent Labs     06/28/22  0705 06/29/22  0519 06/30/22  0507    143 143   K 4.0 3.8 3.7    114* 112*   CO2 20* 14* 13*   GLUCOSE 172* 102* 76   PHOS  --   --  5.0*   MG  --   --  1.90   BUN 26* 42* 60*   CREATININE 1.2 2.4* 3.9*   LABGLOM 44* 20* 11*   GFRAA 53* 24* 14*     CTA chest/a/p on 6/27/22  Impression   Chest-       1. No pulmonary embolus. 2. Multifocal ground-glass opacities, primarily in the mid and upper lungs. Atypical pneumonia, including COVID etiology is considered.  Other causes of   pneumonia and pneumonitis, such as hypersensitivity, are also possible. ---       Abdomen and pelvis-       1. Mural thickening of the gastric antrum.  Gastritis is considered.    2. Right inguinal hernia with small bowel content; no

## 2022-06-30 NOTE — PROGRESS NOTES
Pt awake in bed. Assessment completed and medications given. VS as charted. A&Ox4. Pt states pain is a 6 out of 10. PRN morphine given at this time. Call light in reach and bed in lowest position.

## 2022-07-01 ENCOUNTER — APPOINTMENT (OUTPATIENT)
Dept: GENERAL RADIOLOGY | Age: 72
DRG: 326 | End: 2022-07-01
Payer: MEDICARE

## 2022-07-01 LAB
ALBUMIN SERPL-MCNC: 2 G/DL (ref 3.4–5)
ANION GAP SERPL CALCULATED.3IONS-SCNC: 15 MMOL/L (ref 3–16)
BUN BLDV-MCNC: 63 MG/DL (ref 7–20)
CALCIUM SERPL-MCNC: 8 MG/DL (ref 8.3–10.6)
CHLORIDE BLD-SCNC: 111 MMOL/L (ref 99–110)
CO2: 19 MMOL/L (ref 21–32)
CREAT SERPL-MCNC: 3.5 MG/DL (ref 0.6–1.2)
GFR AFRICAN AMERICAN: 15
GFR NON-AFRICAN AMERICAN: 13
GLUCOSE BLD-MCNC: 109 MG/DL (ref 70–99)
GLUCOSE BLD-MCNC: 116 MG/DL (ref 70–99)
GLUCOSE BLD-MCNC: 120 MG/DL (ref 70–99)
GLUCOSE BLD-MCNC: 138 MG/DL (ref 70–99)
GLUCOSE BLD-MCNC: 145 MG/DL (ref 70–99)
GLUCOSE BLD-MCNC: 65 MG/DL (ref 70–99)
GLUCOSE BLD-MCNC: 84 MG/DL (ref 70–99)
GLUCOSE BLD-MCNC: 96 MG/DL (ref 70–99)
HCT VFR BLD CALC: 31.9 % (ref 36–48)
HEMOGLOBIN: 10.8 G/DL (ref 12–16)
LV EF: 50 %
LVEF MODALITY: NORMAL
MAGNESIUM: 2 MG/DL (ref 1.8–2.4)
MCH RBC QN AUTO: 29.4 PG (ref 26–34)
MCHC RBC AUTO-ENTMCNC: 34 G/DL (ref 31–36)
MCV RBC AUTO: 86.6 FL (ref 80–100)
PDW BLD-RTO: 15.3 % (ref 12.4–15.4)
PERFORMED ON: ABNORMAL
PERFORMED ON: NORMAL
PHOSPHORUS: 4.6 MG/DL (ref 2.5–4.9)
PLATELET # BLD: 268 K/UL (ref 135–450)
PMV BLD AUTO: 7.6 FL (ref 5–10.5)
POTASSIUM SERPL-SCNC: 3.1 MMOL/L (ref 3.5–5.1)
RBC # BLD: 3.69 M/UL (ref 4–5.2)
SODIUM BLD-SCNC: 145 MMOL/L (ref 136–145)
URINE CULTURE, ROUTINE: NORMAL
WBC # BLD: 15.9 K/UL (ref 4–11)

## 2022-07-01 PROCEDURE — 2580000003 HC RX 258: Performed by: INTERNAL MEDICINE

## 2022-07-01 PROCEDURE — 2500000003 HC RX 250 WO HCPCS: Performed by: INTERNAL MEDICINE

## 2022-07-01 PROCEDURE — A4216 STERILE WATER/SALINE, 10 ML: HCPCS | Performed by: NURSE PRACTITIONER

## 2022-07-01 PROCEDURE — 74018 RADEX ABDOMEN 1 VIEW: CPT

## 2022-07-01 PROCEDURE — 36415 COLL VENOUS BLD VENIPUNCTURE: CPT

## 2022-07-01 PROCEDURE — 6370000000 HC RX 637 (ALT 250 FOR IP): Performed by: INTERNAL MEDICINE

## 2022-07-01 PROCEDURE — 6360000002 HC RX W HCPCS: Performed by: INTERNAL MEDICINE

## 2022-07-01 PROCEDURE — 80069 RENAL FUNCTION PANEL: CPT

## 2022-07-01 PROCEDURE — 2580000003 HC RX 258: Performed by: NURSE PRACTITIONER

## 2022-07-01 PROCEDURE — 99233 SBSQ HOSP IP/OBS HIGH 50: CPT | Performed by: INTERNAL MEDICINE

## 2022-07-01 PROCEDURE — 93306 TTE W/DOPPLER COMPLETE: CPT

## 2022-07-01 PROCEDURE — 85027 COMPLETE CBC AUTOMATED: CPT

## 2022-07-01 PROCEDURE — 6360000002 HC RX W HCPCS: Performed by: SURGERY

## 2022-07-01 PROCEDURE — 83735 ASSAY OF MAGNESIUM: CPT

## 2022-07-01 PROCEDURE — 1200000000 HC SEMI PRIVATE

## 2022-07-01 PROCEDURE — 6360000002 HC RX W HCPCS: Performed by: NURSE PRACTITIONER

## 2022-07-01 RX ORDER — POTASSIUM CHLORIDE 7.45 MG/ML
10 INJECTION INTRAVENOUS
Status: DISCONTINUED | OUTPATIENT
Start: 2022-07-01 | End: 2022-07-01

## 2022-07-01 RX ORDER — DEXTROSE MONOHYDRATE 50 MG/ML
100 INJECTION, SOLUTION INTRAVENOUS PRN
Status: DISCONTINUED | OUTPATIENT
Start: 2022-07-01 | End: 2022-07-28 | Stop reason: HOSPADM

## 2022-07-01 RX ORDER — HEPARIN SODIUM 5000 [USP'U]/ML
5000 INJECTION, SOLUTION INTRAVENOUS; SUBCUTANEOUS EVERY 8 HOURS SCHEDULED
Status: DISCONTINUED | OUTPATIENT
Start: 2022-07-01 | End: 2022-07-28 | Stop reason: HOSPADM

## 2022-07-01 RX ORDER — DILTIAZEM HYDROCHLORIDE 120 MG/1
120 CAPSULE, EXTENDED RELEASE ORAL DAILY
Status: DISCONTINUED | OUTPATIENT
Start: 2022-07-01 | End: 2022-07-03

## 2022-07-01 RX ADMIN — SODIUM CHLORIDE: 9 INJECTION, SOLUTION INTRAVENOUS at 08:41

## 2022-07-01 RX ADMIN — SODIUM BICARBONATE: 84 INJECTION, SOLUTION INTRAVENOUS at 22:11

## 2022-07-01 RX ADMIN — DIGOXIN 125 MCG: 0.25 INJECTION INTRAMUSCULAR; INTRAVENOUS at 08:42

## 2022-07-01 RX ADMIN — ENOXAPARIN SODIUM 30 MG: 100 INJECTION SUBCUTANEOUS at 08:42

## 2022-07-01 RX ADMIN — HEPARIN SODIUM 5000 UNITS: 5000 INJECTION INTRAVENOUS; SUBCUTANEOUS at 14:22

## 2022-07-01 RX ADMIN — HEPARIN SODIUM 5000 UNITS: 5000 INJECTION INTRAVENOUS; SUBCUTANEOUS at 22:14

## 2022-07-01 RX ADMIN — SODIUM BICARBONATE: 84 INJECTION, SOLUTION INTRAVENOUS at 03:07

## 2022-07-01 RX ADMIN — SODIUM BICARBONATE: 84 INJECTION, SOLUTION INTRAVENOUS at 10:06

## 2022-07-01 RX ADMIN — DEXTROSE MONOHYDRATE 100 ML/HR: 50 INJECTION, SOLUTION INTRAVENOUS at 01:56

## 2022-07-01 RX ADMIN — DILTIAZEM HYDROCHLORIDE 120 MG: 120 CAPSULE, EXTENDED RELEASE ORAL at 10:06

## 2022-07-01 RX ADMIN — ONDANSETRON HYDROCHLORIDE 4 MG: 2 INJECTION, SOLUTION INTRAMUSCULAR; INTRAVENOUS at 19:28

## 2022-07-01 RX ADMIN — MORPHINE SULFATE 2 MG: 2 INJECTION, SOLUTION INTRAMUSCULAR; INTRAVENOUS at 05:30

## 2022-07-01 RX ADMIN — PIPERACILLIN AND TAZOBACTAM 3375 MG: 3; .375 INJECTION, POWDER, LYOPHILIZED, FOR SOLUTION INTRAVENOUS at 08:41

## 2022-07-01 RX ADMIN — POTASSIUM BICARBONATE 40 MEQ: 391 TABLET, EFFERVESCENT ORAL at 10:56

## 2022-07-01 RX ADMIN — DEXTROSE MONOHYDRATE 125 ML: 100 INJECTION, SOLUTION INTRAVENOUS at 01:15

## 2022-07-01 RX ADMIN — MORPHINE SULFATE 2 MG: 2 INJECTION, SOLUTION INTRAMUSCULAR; INTRAVENOUS at 19:29

## 2022-07-01 RX ADMIN — PIPERACILLIN AND TAZOBACTAM 3375 MG: 3; .375 INJECTION, POWDER, LYOPHILIZED, FOR SOLUTION INTRAVENOUS at 22:13

## 2022-07-01 RX ADMIN — SODIUM CHLORIDE: 9 INJECTION INTRAMUSCULAR; INTRAVENOUS; SUBCUTANEOUS at 14:22

## 2022-07-01 ASSESSMENT — PAIN SCALES - WONG BAKER: WONGBAKER_NUMERICALRESPONSE: 0

## 2022-07-01 ASSESSMENT — PAIN DESCRIPTION - DESCRIPTORS
DESCRIPTORS: DULL
DESCRIPTORS: CRAMPING

## 2022-07-01 ASSESSMENT — PAIN SCALES - GENERAL
PAINLEVEL_OUTOF10: 10
PAINLEVEL_OUTOF10: 9

## 2022-07-01 ASSESSMENT — PAIN DESCRIPTION - PAIN TYPE: TYPE: ACUTE PAIN

## 2022-07-01 ASSESSMENT — PAIN DESCRIPTION - ONSET: ONSET: ON-GOING

## 2022-07-01 ASSESSMENT — PAIN DESCRIPTION - LOCATION
LOCATION: ABDOMEN
LOCATION: ARM;LEG;BACK

## 2022-07-01 ASSESSMENT — PAIN - FUNCTIONAL ASSESSMENT
PAIN_FUNCTIONAL_ASSESSMENT: PREVENTS OR INTERFERES SOME ACTIVE ACTIVITIES AND ADLS
PAIN_FUNCTIONAL_ASSESSMENT: PREVENTS OR INTERFERES SOME ACTIVE ACTIVITIES AND ADLS

## 2022-07-01 ASSESSMENT — PAIN DESCRIPTION - FREQUENCY: FREQUENCY: CONTINUOUS

## 2022-07-01 NOTE — PROGRESS NOTES
CARDIOLOGY PROGRESS NOTE      Patient Name: Jazmyn Powell  Date of admission: 6/28/2022  6:58 AM  Admission Dx: Gastric perforation, acute [K31.89]  Perforated peptic ulcer ([address] [K27.5]  Reason for Consult: Atrial fibrillation  Requesting Physician: Kane Montanez MD  Primary Care physician: Keyla Alas, APRN - CNP    Subjective:     Ms. Ted Perdue is a pleasant 26-year-old woman with a prior medical history notable for colon cancer status post colectomy, hyperlipidemia, COVID-19 diagnosed mid June, thyroid disease, admitted for perforated viscus status post laparotomy with repair. Course complicated by atrial fibrillation with rapid ventricular rates. Today patient states that she is doing well. She is seen today with her  and daughter bedside. She has converted to sinus rhythm. She did not have palpitations nor chest pain while in arrhythmia. Denies shortness of breath at present. Presently has an NG in place. Presently maintained on prophylactic Lovenox. It is felt that she had pneumoperitoneum secondary to perforated viscus that is likely in the setting of peptic ulcer disease. Patient has no prior history of peptic ulcer disease, GI bleed, blood loss anemia of any kind or blood transfusion. She has been rate controlled with IV digoxin daily. Initiated this morning on oral diltiazem for hypertension and atrial fibrillation. Echocardiogram obtained this morning. Home Medications:  Were reviewed and are listed in nursing record and/or below  Prior to Admission medications    Medication Sig Start Date End Date Taking?  Authorizing Provider   ascorbic acid (VITAMIN C) 500 MG tablet Take 1 tablet by mouth 2 times daily for 7 days 6/27/22 7/4/22  James Orellana MD   zinc sulfate (ZINCATE) 220 (50 Zn) MG capsule Take 1 capsule by mouth daily for 7 days 6/27/22 7/4/22  James Orellana MD   guaiFENesin (MUCINEX) 600 MG extended release tablet Take 1 tablet by mouth 2 times daily for 10 days 6/27/22 7/7/22  Maricarmen Holley MD   naproxen (NAPROSYN) 500 MG tablet Take 1 tablet by mouth 2 times daily as needed for Pain 6/27/22 7/7/22  Maricarmen Holley MD   ondansetron (ZOFRAN-ODT) 4 MG disintegrating tablet Place 1 tablet under the tongue every 8 hours as needed for Nausea or Vomiting May Sub regular tablet (non-ODT) if insurance does not cover ODT. 6/27/22 7/4/22  Maricarmen Holley MD   sucralfate (CARAFATE) 1 GM tablet Take 1 tablet by mouth 4 times daily for 15 days 6/27/22 7/12/22  Maricarmen Holley MD   famotidine (PEPCID) 10 MG tablet Take 2 tablets by mouth 2 times daily 6/27/22 7/27/22  Maricarmen Holley MD   butalbital-acetaminophen-caffeine [facility]) -69 MG per tablet Take 1 tablet by mouth every 6 hours as needed for Headaches 6/27/22 6/30/22  Maricarmen Holley MD   albuterol sulfate HFA (PROVENTIL;VENTOLIN;PROAIR) 108 (90 Base) MCG/ACT inhaler Inhale 2 puffs into the lungs every 6 hours as needed for Wheezing 6/24/22   Florentin Collado MD   benzonatate (TESSALON) 100 MG capsule Take 1 capsule by mouth 3 times daily as needed for Cough 6/24/22 7/1/22  Florentin Collado MD   dexamethasone (DECADRON) 6 MG tablet Take 1 tablet by mouth daily (with breakfast) for 8 days 6/24/22 7/2/22  Florentin Collado MD   promethazine-codeine [facility] WITH CODEINE) 6.25-10 MG/5ML syrup Take 5 mLs by mouth every 4 hours as needed for Cough for up to 7 days.  6/24/22 7/1/22  Florentin Collado MD   PAXLOVID 20 x 150 MG & 10 x 100MG  6/20/22   Historical Provider, MD   levocetirizine (XYZAL) 5 MG tablet 5 mg nightly  8/9/21   Historical Provider, MD   aspirin 81 MG tablet Take 81 mg by mouth daily    Historical Provider, MD   atorvastatin (LIPITOR) 10 MG tablet Take 20 mg by mouth 7/17/17   Historical Provider, MD   levothyroxine (SYNTHROID) 137 MCG tablet 125 mcg Daily  6/28/15   Historical Provider, MD   citalopram (CELEXA) 20 MG tablet 20 mg daily  5/8/15   Historical Provider, MD Omega-3 Fatty Acids (FISH OIL) 500 MG CAPS Take 1 g by mouth daily     Historical Provider, MD   PROBIOTIC PRODUCT PO Take by mouth    Historical Provider, MD        CURRENT Medications:  glucose chewable tablet 16 g, PRN  dextrose bolus 10% 125 mL, PRN   Or  dextrose bolus 10% 250 mL, PRN  glucagon (rDNA) injection 1 mg, PRN  dextrose 5 % solution, PRN  sodium bicarbonate 75 mEq in dextrose 5 % 1,000 mL infusion, Continuous  dilTIAZem (TIAZAC) extended release capsule 120 mg, Daily  perflutren lipid microspheres (DEFINITY) injection 1.65 mg, ONCE PRN  metoprolol (LOPRESSOR) injection 2.5 mg, Q6H PRN  digoxin (LANOXIN) injection 125 mcg, Daily  piperacillin-tazobactam (ZOSYN) 3,375 mg in sodium chloride 0.9 % 100 mL IVPB (mini-bag), Q12H  phenol 1.4 % mouth spray 1 spray, Q2H PRN  enoxaparin Sodium (LOVENOX) injection 30 mg, Daily  sodium chloride flush 0.9 % injection 5-40 mL, 2 times per day  sodium chloride flush 0.9 % injection 5-40 mL, PRN  0.9 % sodium chloride infusion, PRN  ondansetron (ZOFRAN-ODT) disintegrating tablet 4 mg, Q8H PRN   Or  ondansetron (ZOFRAN) injection 4 mg, Q6H PRN  morphine (PF) injection 1 mg, Q2H PRN   Or  morphine (PF) injection 2 mg, Q2H PRN  bupivacaine 0.5% (MARCAINE) elastomeric infusion 270 mL, Continuous  naloxone (NARCAN) injection 0.4 mg, PRN        Allergies:  Bee venom, Adhesive tape, Tetracyclines & related, and Sulfa antibiotics     Review of Systems:   A 14 point review of symptoms completed. Pertinent positives identified in the HPI, all other review of symptoms negative.        Objective:     Vitals:    07/01/22 0530 07/01/22 0600 07/01/22 0712 07/01/22 0826   BP:  (!) 170/90 (!) 192/84 (!) 183/75   Pulse:   80 87   Resp: 18 18  20   Temp:    97.7 °F (36.5 °C)   TempSrc:    Oral   SpO2:    96%   Weight: 151 lb 11.2 oz (68.8 kg)      Height:          Weight: 151 lb 11.2 oz (68.8 kg)       PHYSICAL EXAM:      General:   Elderly woman in no acute distress, frail-appearing 05:19 AM    PROT 5.7 08/02/2016 10:34 AM    CALCIUM 8.0 07/01/2022 06:50 AM    BILITOT 0.6 06/29/2022 05:19 AM    ALKPHOS 84 06/29/2022 05:19 AM    AST 49 06/29/2022 05:19 AM    ALT 48 06/29/2022 05:19 AM     PT/INR:  No results found for: PTINR  HgBA1c:No results found for: LABA1C  Lab Results   Component Value Date    TROPONINI <0.01 06/30/2022         Interval Testing/Data:     Telemetry personally reviewed:     Echo is pending    Impression and Plan      Atrial fibrillation, paroxysmal  -Start diltiazem 120 mg oral once daily today per tube and monitor blood pressures and telemetry  -Stop IV digoxin in setting of hypokalemia and with start of calcium channel blocker  -Anticoagulation is indicated for [id number] 2 but at this time we will not initiate as she still having bloody drainage per JERRI and is healing in the postoperative setting.   We will lean on general surgery to  when best to start.    -She may be a appropriate candidate for watchman outpatient referral.  -We will follow-up echocardiogram    Perforated viscus with pneumoperitoneum thought due to PUD, status post laparotomy with repair  Leukocytosis  -J-tube remains in place  -JERRI drain remains in place with bloody drainage    Acute kidney injury with creatinine 3.5  -nephrology evaluating    Hypothyroidism  -not presently on levothyroxine, defer to IM once taking PO    History COVID-19 mid June    Hypokalemia  -Replacement given  -Magnesium level ordered    [id number] Score for Atrial Fibrillation Stroke Risk   Risk   Factors  Component Value   C CHF No 0   H HTN No 0   A2 Age >= 76 No,  (69 y.o.) 0   D DM No 0   S2 Prior Stroke/TIA No 0   V Vascular Disease No 0   A Age 74-69 Yes,  (69 y.o.) 1   Sc Sex female 1    [id number]  Score  2   Score last updated 7/1/22 28:99 AM EDT    Click here for a link to the UpToDate guideline \"Atrial Fibrillation: Anticoagulation therapy to prevent embolization    Disclaimer: Risk Score calculation is dependent on accuracy of patient problem list and past encounter diagnosis. Patient Active Problem List   Diagnosis    Pneumonia due to 2019 novel coronavirus    Hypothyroidism    Chronic back pain    Hypoxia    COVID-19    Pneumonia due to COVID-19 virus    Gastric perforation ([facility]    Perforated peptic ulcer ([facility]    Atrial fibrillation ([facility]           I will address the patient's cardiac risk factors and adjusted pharmacologic treatment as needed. In addition, I have reinforced the need for patient directed risk factor modification. All questions and concerns were addressed to the patient/family. Alternatives to my treatment were discussed. Thank you for allowing us to participate in the care of [place]. Please call me with any questions [phone].     Clay Alcaraz MD, [facility]  Cardiovascular Disease  [address]  [phone] [facility]  [phone] [address] [place] [address]  7/1/2022 11:26 AM

## 2022-07-01 NOTE — PROGRESS NOTES
Pt family called and updated on pt condition. Notified of IV infiltration and drop in blood sugar earlier in shift. Pt daughter states she will be in later during visiting hours.

## 2022-07-01 NOTE — PROGRESS NOTES
Pt IV in 20 [facility] infiltrated. Swelling noted at the site. IV fluids stopped and IV removed. Arm elevated, ice pack applied, and cold wet wash rag applied. PRN morphine given for pain in the arm and generalized pain. Pt states pain as a 10 out of 10 all over.

## 2022-07-01 NOTE — PROGRESS NOTES
Patient called out stating she was nauseous. TF stopped at this time. Zofran given at this time. PRN morphine administered at this time. Patient had also pulled NG. It was advanced back to the 65 bre. KUB ordered at this time for NG placement. Night shift RN Al Shin at bedside. Report given at this time. Page out to surgery at this time.

## 2022-07-01 NOTE — PROGRESS NOTES
General Surgery - January Dias, APRN - CNP, CNP  Daily Progress Note    Pt Name: Kishan Mcqueen Record Number: [id number]  Date of Birth 1950   Today's Date: 7/1/2022    ASSESSMENT  1. POD #3 s/p repair of perf ulcer, j-tube insertion  2. ABD: J-tube in place, JERRI drain in place, pain ball removed, staples look good and left open to air, + diffuse tenderness which appears unchanged from yesterday, mild distention, NGT in place, no N/V, +flatus, + BM   3. ARF: Cr 1.2->2.4->3.9->3.5: IV contrast 6/27 and 6/22, vomiting and poor PO intake. 4. Leuks 13.3->15.9  5. VSS  6. NGT: 300  7. F/C 550  8. JERRI 30 ml out : old bloody/thick drainage  9. Pt states \"I still hurt but, it's not quite as bad. \"    PLAN  1. NGT to CLWS  2. Renal consultation  3. Strict I&Os. 4. Pain control   5. Hylenex for bicarb infiltration  6. PT/OT: Sit on edge of bed/up to chair today/ambualte  7. Protonix gtt  8. IVF per renal  9. Labs in the am.   10. Start Trophic TF via J-tube  11. Pt looks OK POD #3:  Check leuks in the am, start J-tube feeds. Marina Almendarez has slightly from yesterday. Pain is well controlled. She has no nausea and no vomiting. She has passed flatus and has had a bowel movement. She is NPO. Current activity is up with assistance    OBJECTIVE  VITALS:  height is 5' 4\" (1.626 m) and weight is 151 lb 11.2 oz (68.8 kg). Her oral temperature is 97.7 °F (36.5 °C). Her blood pressure is 183/75 (abnormal) and her pulse is 87. Her respiration is 20 and oxygen saturation is 96%.    VITALS:  BP (!) 183/75   Pulse 87   Temp 97.7 °F (36.5 °C) (Oral)   Resp 20   Ht 5' 4\" (1.626 m)   Wt 151 lb 11.2 oz (68.8 kg)   SpO2 96%   BMI 26.04 kg/m²   INTAKE/OUTPUT:      Intake/Output Summary (Last 24 hours) at 7/1/2022 1346  Last data filed at 7/1/2022 1229  Gross per 24 hour   Intake 10 ml   Output 680 ml   Net -670 ml     URINARY CATHETER OUTPUT (Curiel):     GENERAL: alert, cooperative, no distress  I/O last 3 completed shifts: In: 10 [I.V.:10]  Out: 4541 [Urine:1125; Drains:45]  No intake/output data recorded. LABS  Recent Labs     06/29/22  0519 06/30/22  0507 06/30/22  1311 07/01/22  0650   WBC 12.8*   < >  --  15.9*   HGB 12.8   < >  --  10.8*   HCT 39.3   < >  --  31.9*      < >  --  268      < >  --  145   K 3.8   < >  --  3.1*   *   < >  --  111*   CO2 14*   < >  --  19*   BUN 42*   < >  --  63*   CREATININE 2.4*   < >  --  3.5*   MG  --    < >  --  2.00   PHOS  --    < >  --  4.6   CALCIUM 7.4*   < >  --  8.0*   INR 1.57*  --   --   --    AST 49*  --   --   --    ALT 48*  --   --   --    BILITOT 0.6  --   --   --    NITRU  --   --  Negative  --    COLORU  --   --  Yellow  --    BACTERIA  --   --  1+*  --     < > = values in this interval not displayed.      CBC with Differential:    Lab Results   Component Value Date/Time    WBC 15.9 07/01/2022 06:50 AM    RBC 3.69 07/01/2022 06:50 AM    RBC 4.41 08/02/2016 10:34 AM    HGB 10.8 07/01/2022 06:50 AM    HCT 31.9 07/01/2022 06:50 AM     07/01/2022 06:50 AM    MCV 86.6 07/01/2022 06:50 AM    MCH 29.4 07/01/2022 06:50 AM    MCHC 34.0 07/01/2022 06:50 AM    RDW 15.3 07/01/2022 06:50 AM    BANDSPCT 3 06/30/2022 05:07 AM    LYMPHOPCT 4.0 06/30/2022 05:07 AM    LYMPHOPCT 25.3 08/02/2016 10:34 AM    MONOPCT 3.0 06/30/2022 05:07 AM    BASOPCT 0.0 06/30/2022 05:07 AM    MONOSABS 0.4 06/30/2022 05:07 AM    LYMPHSABS 0.5 06/30/2022 05:07 AM    EOSABS 0.0 06/30/2022 05:07 AM    BASOSABS 0.0 06/30/2022 05:07 AM     CMP:    Lab Results   Component Value Date/Time     07/01/2022 06:50 AM    K 3.1 07/01/2022 06:50 AM    K 3.8 06/29/2022 05:19 AM     07/01/2022 06:50 AM    CO2 19 07/01/2022 06:50 AM    BUN 63 07/01/2022 06:50 AM    CREATININE 3.5 07/01/2022 06:50 AM    GFRAA 15 07/01/2022 06:50 AM    AGRATIO 0.8 06/29/2022 05:19 AM    LABGLOM 13 07/01/2022 06:50 AM    GLUCOSE 96 07/01/2022 06:50 AM    GLUCOSE 85 08/02/2016 10:34 AM PROT 4.7 06/29/2022 05:19 AM    PROT 5.7 08/02/2016 10:34 AM    LABALBU 2.0 07/01/2022 06:50 AM    CALCIUM 8.0 07/01/2022 06:50 AM    BILITOT 0.6 06/29/2022 05:19 AM    ALKPHOS 84 06/29/2022 05:19 AM    AST 49 06/29/2022 05:19 AM    ALT 48 06/29/2022 05:19 AM         ILAN Mcmahan CNP  Electronically signed 7/1/2022 at 1:46 PM

## 2022-07-01 NOTE — CARE COORDINATION
INTERDISCIPLINARY PLAN OF CARE CONFERENCE    Date/Time: 7/1/2022 2:21 PM  Completed by: Bradley Keith RN, Case Management      Patient Name:  Arun Salinas  YOB: 1950  Admitting Diagnosis: Gastric perforation, acute [K31.89]  Perforated peptic ulcer ([facility] [address] [address] [K27.5]     Admit Date/Time:  6/28/2022  6:58 AM    Chart reviewed. Interdisciplinary team contacted or reviewed plan related to patient progress and discharge plans. Disciplines included Case Management, Nursing, and Dietitian. Current Status:ongoing; POD #3 repair perf ulcer; started TF via J-tube    Expected D/C Disposition:  Home  Confirmed plan with patient and/or family Yes confirmed with: (name) pt and spouse  Met with:pt and spouse  Discharge Plan Comments: Chart reviewed. Met with pt and spouse at bedside. Plan continues for pt to return home at dc with spouse. IPTA. Will follow for possible HHC needs.      Home O2 in place on admit: No

## 2022-07-01 NOTE — FLOWSHEET NOTE
07/01/22 0826   Vital Signs   Temp 97.7 °F (36.5 °C)   Temp Source Oral   Heart Rate 87   Heart Rate Source Monitor   Resp 20   BP (!) 183/75   BP Location Left upper arm   MAP (Calculated) 111   Patient Position Semi fowlers   Level of Consciousness Alert (0)   MEWS Score 1   Pain Assessment   Pain Assessment 0-10   Oxygen Therapy   SpO2 96 %   Pulse Oximetry Type Intermittent   Pulse Oximeter Device Mode Intermittent   Pulse Oximeter Device Location Finger   O2 Device None (Room air)   Patient is resting showing no s/s of distress. Patient is alert and oriented. Meds were given, see MAR. Patient is denying any needs. Bed is in lowest position and call light is within reach. Will continue to monitor. Shift assessment complete, see flowsheets. Josiane Sims paged for elevated BP. AGBRIEL Sin paged about bicarb gtt infiltration. RN spoke with pharmacy and received their recommendations.

## 2022-07-01 NOTE — PROGRESS NOTES
[employer]  Nephrology follow-up note           Reason for Consult: DA   Requesting Physician:  Dr. Ronit Watt history  Patient is frustrated about IV line infiltration and blisters on her arm along with inability to drink  Creatinine down to 3.5  Blood pressure high    Last 24 h uop 825 mL    ROS: No chest pain/shortness of breath  PSFH: Visitor present    Scheduled Meds:   dilTIAZem  120 mg Oral Daily    digoxin  125 mcg IntraVENous Daily    piperacillin-tazobactam  3,375 mg IntraVENous Q12H    enoxaparin  30 mg SubCUTAneous Daily    sodium chloride flush  5-40 mL IntraVENous 2 times per day     Continuous Infusions:   dextrose Stopped (07/01/22 0411)    sodium bicarbonate infusion      sodium chloride 5 mL/hr at 07/01/22 0841    pantoprazole 8 mg/hr (06/30/22 2144)    bupivacaine 0.5%       PRN Meds:.glucose, dextrose bolus **OR** dextrose bolus, glucagon (rDNA), dextrose, perflutren lipid microspheres, metoprolol, phenol, sodium chloride flush, sodium chloride, ondansetron **OR** ondansetron, morphine **OR** morphine, naloxone    History of Present Illness on 6/30/22:    74 y.o. yo female with PMH of colon cancer hypothyroidism, HLD, OA on mobic who is admitted for abd pain and bowel perf  Pt had [facility] at [facility] from 6/[facility]-24/22 for COVID pneumonia. Was treated with dex. Was also on meloxicam  Presented to ED on 6/27 for abd pain had CTA of chest/a/p and was discharged on naproxen  Came again on 6/28 and was noted to have bowel perf and underwent immediate surgery  Cr was 0.6 on 6/27, was 1.2 on 6/28 which increased to 2.4 on 6/29 and 3.9 on 6/30 when nephrology has been consulted, pt has been getting zosyn after surgery  Her BP dropped in 6/28 to 90s few times.  On 6/30 has had afib rvr    Physical exam:   Constitutional:  VITALS:  BP (!) 183/75   Pulse 87   Temp 97.7 °F (36.5 °C) (Oral)   Resp 20   Ht 5' 4\" (1.626 m)   Wt 151 lb 11.2 oz (68.8 kg)   SpO2 96%   BMI 26.04 kg/m² Gen: alert, awake  Neck: No JVD  Skin: Unremarkable  Cardiovascular:  S1, S2 without m/r/g   Respiratory: diminished  Abdomen:  soft, nt, nd,   Extremities: no lower extremity edema  Neuro/Psy: AAoriented times 3 ; moves all 4 ext    Data/  Recent Labs     06/29/22  0519 06/30/22  0507 07/01/22  0650   WBC 12.8* 13.3* 15.9*   HGB 12.8 11.4* 10.8*   HCT 39.3 34.5* 31.9*   MCV 90.3 88.9 86.6    265 268     Recent Labs     06/29/22  0519 06/30/22  0507 07/01/22  0650    143 145   K 3.8 3.7 3.1*   * 112* 111*   CO2 14* 13* 19*   GLUCOSE 102* 76 96   PHOS  --  5.0* 4.6   MG  --  1.90  --    BUN 42* 60* 63*   CREATININE 2.4* 3.9* 3.5*   LABGLOM 20* 11* 13*   GFRAA 24* 14* 15*     CTA chest/a/p on 6/27/22  Impression   Chest-       1. No pulmonary embolus. 2. Multifocal ground-glass opacities, primarily in the mid and upper lungs. Atypical pneumonia, including COVID etiology is considered.  Other causes of   pneumonia and pneumonitis, such as hypersensitivity, are also possible. ---       Abdomen and pelvis-       1. Mural thickening of the gastric antrum.  Gastritis is considered. 2. Right inguinal hernia with small bowel content; no incarceration. 3. 2.8 cm aneurysm of the infrarenal abdominal aorta.  5 year follow-up   imaging is recommended. 4. Nonobstructive nephrolithiasis     CT a/p wo contrast 6/28/22  Impression   1. New large perforation involving the posterior gastric antrum likely from   ulcerative disease causing a moderate amount of pneumoperitoneum and ascites   throughout the abdomen and pelvis. 2. New trace left pleural effusion. 3. Residual contrast within the bilateral kidneys can be seen in setting of   renal failure. UA w 6-10 granular casts, 100 protein, RBC>100; WBCs 6-10  Urine Na 62 cr 79    Assessment  -DA likely has developed ATN w hypovolemia, hypotension, NSAIDs use and contrast use.      -Perforated prepyloric ulcer s/p laparotomy with repair n jejunostomy insertion on 6/28/22   On zosyn     -Metabolic acidosis from DA     -Hypertension    -afib rvr per cardiology    -recent [place] from 6/22-24/22    -h/o colon cancer    Plan  -Change IV fluid to D5 with 75 meq sod bicarb @100 ml/h  -Noted diltiazem 120 mg p.o. has been started for A. fib which should also help for blood pressure  -Replace potassium 40 M EQ IV  -Okay for PICC line if needed  -Add magnesium to blood work from today and replace as needed  -Serial renal panel  -Daily wts and strict i/o  -Renal dose meds and avoid nephrotoxins      Thank you for the consultation. Please do not hesitate to call with questions. Pepe Jacobo MD  Office: [phone]  Fax:    [phone] [address] [facility]

## 2022-07-01 NOTE — FLOWSHEET NOTE
07/01/22 1357   Encounter Summary   Service Provided For: Patient   Referral/Consult From: Mala   Last Encounter  07/01/22   Complexity of Encounter Moderate   Begin Time 1100   End Time  1115   Total Time Calculated 15 min   Spiritual/Emotional needs   Type Spiritual Support   Assessment/Intervention/Outcome   Assessment Unable to assess   Intervention Nurtured Hope;Sustaining Presence/Ministry of presence   Patient does not want do POA.

## 2022-07-01 NOTE — PROGRESS NOTES
Pt blood sugar was 65. MD notified. PRN orders place. Dextrose bolus 10% 123mL given. Blood sugar came up to 109. PRN dextrose 5% solution started.

## 2022-07-01 NOTE — PROGRESS NOTES
Pt awake in bed. Assessment completed and medications given. VS as charted. A&Ox4. Pt states she is nauseas requesting medication for relief. PRN Zofran given at this time. Call light in reach and bed in lowest position.

## 2022-07-02 LAB
ALBUMIN SERPL-MCNC: 2.5 G/DL (ref 3.4–5)
ANION GAP SERPL CALCULATED.3IONS-SCNC: 13 MMOL/L (ref 3–16)
ANISOCYTOSIS: ABNORMAL
ATYPICAL LYMPHOCYTE RELATIVE PERCENT: 1 % (ref 0–6)
BASOPHILS ABSOLUTE: 0 K/UL (ref 0–0.2)
BASOPHILS RELATIVE PERCENT: 0 %
BLOOD CULTURE, ROUTINE: NORMAL
BUN BLDV-MCNC: 54 MG/DL (ref 7–20)
CALCIUM SERPL-MCNC: 7.7 MG/DL (ref 8.3–10.6)
CHLORIDE BLD-SCNC: 106 MMOL/L (ref 99–110)
CO2: 24 MMOL/L (ref 21–32)
CREAT SERPL-MCNC: 3.1 MG/DL (ref 0.6–1.2)
CULTURE, BLOOD 2: NORMAL
EOSINOPHILS ABSOLUTE: 0 K/UL (ref 0–0.6)
EOSINOPHILS RELATIVE PERCENT: 0 %
GFR AFRICAN AMERICAN: 18
GFR NON-AFRICAN AMERICAN: 15
GLUCOSE BLD-MCNC: 108 MG/DL (ref 70–99)
GLUCOSE BLD-MCNC: 108 MG/DL (ref 70–99)
GLUCOSE BLD-MCNC: 140 MG/DL (ref 70–99)
GLUCOSE BLD-MCNC: 141 MG/DL (ref 70–99)
GLUCOSE BLD-MCNC: 149 MG/DL (ref 70–99)
GLUCOSE BLD-MCNC: 163 MG/DL (ref 70–99)
HCT VFR BLD CALC: 31.2 % (ref 36–48)
HEMOGLOBIN: 10.4 G/DL (ref 12–16)
LIPASE: 129 U/L (ref 13–60)
LYMPHOCYTES ABSOLUTE: 0.4 K/UL (ref 1–5.1)
LYMPHOCYTES RELATIVE PERCENT: 2 %
MCH RBC QN AUTO: 28.9 PG (ref 26–34)
MCHC RBC AUTO-ENTMCNC: 33.5 G/DL (ref 31–36)
MCV RBC AUTO: 86.5 FL (ref 80–100)
MONOCYTES ABSOLUTE: 0.6 K/UL (ref 0–1.3)
MONOCYTES RELATIVE PERCENT: 4 %
NEUTROPHILS ABSOLUTE: 13.2 K/UL (ref 1.7–7.7)
NEUTROPHILS RELATIVE PERCENT: 93 %
PDW BLD-RTO: 15 % (ref 12.4–15.4)
PERFORMED ON: ABNORMAL
PHOSPHORUS: 3 MG/DL (ref 2.5–4.9)
PLATELET # BLD: 227 K/UL (ref 135–450)
PLATELET SLIDE REVIEW: ADEQUATE
PMV BLD AUTO: 8.7 FL (ref 5–10.5)
POIKILOCYTES: ABNORMAL
POTASSIUM SERPL-SCNC: 3.1 MMOL/L (ref 3.5–5.1)
RBC # BLD: 3.61 M/UL (ref 4–5.2)
SLIDE REVIEW: ABNORMAL
SODIUM BLD-SCNC: 143 MMOL/L (ref 136–145)
WBC # BLD: 14.2 K/UL (ref 4–11)

## 2022-07-02 PROCEDURE — 2580000003 HC RX 258: Performed by: INTERNAL MEDICINE

## 2022-07-02 PROCEDURE — 2500000003 HC RX 250 WO HCPCS: Performed by: INTERNAL MEDICINE

## 2022-07-02 PROCEDURE — 94761 N-INVAS EAR/PLS OXIMETRY MLT: CPT

## 2022-07-02 PROCEDURE — 6360000002 HC RX W HCPCS: Performed by: NURSE PRACTITIONER

## 2022-07-02 PROCEDURE — 83690 ASSAY OF LIPASE: CPT

## 2022-07-02 PROCEDURE — C9113 INJ PANTOPRAZOLE SODIUM, VIA: HCPCS | Performed by: NURSE PRACTITIONER

## 2022-07-02 PROCEDURE — 6370000000 HC RX 637 (ALT 250 FOR IP): Performed by: INTERNAL MEDICINE

## 2022-07-02 PROCEDURE — 36415 COLL VENOUS BLD VENIPUNCTURE: CPT

## 2022-07-02 PROCEDURE — 6360000002 HC RX W HCPCS: Performed by: SURGERY

## 2022-07-02 PROCEDURE — 2580000003 HC RX 258: Performed by: NURSE PRACTITIONER

## 2022-07-02 PROCEDURE — 1200000000 HC SEMI PRIVATE

## 2022-07-02 PROCEDURE — 80069 RENAL FUNCTION PANEL: CPT

## 2022-07-02 PROCEDURE — 85025 COMPLETE CBC W/AUTO DIFF WBC: CPT

## 2022-07-02 PROCEDURE — 99233 SBSQ HOSP IP/OBS HIGH 50: CPT | Performed by: INTERNAL MEDICINE

## 2022-07-02 PROCEDURE — 99024 POSTOP FOLLOW-UP VISIT: CPT | Performed by: NURSE PRACTITIONER

## 2022-07-02 RX ORDER — SODIUM CHLORIDE 450 MG/100ML
INJECTION, SOLUTION INTRAVENOUS CONTINUOUS
Status: DISCONTINUED | OUTPATIENT
Start: 2022-07-02 | End: 2022-07-04

## 2022-07-02 RX ORDER — POTASSIUM CHLORIDE 20 MEQ/1
40 TABLET, EXTENDED RELEASE ORAL ONCE
Status: COMPLETED | OUTPATIENT
Start: 2022-07-02 | End: 2022-07-02

## 2022-07-02 RX ORDER — PANTOPRAZOLE SODIUM 40 MG/10ML
40 INJECTION, POWDER, LYOPHILIZED, FOR SOLUTION INTRAVENOUS 2 TIMES DAILY
Status: DISCONTINUED | OUTPATIENT
Start: 2022-07-02 | End: 2022-07-28 | Stop reason: HOSPADM

## 2022-07-02 RX ADMIN — HEPARIN SODIUM 5000 UNITS: 5000 INJECTION INTRAVENOUS; SUBCUTANEOUS at 06:11

## 2022-07-02 RX ADMIN — PIPERACILLIN AND TAZOBACTAM 3375 MG: 3; .375 INJECTION, POWDER, LYOPHILIZED, FOR SOLUTION INTRAVENOUS at 08:02

## 2022-07-02 RX ADMIN — SODIUM BICARBONATE: 84 INJECTION, SOLUTION INTRAVENOUS at 10:29

## 2022-07-02 RX ADMIN — PIPERACILLIN AND TAZOBACTAM 3375 MG: 3; .375 INJECTION, POWDER, LYOPHILIZED, FOR SOLUTION INTRAVENOUS at 21:36

## 2022-07-02 RX ADMIN — HEPARIN SODIUM 5000 UNITS: 5000 INJECTION INTRAVENOUS; SUBCUTANEOUS at 21:30

## 2022-07-02 RX ADMIN — HEPARIN SODIUM 5000 UNITS: 5000 INJECTION INTRAVENOUS; SUBCUTANEOUS at 13:22

## 2022-07-02 RX ADMIN — PANTOPRAZOLE SODIUM 40 MG: 40 INJECTION, POWDER, LYOPHILIZED, FOR SOLUTION INTRAVENOUS at 09:57

## 2022-07-02 RX ADMIN — SODIUM CHLORIDE, PRESERVATIVE FREE 10 ML: 5 INJECTION INTRAVENOUS at 21:32

## 2022-07-02 RX ADMIN — POTASSIUM CHLORIDE 40 MEQ: 1500 TABLET, EXTENDED RELEASE ORAL at 13:14

## 2022-07-02 RX ADMIN — MORPHINE SULFATE 2 MG: 2 INJECTION, SOLUTION INTRAMUSCULAR; INTRAVENOUS at 15:12

## 2022-07-02 RX ADMIN — MORPHINE SULFATE 1 MG: 2 INJECTION, SOLUTION INTRAMUSCULAR; INTRAVENOUS at 03:47

## 2022-07-02 RX ADMIN — DILTIAZEM HYDROCHLORIDE 120 MG: 120 CAPSULE, EXTENDED RELEASE ORAL at 07:39

## 2022-07-02 RX ADMIN — PANTOPRAZOLE SODIUM 40 MG: 40 INJECTION, POWDER, LYOPHILIZED, FOR SOLUTION INTRAVENOUS at 21:32

## 2022-07-02 RX ADMIN — SODIUM CHLORIDE: 4.5 INJECTION, SOLUTION INTRAVENOUS at 12:12

## 2022-07-02 RX ADMIN — MORPHINE SULFATE 2 MG: 2 INJECTION, SOLUTION INTRAMUSCULAR; INTRAVENOUS at 08:00

## 2022-07-02 ASSESSMENT — PAIN DESCRIPTION - LOCATION
LOCATION: ABDOMEN
LOCATION: ABDOMEN

## 2022-07-02 ASSESSMENT — PAIN DESCRIPTION - ONSET
ONSET: ON-GOING
ONSET: ON-GOING

## 2022-07-02 ASSESSMENT — PAIN DESCRIPTION - PAIN TYPE
TYPE: ACUTE PAIN
TYPE: ACUTE PAIN

## 2022-07-02 ASSESSMENT — PAIN DESCRIPTION - FREQUENCY
FREQUENCY: CONTINUOUS
FREQUENCY: CONTINUOUS

## 2022-07-02 ASSESSMENT — PAIN SCALES - GENERAL
PAINLEVEL_OUTOF10: 9
PAINLEVEL_OUTOF10: 10
PAINLEVEL_OUTOF10: 8
PAINLEVEL_OUTOF10: 4

## 2022-07-02 ASSESSMENT — PAIN - FUNCTIONAL ASSESSMENT
PAIN_FUNCTIONAL_ASSESSMENT: PREVENTS OR INTERFERES WITH ALL ACTIVE AND SOME PASSIVE ACTIVITIES
PAIN_FUNCTIONAL_ASSESSMENT: PREVENTS OR INTERFERES SOME ACTIVE ACTIVITIES AND ADLS

## 2022-07-02 ASSESSMENT — PAIN DESCRIPTION - ORIENTATION
ORIENTATION: RIGHT
ORIENTATION: MID

## 2022-07-02 ASSESSMENT — PAIN SCALES - WONG BAKER: WONGBAKER_NUMERICALRESPONSE: 0

## 2022-07-02 ASSESSMENT — PAIN DESCRIPTION - DESCRIPTORS
DESCRIPTORS: DISCOMFORT
DESCRIPTORS: CRAMPING

## 2022-07-02 NOTE — PROGRESS NOTES
Pt appears to be confused after pain medication. Calling out asking who brought her back into this room, how did they get her onto this bed, and where are the two girls.  Pt reoriented, however patient keeps saying \"makes no sense\"

## 2022-07-02 NOTE — FLOWSHEET NOTE
07/02/22 1042   Stool Assessment   Incontinence Yes   Stool Appearance Soft   Stool Color Brown   Stool Amount Large   Stool Source Rectum   Last BM (including prior to admit) 07/02/22   Patient had a large incontinent episode.

## 2022-07-02 NOTE — PROGRESS NOTES
[id number]  Nephrology follow-up note           Reason for Consult: DA   Requesting Physician:  Dr. Maisha Love history  The patient was seen and examined; she feels well today with no CP, SOB, nausea or vomiting. ROS: No fever or chills. Social: No family at bedside. Scheduled Meds:   pantoprazole  40 mg IntraVENous BID    dilTIAZem  120 mg Oral Daily    heparin (porcine)  5,000 Units SubCUTAneous 3 times per day    piperacillin-tazobactam  3,375 mg IntraVENous Q12H    sodium chloride flush  5-40 mL IntraVENous 2 times per day     Continuous Infusions:   dextrose Stopped (07/01/22 0411)    sodium bicarbonate infusion 100 mL/hr at 07/02/22 1029    sodium chloride 5 mL/hr at 07/01/22 0841     PRN Meds:.glucose, dextrose bolus **OR** dextrose bolus, glucagon (rDNA), dextrose, perflutren lipid microspheres, metoprolol, phenol, sodium chloride flush, sodium chloride, ondansetron **OR** ondansetron, morphine **OR** morphine, naloxone    History of Present Illness on 6/30/22:    74 y.o. yo female with PMH of colon cancer hypothyroidism, HLD, OA on mobic who is admitted for abd pain and bowel perf  Pt had been at [facility] from 6/23-24/22 for COVID pneumonia. Was treated with dex. Was also on meloxicam  Presented to ED on 6/27 for abd pain had CTA of chest/a/p and was discharged on naproxen  Came again on 6/28 and was noted to have bowel perf and underwent immediate surgery  Cr was 0.6 on 6/27, was 1.2 on 6/28 which increased to 2.4 on 6/29 and 3.9 on 6/30 when nephrology has been consulted, pt has been getting zosyn after surgery  Her BP dropped in 6/28 to 90s few times.  On 6/30 has had afib rvr    Physical exam:   Constitutional:  VITALS:  BP (!) 187/83   Pulse 71   Temp 98.5 °F (36.9 °C) (Oral)   Resp 16   Ht 5' 4\" (1.626 m)   Wt 158 lb (71.7 kg)   SpO2 95%   BMI 27.12 kg/m²      Gen: alert, awake  Neck: No JVD  Skin: Unremarkable  Cardiovascular:  S1, S2 without m/r/g   Respiratory: diminished  Abdomen:  soft, nt, nd,   Extremities: no lower extremity edema  Neuro/Psy: AAoriented times 3 ; moves all 4 ext    Data/  Recent Labs     06/30/22  0507 07/01/22  0650 07/02/22  0435   WBC 13.3* 15.9* 14.2*   HGB 11.4* 10.8* 10.4*   HCT 34.5* 31.9* 31.2*   MCV 88.9 86.6 86.5    268 227     Recent Labs     06/30/22  0507 07/01/22  0650 07/02/22  0435    145 143   K 3.7 3.1* 3.1*   * 111* 106   CO2 13* 19* 24   GLUCOSE 76 96 149*   PHOS 5.0* 4.6 3.0   MG 1.90 2.00  --    BUN 60* 63* 54*   CREATININE 3.9* 3.5* 3.1*   LABGLOM 11* 13* 15*   GFRAA 14* 15* 18*     CTA chest/a/p on 6/27/22  Impression   Chest-       1. No pulmonary embolus. 2. Multifocal ground-glass opacities, primarily in the mid and upper lungs. Atypical pneumonia, including COVID etiology is considered.  Other causes of   pneumonia and pneumonitis, such as hypersensitivity, are also possible. ---       Abdomen and pelvis-       1. Mural thickening of the gastric antrum.  Gastritis is considered. 2. Right inguinal hernia with small bowel content; no incarceration. 3. 2.8 cm aneurysm of the infrarenal abdominal aorta.  5 year follow-up   imaging is recommended. 4. Nonobstructive nephrolithiasis     CT a/p wo contrast 6/28/22  Impression   1. New large perforation involving the posterior gastric antrum likely from   ulcerative disease causing a moderate amount of pneumoperitoneum and ascites   throughout the abdomen and pelvis. 2. New trace left pleural effusion. 3. Residual contrast within the bilateral kidneys can be seen in setting of   renal failure. UA w 6-10 granular casts, 100 protein, RBC>100; WBCs 6-10  Urine Na 62 cr 79    Assessment  -DA likely has developed ATN w hypovolemia, hypotension, NSAIDs use and contrast use.      -Perforated prepyloric ulcer s/p laparotomy with repair n jejunostomy insertion on 6/28/22   On zosyn     -Metabolic acidosis from DA     -Hypertension    -afib rvr per

## 2022-07-02 NOTE — PROGRESS NOTES
General Surgery - January Arellano, APRN - CNP, CNP  Daily Progress Note    Pt Name: Kishan Mcqueen Record Number: [id number]  Date of Birth 1950   Today's Date: 7/2/2022    ASSESSMENT  1. POD #4 s/p repair of perf ulcer, j-tube insertion  2. ABD: J-tube in place, JERRI drain in place, staples look good and left open to air, + diffuse tenderness which appears unchanged from yesterday, mild distention, NGT in place, no N/V, +flatus, + BM    3. ARF improving: Cr 1.2->2.4->3.9->3.5->3.1: IV contrast 6/27 and 6/22, vomiting and poor PO intake. 4. Leuks 13.3->15.9->14.2  5. Lipase 123->129 (pancrease was secondarily inflamed on ER CT  6. VSS  7. Right arm Bicarb infiltration: appears improved today  8. NGT: was pulled out by patient and put back in, no output per pt   9. F/C 1.2  10. JERRI 30/65 ml out : old bloody/thick drainage  11. Pt states \"I got sick after that food yesterday. \"    PLAN  1. NGT to CLWS  2. Renal seeing   3. Strict I&Os: continue waddell for now. 4. Pain control   5. PT/OT: Sit on edge of bed/up to chair today/ambulate  6. Protonix BID  7. IVF per renal  8. Labs in the am.   9. Start Trophic TF via J-tube: via J-tube discussed with RRN  10. Pt looks OK POD #4: Pt looks OK, start J-tube feeds. Den Aguillon is unchanged from yesterday. Pain is well controlled. She has no nausea and no vomiting. She has passed flatus and has had a bowel movement. She is NPO. Current activity is up with assistance    OBJECTIVE  VITALS:  height is 5' 4\" (1.626 m) and weight is 158 lb (71.7 kg). Her oral temperature is 98.5 °F (36.9 °C). Her blood pressure is 187/83 (abnormal) and her pulse is 71. Her respiration is 16 and oxygen saturation is 95%.    VITALS:  BP (!) 187/83   Pulse 71   Temp 98.5 °F (36.9 °C) (Oral)   Resp 16   Ht 5' 4\" (1.626 m)   Wt 158 lb (71.7 kg)   SpO2 95%   BMI 27.12 kg/m²   INTAKE/OUTPUT:      Intake/Output Summary (Last 24 hours) at 7/2/2022 0845  Last data filed at 7/2/2022 0752  Gross per 24 hour   Intake 0 ml   Output 1315 ml   Net -1315 ml     URINARY CATHETER OUTPUT (Curiel):     GENERAL: alert, cooperative, no distress  I/O last 3 completed shifts: In: 10 [I.V.:10]  Out: 0009 [Urine:1625; Drains:95]  No intake/output data recorded. LABS  Recent Labs     06/30/22  0507 06/30/22  1311 07/01/22  0650 07/01/22  0650 07/02/22  0435   WBC   < >  --  15.9*   < > 14.2*   HGB   < >  --  10.8*   < > 10.4*   HCT   < >  --  31.9*   < > 31.2*   PLT   < >  --  268   < > 227   NA   < >  --  145   < > 143   K   < >  --  3.1*   < > 3.1*   CL   < >  --  111*   < > 106   CO2   < >  --  19*   < > 24   BUN   < >  --  63*   < > 54*   CREATININE   < >  --  3.5*   < > 3.1*   MG   < >  --  2.00  --   --    PHOS   < >  --  4.6   < > 3.0   CALCIUM   < >  --  8.0*   < > 7.7*   NITRU  --  Negative  --   --   --    COLORU  --  Yellow  --   --   --    BACTERIA  --  1+*  --   --   --     < > = values in this interval not displayed.      CBC with Differential:    Lab Results   Component Value Date/Time    WBC 14.2 07/02/2022 04:35 AM    RBC 3.61 07/02/2022 04:35 AM    RBC 4.41 08/02/2016 10:34 AM    HGB 10.4 07/02/2022 04:35 AM    HCT 31.2 07/02/2022 04:35 AM     07/02/2022 04:35 AM    MCV 86.5 07/02/2022 04:35 AM    MCH 28.9 07/02/2022 04:35 AM    MCHC 33.5 07/02/2022 04:35 AM    RDW 15.0 07/02/2022 04:35 AM    BANDSPCT 3 06/30/2022 05:07 AM    LYMPHOPCT 2.0 07/02/2022 04:35 AM    LYMPHOPCT 25.3 08/02/2016 10:34 AM    MONOPCT 4.0 07/02/2022 04:35 AM    BASOPCT 0.0 07/02/2022 04:35 AM    MONOSABS 0.6 07/02/2022 04:35 AM    LYMPHSABS 0.4 07/02/2022 04:35 AM    EOSABS 0.0 07/02/2022 04:35 AM    BASOSABS 0.0 07/02/2022 04:35 AM     CMP:    Lab Results   Component Value Date/Time     07/02/2022 04:35 AM    K 3.1 07/02/2022 04:35 AM    K 3.8 06/29/2022 05:19 AM     07/02/2022 04:35 AM    CO2 24 07/02/2022 04:35 AM    BUN 54 07/02/2022 04:35 AM    CREATININE 3.1 07/02/2022 04:35 AM GFRAA 18 07/02/2022 04:35 AM    AGRATIO 0.8 06/29/2022 05:19 AM    LABGLOM 15 07/02/2022 04:35 AM    GLUCOSE 149 07/02/2022 04:35 AM    GLUCOSE 85 08/02/2016 10:34 AM    PROT 4.7 06/29/2022 05:19 AM    PROT 5.7 08/02/2016 10:34 AM    LABALBU 2.5 07/02/2022 04:35 AM    CALCIUM 7.7 07/02/2022 04:35 AM    BILITOT 0.6 06/29/2022 05:19 AM    ALKPHOS 84 06/29/2022 05:19 AM    AST 49 06/29/2022 05:19 AM    ALT 48 06/29/2022 05:19 AM         ILAN Gonzalez CNP  Electronically signed 7/2/2022 at 8:45 AM

## 2022-07-02 NOTE — FLOWSHEET NOTE
07/02/22 1312   Vital Signs   Temp 97.8 °F (36.6 °C)   Temp Source Oral   Heart Rate 68   Heart Rate Source Monitor   Resp 14   BP (!) 152/76   BP Location Left upper arm   MAP (Calculated) 101.33   Patient Position Semi fowlers   Level of Consciousness Alert (0)   MEWS Score 0   Pain Assessment   Pain Assessment 0-10   Oxygen Therapy   SpO2 97 %   Pulse Oximetry Type Intermittent   Pulse Oximeter Device Mode Intermittent   Pulse Oximeter Device Location Finger   O2 Device None (Room air)   Patient is resting comfortably. Potassium being replaced at this time. BP has since improved. Patient tolerating TF. Denies any needs.

## 2022-07-02 NOTE — PROGRESS NOTES
Perfect serve to Dr. Theodore Santiago. Aware of patient becoming nauseas within 1 hour of starting tube feed. Okay to place NG back to wall suction.

## 2022-07-02 NOTE — PROGRESS NOTES
Patient assisted X2 up to the chair. RN medicated with morphine. /83 . RN spoke with Star Smith NP. PT/OT consulted.

## 2022-07-02 NOTE — FLOWSHEET NOTE
07/02/22 0733   Vital Signs   Temp 98.5 °F (36.9 °C)   Temp Source Oral   Heart Rate 71   Heart Rate Source Monitor   Resp 16   BP (!) 187/83   BP Location Left upper arm   MAP (Calculated) 117.67   Patient Position Semi fowlers   Level of Consciousness Alert (0)   MEWS Score 1   Pain Assessment   Pain Assessment None - Denies Pain   Oxygen Therapy   SpO2 95 %   Pulse Oximetry Type Intermittent   Pulse Oximeter Device Mode Intermittent   Pulse Oximeter Device Location Finger   O2 Device None (Room air)   Patient is resting showing no s/s of distress. Patient is alert and oriented. Meds were given, see MAR. Patient is denying any needs. Bed is in lowest position and call light is within reach. Will continue to monitor. Shift assessment complete, see flowsheets. TF started through J-tube at 10ml/hr with q6 water flushes. PRN morphine administered a this time.

## 2022-07-03 LAB
ALBUMIN SERPL-MCNC: 2.2 G/DL (ref 3.4–5)
ANION GAP SERPL CALCULATED.3IONS-SCNC: 12 MMOL/L (ref 3–16)
BUN BLDV-MCNC: 46 MG/DL (ref 7–20)
CALCIUM SERPL-MCNC: 7.7 MG/DL (ref 8.3–10.6)
CHLORIDE BLD-SCNC: 107 MMOL/L (ref 99–110)
CO2: 24 MMOL/L (ref 21–32)
CREAT SERPL-MCNC: 2.3 MG/DL (ref 0.6–1.2)
GFR AFRICAN AMERICAN: 25
GFR NON-AFRICAN AMERICAN: 21
GLUCOSE BLD-MCNC: 102 MG/DL (ref 70–99)
GLUCOSE BLD-MCNC: 103 MG/DL (ref 70–99)
GLUCOSE BLD-MCNC: 103 MG/DL (ref 70–99)
GLUCOSE BLD-MCNC: 107 MG/DL (ref 70–99)
GLUCOSE BLD-MCNC: 86 MG/DL (ref 70–99)
GLUCOSE BLD-MCNC: 94 MG/DL (ref 70–99)
GLUCOSE BLD-MCNC: 98 MG/DL (ref 70–99)
PERFORMED ON: ABNORMAL
PERFORMED ON: NORMAL
PERFORMED ON: NORMAL
PHOSPHORUS: 3.1 MG/DL (ref 2.5–4.9)
POTASSIUM SERPL-SCNC: 3.1 MMOL/L (ref 3.5–5.1)
SODIUM BLD-SCNC: 143 MMOL/L (ref 136–145)

## 2022-07-03 PROCEDURE — 97530 THERAPEUTIC ACTIVITIES: CPT

## 2022-07-03 PROCEDURE — 6360000002 HC RX W HCPCS: Performed by: SURGERY

## 2022-07-03 PROCEDURE — 97162 PT EVAL MOD COMPLEX 30 MIN: CPT

## 2022-07-03 PROCEDURE — 1200000000 HC SEMI PRIVATE

## 2022-07-03 PROCEDURE — C9113 INJ PANTOPRAZOLE SODIUM, VIA: HCPCS | Performed by: NURSE PRACTITIONER

## 2022-07-03 PROCEDURE — 6370000000 HC RX 637 (ALT 250 FOR IP): Performed by: INTERNAL MEDICINE

## 2022-07-03 PROCEDURE — 99024 POSTOP FOLLOW-UP VISIT: CPT | Performed by: NURSE PRACTITIONER

## 2022-07-03 PROCEDURE — 80069 RENAL FUNCTION PANEL: CPT

## 2022-07-03 PROCEDURE — 99024 POSTOP FOLLOW-UP VISIT: CPT | Performed by: SURGERY

## 2022-07-03 PROCEDURE — 6360000002 HC RX W HCPCS: Performed by: NURSE PRACTITIONER

## 2022-07-03 PROCEDURE — 36415 COLL VENOUS BLD VENIPUNCTURE: CPT

## 2022-07-03 PROCEDURE — 2580000003 HC RX 258: Performed by: INTERNAL MEDICINE

## 2022-07-03 PROCEDURE — 97110 THERAPEUTIC EXERCISES: CPT

## 2022-07-03 PROCEDURE — 99233 SBSQ HOSP IP/OBS HIGH 50: CPT | Performed by: INTERNAL MEDICINE

## 2022-07-03 PROCEDURE — 2580000003 HC RX 258: Performed by: NURSE PRACTITIONER

## 2022-07-03 RX ORDER — POTASSIUM CHLORIDE 7.45 MG/ML
10 INJECTION INTRAVENOUS
Status: DISCONTINUED | OUTPATIENT
Start: 2022-07-03 | End: 2022-07-03

## 2022-07-03 RX ORDER — DILTIAZEM HYDROCHLORIDE 180 MG/1
180 CAPSULE, COATED, EXTENDED RELEASE ORAL DAILY
Status: DISCONTINUED | OUTPATIENT
Start: 2022-07-03 | End: 2022-07-10

## 2022-07-03 RX ORDER — POTASSIUM CHLORIDE 750 MG/1
40 TABLET, EXTENDED RELEASE ORAL ONCE
Status: DISCONTINUED | OUTPATIENT
Start: 2022-07-03 | End: 2022-07-04

## 2022-07-03 RX ADMIN — DILTIAZEM HYDROCHLORIDE 180 MG: 180 CAPSULE, COATED, EXTENDED RELEASE ORAL at 09:32

## 2022-07-03 RX ADMIN — MORPHINE SULFATE 2 MG: 2 INJECTION, SOLUTION INTRAMUSCULAR; INTRAVENOUS at 19:49

## 2022-07-03 RX ADMIN — MORPHINE SULFATE 2 MG: 2 INJECTION, SOLUTION INTRAMUSCULAR; INTRAVENOUS at 04:03

## 2022-07-03 RX ADMIN — HEPARIN SODIUM 5000 UNITS: 5000 INJECTION INTRAVENOUS; SUBCUTANEOUS at 22:07

## 2022-07-03 RX ADMIN — SODIUM CHLORIDE: 4.5 INJECTION, SOLUTION INTRAVENOUS at 19:45

## 2022-07-03 RX ADMIN — SODIUM CHLORIDE: 4.5 INJECTION, SOLUTION INTRAVENOUS at 02:51

## 2022-07-03 RX ADMIN — PIPERACILLIN AND TAZOBACTAM 3375 MG: 3; .375 INJECTION, POWDER, LYOPHILIZED, FOR SOLUTION INTRAVENOUS at 09:33

## 2022-07-03 RX ADMIN — PIPERACILLIN AND TAZOBACTAM 3375 MG: 3; .375 INJECTION, POWDER, LYOPHILIZED, FOR SOLUTION INTRAVENOUS at 22:06

## 2022-07-03 RX ADMIN — HEPARIN SODIUM 5000 UNITS: 5000 INJECTION INTRAVENOUS; SUBCUTANEOUS at 15:18

## 2022-07-03 RX ADMIN — POTASSIUM BICARBONATE 40 MEQ: 391 TABLET, EFFERVESCENT ORAL at 16:22

## 2022-07-03 RX ADMIN — PANTOPRAZOLE SODIUM 40 MG: 40 INJECTION, POWDER, LYOPHILIZED, FOR SOLUTION INTRAVENOUS at 19:49

## 2022-07-03 RX ADMIN — HEPARIN SODIUM 5000 UNITS: 5000 INJECTION INTRAVENOUS; SUBCUTANEOUS at 05:42

## 2022-07-03 RX ADMIN — MORPHINE SULFATE 2 MG: 2 INJECTION, SOLUTION INTRAMUSCULAR; INTRAVENOUS at 12:22

## 2022-07-03 RX ADMIN — PANTOPRAZOLE SODIUM 40 MG: 40 INJECTION, POWDER, LYOPHILIZED, FOR SOLUTION INTRAVENOUS at 09:32

## 2022-07-03 ASSESSMENT — PAIN DESCRIPTION - FREQUENCY: FREQUENCY: CONTINUOUS

## 2022-07-03 ASSESSMENT — PAIN SCALES - GENERAL
PAINLEVEL_OUTOF10: 7
PAINLEVEL_OUTOF10: 10
PAINLEVEL_OUTOF10: 8
PAINLEVEL_OUTOF10: 0

## 2022-07-03 ASSESSMENT — PAIN DESCRIPTION - DESCRIPTORS
DESCRIPTORS: TENDER
DESCRIPTORS: DISCOMFORT

## 2022-07-03 ASSESSMENT — PAIN SCALES - WONG BAKER: WONGBAKER_NUMERICALRESPONSE: 0

## 2022-07-03 ASSESSMENT — PAIN DESCRIPTION - LOCATION: LOCATION: ABDOMEN

## 2022-07-03 ASSESSMENT — PAIN DESCRIPTION - ONSET: ONSET: ON-GOING

## 2022-07-03 ASSESSMENT — PAIN - FUNCTIONAL ASSESSMENT: PAIN_FUNCTIONAL_ASSESSMENT: PREVENTS OR INTERFERES SOME ACTIVE ACTIVITIES AND ADLS

## 2022-07-03 ASSESSMENT — PAIN DESCRIPTION - PAIN TYPE: TYPE: ACUTE PAIN

## 2022-07-03 NOTE — FLOWSHEET NOTE
07/03/22 1221   Vital Signs   BP (!) 156/71   BP Location Left upper arm   MAP (Calculated) 99.33   Pain Assessment   Pain Assessment 0-10   Pain Level 8   Gonsalez-Baker Pain Rating 0   Patient's Stated Pain Goal 0 - No pain   Pain Descriptors Discomfort   Functional Pain Assessment Prevents or interferes some active activities and ADLs   Pain Type Acute pain   Pain Frequency Continuous   Pain Onset On-going   Non-Pharmaceutical Pain Intervention(s) Declines   PRN morphine administered at this time.  PT at bedside/

## 2022-07-03 NOTE — FLOWSHEET NOTE
07/02/22 2051   Vital Signs   Temp 98.3 °F (36.8 °C)   Temp Source Axillary   Heart Rate 73   Heart Rate Source Monitor   Resp 18   BP (!) 177/90   BP Location Right lower arm   BP Method Automatic   MAP (Calculated) 119   Patient Position Sitting   Level of Consciousness Alert (0)   MEWS Score 1   Oxygen Therapy   SpO2 91 %   O2 Device None (Room air)     Shift assessment completed see flow sheet. Patient in brd alert and oriented X4. Patient on room air, showing no signs of distress. Evening medications given per order. No other needs at this time. Call light in reach.

## 2022-07-03 NOTE — FLOWSHEET NOTE
07/03/22 1518   Vital Signs   Temp 97.8 °F (36.6 °C)   Temp Source Oral   Heart Rate 77   Heart Rate Source Monitor   Resp 18   /76   BP Location Left upper arm   MAP (Calculated) 96   Patient Position Semi fowlers   Level of Consciousness Alert (0)   MEWS Score 1   Oxygen Therapy   SpO2 96 %   Pulse Oximetry Type Intermittent   Pulse Oximeter Device Mode Intermittent   Pulse Oximeter Device Location Finger   O2 Device None (Room air)   Patient resting.  at bedside. Patient not able to tolerate swallowing pills, page out to nephro at this time for a new order. 1526: New order from .

## 2022-07-03 NOTE — FLOWSHEET NOTE
07/03/22 0932   Vital Signs   Temp 98.4 °F (36.9 °C)   Temp Source Oral   Heart Rate 82   Heart Rate Source Monitor   Resp 16   BP (!) 166/71   BP Location Left upper arm   MAP (Calculated) 102.67   Patient Position High fowlers   Level of Consciousness Alert (0)   MEWS Score 1   Pain Assessment   Pain Assessment 0-10   Pain Level 0   Oxygen Therapy   SpO2 94 %   Pulse Oximetry Type Intermittent   Pulse Oximeter Device Mode Intermittent   Pulse Oximeter Device Location Finger   O2 Device None (Room air)   Patient is resting showing no s/s of distress. Patient is alert and oriented. Meds were given, see MAR. Patient is denying any needs. Bed is in lowest position and call light is within reach. Will continue to monitor. Shift assessment complete, see flowsheets. Patient appears more lethargic today. 1/2 NS gtt continues. NG to suction. TF continues  RN will replace potassium according to nephro.

## 2022-07-03 NOTE — PROGRESS NOTES
[email or web address]  Nephrology follow-up note           Reason for Consult: DA   Requesting Physician:  Dr. Jaelyn Guevara history  The patient was seen and examined; she feels well today with no CP, SOB, nausea or vomiting. ROS: No fever or chills. Social: Family at bedside. Scheduled Meds:   dilTIAZem  180 mg Oral Daily    pantoprazole  40 mg IntraVENous BID    heparin (porcine)  5,000 Units SubCUTAneous 3 times per day    piperacillin-tazobactam  3,375 mg IntraVENous Q12H    sodium chloride flush  5-40 mL IntraVENous 2 times per day     Continuous Infusions:   sodium chloride 75 mL/hr at 07/03/22 0251    dextrose Stopped (07/01/22 0411)    sodium chloride 5 mL/hr at 07/01/22 0841     PRN Meds:.glucose, dextrose bolus **OR** dextrose bolus, glucagon (rDNA), dextrose, perflutren lipid microspheres, metoprolol, phenol, sodium chloride flush, sodium chloride, ondansetron **OR** ondansetron, morphine **OR** morphine, naloxone    History of Present Illness on 6/30/22:    74 y.o. yo female with PMH of colon cancer hypothyroidism, HLD, OA on mobic who is admitted for abd pain and bowel perf  Pt had been at [facility] from 6/23-24/22 for COVID pneumonia. Was treated with dex. Was also on meloxicam  Presented to ED on 6/27 for abd pain had CTA of chest/a/p and was discharged on naproxen  Came again on 6/28 and was noted to have bowel perf and underwent immediate surgery  Cr was 0.6 on 6/27, was 1.2 on 6/28 which increased to 2.4 on 6/29 and 3.9 on 6/30 when nephrology has been consulted, pt has been getting zosyn after surgery  Her BP dropped in 6/28 to 90s few times.  On 6/30 has had afib rvr    Physical exam:   Constitutional:  VITALS:  BP (!) 156/71   Pulse 82   Temp 98.4 °F (36.9 °C) (Oral)   Resp 16   Ht 5' 4\" (1.626 m)   Wt 161 lb 12.8 oz (73.4 kg)   SpO2 94%   BMI 27.77 kg/m²      Gen: alert, awake  Neck: No JVD  Skin: Unremarkable  Cardiovascular:  S1, S2 without m/r/g   Respiratory: diminished  Abdomen:  soft, nt, nd,   Extremities: no lower extremity edema  Neuro/Psy: AAoriented times 3 ; moves all 4 ext    Data/  Recent Labs     07/01/22  0650 07/02/22  0435   WBC 15.9* 14.2*   HGB 10.8* 10.4*   HCT 31.9* 31.2*   MCV 86.6 86.5    227     Recent Labs     07/01/22  0650 07/02/22  0435 07/03/22  0433    143 143   K 3.1* 3.1* 3.1*   * 106 107   CO2 19* 24 24   GLUCOSE 96 149* 94   PHOS 4.6 3.0 3.1   MG 2.00  --   --    BUN 63* 54* 46*   CREATININE 3.5* 3.1* 2.3*   LABGLOM 13* 15* 21*   GFRAA 15* 18* 25*     CTA chest/a/p on 6/27/22  Impression   Chest-       1. No pulmonary embolus. 2. Multifocal ground-glass opacities, primarily in the mid and upper lungs. Atypical pneumonia, including COVID etiology is considered.  Other causes of   pneumonia and pneumonitis, such as hypersensitivity, are also possible. ---       Abdomen and pelvis-       1. Mural thickening of the gastric antrum.  Gastritis is considered. 2. Right inguinal hernia with small bowel content; no incarceration. 3. 2.8 cm aneurysm of the infrarenal abdominal aorta.  5 year follow-up   imaging is recommended. 4. Nonobstructive nephrolithiasis     CT a/p wo contrast 6/28/22  Impression   1. New large perforation involving the posterior gastric antrum likely from   ulcerative disease causing a moderate amount of pneumoperitoneum and ascites   throughout the abdomen and pelvis. 2. New trace left pleural effusion. 3. Residual contrast within the bilateral kidneys can be seen in setting of   renal failure. UA w 6-10 granular casts, 100 protein, RBC>100; WBCs 6-10  Urine Na 62 cr 79    Assessment  -DA likely has developed ATN w hypovolemia, hypotension, NSAIDs use and contrast use.      -Perforated prepyloric ulcer s/p laparotomy with repair n jejunostomy insertion on 6/28/22   On zosyn     -Metabolic acidosis from DA     -Hypertension    -afib rvr per cardiology    -recent COVID from 6/22-24/22    -h/o colon cancer    Plan  -Continue volume expansion  -PRN potassium replacement  -Renal dose meds and avoid nephrotoxins      Jose Tamayo MD  Office: [phone]  Fax:    [phone] [facility]

## 2022-07-03 NOTE — FLOWSHEET NOTE
07/03/22 0230   Vital Signs   Temp 97 °F (36.1 °C)   Temp Source Axillary   Heart Rate 79   Heart Rate Source Monitor   Resp 16   BP (!) 159/82   BP Location Left upper arm   BP Method Automatic   MAP (Calculated) 107.67   Patient Position Semi fowlers   Level of Consciousness Alert (0)   MEWS Score 1   Oxygen Therapy   SpO2 90 %   O2 Device None (Room air)   Height and Weight   Weight 161 lb 12.8 oz (73.4 kg)   Weight Method Actual;Bed scale   BMI (Calculated) 27.8     Vitals taken, shown above. Patient is stable with no current needs at this time. Call light in reach. Bed in lowest position.

## 2022-07-03 NOTE — PROGRESS NOTES
General Surgery - January Bishop Mt, APRN - CNP, CNP  Daily Progress Note    Pt Name: Kishan Mcqueen Record Number: [id number]  Date of Birth 1950   Today's Date: 7/3/2022    ASSESSMENT  1. POD #5 s/p repair of perf ulcer, j-tube insertion  2. ABD: J-tube in place, JERRI drain in place, staples look good and left open to air, + diffuse tenderness which appears unchanged from yesterday, mild distention, NGT in place, no N/V, +flatus, + BMs    3. ARF improving: Cr 2.3: IV contrast 6/27 and 6/22, vomiting and poor PO intake. 4. K+ 3.1  5. Leuks 13.3->15.9->14.2  6. Lipase 123->129 (pancreas was secondarily inflamed on ER CT  7. VSS  8. Right arm Bicarb infiltration: appears much improved  9. NGT: 600 ml out  10. F/C 1 L out  11. JERRI 10 ml out : old bloody drainage  12. Pt states \"I am so tired today. \"    PLAN  1. NGT to CLWS  2. Renal seeing   3. Strict I&Os: continue waddell for now. 4. Pain control   5. Zosyn  6. PT/OT: Sit on edge of bed/up to chair today/ambulate  7. Protonix BID  8. IVF per renal  9. Labs in the am.   10. Advance TF via J-tube to 20 ml/hr  11. Pt looks OK POD #5: Pt looks OK advance TF to 20 ml/hr. May plan on UGI on Tuesday to evaluate for contrast extravasation. Remus Kinsey is unchanged from yesterday. Pain is well controlled. She has no nausea and no vomiting. She has passed flatus and has had a bowel movement. She is NPO. Current activity is up with assistance    OBJECTIVE  VITALS:  height is 5' 4\" (1.626 m) and weight is 161 lb 12.8 oz (73.4 kg). Her oral temperature is 98.4 °F (36.9 °C). Her blood pressure is 166/71 (abnormal) and her pulse is 82. Her respiration is 16 and oxygen saturation is 94%.    VITALS:  BP (!) 166/71   Pulse 82   Temp 98.4 °F (36.9 °C) (Oral)   Resp 16   Ht 5' 4\" (1.626 m)   Wt 161 lb 12.8 oz (73.4 kg)   SpO2 94%   BMI 27.77 kg/m²   INTAKE/OUTPUT:      Intake/Output Summary (Last 24 hours) at 7/3/2022 2479  Last data filed at 7/3/2022 0854  Gross per 24 hour   Intake 209 ml   Output 1660 ml   Net -1451 ml     URINARY CATHETER OUTPUT (Curiel):     GENERAL: alert, cooperative, no distress  I/O last 3 completed shifts: In: 209 [NG/GT:209]  Out: 2325 [Urine:1650; Emesis/NG output:600; Drains:75]  No intake/output data recorded. LABS  Recent Labs     06/30/22  1311 07/01/22  0650 07/01/22  0650 07/02/22  0435 07/02/22  0435 07/03/22  0433   WBC  --  15.9*   < > 14.2*  --   --    HGB  --  10.8*   < > 10.4*  --   --    HCT  --  31.9*   < > 31.2*  --   --    PLT  --  268   < > 227  --   --    NA  --  145   < > 143   < > 143   K  --  3.1*   < > 3.1*   < > 3.1*   CL  --  111*   < > 106   < > 107   CO2  --  19*   < > 24   < > 24   BUN  --  63*   < > 54*   < > 46*   CREATININE  --  3.5*   < > 3.1*   < > 2.3*   MG  --  2.00  --   --   --   --    PHOS  --  4.6   < > 3.0   < > 3.1   CALCIUM  --  8.0*   < > 7.7*   < > 7.7*   NITRU Negative  --   --   --   --   --    COLORU Yellow  --   --   --   --   --    BACTERIA 1+*  --   --   --   --   --     < > = values in this interval not displayed.      CBC with Differential:    Lab Results   Component Value Date/Time    WBC 14.2 07/02/2022 04:35 AM    RBC 3.61 07/02/2022 04:35 AM    RBC 4.41 08/02/2016 10:34 AM    HGB 10.4 07/02/2022 04:35 AM    HCT 31.2 07/02/2022 04:35 AM     07/02/2022 04:35 AM    MCV 86.5 07/02/2022 04:35 AM    MCH 28.9 07/02/2022 04:35 AM    MCHC 33.5 07/02/2022 04:35 AM    RDW 15.0 07/02/2022 04:35 AM    BANDSPCT 3 06/30/2022 05:07 AM    LYMPHOPCT 2.0 07/02/2022 04:35 AM    LYMPHOPCT 25.3 08/02/2016 10:34 AM    MONOPCT 4.0 07/02/2022 04:35 AM    BASOPCT 0.0 07/02/2022 04:35 AM    MONOSABS 0.6 07/02/2022 04:35 AM    LYMPHSABS 0.4 07/02/2022 04:35 AM    EOSABS 0.0 07/02/2022 04:35 AM    BASOSABS 0.0 07/02/2022 04:35 AM     CMP:    Lab Results   Component Value Date/Time     07/03/2022 04:33 AM    K 3.1 07/03/2022 04:33 AM    K 3.8 06/29/2022 05:19 AM     07/03/2022 04:33 AM    CO2 24 07/03/2022 04:33 AM    BUN 46 07/03/2022 04:33 AM    CREATININE 2.3 07/03/2022 04:33 AM    GFRAA 25 07/03/2022 04:33 AM    AGRATIO 0.8 06/29/2022 05:19 AM    LABGLOM 21 07/03/2022 04:33 AM    GLUCOSE 94 07/03/2022 04:33 AM    GLUCOSE 85 08/02/2016 10:34 AM    PROT 4.7 06/29/2022 05:19 AM    PROT 5.7 08/02/2016 10:34 AM    LABALBU 2.2 07/03/2022 04:33 AM    CALCIUM 7.7 07/03/2022 04:33 AM    BILITOT 0.6 06/29/2022 05:19 AM    ALKPHOS 84 06/29/2022 05:19 AM    AST 49 06/29/2022 05:19 AM    ALT 48 06/29/2022 05:19 AM         ILAN Bridges CNP  Electronically signed 7/3/2022 at 9:53 AM

## 2022-07-03 NOTE — PROGRESS NOTES
get into kitchen for simple things if needed)  Pt. independent for transfers and gait and walked with No Device  History of falls No    Pain   Yes  Location: abdomen  Ratin /10  Pain Medicine Status: RN notified , came to room to administer meds before mobility. Cognition    A&O x4   Able to follow 2 step commands    Subjective  Patient lying supine in bed with daughter and KARISSA at bedside. Pt agreeable to this PT eval & tx. Upper Extremity ROM/Strength  Please see OT evaluation. BUEs swollen, very limited AROM all joints 2/2 swelling and fatigue. Lower Extremity ROM / Strength   AROM WFL: No, limited by fatigue at all joints, all directions in supine. Able to move [facility] with AAROM. BLE strength impaired, but not formally assessed with MMT. Assessed in supine at ~3/5 at ankles and ~2+/5 at knees and hips. Arthritic pain in hips (L>R) with PROM. Lower Extremity Sensation    NT    Lower Extremity Proprioception:   NT    Coordination and Tone  NT    Balance  Sitting:  Not tested; N/A  Comments: pt refused sitting up to EOB 2/2 pain. Per RN, yesterday pt transferred bed<>chair with HHA , ~CGA/Min A level. Standing: Not tested; N/A  Comments: pt refused. Bed Mobility   Supine to Sit:    Not Tested  Sit to Supine:   Not Tested  Rolling: Max A to left to complete pericare and bedpad/brief change. Scooting in sitting: Not Tested  Scooting in supine: Total A with use of [employer] bed lift. Transfer Training  Pt refused     Gait pt declined to ambulate; pt ambulated 0 ft. Stair Training deferred, pt unsafe/ not appropriate to complete stairs at this time    Activity Tolerance   Pt completed therapy session with Pain noted with rolling for cleanup of BM. Also with joint pain during light PROM/AAROM LEs and UEs.   Supine before activity:   EW=555/77  HR=80's  SpO2=94% on 2L O2    Positioning Needs   Pt up in chair, RN cleared no chair alarm, positioned in proper neutral alignment and pressure relief provided. Call light provided and all needs within reach    Exercises Initiated  all completed bilaterally unless indicated and completed in supine position  Ankle Pumps x 10 reps  Quad sets x 3 reps  Hip abduction: x 3 reps   Max encouragement (VC/TC) needed for repetitions. Limited by fatigue, pain, and dec motivation to participate. Other  Able to grasp cup in L hand, able to bring near-empty cup only ~1/2 way to face. Patient/Family Education   Pt educated on role of inpatient PT, POC, importance of continued activity, HEP and calling for assist with mobility. Daughter provided with written handout of UE/LE AROM/AAROM exercises. Pt and family encouraged to complete as tolerated frequently, they are agreeable. Assessment  Pt seen for Physical Therapy evaluation in acute care setting, POD#5 following emergent repair of perf ulcer. At baseline pt is indep with functional mobility without a device and indep for most ADLs. She has good family support. At this time pt has very little tolerance to activity. She completes only a few ROM exercises at a time, in limited planes of motion. All AROM and PROM is painful. Initially she was agreeable with encouragement to get up to chair, but after rolling for bowel cleanup, she refused 2/2 inc pain. Pt functioning well below baseline and would benefit from skilled physical therapy to promote inc activity as tolerated and improved strength for functional mobility. Will CTA for discharge recommendation once pt able to participate in out of bed activity. Goals : To be met in 3 visits:  1). Independent with LE Ex x 10 reps  2). Supine to/from sit: Mod A  3). Bed to chair: CGA with walker    To be met in 6 visits:  1). Supine to/from sit: Supervision  2). Sit to/from stand: Supervision  3). Bed to chair: Supervision  4). Gait: Ambulate 50 ft.  with  SBA and use of LRAD (least restrictive assistive device)  5).   Tolerate B LE exercises 3 sets of 10-15 reps  6). Ascend/descend 2 steps with CGA with use of hand rail unilateral and LRAD (least restrictive assistive device)    Rehabilitation Potential: Good  Strengths for achieving goals include:   Pt motivated, PLOF, Family Support and Pt cooperative   Barriers to achieving goals include:    Pain    Plan    To be seen 3-5 x / week  while in acute care setting for therapeutic exercises, bed mobility, transfers, progressive gait training, balance training, and family/patient education. Signature: Thais Martel, PT, DPT    If patient discharges from this facility prior to next visit, this note will serve as the Discharge Summary.

## 2022-07-03 NOTE — PROGRESS NOTES
Prn morphine given for 10/10 pain abd pain. Will reassess pain sclae. Blood pressure (!) 167/83, pulse 79, temperature 97 °F (36.1 °C), temperature source Axillary, resp. rate 16, height 5' 4\" (1.626 m), weight 161 lb 12.8 oz (73.4 kg), SpO2 90 %.

## 2022-07-03 NOTE — PROGRESS NOTES
[address]   Progress Note  Cardiology      Brennen Otero   Admission date:  6/28/2022  CC-f/up a fib  Subjective:  Continues to feel miserable with pain all over    Objective:  Medications/Labs all Reviewed     pantoprazole  40 mg IntraVENous BID    dilTIAZem  120 mg Oral Daily    heparin (porcine)  5,000 Units SubCUTAneous 3 times per day    piperacillin-tazobactam  3,375 mg IntraVENous Q12H    sodium chloride flush  5-40 mL IntraVENous 2 times per day       BMP:   Lab Results   Component Value Date/Time     07/03/2022 04:33 AM    K 3.1 07/03/2022 04:33 AM    K 3.8 06/29/2022 05:19 AM     07/03/2022 04:33 AM    CO2 24 07/03/2022 04:33 AM    BUN 46 07/03/2022 04:33 AM    CREATININE 2.3 07/03/2022 04:33 AM    MG 2.00 07/01/2022 06:50 AM     CBC:    Lab Results   Component Value Date/Time    WBC 14.2 07/02/2022 04:35 AM    RBC 3.61 07/02/2022 04:35 AM    RBC 4.41 08/02/2016 10:34 AM    HGB 10.4 07/02/2022 04:35 AM    HCT 31.2 07/02/2022 04:35 AM    MCV 86.5 07/02/2022 04:35 AM    RDW 15.0 07/02/2022 04:35 AM     07/02/2022 04:35 AM      PT/INR:    Lab Results   Component Value Date    INR 1.57 (H) 06/29/2022    PROTIME 18.6 (H) 06/29/2022     Cardiac Enzymes:  No results found for: CKTOTAL, CKMB, CKMBINDEX  Lab Results   Component Value Date    TROPONINI <0.01 06/30/2022    TROPONINI <0.01 06/30/2022    TROPONINI <0.01 06/28/2022     BNP:  No results found for: BNP  FASTING LIPID PANEL:    Lab Results   Component Value Date/Time    HDL 39 10/25/2018 11:39 AM       Physical Examination:    BP (!) 167/83   Pulse 79   Temp 97 °F (36.1 °C) (Axillary)   Resp 18   Ht 5' 4\" (1.626 m)   Wt 161 lb 12.8 oz (73.4 kg)   SpO2 90%   BMI 27.77 kg/m²      Respiratory:NG in place  · Resp Assessment: Normal respiratory effort  · Resp Auscultation: Clear to auscultation bilaterally   Cardiovascular:  · Auscultation: regular rate and rhythm, normal S1S2, no murmur, rub or gallop  · Palpation:  Nl PMI  · JVP:  normal  · Extremities: No Edema  Abdomen:  · Nontender,no bowel sounds  Extremities:  ·  No Cyanosis or Clubbing  Neurological/Psychiatric:  · Oriented to time, place, and person  · Non-anxious  Skin Warm and dry    Assessment:    Principal Problem:    Gastric perforation (HCC) with diffuse pain    Active Problems:    Perforated peptic ulcer ([address]      Atrial fibrillation (HCC)  Plan:currently sinus    Acute renal failure improving    Plan:  1.  analgesia to be managed by surgical team, current dose of morphine with no significant change in pain  2.  Will raise cardizem dose to see if BP better controlled    I have spent  35  minutes of face to face time with the patient with more than 50%  spent  counseling and coordinating care for [place]

## 2022-07-03 NOTE — PROGRESS NOTES
Hand off report give to ABDULAZIZ Patel. Patient is stable showing no signs of distress and has no current needs at this time. Call light is in reach and bed is in lowest position. Care is transferred at this time.

## 2022-07-04 LAB
ALBUMIN SERPL-MCNC: 2.2 G/DL (ref 3.4–5)
ANION GAP SERPL CALCULATED.3IONS-SCNC: 11 MMOL/L (ref 3–16)
BUN BLDV-MCNC: 40 MG/DL (ref 7–20)
CALCIUM SERPL-MCNC: 7.7 MG/DL (ref 8.3–10.6)
CHLORIDE BLD-SCNC: 109 MMOL/L (ref 99–110)
CO2: 24 MMOL/L (ref 21–32)
CREAT SERPL-MCNC: 2 MG/DL (ref 0.6–1.2)
GFR AFRICAN AMERICAN: 29
GFR NON-AFRICAN AMERICAN: 24
GLUCOSE BLD-MCNC: 111 MG/DL (ref 70–99)
GLUCOSE BLD-MCNC: 113 MG/DL (ref 70–99)
GLUCOSE BLD-MCNC: 115 MG/DL (ref 70–99)
GLUCOSE BLD-MCNC: 118 MG/DL (ref 70–99)
GLUCOSE BLD-MCNC: 120 MG/DL (ref 70–99)
GLUCOSE BLD-MCNC: 151 MG/DL (ref 70–99)
GLUCOSE BLD-MCNC: 166 MG/DL (ref 70–99)
PERFORMED ON: ABNORMAL
PHOSPHORUS: 2.8 MG/DL (ref 2.5–4.9)
POTASSIUM SERPL-SCNC: 3.4 MMOL/L (ref 3.5–5.1)
SODIUM BLD-SCNC: 144 MMOL/L (ref 136–145)

## 2022-07-04 PROCEDURE — 99024 POSTOP FOLLOW-UP VISIT: CPT | Performed by: SURGERY

## 2022-07-04 PROCEDURE — C9113 INJ PANTOPRAZOLE SODIUM, VIA: HCPCS | Performed by: NURSE PRACTITIONER

## 2022-07-04 PROCEDURE — 94761 N-INVAS EAR/PLS OXIMETRY MLT: CPT

## 2022-07-04 PROCEDURE — 80069 RENAL FUNCTION PANEL: CPT

## 2022-07-04 PROCEDURE — 99232 SBSQ HOSP IP/OBS MODERATE 35: CPT | Performed by: INTERNAL MEDICINE

## 2022-07-04 PROCEDURE — 2580000003 HC RX 258: Performed by: NURSE PRACTITIONER

## 2022-07-04 PROCEDURE — 2700000000 HC OXYGEN THERAPY PER DAY

## 2022-07-04 PROCEDURE — 2580000003 HC RX 258: Performed by: INTERNAL MEDICINE

## 2022-07-04 PROCEDURE — 6360000002 HC RX W HCPCS: Performed by: SURGERY

## 2022-07-04 PROCEDURE — 36415 COLL VENOUS BLD VENIPUNCTURE: CPT

## 2022-07-04 PROCEDURE — 6370000000 HC RX 637 (ALT 250 FOR IP): Performed by: INTERNAL MEDICINE

## 2022-07-04 PROCEDURE — 1200000000 HC SEMI PRIVATE

## 2022-07-04 PROCEDURE — 6360000002 HC RX W HCPCS: Performed by: NURSE PRACTITIONER

## 2022-07-04 RX ORDER — DEXTROSE AND SODIUM CHLORIDE 5; .2 G/100ML; G/100ML
INJECTION, SOLUTION INTRAVENOUS CONTINUOUS
Status: DISCONTINUED | OUTPATIENT
Start: 2022-07-04 | End: 2022-07-08

## 2022-07-04 RX ADMIN — PIPERACILLIN AND TAZOBACTAM 3375 MG: 3; .375 INJECTION, POWDER, LYOPHILIZED, FOR SOLUTION INTRAVENOUS at 09:24

## 2022-07-04 RX ADMIN — MORPHINE SULFATE 2 MG: 2 INJECTION, SOLUTION INTRAMUSCULAR; INTRAVENOUS at 12:01

## 2022-07-04 RX ADMIN — DILTIAZEM HYDROCHLORIDE 180 MG: 180 CAPSULE, COATED, EXTENDED RELEASE ORAL at 09:28

## 2022-07-04 RX ADMIN — MORPHINE SULFATE 2 MG: 2 INJECTION, SOLUTION INTRAMUSCULAR; INTRAVENOUS at 14:41

## 2022-07-04 RX ADMIN — PANTOPRAZOLE SODIUM 40 MG: 40 INJECTION, POWDER, LYOPHILIZED, FOR SOLUTION INTRAVENOUS at 09:25

## 2022-07-04 RX ADMIN — POTASSIUM BICARBONATE 20 MEQ: 391 TABLET, EFFERVESCENT ORAL at 14:14

## 2022-07-04 RX ADMIN — SODIUM CHLORIDE: 4.5 INJECTION, SOLUTION INTRAVENOUS at 09:23

## 2022-07-04 RX ADMIN — HEPARIN SODIUM 5000 UNITS: 5000 INJECTION INTRAVENOUS; SUBCUTANEOUS at 05:03

## 2022-07-04 RX ADMIN — HEPARIN SODIUM 5000 UNITS: 5000 INJECTION INTRAVENOUS; SUBCUTANEOUS at 14:14

## 2022-07-04 RX ADMIN — PIPERACILLIN AND TAZOBACTAM 3375 MG: 3; .375 INJECTION, POWDER, LYOPHILIZED, FOR SOLUTION INTRAVENOUS at 21:38

## 2022-07-04 RX ADMIN — DEXTROSE AND SODIUM CHLORIDE: 5; 200 INJECTION, SOLUTION INTRAVENOUS at 14:04

## 2022-07-04 RX ADMIN — PANTOPRAZOLE SODIUM 40 MG: 40 INJECTION, POWDER, LYOPHILIZED, FOR SOLUTION INTRAVENOUS at 19:27

## 2022-07-04 RX ADMIN — MORPHINE SULFATE 2 MG: 2 INJECTION, SOLUTION INTRAMUSCULAR; INTRAVENOUS at 19:27

## 2022-07-04 RX ADMIN — HEPARIN SODIUM 5000 UNITS: 5000 INJECTION INTRAVENOUS; SUBCUTANEOUS at 21:39

## 2022-07-04 ASSESSMENT — PAIN DESCRIPTION - LOCATION
LOCATION: ABDOMEN;BACK
LOCATION: ABDOMEN

## 2022-07-04 ASSESSMENT — PAIN DESCRIPTION - DESCRIPTORS
DESCRIPTORS: CRAMPING;ACHING;DISCOMFORT
DESCRIPTORS: CRAMPING

## 2022-07-04 ASSESSMENT — PAIN SCALES - GENERAL
PAINLEVEL_OUTOF10: 8
PAINLEVEL_OUTOF10: 9
PAINLEVEL_OUTOF10: 8

## 2022-07-04 ASSESSMENT — PAIN DESCRIPTION - ORIENTATION
ORIENTATION: MID
ORIENTATION: MID

## 2022-07-04 ASSESSMENT — PAIN DESCRIPTION - PAIN TYPE: TYPE: SURGICAL PAIN

## 2022-07-04 NOTE — PROGRESS NOTES
Occupational Therapy Attempt Note    Attempted occupational therapy evaluation, however patient declined to sit at the EOB today due to pain. Will attempt again as patient is able and schedule allows.      [facility], OTR/L 8565

## 2022-07-04 NOTE — PROGRESS NOTES
Bedside report and transfer of care given to Nell Capellan RN Pt currently resting in bed with the call light within reach. Pt denies any other care needs at this time. Pt stable at this time.

## 2022-07-04 NOTE — FLOWSHEET NOTE
Shift assessment complete. See flowsheet for full assessment. Pt denies any needs, call light within reach.        07/03/22 1939   Vital Signs   Temp 97.7 °F (36.5 °C)   Temp Source Oral   Heart Rate 74   Heart Rate Source Monitor   Resp 18   /79   BP Method Automatic   MAP (Calculated) 96.33   Patient Position High fowlers   Oxygen Therapy   SpO2 93 %   O2 Device Nasal cannula   O2 Flow Rate (L/min) 2 L/min

## 2022-07-04 NOTE — PROGRESS NOTES
Comprehensive Nutrition Assessment    Type and Reason for Visit:  Reassess    Nutrition Recommendations/Plan:   1. Modified TF order - ADULT TUBE FEEDING; Jejunostomy; Standard with Fiber formula - Jevity 1.5 with a goal rate of 45 ml/hr x 20 hours + 30 ml water flushes every 4 hours + one proteinex P2Go once daily via feeding tube. 2. Monitor TF rate, intake, and tolerance + water flushes + administration of one proteinex P2Go once daily. 3. Monitor GI status, surgery notes, and plan of care. 4. Monitor nutrition-related labs, bowel function, and weight trends. Malnutrition Assessment:  Malnutrition Status:   At risk for malnutrition (07/04/22 1247)    Context:  Acute Illness     Findings of the 6 clinical characteristics of malnutrition:  Energy Intake:  50% or less of estimated energy requirements for 5 or more days  Weight Loss:  No significant weight loss     Body Fat Loss:  No significant body fat loss     Muscle Mass Loss:  No significant muscle mass loss    Fluid Accumulation:  No significant fluid accumulation     Strength:  Not Performed    Nutrition Assessment:    patient is improved from a nutritional standpoint AEB j-tube feeds were started and TF is infusing at goal rate without issue at this time, however, she remains at risk for further compromise d/t need for EN as sole source of nutrition at this time, GI dysfunction, and altered nutrition-related labs; will continue NPO status and modify TF order to Jevity 1.5 with a goal rate of 45 ml/hr x 20 hours + 30 ml water flushes every 4 hours + add one proteinex P2Go once daily    Nutrition Related Findings:    patient is A & O x 4; she had a BM on 7/2/22; abdomen is soft, tender, and bowel sounds are active; + nausea; she has 1/2 NS infusing at 75 ml/hr currently; TF is infusing at goal rate without issue; POD #6 s/p repair of perf ulcer, j-tube insertion; NG tube to LWS Wound Type: Surgical Incision (surgical incision on abdomen) Current Nutrition Intake & Therapies:    Average Meal Intake: NPO  Average Supplements Intake: NPO  Current Tube Feeding (TF) Orders:  · Feeding Route: Jejunostomy  · Formula: Standard with Fiber  · Schedule: Continuous  · Feeding Regimen: Jevity 1.5 with a goal rate of 45 ml/hr x 20 hours  · Additives/Modulars: Protein (one proteinex P2Go once daily via feeding tube)  · Water Flushes: 30 ml water flushes every 4 hours  · Current TF & Flush Orders Provides: unable to determine at this time  · Goal TF & Flush Orders Provides: Jevity 1.5 with a goal rate of 45 ml/hr x 20 hours = 900 ml TV, 1350 kcals, 57 g protein, and 684 ml free water + 30 ml water flushes every 4 hours for tube patency + one proteinex P2Go once daily via feeding tube (83 g protein and 1454 kcals total)      Anthropometric Measures:  Height: 5' 4\" (162.6 cm)  Ideal Body Weight (IBW): 120 lbs (55 kg)    Admission Body Weight: 149 lb (67.6 kg) (obtained on 6/29/22; actual weight)  Current Body Weight: 162 lb 3.2 oz (73.6 kg) (obtained on 7/4/22; actual weight), 135.2 % IBW. Weight Source: Bed Scale  Current BMI (kg/m2): 27.8  Usual Body Weight: 149 lb (67.6 kg) (obtained on 6/29/22; actual weight)  % Weight Change (Calculated): 8.9  Weight Adjustment For: No Adjustment                 BMI Categories: Overweight (BMI 25.0-29. 9)    Estimated Daily Nutrient Needs:  Energy Requirements Based On: Kcal/kg  Weight Used for Energy Requirements: Current  Energy (kcal/day): 1480 - 1702 kcals based on 20-23 kcals/kg/CBW  Weight Used for Protein Requirements: Ideal  Protein (g/day): 77 - 83 g protein based on 1.4-1.5 g/kg/IBW  Method Used for Fluid Requirements: 1 ml/kcal  Fluid (ml/day): 1480 - 1702 ml    Nutrition Diagnosis:   · Inadequate oral intake related to altered GI function,altered GI structure,inadequate protein-energy intake as evidenced by NPO or clear liquid status due to medical condition,nutrition support - enteral nutrition,GI abnormality      Nutrition Interventions:   Food and/or Nutrient Delivery: Continue NPO,Modify Tube Feeding  Nutrition Education/Counseling: No recommendation at this time  Coordination of Nutrition Care: Continue to monitor while inpatient,Coordination of Care       Goals:  Previous Goal Met: Goal(s) Achieved  Goals: Tolerate nutrition support at goal rate,by next RD assessment       Nutrition Monitoring and Evaluation:   Behavioral-Environmental Outcomes: None Identified  Food/Nutrient Intake Outcomes: Enteral Nutrition Intake/Tolerance,IVF Intake  Physical Signs/Symptoms Outcomes: Biochemical Data,GI Status,Nausea or Vomiting,Nutrition Focused Physical Findings,Skin,Weight    Discharge Planning:     Too soon to determine     Kendall Duong, [address] [place] [address], LD  Contact: [phone]

## 2022-07-04 NOTE — FLOWSHEET NOTE
Pt appears to be resting comfortably. VSS. Per pt, no new needs at this time.         07/04/22 0007   Vital Signs   Temp 97.6 °F (36.4 °C)   Temp Source Oral   Heart Rate 75   Heart Rate Source Monitor   Resp 18   /67   BP Method Automatic   MAP (Calculated) 84.33   Patient Position High fowlers   Oxygen Therapy   SpO2 93 %   O2 Device Nasal cannula   O2 Flow Rate (L/min) 2 L/min   Height and Weight   Weight 162 lb 3.2 oz (73.6 kg)   Weight Method Actual;Bed scale   BMI (Calculated) 27.9

## 2022-07-04 NOTE — PROGRESS NOTES
Writer spoke with pharmacist Armin regarding the cardizem ER 180mg. Pt takes her medication via jejunostomy as well as nutrition this medication is a do not crush/chew orders. Per Armin from pharmacy it is ok to pull apart the capsule to the granules and administer the granules whole (DO NOT CRUSH GRANULES) if they will pass through the j-tube. However, if they will not pass a new order nursing staff will need to be obtained a new order for an IM release and divide the dosage for example 60 mg Q6 hours. Update: Granules will pass through j-tube.

## 2022-07-04 NOTE — PROGRESS NOTES
[facility] GENERAL SURGERY    Surgery Progress Note           POD # 6    PATIENT NAME: Kalen Slater     TODAY'S DATE: 7/4/2022    INTERVAL HISTORY:    Pt  Remains on low-rate tube feeds with out nausea, bloating. Remains thirsty. OBJECTIVE:   VITALS:  BP (!) 144/72   Pulse 75   Temp 99 °F (37.2 °C) (Oral)   Resp 16   Ht 5' 4\" (1.626 m)   Wt 162 lb 3.2 oz (73.6 kg)   SpO2 93%   BMI 27.84 kg/m²     INTAKE/OUTPUT:    I/O last 3 completed shifts: In: 3233.4 [I.V.:2314.9; NG/GT:343; IV Piggyback:575.5]  Out: 2121 [Urine:1500; Emesis/NG output:600; Drains:20; Stool:1]  No intake/output data recorded. CONSTITUTIONAL:  fatigued and alert  LUNGS:  clear to auscultation  ABDOMEN:    , soft, non-distended, non-tender   INCISION: clean, dry, no drainage, healing, sero-sanguinous drainage in JERRI    Data:  CBC: Recent Labs     07/02/22 0435   WBC 14.2*   HGB 10.4*   HCT 31.2*        BMP:    Recent Labs     07/02/22 0435 07/03/22 0433 07/04/22  0526    143 144   K 3.1* 3.1* 3.4*    107 109   CO2 24 24 24   BUN 54* 46* 40*   CREATININE 3.1* 2.3* 2.0*   GLUCOSE 149* 94 118*     Hepatic: No results for input(s): AST, ALT, ALB, BILITOT, ALKPHOS in the last 72 hours. Mag:    No results for input(s): MG in the last 72 hours. Phos:     Recent Labs     07/02/22 0435 07/03/22 0433 07/04/22  0526   PHOS 3.0 3.1 2.8      INR: No results for input(s): INR in the last 72 hours.       Radiology Review:       ASSESSMENT AND PLAN:  1. 74 y.o. female status post repair of perf ulcer, j-tube insertion   - increase tube feeds to 40/hr   - UOP, creatinine improving - appreciate Nephro recs   - K+ - stable/improved   - cont IV abx   - plan for UGI tomorrow to assess repair of ulcer, then possible d/c NGT, start PO         Electronically signed by Volodymyr Manley MD

## 2022-07-04 NOTE — PROGRESS NOTES
[facility]  Nephrology follow-up note           Reason for Consult: DA   Requesting Physician:  Dr. Elysia Armas history  The patient was seen and examined; she feels well today with no CP, SOB, nausea or vomiting. ROS: No fever or chills. Social: Family at bedside. Scheduled Meds:   dilTIAZem  180 mg Oral Daily    potassium chloride  40 mEq Oral Once    pantoprazole  40 mg IntraVENous BID    heparin (porcine)  5,000 Units SubCUTAneous 3 times per day    piperacillin-tazobactam  3,375 mg IntraVENous Q12H    sodium chloride flush  5-40 mL IntraVENous 2 times per day     Continuous Infusions:   sodium chloride 75 mL/hr at 07/04/22 0923    dextrose Stopped (07/01/22 0410)    sodium chloride Stopped (07/02/22 1317)     PRN Meds:.glucose, dextrose bolus **OR** dextrose bolus, glucagon (rDNA), dextrose, perflutren lipid microspheres, metoprolol, phenol, sodium chloride flush, sodium chloride, ondansetron **OR** ondansetron, morphine **OR** morphine, naloxone    History of Present Illness on 6/30/22:    74 y.o. yo female with PMH of colon cancer hypothyroidism, HLD, OA on mobic who is admitted for abd pain and bowel perf  Pt had been at [facility] from 6/23-24/22 for COVID pneumonia. Was treated with dex. Was also on meloxicam  Presented to ED on 6/27 for abd pain had CTA of chest/a/p and was discharged on naproxen  Came again on 6/28 and was noted to have bowel perf and underwent immediate surgery  Cr was 0.6 on 6/27, was 1.2 on 6/28 which increased to 2.4 on 6/29 and 3.9 on 6/30 when nephrology has been consulted, pt has been getting zosyn after surgery  Her BP dropped in 6/28 to 90s few times.  On 6/30 has had afib rvr    Physical exam:   Constitutional:  VITALS:  BP (!) 144/72   Pulse 75   Temp 99 °F (37.2 °C) (Oral)   Resp 16   Ht 5' 4\" (1.626 m)   Wt 162 lb 3.2 oz (73.6 kg)   SpO2 93%   BMI 27.84 kg/m²      Gen: alert, awake  Neck: No JVD  Skin: Unremarkable  Cardiovascular:  S1, S2 without m/r/g   Respiratory: diminished  Abdomen:  soft, nt, nd,   Extremities: no lower extremity edema  Neuro/Psy: AAoriented times 3 ; moves all 4 ext    Data/  Recent Labs     07/02/22  0435   WBC 14.2*   HGB 10.4*   HCT 31.2*   MCV 86.5        Recent Labs     07/02/22  0435 07/03/22  0433 07/04/22  0526    143 144   K 3.1* 3.1* 3.4*    107 109   CO2 24 24 24   GLUCOSE 149* 94 118*   PHOS 3.0 3.1 2.8   BUN 54* 46* 40*   CREATININE 3.1* 2.3* 2.0*   LABGLOM 15* 21* 24*   GFRAA 18* 25* 29*     CTA chest/a/p on 6/27/22  Impression   Chest-       1. No pulmonary embolus. 2. Multifocal ground-glass opacities, primarily in the mid and upper lungs. Atypical pneumonia, including COVID etiology is considered.  Other causes of   pneumonia and pneumonitis, such as hypersensitivity, are also possible. ---       Abdomen and pelvis-       1. Mural thickening of the gastric antrum.  Gastritis is considered. 2. Right inguinal hernia with small bowel content; no incarceration. 3. 2.8 cm aneurysm of the infrarenal abdominal aorta.  5 year follow-up   imaging is recommended. 4. Nonobstructive nephrolithiasis     CT a/p wo contrast 6/28/22  Impression   1. New large perforation involving the posterior gastric antrum likely from   ulcerative disease causing a moderate amount of pneumoperitoneum and ascites   throughout the abdomen and pelvis. 2. New trace left pleural effusion. 3. Residual contrast within the bilateral kidneys can be seen in setting of   renal failure. UA w 6-10 granular casts, 100 protein, RBC>100; WBCs 6-10  Urine Na 62 cr 79    Assessment  -DA likely has developed ATN w hypovolemia, hypotension, NSAIDs use and contrast use.      -Perforated prepyloric ulcer s/p laparotomy with repair n jejunostomy insertion on 6/28/22   On zosyn     -Metabolic acidosis from DA     -Hypertension    -afib rvr per cardiology    -recent Stephan from 6/22-24/22    -h/o colon cancer    Plan  -Continue volume expansion  -PRN potassium replacement  -Renal dose meds and avoid nephrotoxins      Carl Marcelino MD  Office: [phone]  Fax:    [phone]  [facility]

## 2022-07-04 NOTE — PROGRESS NOTES
[address]   Progress Note  Cardiology      Jazmyn Powell   Admission date:  6/28/2022  CC-f/up a fib  Subjective:  Continues to feel miserable with pain somewhat better    Objective:  Medications/Labs all Reviewed     dilTIAZem  180 mg Oral Daily    potassium chloride  40 mEq Oral Once    pantoprazole  40 mg IntraVENous BID    heparin (porcine)  5,000 Units SubCUTAneous 3 times per day    piperacillin-tazobactam  3,375 mg IntraVENous Q12H    sodium chloride flush  5-40 mL IntraVENous 2 times per day       BMP:   Lab Results   Component Value Date/Time     07/04/2022 05:26 AM    K 3.4 07/04/2022 05:26 AM    K 3.8 06/29/2022 05:19 AM     07/04/2022 05:26 AM    CO2 24 07/04/2022 05:26 AM    BUN 40 07/04/2022 05:26 AM    CREATININE 2.0 07/04/2022 05:26 AM    MG 2.00 07/01/2022 06:50 AM     CBC:    Lab Results   Component Value Date/Time    WBC 14.2 07/02/2022 04:35 AM    RBC 3.61 07/02/2022 04:35 AM    RBC 4.41 08/02/2016 10:34 AM    HGB 10.4 07/02/2022 04:35 AM    HCT 31.2 07/02/2022 04:35 AM    MCV 86.5 07/02/2022 04:35 AM    RDW 15.0 07/02/2022 04:35 AM     07/02/2022 04:35 AM      PT/INR:    Lab Results   Component Value Date    INR 1.57 (H) 06/29/2022    PROTIME 18.6 (H) 06/29/2022     Cardiac Enzymes:  No results found for: CKTOTAL, CKMB, CKMBINDEX  Lab Results   Component Value Date    TROPONINI <0.01 06/30/2022    TROPONINI <0.01 06/30/2022    TROPONINI <0.01 06/28/2022     BNP:  No results found for: BNP  FASTING LIPID PANEL:    Lab Results   Component Value Date/Time    HDL 39 10/25/2018 11:39 AM       Physical Examination:    BP (!) 144/72   Pulse 75   Temp 99 °F (37.2 °C) (Oral)   Resp 18   Ht 5' 4\" (1.626 m)   Wt 162 lb 3.2 oz (73.6 kg)   SpO2 93%   BMI 27.84 kg/m²      Respiratory:NG in place  · Resp Assessment: Normal respiratory effort  · Resp Auscultation: Clear to auscultation bilaterally   Cardiovascular:  · Auscultation: regular rate and rhythm, normal S1S2, no murmur, rub or gallop  · Palpation:  Nl PMI  · JVP:  normal  · Extremities: No Edema  Abdomen:  · Nontender,no bowel sounds  Extremities:  ·  No Cyanosis or Clubbing  Neurological/Psychiatric:  · Oriented to time, place, and person  · Non-anxious  Skin Warm and dry    Assessment:    Principal Problem:    Gastric perforation (HCC) with diffuse pain    Active Problems:    Perforated peptic ulcer (HCC)      Atrial fibrillation (HCC)  Plan:currently sinus    Acute renal failure improving    Plan:  1.  analgesia to be managed by surgical team, current dose of morphine with no significant change in pain  2. BP better controlled    Cardiac status stable.  Will see further if contacted

## 2022-07-04 NOTE — PROGRESS NOTES
Perfect Serve sent to Dr. Arndt July regarding K 3.4 and no  PRN orders. Awaiting orders/response. Writer spoke with MD and no new orders at this time.

## 2022-07-05 ENCOUNTER — APPOINTMENT (OUTPATIENT)
Dept: INTERVENTIONAL RADIOLOGY/VASCULAR | Age: 72
DRG: 326 | End: 2022-07-05
Payer: MEDICARE

## 2022-07-05 LAB
ALBUMIN SERPL-MCNC: 1.9 G/DL (ref 3.4–5)
ANION GAP SERPL CALCULATED.3IONS-SCNC: 10 MMOL/L (ref 3–16)
ANISOCYTOSIS: ABNORMAL
BANDED NEUTROPHILS RELATIVE PERCENT: 1 % (ref 0–7)
BASOPHILS ABSOLUTE: 0 K/UL (ref 0–0.2)
BASOPHILS RELATIVE PERCENT: 0 %
BUN BLDV-MCNC: 39 MG/DL (ref 7–20)
CALCIUM SERPL-MCNC: 7.6 MG/DL (ref 8.3–10.6)
CHLORIDE BLD-SCNC: 106 MMOL/L (ref 99–110)
CO2: 23 MMOL/L (ref 21–32)
CREAT SERPL-MCNC: 1.8 MG/DL (ref 0.6–1.2)
EOSINOPHILS ABSOLUTE: 0 K/UL (ref 0–0.6)
EOSINOPHILS RELATIVE PERCENT: 0 %
GFR AFRICAN AMERICAN: 33
GFR NON-AFRICAN AMERICAN: 27
GLUCOSE BLD-MCNC: 104 MG/DL (ref 70–99)
GLUCOSE BLD-MCNC: 118 MG/DL (ref 70–99)
GLUCOSE BLD-MCNC: 156 MG/DL (ref 70–99)
GLUCOSE BLD-MCNC: 169 MG/DL (ref 70–99)
GLUCOSE BLD-MCNC: 177 MG/DL (ref 70–99)
GLUCOSE BLD-MCNC: 85 MG/DL (ref 70–99)
GLUCOSE BLD-MCNC: 88 MG/DL (ref 70–99)
HCT VFR BLD CALC: 29 % (ref 36–48)
HEMOGLOBIN: 9.4 G/DL (ref 12–16)
LYMPHOCYTES ABSOLUTE: 0.4 K/UL (ref 1–5.1)
LYMPHOCYTES RELATIVE PERCENT: 3 %
MCH RBC QN AUTO: 28.6 PG (ref 26–34)
MCHC RBC AUTO-ENTMCNC: 32.4 G/DL (ref 31–36)
MCV RBC AUTO: 88.2 FL (ref 80–100)
MONOCYTES ABSOLUTE: 0.6 K/UL (ref 0–1.3)
MONOCYTES RELATIVE PERCENT: 4 %
NEUTROPHILS ABSOLUTE: 13.3 K/UL (ref 1.7–7.7)
NEUTROPHILS RELATIVE PERCENT: 92 %
PDW BLD-RTO: 15.2 % (ref 12.4–15.4)
PERFORMED ON: ABNORMAL
PERFORMED ON: NORMAL
PERFORMED ON: NORMAL
PHOSPHORUS: 2.5 MG/DL (ref 2.5–4.9)
PLATELET # BLD: 187 K/UL (ref 135–450)
PLATELET SLIDE REVIEW: ADEQUATE
PMV BLD AUTO: 9 FL (ref 5–10.5)
POIKILOCYTES: ABNORMAL
POTASSIUM SERPL-SCNC: 3.2 MMOL/L (ref 3.5–5.1)
RBC # BLD: 3.28 M/UL (ref 4–5.2)
SLIDE REVIEW: ABNORMAL
SODIUM BLD-SCNC: 139 MMOL/L (ref 136–145)
WBC # BLD: 14.3 K/UL (ref 4–11)

## 2022-07-05 PROCEDURE — 2580000003 HC RX 258: Performed by: NURSE PRACTITIONER

## 2022-07-05 PROCEDURE — 2700000000 HC OXYGEN THERAPY PER DAY

## 2022-07-05 PROCEDURE — 80069 RENAL FUNCTION PANEL: CPT

## 2022-07-05 PROCEDURE — 99232 SBSQ HOSP IP/OBS MODERATE 35: CPT | Performed by: NURSE PRACTITIONER

## 2022-07-05 PROCEDURE — 6370000000 HC RX 637 (ALT 250 FOR IP): Performed by: INTERNAL MEDICINE

## 2022-07-05 PROCEDURE — 2580000003 HC RX 258: Performed by: INTERNAL MEDICINE

## 2022-07-05 PROCEDURE — 94761 N-INVAS EAR/PLS OXIMETRY MLT: CPT

## 2022-07-05 PROCEDURE — 99024 POSTOP FOLLOW-UP VISIT: CPT | Performed by: NURSE PRACTITIONER

## 2022-07-05 PROCEDURE — 6360000002 HC RX W HCPCS: Performed by: NURSE PRACTITIONER

## 2022-07-05 PROCEDURE — 85025 COMPLETE CBC W/AUTO DIFF WBC: CPT

## 2022-07-05 PROCEDURE — 1200000000 HC SEMI PRIVATE

## 2022-07-05 PROCEDURE — 36415 COLL VENOUS BLD VENIPUNCTURE: CPT

## 2022-07-05 PROCEDURE — C9113 INJ PANTOPRAZOLE SODIUM, VIA: HCPCS | Performed by: NURSE PRACTITIONER

## 2022-07-05 PROCEDURE — C1769 GUIDE WIRE: HCPCS

## 2022-07-05 PROCEDURE — 6360000002 HC RX W HCPCS: Performed by: SURGERY

## 2022-07-05 PROCEDURE — 36573 INSJ PICC RS&I 5 YR+: CPT

## 2022-07-05 PROCEDURE — 02HV33Z INSERTION OF INFUSION DEVICE INTO SUPERIOR VENA CAVA, PERCUTANEOUS APPROACH: ICD-10-PCS | Performed by: SURGERY

## 2022-07-05 RX ORDER — LIDOCAINE HYDROCHLORIDE 10 MG/ML
5 INJECTION, SOLUTION INFILTRATION; PERINEURAL ONCE
Status: DISCONTINUED | OUTPATIENT
Start: 2022-07-05 | End: 2022-07-28 | Stop reason: HOSPADM

## 2022-07-05 RX ORDER — POTASSIUM CHLORIDE 7.45 MG/ML
40 INJECTION INTRAVENOUS ONCE
Status: COMPLETED | OUTPATIENT
Start: 2022-07-05 | End: 2022-07-05

## 2022-07-05 RX ADMIN — MORPHINE SULFATE 2 MG: 2 INJECTION, SOLUTION INTRAMUSCULAR; INTRAVENOUS at 03:25

## 2022-07-05 RX ADMIN — ONDANSETRON HYDROCHLORIDE 4 MG: 2 INJECTION, SOLUTION INTRAMUSCULAR; INTRAVENOUS at 16:25

## 2022-07-05 RX ADMIN — PANTOPRAZOLE SODIUM 40 MG: 40 INJECTION, POWDER, LYOPHILIZED, FOR SOLUTION INTRAVENOUS at 20:19

## 2022-07-05 RX ADMIN — DEXTROSE AND SODIUM CHLORIDE: 5; 200 INJECTION, SOLUTION INTRAVENOUS at 16:15

## 2022-07-05 RX ADMIN — HEPARIN SODIUM 5000 UNITS: 5000 INJECTION INTRAVENOUS; SUBCUTANEOUS at 13:08

## 2022-07-05 RX ADMIN — PIPERACILLIN AND TAZOBACTAM 3375 MG: 3; .375 INJECTION, POWDER, LYOPHILIZED, FOR SOLUTION INTRAVENOUS at 13:02

## 2022-07-05 RX ADMIN — DILTIAZEM HYDROCHLORIDE 180 MG: 180 CAPSULE, COATED, EXTENDED RELEASE ORAL at 07:41

## 2022-07-05 RX ADMIN — SODIUM CHLORIDE, PRESERVATIVE FREE 10 ML: 5 INJECTION INTRAVENOUS at 07:41

## 2022-07-05 RX ADMIN — MORPHINE SULFATE 2 MG: 2 INJECTION, SOLUTION INTRAMUSCULAR; INTRAVENOUS at 16:24

## 2022-07-05 RX ADMIN — HEPARIN SODIUM 5000 UNITS: 5000 INJECTION INTRAVENOUS; SUBCUTANEOUS at 22:18

## 2022-07-05 RX ADMIN — HEPARIN SODIUM 5000 UNITS: 5000 INJECTION INTRAVENOUS; SUBCUTANEOUS at 05:27

## 2022-07-05 RX ADMIN — PANTOPRAZOLE SODIUM 40 MG: 40 INJECTION, POWDER, LYOPHILIZED, FOR SOLUTION INTRAVENOUS at 07:41

## 2022-07-05 RX ADMIN — MORPHINE SULFATE 2 MG: 2 INJECTION, SOLUTION INTRAMUSCULAR; INTRAVENOUS at 07:45

## 2022-07-05 RX ADMIN — MORPHINE SULFATE 2 MG: 2 INJECTION, SOLUTION INTRAMUSCULAR; INTRAVENOUS at 20:19

## 2022-07-05 RX ADMIN — POTASSIUM CHLORIDE 40 MEQ: 7.46 INJECTION, SOLUTION INTRAVENOUS at 13:07

## 2022-07-05 ASSESSMENT — PAIN DESCRIPTION - DESCRIPTORS
DESCRIPTORS: ACHING;CRAMPING;DISCOMFORT
DESCRIPTORS: CRAMPING;ACHING;DISCOMFORT

## 2022-07-05 ASSESSMENT — PAIN DESCRIPTION - LOCATION
LOCATION: ABDOMEN;BACK
LOCATION: ABDOMEN;BACK

## 2022-07-05 ASSESSMENT — PAIN SCALES - WONG BAKER: WONGBAKER_NUMERICALRESPONSE: 0

## 2022-07-05 ASSESSMENT — PAIN DESCRIPTION - ORIENTATION: ORIENTATION: MID

## 2022-07-05 ASSESSMENT — PAIN SCALES - GENERAL
PAINLEVEL_OUTOF10: 10
PAINLEVEL_OUTOF10: 7
PAINLEVEL_OUTOF10: 7
PAINLEVEL_OUTOF10: 8
PAINLEVEL_OUTOF10: 3

## 2022-07-05 ASSESSMENT — ENCOUNTER SYMPTOMS
RESPIRATORY NEGATIVE: 1
ABDOMINAL PAIN: 1
NAUSEA: 1

## 2022-07-05 NOTE — PROGRESS NOTES
Shift assessment complete - see flow sheet, mid abd. Incision draining - lower abd. surg. team aware, Brennan Pagan will update during rounds about the upper incisions are now draining sanguineous drainage which is new. (surg. Team aware) the JERRI drain has significant amount of drainage 0730 10 ml yellow drainage, 0830 65 ml green bile, patient received a full bath and linen change, up to the [facility] x2, large loose BM, cleaned with mid abd. Incision with NS and  placed gauze then secured with paper tape to hold in place and J-tube dsg change. Pt states she feels better than yesterday but states she is in plain. IV pain medication administered. Writer encouraged patient to move a little in the bed or get in the chair this afternoon. Pt resting in bed, call light within reach.

## 2022-07-05 NOTE — FLOWSHEET NOTE
Shift assessment complete. See flowsheet for full assessment. Pt denies any needs, call light within reach. Pt satting 88% on assessment.  Pt placed on 2L NC.        07/04/22 1925   Vital Signs   Temp 99.5 °F (37.5 °C)   Temp Source Oral   Heart Rate 87   Heart Rate Source Monitor   Resp 16   BP (!) 142/73   BP Location Left upper arm   BP Method Automatic   MAP (Calculated) 96   Patient Position Semi fowlers   Level of Consciousness Alert (0)   MEWS Score 1   Pain Assessment   Pain Assessment 0-10   Pain Level 8   Oxygen Therapy   SpO2 (!) 88 %  (Placed on 2L NC)   O2 Device None (Room air)  (Placed on 2L NC)

## 2022-07-05 NOTE — FLOWSHEET NOTE
07/05/22 1845   Oxygen Therapy   SpO2 (!) 86 %   Pulse Oximetry Type Continuous   Pulse Oximeter Device Location Finger   O2 Device None (Room air)       MT called regarding pt O2 saturation. Writer placed pt on 2L NC.  O2 saturation 93%

## 2022-07-05 NOTE — PROGRESS NOTES
On-call surgeon notified of increased JERRI drain output. This Elliott Flores Bolus at [employer]: \"Pt's Darshana Mould has had minimal output over the last few days (around 30mL total over the last 72 hours) but tonight it put out 140ml in 5 hours and has changed from old bloody drainage to bright yellow purulent drainage. I drained 90mL out at 230a, and then went to check on it an hour later and it already had another 50mL in it. Vital signs stable. \" MD texted back at [phone] notifying this RN that the number provided in [facility] wasn't working. This writer provided nurses station number at [phone]. At [phone] this RN reached back out  to see if nurses station number worked as no calls were received. At 0848, RN reached out again notifying MD that an additional 50mL was drained between 0330 and 0430. MD called RN and gave verbal orders to hold tube feed. This RN asked if morning heparin should be held. Per Dr Sandra Flood, do not hold heparin.

## 2022-07-05 NOTE — PROGRESS NOTES
[address]  Cardiology  Progress Note    Admission date:  2022    Reason for follow up visit: AF    HPI/CC: Angel Beard is a 74 y.o. female who presented 2022 for abdominal pain and CT showed pneumoperitoneum due to perforated viscus, underwent emergent exp lap. Cardiology consulted for post op AF RVR. Echo showed EF 50%. She has also been treated for DA. Rhythm has been sinus. Subjective: She feels terrible though no chest pain or SOB, mainly abdominal pain and nausea. Vitals:  Blood pressure 132/74, pulse 80, temperature 98 °F (36.7 °C), temperature source Oral, resp. rate 16, height 5' 4\" (1.626 m), weight 165 lb (74.8 kg), SpO2 95 %.   Temp  Av.3 °F (36.8 °C)  Min: 97.6 °F (36.4 °C)  Max: 99.5 °F (37.5 °C)  Pulse  Av.3  Min: 76  Max: 87  BP  Min: 129/72  Max: 147/83  SpO2  Av %  Min: 88 %  Max: 95 %    24 hour I/O    Intake/Output Summary (Last 24 hours) at 2022 1422  Last data filed at 2022 1308  Gross per 24 hour   Intake 75 ml   Output 1240 ml   Net -1165 ml     Current Facility-Administered Medications   Medication Dose Route Frequency Provider Last Rate Last Admin    piperacillin-tazobactam (ZOSYN) 3,375 mg in sodium chloride 0.9 % 100 mL IVPB (mini-bag)  3,375 mg IntraVENous Q8H Kirstin Nixon MD        lidocaine 1 % injection 5 mL  5 mL IntraDERmal Once [employer], APRN - CNP        dextrose 5 % and 0.2 % NaCl infusion   IntraVENous Continuous Shara Esparza MD 50 mL/hr at 22 1404 New Bag at 22 1404    dilTIAZem (CARDIZEM CD) extended release capsule 180 mg  180 mg Oral Daily Siria Martino MD   180 mg at 22 0741    pantoprazole (PROTONIX) injection 40 mg  40 mg IntraVENous BID January Linder APRN - CNP   40 mg at 22 0741    glucose chewable tablet 16 g  4 tablet Oral PRN Dontae Moncada MD        dextrose bolus 10% 125 mL  125 mL IntraVENous PRN Dontae Moncada MD   Stopped at 22 0151    Or    dextrose bolus 10% 250 mL  250 mL IntraVENous PRN Lida Schwab, MD        glucagon (rDNA) injection 1 mg  1 mg IntraMUSCular PRN Lida Schwab, MD        dextrose 5 % solution  100 mL/hr IntraVENous PRN Lida Schwab, MD   Stopped at 07/01/22 0410    perflutren lipid microspheres (DEFINITY) injection 1.65 mg  1.5 mL IntraVENous ONCE PRN Haylie Marquis MD        heparin (porcine) injection 5,000 Units  5,000 Units SubCUTAneous 3 times per day Flor Orosco MD   5,000 Units at 07/05/22 1308    metoprolol (LOPRESSOR) injection 2.5 mg  2.5 mg IntraVENous Q6H PRN Lida Schwab, MD        phenol 1.4 % mouth spray 1 spray  1 spray Mouth/Throat Q2H PRN January Forte, APRN - CNP        sodium chloride flush 0.9 % injection 5-40 mL  5-40 mL IntraVENous 2 times per day Zaira Bethea APRN - CNP   10 mL at 07/05/22 0741    sodium chloride flush 0.9 % injection 5-40 mL  5-40 mL IntraVENous PRN January Barbie Castrobs, APRN - CNP        0.9 % sodium chloride infusion   IntraVENous PRN January Barbie Castrobs, APRN - CNP   Stopped at 07/02/22 1317    ondansetron (ZOFRAN-ODT) disintegrating tablet 4 mg  4 mg Oral Q8H PRN January Barbie Forte, APRN - CNP        Or    ondansetron (ZOFRAN) injection 4 mg  4 mg IntraVENous Q6H PRN January Barbie Statk, APRN - CNP   4 mg at 07/01/22 1928    morphine (PF) injection 1 mg  1 mg IntraVENous Q2H PRN January Forte, APRN - CNP   1 mg at 07/02/22 8330    Or    morphine (PF) injection 2 mg  2 mg IntraVENous Q2H PRN January Forte, APRN - CNP   2 mg at 07/05/22 0745    naloxone (NARCAN) injection 0.4 mg  0.4 mg IntraVENous PRN Lida Schwab, MD   0.4 mg at 06/28/22 2040     Review of Systems   Constitutional: Positive for fatigue. Respiratory: Negative. Cardiovascular: Negative. Gastrointestinal: Positive for abdominal pain and nausea. Neurological: Negative.       Objective:     Telemetry monitor: SR    Physical Exam:  Constitutional:  Comfortable and alert, NAD, appears fatigue, stated age, +NGT  Eyes: PERRL, sclera nonicteric  Neck:  Supple, no masses, no thyroidmegaly, no JVD  Skin:  Warm and dry; no rash or lesions  Heart: Regular, normal apex, S1 and S2 normal, no M/G/R  Lungs:  Normal respiratory effort; clear; no wheezing/rhonchi/rales  Abdomen: soft, non tender, + bowel sounds  Extremities:  No edema or cyanosis; no clubbing  Neuro: alert and oriented, moves legs and arms equally, normal mood and affect    Data Reviewed:    Echo 7/1/2022:  Left ventricular size is mildly increased. Normal left ventricular wall thickness. Left ventricular systolic function is low normal with ejection fraction   estimated at 50 %. Grade I diastolic dysfunction with normal filling pressure. The right ventricle is normal in size and function. Aortic valve sclerosis without aortic stenosis. Mild aortic root enlargement with trace aortic regurgitation. Normal systolic pulmonary artery pressure (SPAP) estimated at 31 mmHg (RA   pressure 8 mmHg).     Lab Reviewed:     Renal Profile:  Lab Results   Component Value Date/Time    CREATININE 1.8 07/05/2022 04:36 AM    BUN 39 07/05/2022 04:36 AM     07/05/2022 04:36 AM    K 3.2 07/05/2022 04:36 AM    K 3.8 06/29/2022 05:19 AM     07/05/2022 04:36 AM    CO2 23 07/05/2022 04:36 AM     CBC:    Lab Results   Component Value Date/Time    WBC 14.3 07/05/2022 04:36 AM    RBC 3.28 07/05/2022 04:36 AM    RBC 4.41 08/02/2016 10:34 AM    HGB 9.4 07/05/2022 04:36 AM    HCT 29.0 07/05/2022 04:36 AM    MCV 88.2 07/05/2022 04:36 AM    RDW 15.2 07/05/2022 04:36 AM     07/05/2022 04:36 AM     BNP:  No results found for: PROBNP  Fasting Lipid Panel:    Lab Results   Component Value Date/Time    HDL 39 10/25/2018 11:39 AM     Cardiac Enzymes:  CK/MbTroponin  Lab Results   Component Value Date/Time    TROPONINI <0.01 06/30/2022 04:03 PM     PT/ INR   Lab Results   Component Value Date/Time    INR 1.57 06/29/2022 05:19 AM    PROTIME 18.6 06/29/2022 05:19 AM     PTT No results found for: PTT   Lab Results   Component Value Date/Time    MG 2.00 07/01/2022 06:50 AM      Lab Results   Component Value Date/Time    TSH 1.00 06/11/2019 01:17 PM     All labs and imaging reviewed today    Assessment:  Paroxysmal atrial fibrillation: new diagnosis, now stable in sinus   - [id number] score 2   - noted post op  Perforated viscus with pneumoperitoneum possibly due to PUD s/p emergency exp lap 6/28/2022  DA  Hypothyroidism  Hypokalemia  Anemia    Plan:   1. Continue diltiazem 180 mg daily  2. Anticoagulation for PAF when ok from surgical standpoint  3.  Monitor telemetry    BETTIE Aguilera  [facility], [place], operated by [facility]  [phone]

## 2022-07-05 NOTE — PROGRESS NOTES
Report given to ABDULAZIZ Aguirre. Carla aware of high JERRI drain output and tube feed being off currently. Care transferred at this time.

## 2022-07-05 NOTE — PROGRESS NOTES
Physical Therapy/Occupational Therapy    Patient was approached for PT/OT session in the afternoon. Patient refused to participate due to moderate abdominal pain levels and fatigue. Patient reported getting up with nursing staff to go to [facility] but feels exhausted after that activity. Patient is later going for PICC line placement. Patient will be followed up later as schedule permits. No charge.      Michael Stewart MS PT, # [id number]  JGTG [id number], OTR/L [id number]

## 2022-07-05 NOTE — PROGRESS NOTES
General Surgery - January Linder, APRN - CNP, CNP  Daily Progress Note    Pt Name: Kishan Mcqueen Record Number: [id number]  Date of Birth 1950   Today's Date: 7/5/2022    ASSESSMENT  1. POD #7 s/p repair of perf ulcer, j-tube insertion  2. ABD: J-tube in place, JERRI drain in place, staples look good: small amount of bloody drainage noted, + diffuse tenderness which appears unchanged, + distention, NGT in place, no N/V, +flatus, + BMs    3. ARF improving: Cr 1.8  4. K+ 3.2  5. Leuks 13.3->15.9->14.2->14.3   6. Lipase 123->129 (pancreas was secondarily inflamed on ER CT  7. VSS  8. Right arm Bicarb infiltration: appears much improved  9. NGT: 425 ml out  10. F/C 350 ml out  11. JERRI 290 ml out : bilious today  12. Pt states \"I am very thirsty and Im having pain. \"    PLAN  1. NGT to CLWS  2. Renal seeing   3. Strict I&Os: continue waddell for now. 4. Pain control   5. Zosyn  6. PT/OT: Sit on edge of bed/up to chair today/ambulate  7. Protonix BID  8. IVF per renal  9. Labs in the am.   10. Hold TF for now. Pt with increased distention today  11. Pt looks OK POD #7: Pt with bilious JERRI drainage suggestive of leak from perf ulcer repair. JERRI should be able to adequately drain fluid. Discussed with pt an her  at the bedside. Coleen Alexandra is virtually unchanged from yesterday. Pain is somewhat well controlled. She has no nausea and no vomiting. She has passed flatus and has had a bowel movement. She is NPO. Current activity is up with assistance    OBJECTIVE  VITALS:  height is 5' 4\" (1.626 m) and weight is 165 lb (74.8 kg). Her oral temperature is 98 °F (36.7 °C). Her blood pressure is 132/74 and her pulse is 80. Her respiration is 16 and oxygen saturation is 95%.    VITALS:  /74   Pulse 80   Temp 98 °F (36.7 °C) (Oral)   Resp 16   Ht 5' 4\" (1.626 m)   Wt 165 lb (74.8 kg)   SpO2 95%   BMI 28.32 kg/m²   INTAKE/OUTPUT:      Intake/Output Summary (Last 24 hours) at 7/5/2022 935-[facility] filed at 7/5/2022 1251  Gross per 24 hour   Intake 75 ml   Output 1140 ml   Net -1065 ml     URINARY CATHETER OUTPUT (Curiel):     GENERAL: alert, cooperative, no distress  I/O last 3 completed shifts: In: 3453.4 [I.V.:2314.9; NG/GT:563; IV Piggyback:575.5]  Out: 2744 [Urine:950; Emesis/NG output:425; OIIETL:522]  I/O this shift:   In: 0   Out: 75 [Drains:75]    LABS  Recent Labs     07/05/22  0436   WBC 14.3*   HGB 9.4*   HCT 29.0*         K 3.2*      CO2 23   BUN 39*   CREATININE 1.8*   PHOS 2.5   CALCIUM 7.6*     CBC with Differential:    Lab Results   Component Value Date/Time    WBC 14.3 07/05/2022 04:36 AM    RBC 3.28 07/05/2022 04:36 AM    RBC 4.41 08/02/2016 10:34 AM    HGB 9.4 07/05/2022 04:36 AM    HCT 29.0 07/05/2022 04:36 AM     07/05/2022 04:36 AM    MCV 88.2 07/05/2022 04:36 AM    MCH 28.6 07/05/2022 04:36 AM    MCHC 32.4 07/05/2022 04:36 AM    RDW 15.2 07/05/2022 04:36 AM    BANDSPCT 1 07/05/2022 04:36 AM    LYMPHOPCT 3.0 07/05/2022 04:36 AM    LYMPHOPCT 25.3 08/02/2016 10:34 AM    MONOPCT 4.0 07/05/2022 04:36 AM    BASOPCT 0.0 07/05/2022 04:36 AM    MONOSABS 0.6 07/05/2022 04:36 AM    LYMPHSABS 0.4 07/05/2022 04:36 AM    EOSABS 0.0 07/05/2022 04:36 AM    BASOSABS 0.0 07/05/2022 04:36 AM     CMP:    Lab Results   Component Value Date/Time     07/05/2022 04:36 AM    K 3.2 07/05/2022 04:36 AM    K 3.8 06/29/2022 05:19 AM     07/05/2022 04:36 AM    CO2 23 07/05/2022 04:36 AM    BUN 39 07/05/2022 04:36 AM    CREATININE 1.8 07/05/2022 04:36 AM    GFRAA 33 07/05/2022 04:36 AM    AGRATIO 0.8 06/29/2022 05:19 AM    LABGLOM 27 07/05/2022 04:36 AM    GLUCOSE 177 07/05/2022 04:36 AM    GLUCOSE 85 08/02/2016 10:34 AM    PROT 4.7 06/29/2022 05:19 AM    PROT 5.7 08/02/2016 10:34 AM    LABALBU 1.9 07/05/2022 04:36 AM    CALCIUM 7.6 07/05/2022 04:36 AM    BILITOT 0.6 06/29/2022 05:19 AM    ALKPHOS 84 06/29/2022 05:19 AM    AST 49 06/29/2022 05:19 AM    ALT 48 06/29/2022 05:19 AM         ILAN Crowley - CNP  Electronically signed 7/5/2022 at 1:14 PM

## 2022-07-05 NOTE — PROGRESS NOTES
Page sent to Surgery team to clarify a verbal order regarding TF resuming via Jejunostomy. Awaiting a call back. Per January from surgery hold the TF at this time.  January will re-evaluate in the AM.

## 2022-07-05 NOTE — FLOWSHEET NOTE
Pt appears to be resting comfortably. VSS. Per pt, no new needs at this time.         07/05/22 0013   Vital Signs   Temp 97.6 °F (36.4 °C)   Temp Source Oral   Heart Rate 84   Heart Rate Source Monitor   Resp 16   /73   BP Location Left upper arm   BP Method Automatic   MAP (Calculated) 92   Patient Position Semi fowlers   Oxygen Therapy   SpO2 95 %   O2 Device Nasal cannula   O2 Flow Rate (L/min) 2 L/min   Height and Weight   Weight 165 lb (74.8 kg)   Weight Method Bed scale   BMI (Calculated) 28.4

## 2022-07-05 NOTE — PROGRESS NOTES
Pt lost IV access, page January from surgery. Update:  Verbal orders obtained for a PICC. Nephro agrees with the decision of a central line. IR team called and consent obtained.

## 2022-07-05 NOTE — PROGRESS NOTES
[facility] [employer]  Nephrology follow-up note           Reason for Consult: DA   Requesting Physician:  Dr. Marmolejo Heads history  Patient is strictly n.p.o.  IV went bad and needs PICC line  Renal function improving    ROS: No fever or chills. Social: no Family at bedside. Scheduled Meds:   dilTIAZem  180 mg Oral Daily    pantoprazole  40 mg IntraVENous BID    heparin (porcine)  5,000 Units SubCUTAneous 3 times per day    piperacillin-tazobactam  3,375 mg IntraVENous Q12H    sodium chloride flush  5-40 mL IntraVENous 2 times per day     Continuous Infusions:   dextrose 5 % and 0.2 % NaCl 50 mL/hr at 07/04/22 1404    dextrose Stopped (07/01/22 0410)    sodium chloride Stopped (07/02/22 1317)     PRN Meds:.glucose, dextrose bolus **OR** dextrose bolus, glucagon (rDNA), dextrose, perflutren lipid microspheres, metoprolol, phenol, sodium chloride flush, sodium chloride, ondansetron **OR** ondansetron, morphine **OR** morphine, naloxone    History of Present Illness on 6/30/22:    74 y.o. yo female with PMH of colon cancer hypothyroidism, HLD, OA on mobic who is admitted for abd pain and bowel perf  Pt had been at [facility] from 6/23-24/22 for COVID pneumonia. Was treated with dex. Was also on meloxicam  Presented to ED on 6/27 for abd pain had CTA of chest/a/p and was discharged on naproxen  Came again on 6/28 and was noted to have bowel perf and underwent immediate surgery  Cr was 0.6 on 6/27, was 1.2 on 6/28 which increased to 2.4 on 6/29 and 3.9 on 6/30 when nephrology has been consulted, pt has been getting zosyn after surgery  Her BP dropped in 6/28 to 90s few times.  On 6/30 has had afib rvr    Physical exam:   Constitutional:  VITALS:  /74   Pulse 80   Temp 98 °F (36.7 °C) (Oral)   Resp 16   Ht 5' 4\" (1.626 m)   Wt 165 lb (74.8 kg)   SpO2 95%   BMI 28.32 kg/m²      Gen: alert, awake  Neck: No JVD  Skin: Unremarkable  Cardiovascular:  S1, S2 without m/r/g   Respiratory: diminished  Abdomen:  soft, nt, nd,   Extremities: no lower extremity edema  Neuro/Psy: AAoriented times 3 ; moves all 4 ext    Data/  No results for input(s): WBC, HGB, HCT, MCV, PLT in the last 72 hours. Recent Labs     07/03/22  0433 07/04/22  0526 07/05/22  0436    144 139   K 3.1* 3.4* 3.2*    109 106   CO2 24 24 23   GLUCOSE 94 118* 177*   PHOS 3.1 2.8 2.5   BUN 46* 40* 39*   CREATININE 2.3* 2.0* 1.8*   LABGLOM 21* 24* 27*   GFRAA 25* 29* 33*     CTA chest/a/p on 6/27/22  Impression   Chest-       1. No pulmonary embolus. 2. Multifocal ground-glass opacities, primarily in the mid and upper lungs. Atypical pneumonia, including COVID etiology is considered.  Other causes of   pneumonia and pneumonitis, such as hypersensitivity, are also possible. ---       Abdomen and pelvis-       1. Mural thickening of the gastric antrum.  Gastritis is considered. 2. Right inguinal hernia with small bowel content; no incarceration. 3. 2.8 cm aneurysm of the infrarenal abdominal aorta.  5 year follow-up   imaging is recommended. 4. Nonobstructive nephrolithiasis     CT a/p wo contrast 6/28/22  Impression   1. New large perforation involving the posterior gastric antrum likely from   ulcerative disease causing a moderate amount of pneumoperitoneum and ascites   throughout the abdomen and pelvis. 2. New trace left pleural effusion. 3. Residual contrast within the bilateral kidneys can be seen in setting of   renal failure. UA w 6-10 granular casts, 100 protein, RBC>100; WBCs 6-10  Urine Na 62 cr 79    Assessment  -DA likely has developed ATN w hypovolemia, hypotension, NSAIDs use and contrast use.      -Perforated prepyloric ulcer s/p laparotomy with repair n jejunostomy insertion on 6/28/22   On zosyn     -Metabolic acidosis from DA     -Hypertension    -afib rvr per cardiology    -recent Stephan from 6/22-24/22    -h/o colon cancer    Plan  -Continue volume expansion  -PRN potassium

## 2022-07-05 NOTE — PROGRESS NOTES
Total JERRI output for shift 290mL, yellow purulent drainage.     6200-1407- 90mL  0881-4431- 50mL  8972-4415- 50mL   1379-6131- 70mL  1314-4045- 30mL

## 2022-07-05 NOTE — CARE COORDINATION
INTERDISCIPLINARY PLAN OF CARE CONFERENCE    Date/Time: 7/5/2022 11:46 AM  Completed by: Naya Coreas RN, Case Management      Patient Name:  Kim Beebe  YOB: 1950  Admitting Diagnosis: Gastric perforation, acute [K31.89]  Perforated peptic ulcer ([facility] [address] [K27.5]     Admit Date/Time:  6/28/2022  6:58 AM    Chart reviewed. Interdisciplinary team contacted or reviewed plan related to patient progress and discharge plans. Disciplines included Case Management, Nursing, and Dietitian. Current Status: IP 06/28/2022  PT/OT recommendation for discharge plan of care: Ordered      Expected D/C Disposition:  TBD  Confirmed plan with patient (sleeping) Spouse at bedside  Discharge Plan Comments: Chart reviewed. Met with pt and spouse at bedside and explained the role of the CM. Plan is for patient to return home. PT/OT ordered when pt able to tolerate. Pt is sleeping soundly. +NGT/NPO. +CM following for possible home care needs/Tube feeding.        Home O2 in place on admit: No  Pt informed of need to bring portable home O2 tank on day of discharge for nursing to connect prior to leaving:  No  Verbalized agreement/Understanding:  Not Indicated

## 2022-07-05 NOTE — PROGRESS NOTES
Zosyn-Day 3  Crcl 27ml/min  Will change Zosyn to q8h for Crcl > 20ml/min  Anabell RADER 7/5/20221:15 PM  .

## 2022-07-06 DIAGNOSIS — I48.91 ATRIAL FIBRILLATION WITH RVR (HCC): ICD-10-CM

## 2022-07-06 DIAGNOSIS — I48.0 PAROXYSMAL ATRIAL FIBRILLATION (HCC): Primary | ICD-10-CM

## 2022-07-06 LAB
ALBUMIN SERPL-MCNC: 1.7 G/DL (ref 3.4–5)
ANION GAP SERPL CALCULATED.3IONS-SCNC: 10 MMOL/L (ref 3–16)
BUN BLDV-MCNC: 32 MG/DL (ref 7–20)
CALCIUM SERPL-MCNC: 7.9 MG/DL (ref 8.3–10.6)
CHLORIDE BLD-SCNC: 106 MMOL/L (ref 99–110)
CO2: 25 MMOL/L (ref 21–32)
CREAT SERPL-MCNC: 1.8 MG/DL (ref 0.6–1.2)
GFR AFRICAN AMERICAN: 33
GFR NON-AFRICAN AMERICAN: 27
GLUCOSE BLD-MCNC: 77 MG/DL (ref 70–99)
GLUCOSE BLD-MCNC: 88 MG/DL (ref 70–99)
GLUCOSE BLD-MCNC: 89 MG/DL (ref 70–99)
GLUCOSE BLD-MCNC: 91 MG/DL (ref 70–99)
GLUCOSE BLD-MCNC: 94 MG/DL (ref 70–99)
GLUCOSE BLD-MCNC: 96 MG/DL (ref 70–99)
GLUCOSE BLD-MCNC: 99 MG/DL (ref 70–99)
LIPASE: 36 U/L (ref 13–60)
PERFORMED ON: NORMAL
PHOSPHORUS: 3.1 MG/DL (ref 2.5–4.9)
POTASSIUM SERPL-SCNC: 3.4 MMOL/L (ref 3.5–5.1)
SODIUM BLD-SCNC: 141 MMOL/L (ref 136–145)

## 2022-07-06 PROCEDURE — 6360000002 HC RX W HCPCS: Performed by: NURSE PRACTITIONER

## 2022-07-06 PROCEDURE — 6360000002 HC RX W HCPCS: Performed by: SURGERY

## 2022-07-06 PROCEDURE — 99232 SBSQ HOSP IP/OBS MODERATE 35: CPT | Performed by: NURSE PRACTITIONER

## 2022-07-06 PROCEDURE — 83690 ASSAY OF LIPASE: CPT

## 2022-07-06 PROCEDURE — C9113 INJ PANTOPRAZOLE SODIUM, VIA: HCPCS | Performed by: NURSE PRACTITIONER

## 2022-07-06 PROCEDURE — 6370000000 HC RX 637 (ALT 250 FOR IP): Performed by: NURSE PRACTITIONER

## 2022-07-06 PROCEDURE — 2580000003 HC RX 258: Performed by: NURSE PRACTITIONER

## 2022-07-06 PROCEDURE — 80069 RENAL FUNCTION PANEL: CPT

## 2022-07-06 PROCEDURE — 99024 POSTOP FOLLOW-UP VISIT: CPT | Performed by: NURSE PRACTITIONER

## 2022-07-06 PROCEDURE — 6370000000 HC RX 637 (ALT 250 FOR IP): Performed by: INTERNAL MEDICINE

## 2022-07-06 PROCEDURE — 6360000002 HC RX W HCPCS: Performed by: INTERNAL MEDICINE

## 2022-07-06 PROCEDURE — 97110 THERAPEUTIC EXERCISES: CPT

## 2022-07-06 PROCEDURE — 94761 N-INVAS EAR/PLS OXIMETRY MLT: CPT

## 2022-07-06 PROCEDURE — 2580000003 HC RX 258: Performed by: SURGERY

## 2022-07-06 PROCEDURE — 97112 NEUROMUSCULAR REEDUCATION: CPT

## 2022-07-06 PROCEDURE — 97530 THERAPEUTIC ACTIVITIES: CPT

## 2022-07-06 PROCEDURE — 2700000000 HC OXYGEN THERAPY PER DAY

## 2022-07-06 PROCEDURE — 1200000000 HC SEMI PRIVATE

## 2022-07-06 RX ORDER — POTASSIUM CHLORIDE 7.45 MG/ML
10 INJECTION INTRAVENOUS
Status: COMPLETED | OUTPATIENT
Start: 2022-07-06 | End: 2022-07-06

## 2022-07-06 RX ADMIN — POTASSIUM CHLORIDE 10 MEQ: 7.46 INJECTION, SOLUTION INTRAVENOUS at 10:50

## 2022-07-06 RX ADMIN — DILTIAZEM HYDROCHLORIDE 180 MG: 180 CAPSULE, COATED, EXTENDED RELEASE ORAL at 08:33

## 2022-07-06 RX ADMIN — MORPHINE SULFATE 2 MG: 2 INJECTION, SOLUTION INTRAMUSCULAR; INTRAVENOUS at 19:25

## 2022-07-06 RX ADMIN — POTASSIUM CHLORIDE 10 MEQ: 7.46 INJECTION, SOLUTION INTRAVENOUS at 09:49

## 2022-07-06 RX ADMIN — SODIUM CHLORIDE, PRESERVATIVE FREE 10 ML: 5 INJECTION INTRAVENOUS at 08:33

## 2022-07-06 RX ADMIN — PIPERACILLIN AND TAZOBACTAM 3375 MG: 3; .375 INJECTION, POWDER, LYOPHILIZED, FOR SOLUTION INTRAVENOUS at 00:52

## 2022-07-06 RX ADMIN — HEPARIN SODIUM 5000 UNITS: 5000 INJECTION INTRAVENOUS; SUBCUTANEOUS at 21:30

## 2022-07-06 RX ADMIN — PIPERACILLIN AND TAZOBACTAM 3375 MG: 3; .375 INJECTION, POWDER, LYOPHILIZED, FOR SOLUTION INTRAVENOUS at 16:10

## 2022-07-06 RX ADMIN — HEPARIN SODIUM 5000 UNITS: 5000 INJECTION INTRAVENOUS; SUBCUTANEOUS at 13:59

## 2022-07-06 RX ADMIN — SODIUM CHLORIDE, PRESERVATIVE FREE 10 ML: 5 INJECTION INTRAVENOUS at 19:52

## 2022-07-06 RX ADMIN — POTASSIUM CHLORIDE 10 MEQ: 7.46 INJECTION, SOLUTION INTRAVENOUS at 12:23

## 2022-07-06 RX ADMIN — PANTOPRAZOLE SODIUM 40 MG: 40 INJECTION, POWDER, LYOPHILIZED, FOR SOLUTION INTRAVENOUS at 19:52

## 2022-07-06 RX ADMIN — MORPHINE SULFATE 2 MG: 2 INJECTION, SOLUTION INTRAMUSCULAR; INTRAVENOUS at 11:41

## 2022-07-06 RX ADMIN — Medication 1 SPRAY: at 22:45

## 2022-07-06 RX ADMIN — PIPERACILLIN AND TAZOBACTAM 3375 MG: 3; .375 INJECTION, POWDER, LYOPHILIZED, FOR SOLUTION INTRAVENOUS at 08:32

## 2022-07-06 RX ADMIN — POTASSIUM CHLORIDE 10 MEQ: 7.46 INJECTION, SOLUTION INTRAVENOUS at 08:48

## 2022-07-06 RX ADMIN — MORPHINE SULFATE 1 MG: 2 INJECTION, SOLUTION INTRAMUSCULAR; INTRAVENOUS at 22:45

## 2022-07-06 RX ADMIN — HEPARIN SODIUM 5000 UNITS: 5000 INJECTION INTRAVENOUS; SUBCUTANEOUS at 05:35

## 2022-07-06 RX ADMIN — MORPHINE SULFATE 2 MG: 2 INJECTION, SOLUTION INTRAMUSCULAR; INTRAVENOUS at 13:59

## 2022-07-06 RX ADMIN — PANTOPRAZOLE SODIUM 40 MG: 40 INJECTION, POWDER, LYOPHILIZED, FOR SOLUTION INTRAVENOUS at 08:33

## 2022-07-06 RX ADMIN — SODIUM CHLORIDE: 9 INJECTION, SOLUTION INTRAVENOUS at 08:47

## 2022-07-06 ASSESSMENT — PAIN - FUNCTIONAL ASSESSMENT
PAIN_FUNCTIONAL_ASSESSMENT: PREVENTS OR INTERFERES SOME ACTIVE ACTIVITIES AND ADLS

## 2022-07-06 ASSESSMENT — PAIN DESCRIPTION - LOCATION
LOCATION: CHEST;ABDOMEN
LOCATION: ABDOMEN

## 2022-07-06 ASSESSMENT — PAIN DESCRIPTION - DESCRIPTORS
DESCRIPTORS: DISCOMFORT

## 2022-07-06 ASSESSMENT — PAIN SCALES - GENERAL
PAINLEVEL_OUTOF10: 8
PAINLEVEL_OUTOF10: 9
PAINLEVEL_OUTOF10: 4
PAINLEVEL_OUTOF10: 4

## 2022-07-06 ASSESSMENT — PAIN DESCRIPTION - PAIN TYPE
TYPE: SURGICAL PAIN

## 2022-07-06 ASSESSMENT — PAIN DESCRIPTION - FREQUENCY
FREQUENCY: CONTINUOUS

## 2022-07-06 ASSESSMENT — ENCOUNTER SYMPTOMS
ABDOMINAL PAIN: 1
NAUSEA: 1
RESPIRATORY NEGATIVE: 1

## 2022-07-06 ASSESSMENT — PAIN DESCRIPTION - ONSET
ONSET: ON-GOING

## 2022-07-06 ASSESSMENT — PAIN DESCRIPTION - ORIENTATION
ORIENTATION: UPPER;MID
ORIENTATION: MID
ORIENTATION: MID

## 2022-07-06 ASSESSMENT — PAIN SCALES - WONG BAKER: WONGBAKER_NUMERICALRESPONSE: 0

## 2022-07-06 NOTE — PROGRESS NOTES
Inpatient Physical Therapy Daily Treatment Note    Unit: PCU  Date:  7/6/2022  Patient Name:    Urbano Strange  Admitting diagnosis:  Gastric perforation, acute [K31.89]  Perforated peptic ulcer ([facility] [address] [K27.5]  Admit Date:  6/28/2022  Precautions/Restrictions:  Fall risk, Bed/chair alarm, Lines -IV, Supplemental O2 (2L/min), Curiel catheter, NG tube, PICC left and Drains (JERRI), Telemetry, Continuous pulse oximetry and midline abdominal incision, telesitter      Discharge Recommendations: SNF (if declined SNF will need home with 24hr assist x 2 persons with home PT)  DME needs for discharge: defer to facility       Therapy recommendation for EMS Transport: requires transport by cot due to need of 2 person assist for functional transfers and poor sitting tolerance due to abdominal pain    Therapy recommendations for staff:   Assist of 2 with use of rolling walker (RW) and gait belt for all transfers and ambulation to/from [facility]  to/from chair   Assist of 2 for bed mobility (use log rolling and provide pillow for bracing during coughing and comfort)    History of Present Illness: S/P emergent repair of perf ulcer with j-tube insertion on 6/28 by Dr. Kaye Razo, subsequent DA and hypertension. Recent COVID from 6/22-24/22, h/o colon cancer. Home Health S4 Level Recommendation: Level 3 Safety  AM-PAC Mobility Score   AM-PAC Inpatient Mobility Raw Score : 11    Treatment Time:  0702 - 1515  Treatment number: 2  Timed Code Treatment Minutes: 55 minutes  Total Treatment Minutes:  55  minutes    Cognition    A&O Person, Place and Situation   Able to follow 1 step commands    Subjective  Patient lying supine in bed with family at bedside   Pt agreeable to this PT tx. Pain   Yes  Location: at abdominal surgical site  Rating:    10/10  Pain Medicine Status: Received pain med prior to tx     Bed Mobility   Supine to Sit:    Max A   Sit to Supine: Mod A  and 2 persons  Rolling: Mod A   Scooting:    Total A using [employer] bed controls, scooting in sitting with Mod A    Transfer Training     Sit to stand:   Patient refused secondary to abdominal pain  Stand to sit:   Patient refused secondary to abdominal pain  Bed to Chair:   Patient refused secondary to abdominal pain with use of N/A    Gait Training pt declined to ambulate; pt ambulated 0 ft. Distance:      0 ft  Deviations (firm surface/linoleum):  N/A  Assistive Device Used:    N/A  Level of Assist:    Not Tested  Comment: Patient refused secondary to abdominal pain. Stair Training deferred, pt unsafe/not appropriate to complete stairs at this time  # of Steps:   N/A  Level of Assist:  Not Tested  UE Support:  NA  Assistive Device:  N/A  Pattern:   N/A  Comments:     Therapeutic Exercise all completed bilaterally unless indicated  Ankle Pumps x 10 reps  Heel slides x 10 reps  LAQ x 10 reps    Balance  Sitting:  Fair -; CGA  Comments: ~20 min EOB sitting, patient attempted scooting in the bed, LEs exercises, donning and doffing of the gown. Standing: Not tested; Not Tested  Comments: Patient refused secondary to abdominal pain. Patient Education      Role of PT, POC, Discharge recommendations, DC recommendations, safety awareness, transfer techniques, pursed lip breathing, energy conservation, pacing activity and calling for assist with mobility. Positioning Needs       Pt in bed, alarm set, positioned in proper neutral alignment and pressure relief provided. Call light provided and all needs within reach    ROM Measurements N/A  Knee Flexion:  Knee Extension:     Activity Tolerance   Pt completed therapy session with Pain noted with all functional mobility. SpO2: 92 -96% on 2L/min of O2 throughout the session  HR: 83 - 92 bpm    Other  N/A    Assessment :  Patient was limited in functional mobility secondary to abdominal pain despite of IV pain medications received prior to session.  Patient lacked motivation to be out side the bed and participate in functional mobility. Patient was educated about the PT POC and importance of early mobility. Patient verbalized agreement but still refused to perform standing activities. Recommending SNF upon discharge as patient functioning well below baseline, demonstrates good rehab potential and unable to return home due to limited or no family support, inability to negotiate stairs to enter home/bedroom/bathroom, burden of care beyond caregiver ability, home environment not conducive to patient recovery and limited safety awareness. Goals (all goals ongoing unless otherwise indicated)  To be met in 3 visits:  1). Independent with LE Ex x 10 reps  2). Supine to/from sit: Mod A  3). Bed to chair: CGA with walker     To be met in 6 visits:  1). Supine to/from sit: Supervision  2). Sit to/from stand: Supervision  3). Bed to chair: Supervision  4). Gait: Ambulate 50 ft.  with  SBA and use of LRAD (least restrictive assistive device)  5). Tolerate B LE exercises 3 sets of 10-15 reps  6). Ascend/descend 2 steps with CGA with use of hand rail unilateral and LRAD (least restrictive assistive device)    Plan   Continue with plan of care. Signature: Demetria Spurling, MS PT, # [id number]    If patient discharges from this facility prior to next visit, this note will serve as the Discharge Summary.

## 2022-07-06 NOTE — FLOWSHEET NOTE
07/06/22 1915   Vital Signs   /63   BP Location Right upper arm   MAP (Calculated) 82.33   Pain Assessment   Pain Assessment 0-10   Pain Level 8   Patient's Stated Pain Goal 0 - No pain   Pain Location Abdomen   Pain Orientation Mid   Pain Descriptors Discomfort   Functional Pain Assessment Prevents or interferes some active activities and ADLs   Pain Type Surgical pain   Pain Frequency Continuous   Pain Onset On-going   Non-Pharmaceutical Pain Intervention(s) Repositioned   PRN morphine administered at this time. Report given to ABDULAZIZ Villaseñor. Care transferred.

## 2022-07-06 NOTE — FLOWSHEET NOTE
Shift assessment complete. See flowsheet for full assessment. Pt denies any needs, call light within reach.        07/05/22 2016   Vital Signs   Temp 98.7 °F (37.1 °C)   Temp Source Oral   Heart Rate 93   Heart Rate Source Monitor   Resp 18   /61   BP Location Right upper arm   BP Method Automatic   MAP (Calculated) 81   Patient Position Semi fowlers   Level of Consciousness Alert (0)   MEWS Score 1   Pain Assessment   Pain Assessment 0-10   Pain Level 7   Oxygen Therapy   SpO2 93 %   O2 Device Nasal cannula   O2 Flow Rate (L/min) 2 L/min

## 2022-07-06 NOTE — FLOWSHEET NOTE
07/06/22 1141   Pain Assessment   Pain Assessment 0-10   Pain Level 8   Gonsalez-Baker Pain Rating 0   Patient's Stated Pain Goal 0 - No pain   Pain Location Abdomen   Pain Descriptors Discomfort   Functional Pain Assessment Prevents or interferes some active activities and ADLs   Pain Type Surgical pain   Pain Frequency Continuous   Pain Onset On-going   Non-Pharmaceutical Pain Intervention(s) Repositioned   Patient requesting pain medication. Provided. GABRIEL Sin updated on JERRI drainage.

## 2022-07-06 NOTE — PROGRESS NOTES
General Surgery - January Romano, APRN - CNP, CNP  Daily Progress Note    Pt Name: Kishan Mcqueen Record Number: [id number]  Date of Birth 1950   Today's Date: 7/6/2022    ASSESSMENT  1. POD #8 s/p repair of perf ulcer, j-tube insertion  2. ABD: J-tube in place, JERRI drain in place, staples look good: small amount of bloody drainage noted, dressing changed, + diffuse tenderness which appears slightly improved from yesterday, +mild distention, NGT in place, no N/V, +flatus, + BMs x 4 yesterday    3. ARF improving: Cr 1.8  4. K+ 3.2  5. Leuks 13.3->15.9->14.2->14.3   6. Lipase 123->129 (pancreas was secondarily inflamed on ER CT  7. VSS  8. Right arm Bicarb infiltration: appears much improved  9. NGT: no output recorded  10. F/C 350 ml out  11. JERRI 470 ml out : bilious today  12. Pt states \"I am just in some pain. \"    PLAN  1. NGT to CLWS  2. Renal seeing: OK to d/c [facility] when Rylee Oris with renal   3. Strict I&Os  4. Pain control   5. Zosyn  6. PT/OT: Sit on edge of bed/up to chair today/ambulate  7. Protonix BID  8. IVF per renal  9. Labs in the am.   10. Start trophic TF today  11. Pt looks OK POD #8: Pt with bilious JERRI drainage suggestive of leak from perf ulcer repair. JERRI should be able to adequately drain fluid. Will restart trophic TF today. Discussed with pt and her family she needs to get out of bedand use IS    Tej Landau is virtually unchanged from yesterday. Pain is somewhat well controlled. She has no nausea and no vomiting. She has passed flatus and has had a bowel movement. She is NPO. Current activity is up with assistance    OBJECTIVE  VITALS:  height is 5' 4\" (1.626 m) and weight is 165 lb (74.8 kg). Her oral temperature is 97.1 °F (36.2 °C). Her blood pressure is 134/68 and her pulse is 78. Her respiration is 16 and oxygen saturation is 97%.    VITALS:  /68   Pulse 78   Temp 97.1 °F (36.2 °C) (Oral)   Resp 16   Ht 5' 4\" (1.626 m)   Wt 165 lb (74.8 kg)   SpO2 97% 06/29/2022 05:19 AM    PROT 5.7 08/02/2016 10:34 AM    LABALBU 1.7 07/06/2022 04:34 AM    CALCIUM 7.9 07/06/2022 04:34 AM    BILITOT 0.6 06/29/2022 05:19 AM    ALKPHOS 84 06/29/2022 05:19 AM    AST 49 06/29/2022 05:19 AM    ALT 48 06/29/2022 05:19 AM         ILAN Urbina - CNP  Electronically signed 7/6/2022 at 1:52 PM     I agree with the progress note and care plan of January Paiz CNP.      Zuri Nava MD

## 2022-07-06 NOTE — CARE COORDINATION
[facility]  Received referral regarding [facility] [place]. services from [address]. Liaison to follow up prior to discharge. Possible need for TF.       Electronically signed by Elkin Hubbard RN on 7/6/2022 at 1:43 PM

## 2022-07-06 NOTE — PROGRESS NOTES
[facility]  Cardiology  Progress Note    Admission date:  2022    Reason for follow up visit: AF    HPI/CC: Shaylee Ziegler is a 74 y.o. female who presented 2022 for abdominal pain and CT showed pneumoperitoneum due to perforated viscus, underwent emergent exp lap. Cardiology consulted for post op AF RVR. Echo showed EF 50%. She has also been treated for DA. Rhythm has been sinus. Subjective: Resting comfortably. Family at bedside. Vitals:  Blood pressure 123/60, pulse 78, temperature 98.2 °F (36.8 °C), temperature source Oral, resp. rate 18, height 5' 4\" (1.626 m), weight 165 lb (74.8 kg), SpO2 98 %.   Temp  Av.8 °F (36.6 °C)  Min: 97.1 °F (36.2 °C)  Max: 98.7 °F (37.1 °C)  Pulse  Av  Min: 75  Max: 93  BP  Min: 121/61  Max: 134/68  SpO2  Av.5 %  Min: 86 %  Max: 98 %    24 hour I/O    Intake/Output Summary (Last 24 hours) at 2022 1433  Last data filed at 2022 1144  Gross per 24 hour   Intake 90 ml   Output 1325 ml   Net -1235 ml     Current Facility-Administered Medications   Medication Dose Route Frequency Provider Last Rate Last Admin    piperacillin-tazobactam (ZOSYN) 3,375 mg in sodium chloride 0.9 % 100 mL IVPB (mini-bag)  3,375 mg IntraVENous Q8H Onofre Jean MD   Stopped at 22 1232    lidocaine 1 % injection 5 mL  5 mL IntraDERmal Once [email or web address], APRN - CNP        dextrose 5 % and 0.2 % NaCl infusion   IntraVENous Continuous Mariajose Brown MD 50 mL/hr at 22 1615 New Bag at 22 1615    dilTIAZem (CARDIZEM CD) extended release capsule 180 mg  180 mg Oral Daily Esme Patel MD   180 mg at 22 0833    pantoprazole (PROTONIX) injection 40 mg  40 mg IntraVENous BID January Mathis APRN - CNP   40 mg at 22 8795    glucose chewable tablet 16 g  4 tablet Oral PRN Scarlet Cage MD        dextrose bolus 10% 125 mL  125 mL IntraVENous PRN Scarlet Cage MD   Stopped at 22 0151    Or    dextrose bolus 10% appears fatigue, stated age, +NGT  Eyes: PERRL, sclera nonicteric  Neck:  Supple, no masses, no thyroidmegaly, no JVD  Skin:  Warm and dry; no rash or lesions  Heart: Regular, normal apex, S1 and S2 normal, no M/G/R  Lungs:  Normal respiratory effort; clear; no wheezing/rhonchi/rales  Abdomen: soft, non tender, + bowel sounds  Extremities:  No edema or cyanosis; no clubbing  Neuro: alert and oriented, moves legs and arms equally, normal mood and affect    Data Reviewed:    Echo 7/1/2022:  Left ventricular size is mildly increased. Normal left ventricular wall thickness. Left ventricular systolic function is low normal with ejection fraction   estimated at 50 %. Grade I diastolic dysfunction with normal filling pressure. The right ventricle is normal in size and function. Aortic valve sclerosis without aortic stenosis. Mild aortic root enlargement with trace aortic regurgitation. Normal systolic pulmonary artery pressure (SPAP) estimated at 31 mmHg (RA   pressure 8 mmHg).     Lab Reviewed:     Renal Profile:  Lab Results   Component Value Date/Time    CREATININE 1.8 07/06/2022 04:34 AM    BUN 32 07/06/2022 04:34 AM     07/06/2022 04:34 AM    K 3.4 07/06/2022 04:34 AM    K 3.8 06/29/2022 05:19 AM     07/06/2022 04:34 AM    CO2 25 07/06/2022 04:34 AM     CBC:    Lab Results   Component Value Date/Time    WBC 14.3 07/05/2022 04:36 AM    RBC 3.28 07/05/2022 04:36 AM    RBC 4.41 08/02/2016 10:34 AM    HGB 9.4 07/05/2022 04:36 AM    HCT 29.0 07/05/2022 04:36 AM    MCV 88.2 07/05/2022 04:36 AM    RDW 15.2 07/05/2022 04:36 AM     07/05/2022 04:36 AM     BNP:  No results found for: PROBNP  Fasting Lipid Panel:    Lab Results   Component Value Date/Time    HDL 39 10/25/2018 11:39 AM     Cardiac Enzymes:  CK/MbTroponin  Lab Results   Component Value Date/Time    TROPONINI <0.01 06/30/2022 04:03 PM     PT/ INR   Lab Results   Component Value Date/Time    INR 1.57 06/29/2022 05:19 AM    PROTIME 18.6 06/29/2022 05:19 AM     PTT No results found for: PTT   Lab Results   Component Value Date/Time    MG 2.00 07/01/2022 06:50 AM      Lab Results   Component Value Date/Time    TSH 1.00 06/11/2019 01:17 PM     All labs and imaging reviewed today    Assessment:  Paroxysmal atrial fibrillation: new diagnosis, now stable in sinus   - [id number] score 2   - noted post op  Perforated viscus with pneumoperitoneum possibly due to PUD s/p emergency exp lap 6/28/2022  DA  Hypothyroidism  Hypokalemia  Anemia    Plan:   1. Continue diltiazem 180 mg daily  2. Anticoagulation for PAF when ok from surgical standpoint  3. Monitor telemetry  4. Will need 30 day monitor at discharge  5. Cardiology will sign off, please call if needed. Follow up will be arranged.      ILAN Dean-CNP  [facility]  [phone]

## 2022-07-06 NOTE — FLOWSHEET NOTE
07/06/22 1353   Vital Signs   Temp 98.2 °F (36.8 °C)   Temp Source Oral   Heart Rate 78   Heart Rate Source Monitor   Resp 18   /60   BP Location Right upper arm   MAP (Calculated) 81   Patient Position Semi fowlers   Level of Consciousness Alert (0)   MEWS Score 1   Pain Assessment   Pain Assessment 0-10   Pain Level 8   Patient's Stated Pain Goal 0 - No pain   Pain Location Abdomen   Pain Orientation Mid   Pain Descriptors Discomfort   Functional Pain Assessment Prevents or interferes some active activities and ADLs   Pain Type Surgical pain   Pain Frequency Continuous   Pain Onset On-going   Non-Pharmaceutical Pain Intervention(s) Repositioned   Oxygen Therapy   SpO2 98 %   Pulse Oximetry Type Intermittent   Pulse Oximeter Device Mode Intermittent   Pulse Oximeter Device Location Finger   O2 Device Nasal cannula   O2 Flow Rate (L/min) 2 L/min   PRN morphine administered at this time. RN encouraged ambulation at this time. Patient declined. Writer educated patient and family at this time on the importance of getting out of bed.

## 2022-07-06 NOTE — FLOWSHEET NOTE
Pt appears to be resting comfortably. VSS. Per pt, no new needs at this time.         07/06/22 0019   Vital Signs   Temp 97.1 °F (36.2 °C)   Temp Source Axillary   Heart Rate 75   Heart Rate Source Monitor   Resp 18   /68   BP Location Left lower arm   BP Method Automatic   MAP (Calculated) 88   Patient Position Semi fowlers   Oxygen Therapy   SpO2 94 %   O2 Device Nasal cannula   O2 Flow Rate (L/min) 2 L/min

## 2022-07-06 NOTE — PROGRESS NOTES
Report given to ABDULAZIZ Patel. Amanda aware of increased JERRI drainage and NPO diet. Care transferred at this time.

## 2022-07-06 NOTE — CARE COORDINATION
INTERDISCIPLINARY PLAN OF CARE CONFERENCE    Date/Time: 7/6/2022 2:36 PM  Completed by: Garrett Vargas RN, Case Management      Patient Name:  Jah Nieves  YOB: 1950  Admitting Diagnosis: Gastric perforation, acute [K31.89]  Perforated peptic ulcer ([facility] [address] [K27.5]     Admit Date/Time:  6/28/2022  6:58 AM    Chart reviewed. Interdisciplinary team contacted or reviewed plan related to patient progress and discharge plans. Disciplines included Case Management, Nursing, and Dietitian. Current Status: IP 06/28/2022  PT/OT recommendation for discharge plan of care: on hold    Expected D/C Disposition:  Home    Discharge Plan Comments: POD# 8 pre-pyloric ulcer repair. NGT. J-Tube for feeding. Ongoing issues with pain. Tf will be restarted today. Need PT/OT when pt able to tolerate. CM following for potential HHC/home TF. Referral to [facility], [address] [place] [address] to assist with benefits check [facility] and [facility] if they they are needed.

## 2022-07-06 NOTE — FLOWSHEET NOTE
07/06/22 1930   Vital Signs   Temp 99.2 °F (37.3 °C)   Temp Source Oral   Heart Rate 82   Heart Rate Source Monitor   Resp 18   /64   BP Location Right upper arm   BP Method Automatic   MAP (Calculated) 83.67   Patient Position Semi fowlers   Level of Consciousness Alert (0)   MEWS Score 1   Oxygen Therapy   SpO2 97 %   Pulse Oximetry Type Continuous   O2 Device Nasal cannula   O2 Flow Rate (L/min) 2 L/min     Shift assessment completed see flow sheet. Patient in bed alert and oriented X4. Patient on2L O2, showing no signs of distress. Evening medications given per order. 70 green output from JERRI drain. Patient is comfortable with No other needs at this time. Call light in reach.

## 2022-07-06 NOTE — PROGRESS NOTES
[facility]  Nephrology follow-up note           Reason for Consult: DA   Requesting Physician:  Dr. Ronit Watt history  Patient is a bit drowsy this morning  Creatinine at 1.8  Blood pressure stable  Continues to have significant JERRI drain      ROS: No fever or chills. Social: no Family at bedside. Scheduled Meds:   potassium chloride  10 mEq IntraVENous Q1H    piperacillin-tazobactam  3,375 mg IntraVENous Q8H    lidocaine 1 % injection  5 mL IntraDERmal Once    dilTIAZem  180 mg Oral Daily    pantoprazole  40 mg IntraVENous BID    heparin (porcine)  5,000 Units SubCUTAneous 3 times per day    sodium chloride flush  5-40 mL IntraVENous 2 times per day     Continuous Infusions:   dextrose 5 % and 0.2 % NaCl 50 mL/hr at 07/05/22 1615    dextrose Stopped (07/01/22 0410)    sodium chloride 5 mL/hr at 07/06/22 0847     PRN Meds:.glucose, dextrose bolus **OR** dextrose bolus, glucagon (rDNA), dextrose, perflutren lipid microspheres, metoprolol, phenol, sodium chloride flush, sodium chloride, ondansetron **OR** ondansetron, morphine **OR** morphine, naloxone    History of Present Illness on 6/30/22:    74 y.o. yo female with PMH of colon cancer hypothyroidism, HLD, OA on mobic who is admitted for abd pain and bowel perf  Pt had been at [facility] from 6/23-24/22 for COVID pneumonia. Was treated with dex. Was also on meloxicam  Presented to ED on 6/27 for abd pain had CTA of chest/a/p and was discharged on naproxen  Came again on 6/28 and was noted to have bowel perf and underwent immediate surgery  Cr was 0.6 on 6/27, was 1.2 on 6/28 which increased to 2.4 on 6/29 and 3.9 on 6/30 when nephrology has been consulted, pt has been getting zosyn after surgery  Her BP dropped in 6/28 to 90s few times.  On 6/30 has had afib rvr    Physical exam:   Constitutional:  VITALS:  /68   Pulse 78   Temp 97.1 °F (36.2 °C) (Oral)   Resp 16   Ht 5' 4\" (1.626 m)   Wt 165 lb (74.8 kg)   SpO2 97%   BMI 28.32 kg/m²      Gen: alert, awake  Neck: No JVD  Skin: Unremarkable  Cardiovascular:  S1, S2 without m/r/g   Respiratory: diminished  Abdomen:  soft, nt, nd,   Extremities: no lower extremity edema  Neuro/Psy: AAoriented times 3 ; moves all 4 ext    Data/  Recent Labs     07/05/22  0436   WBC 14.3*   HGB 9.4*   HCT 29.0*   MCV 88.2        Recent Labs     07/04/22  0526 07/05/22  0436 07/06/22  0434    139 141   K 3.4* 3.2* 3.4*    106 106   CO2 24 23 25   GLUCOSE 118* 177* 94   PHOS 2.8 2.5 3.1   BUN 40* 39* 32*   CREATININE 2.0* 1.8* 1.8*   LABGLOM 24* 27* 27*   GFRAA 29* 33* 33*     CTA chest/a/p on 6/27/22  Impression   Chest-       1. No pulmonary embolus. 2. Multifocal ground-glass opacities, primarily in the mid and upper lungs. Atypical pneumonia, including COVID etiology is considered.  Other causes of   pneumonia and pneumonitis, such as hypersensitivity, are also possible. ---       Abdomen and pelvis-       1. Mural thickening of the gastric antrum.  Gastritis is considered. 2. Right inguinal hernia with small bowel content; no incarceration. 3. 2.8 cm aneurysm of the infrarenal abdominal aorta.  5 year follow-up   imaging is recommended. 4. Nonobstructive nephrolithiasis     CT a/p wo contrast 6/28/22  Impression   1. New large perforation involving the posterior gastric antrum likely from   ulcerative disease causing a moderate amount of pneumoperitoneum and ascites   throughout the abdomen and pelvis. 2. New trace left pleural effusion. 3. Residual contrast within the bilateral kidneys can be seen in setting of   renal failure. UA w 6-10 granular casts, 100 protein, RBC>100; WBCs 6-10  Urine Na 62 cr 79    Assessment  -DA likely has developed ATN w hypovolemia, hypotension, NSAIDs use and contrast use.      -Perforated prepyloric ulcer s/p laparotomy with repair n jejunostomy insertion on 6/28/22   On zosyn     -Metabolic acidosis from DA     -Hypertension    -afib rvr per cardiology    -recent COVID from 6/22-24/22    -h/o colon cancer    Plan  -Continue volume expansion-currently on D5 quarter normal saline  -PRN potassium replacement-maximo 40 M EQ IV  -Renal dose meds and avoid nephrotoxins  -Follow plans from surgery       Adan Shukla MD  Office: [address] [facility]  Fax:    [phone]  [facility]

## 2022-07-07 LAB
ALBUMIN SERPL-MCNC: 1.9 G/DL (ref 3.4–5)
ANION GAP SERPL CALCULATED.3IONS-SCNC: 9 MMOL/L (ref 3–16)
BASOPHILS ABSOLUTE: 0 K/UL (ref 0–0.2)
BASOPHILS RELATIVE PERCENT: 0.1 %
BUN BLDV-MCNC: 27 MG/DL (ref 7–20)
CALCIUM SERPL-MCNC: 7.6 MG/DL (ref 8.3–10.6)
CHLORIDE BLD-SCNC: 106 MMOL/L (ref 99–110)
CO2: 25 MMOL/L (ref 21–32)
CREAT SERPL-MCNC: 1.8 MG/DL (ref 0.6–1.2)
EOSINOPHILS ABSOLUTE: 0.1 K/UL (ref 0–0.6)
EOSINOPHILS RELATIVE PERCENT: 1 %
GFR AFRICAN AMERICAN: 33
GFR NON-AFRICAN AMERICAN: 27
GLUCOSE BLD-MCNC: 112 MG/DL (ref 70–99)
GLUCOSE BLD-MCNC: 114 MG/DL (ref 70–99)
GLUCOSE BLD-MCNC: 117 MG/DL (ref 70–99)
GLUCOSE BLD-MCNC: 118 MG/DL (ref 70–99)
GLUCOSE BLD-MCNC: 119 MG/DL (ref 70–99)
GLUCOSE BLD-MCNC: 123 MG/DL (ref 70–99)
GLUCOSE BLD-MCNC: 124 MG/DL (ref 70–99)
HCT VFR BLD CALC: 24.6 % (ref 36–48)
HEMOGLOBIN: 8.1 G/DL (ref 12–16)
LYMPHOCYTES ABSOLUTE: 0.7 K/UL (ref 1–5.1)
LYMPHOCYTES RELATIVE PERCENT: 5.1 %
MCH RBC QN AUTO: 29 PG (ref 26–34)
MCHC RBC AUTO-ENTMCNC: 33 G/DL (ref 31–36)
MCV RBC AUTO: 87.8 FL (ref 80–100)
MONOCYTES ABSOLUTE: 0.6 K/UL (ref 0–1.3)
MONOCYTES RELATIVE PERCENT: 4.6 %
NEUTROPHILS ABSOLUTE: 11.9 K/UL (ref 1.7–7.7)
NEUTROPHILS RELATIVE PERCENT: 89.2 %
PDW BLD-RTO: 15.4 % (ref 12.4–15.4)
PERFORMED ON: ABNORMAL
PHOSPHORUS: 3.2 MG/DL (ref 2.5–4.9)
PLATELET # BLD: 227 K/UL (ref 135–450)
PMV BLD AUTO: 7.7 FL (ref 5–10.5)
POTASSIUM SERPL-SCNC: 3.7 MMOL/L (ref 3.5–5.1)
RBC # BLD: 2.8 M/UL (ref 4–5.2)
SODIUM BLD-SCNC: 140 MMOL/L (ref 136–145)
WBC # BLD: 13.3 K/UL (ref 4–11)

## 2022-07-07 PROCEDURE — C9113 INJ PANTOPRAZOLE SODIUM, VIA: HCPCS | Performed by: NURSE PRACTITIONER

## 2022-07-07 PROCEDURE — 97110 THERAPEUTIC EXERCISES: CPT

## 2022-07-07 PROCEDURE — 94761 N-INVAS EAR/PLS OXIMETRY MLT: CPT

## 2022-07-07 PROCEDURE — 2700000000 HC OXYGEN THERAPY PER DAY

## 2022-07-07 PROCEDURE — 97167 OT EVAL HIGH COMPLEX 60 MIN: CPT

## 2022-07-07 PROCEDURE — 80069 RENAL FUNCTION PANEL: CPT

## 2022-07-07 PROCEDURE — 99024 POSTOP FOLLOW-UP VISIT: CPT | Performed by: NURSE PRACTITIONER

## 2022-07-07 PROCEDURE — 97535 SELF CARE MNGMENT TRAINING: CPT

## 2022-07-07 PROCEDURE — 2580000003 HC RX 258: Performed by: INTERNAL MEDICINE

## 2022-07-07 PROCEDURE — 6370000000 HC RX 637 (ALT 250 FOR IP): Performed by: INTERNAL MEDICINE

## 2022-07-07 PROCEDURE — 97530 THERAPEUTIC ACTIVITIES: CPT

## 2022-07-07 PROCEDURE — 6360000002 HC RX W HCPCS: Performed by: SURGERY

## 2022-07-07 PROCEDURE — 85025 COMPLETE CBC W/AUTO DIFF WBC: CPT

## 2022-07-07 PROCEDURE — 2580000003 HC RX 258: Performed by: SURGERY

## 2022-07-07 PROCEDURE — 6370000000 HC RX 637 (ALT 250 FOR IP)

## 2022-07-07 PROCEDURE — 6360000002 HC RX W HCPCS: Performed by: NURSE PRACTITIONER

## 2022-07-07 PROCEDURE — 1200000000 HC SEMI PRIVATE

## 2022-07-07 PROCEDURE — 97112 NEUROMUSCULAR REEDUCATION: CPT

## 2022-07-07 RX ORDER — DIMETHICONE, OXYBENZONE, AND PADIMATE O 2; 2.5; 6.6 G/100G; G/100G; G/100G
STICK TOPICAL
Status: COMPLETED
Start: 2022-07-07 | End: 2022-07-07

## 2022-07-07 RX ADMIN — PANTOPRAZOLE SODIUM 40 MG: 40 INJECTION, POWDER, LYOPHILIZED, FOR SOLUTION INTRAVENOUS at 21:05

## 2022-07-07 RX ADMIN — HEPARIN SODIUM 5000 UNITS: 5000 INJECTION INTRAVENOUS; SUBCUTANEOUS at 21:05

## 2022-07-07 RX ADMIN — Medication: at 21:06

## 2022-07-07 RX ADMIN — MORPHINE SULFATE 2 MG: 2 INJECTION, SOLUTION INTRAMUSCULAR; INTRAVENOUS at 12:31

## 2022-07-07 RX ADMIN — HEPARIN SODIUM 5000 UNITS: 5000 INJECTION INTRAVENOUS; SUBCUTANEOUS at 14:42

## 2022-07-07 RX ADMIN — HEPARIN SODIUM 5000 UNITS: 5000 INJECTION INTRAVENOUS; SUBCUTANEOUS at 05:30

## 2022-07-07 RX ADMIN — MORPHINE SULFATE 2 MG: 2 INJECTION, SOLUTION INTRAMUSCULAR; INTRAVENOUS at 10:04

## 2022-07-07 RX ADMIN — PANTOPRAZOLE SODIUM 40 MG: 40 INJECTION, POWDER, LYOPHILIZED, FOR SOLUTION INTRAVENOUS at 09:26

## 2022-07-07 RX ADMIN — DEXTROSE AND SODIUM CHLORIDE: 5; 200 INJECTION, SOLUTION INTRAVENOUS at 09:21

## 2022-07-07 RX ADMIN — PIPERACILLIN AND TAZOBACTAM 3375 MG: 3; .375 INJECTION, POWDER, LYOPHILIZED, FOR SOLUTION INTRAVENOUS at 09:21

## 2022-07-07 RX ADMIN — DILTIAZEM HYDROCHLORIDE 180 MG: 180 CAPSULE, COATED, EXTENDED RELEASE ORAL at 09:22

## 2022-07-07 RX ADMIN — PIPERACILLIN AND TAZOBACTAM 3375 MG: 3; .375 INJECTION, POWDER, LYOPHILIZED, FOR SOLUTION INTRAVENOUS at 00:12

## 2022-07-07 RX ADMIN — PIPERACILLIN AND TAZOBACTAM 3375 MG: 3; .375 INJECTION, POWDER, LYOPHILIZED, FOR SOLUTION INTRAVENOUS at 16:06

## 2022-07-07 ASSESSMENT — PAIN SCALES - GENERAL
PAINLEVEL_OUTOF10: 4
PAINLEVEL_OUTOF10: 3
PAINLEVEL_OUTOF10: 8
PAINLEVEL_OUTOF10: 8
PAINLEVEL_OUTOF10: 0
PAINLEVEL_OUTOF10: 0

## 2022-07-07 ASSESSMENT — PAIN DESCRIPTION - PAIN TYPE
TYPE: SURGICAL PAIN
TYPE: SURGICAL PAIN

## 2022-07-07 ASSESSMENT — PAIN DESCRIPTION - ORIENTATION
ORIENTATION: MID
ORIENTATION: MID

## 2022-07-07 ASSESSMENT — PAIN DESCRIPTION - DESCRIPTORS
DESCRIPTORS: DISCOMFORT
DESCRIPTORS: DISCOMFORT

## 2022-07-07 ASSESSMENT — PAIN DESCRIPTION - LOCATION
LOCATION: ABDOMEN
LOCATION: ABDOMEN

## 2022-07-07 ASSESSMENT — PAIN DESCRIPTION - ONSET
ONSET: ON-GOING
ONSET: ON-GOING

## 2022-07-07 ASSESSMENT — PAIN DESCRIPTION - FREQUENCY
FREQUENCY: CONTINUOUS
FREQUENCY: CONTINUOUS

## 2022-07-07 ASSESSMENT — PAIN SCALES - WONG BAKER: WONGBAKER_NUMERICALRESPONSE: 0

## 2022-07-07 NOTE — PROGRESS NOTES
Inpatient Physical Therapy Daily Treatment Note    Unit: PCU  Date:  7/7/2022  Patient Name:    Eliezer Alvares  Admitting diagnosis:  Gastric perforation, acute [K31.89]  Perforated peptic ulcer ([facility] [address] [K27.5]  Admit Date:  6/28/2022  Precautions/Restrictions:  Fall risk, Bed/chair alarm, Lines -IV, Supplemental O2 (2L/min), Curiel catheter, NG tube, PICC left and Drains (JERRI), Telemetry, Continuous pulse oximetry and midline abdominal incision, telesitter      Discharge Recommendations: SNF (if declined SNF will need home with 24hr assist x 2 persons with home PT)  DME needs for discharge: defer to facility       Therapy recommendation for EMS Transport: requires transport by cot due to need of 2 person assist for functional transfers and poor sitting tolerance due to abdominal pain    Therapy recommendations for staff:   Assist of 2 with use of MAXI-Move for all transfers and ambulation to/from chair   Assist of 2 for bed mobility (use log rolling and provide pillow for bracing during coughing and comfort)    History of Present Illness: S/P emergent repair of perf ulcer with j-tube insertion on 6/28 by Dr. Keisha Vasquez, subsequent DA and hypertension. Recent COVID from 6/22-24/22, h/o colon cancer. Home Health S4 Level Recommendation: Level 3 Safety  AM-PAC Mobility Score   AM-PAC Inpatient Mobility Raw Score : 8    Treatment Time:  1312 - 1355  Treatment number: 3  Timed Code Treatment Minutes: 43 minutes  Total Treatment Minutes: 43 minutes    Cognition    A&O Person, Place and Situation   Able to follow 2 step commands    Subjective  Patient lying supine in bed with no family present   Pt agreeable to this PT tx. Pain   Yes  Location: at abdominal surgical site  Rating:    moderate/10  Pain Medicine Status: Received pain med prior to tx     Bed Mobility   Supine to Sit:    Max A  and 2 persons  Sit to Supine:   Max A  and 2 persons  Rolling:   Not Tested  Scooting:    Total A using [employer] bed controls, scooting in sitting with Max A    Transfer Training     Sit to stand:   Patient refused secondary to feeling of general weakness and passing out  Stand to sit:   Patient refused secondary to feeling of general weakness and passing out  Bed to Chair:   Not Tested with use of N/A    Gait Training pt declined to ambulate; pt ambulated 0 ft. Distance:      0 ft  Deviations (firm surface/linoleum):  N/A  Assistive Device Used:    N/A  Level of Assist:    Not Tested  Comment: Patient refused secondary to feeling of general weakness and passing out    Stair Training deferred, pt unsafe/not appropriate to complete stairs at this time  # of Steps:   N/A  Level of Assist:  Not Tested  UE Support:  NA  Assistive Device:  N/A  Pattern:   N/A  Comments:     Therapeutic Exercise all completed bilaterally unless indicated  Ankle Pumps x 10 reps  Heel slides x 10 reps  LAQ x 10 reps    Balance  Sitting:  Fair -; CGA  Comments: ~8 min EOB sitting    Standing: Not tested; Not Tested  Comments: Patient refused secondary to feeling of general weakness and passing out. Patient Education      Role of PT, POC, Discharge recommendations, DC recommendations, safety awareness, transfer techniques, pursed lip breathing, energy conservation, pacing activity and calling for assist with mobility. Positioning Needs       Pt in bed, alarm set, positioned in proper neutral alignment and pressure relief provided. Call light provided and all needs within reach    ROM Measurements N/A  Knee Flexion:  Knee Extension:     Activity Tolerance   Pt completed therapy session with Dizziness noted with sitting at the EOB with feeling of passing out. Resolved after assisted to supine position. Patient was feeling lethargy and general fatigue.    Supine  SpO2: 98% on 2L O2  HR: 73  BP: 108/59  After sitting and layed back down  /72  HR76  SpO2 97%% 2L O2    Other  N/A    Assessment :  Patient was limited in functional mobility secondary to feeling of lethargy and feeling of passing out while sitting at the EOB. Patient lacked motivation to be out side the bed and participate in functional mobility. Patient was educated about the PT POC and importance of early mobility. Patient verbalized agreement but still refused to perform standing activities. Recommending SNF upon discharge as patient functioning well below baseline, demonstrates good rehab potential and unable to return home due to limited or no family support, inability to negotiate stairs to enter home/bedroom/bathroom, burden of care beyond caregiver ability, home environment not conducive to patient recovery and limited safety awareness. Goals (all goals ongoing unless otherwise indicated)  To be met in 3 visits:  1). Independent with LE Ex x 10 reps  2). Supine to/from sit: Mod A  3). Bed to chair: CGA with walker     To be met in 6 visits:  1). Supine to/from sit: Supervision  2). Sit to/from stand: Supervision  3). Bed to chair: Supervision  4). Gait: Ambulate 50 ft.  with  SBA and use of LRAD (least restrictive assistive device)  5). Tolerate B LE exercises 3 sets of 10-15 reps  6). Ascend/descend 2 steps with CGA with use of hand rail unilateral and LRAD (least restrictive assistive device)    Plan   Continue with plan of care. Signature: Gera Henson MS PT, # [id number]    If patient discharges from this facility prior to next visit, this note will serve as the Discharge Summary.

## 2022-07-07 NOTE — FLOWSHEET NOTE
07/07/22 0743   Vital Signs   Temp 97 °F (36.1 °C)   Temp Source Oral   Heart Rate 74   Heart Rate Source Monitor   Resp 17   /69   BP Location Right upper arm   BP Method Automatic   MAP (Calculated) 86.67   Patient Position High fowlers   Level of Consciousness Alert (0)   MEWS Score 1   Pain Assessment   Pain Assessment 0-10   Pain Level 0   Oxygen Therapy   SpO2 99 %   O2 Device Nasal cannula   O2 Flow Rate (L/min) 2 L/min   Patient is resting showing no s/s of distress. Patient is alert and oriented but drowsy. Meds were given, see MAR. Patient is denying any needs. Bed is in lowest position and call light is within reach. Will continue to monitor. Shift assessment complete, see flowsheets.

## 2022-07-07 NOTE — PROGRESS NOTES
General Surgery   Daily Progress Note    Pt Name: Kishan Mcqueen Record Number: [id number]  Date of Birth 1950   Today's Date: 7/7/2022    ASSESSMENT  1. POD #9 s/p repair of perf ulcer, j-tube insertion  2. ABD: J-tube in place, JERRI drain in place, staples look good: small amount of bloody drainage noted, + diffuse tenderness which appears slightly improved from yesterday, +mild distention, NGT in place, no N/V, +flatus, + BM x 2 days ago    3. ARF improving: Cr 1.8  4. K+ 3.7  5. Leuks 13.3->15.9->14.2->14.3->13.3  6. Lipase 129 (pancreas was secondarily inflamed on ER CT  7. VSS  8. Right arm Bicarb infiltration: appears much improved  9. NGT: 500 ml out  10. F/C 400 ml out  11. JERRI 500 ml out : bilious today  12. Pt states \"I am just tired and I don't want to get up. \"    PLAN  1. NGT to CLWS  2. Renal seeing: OK to d/c nadira when [id number] Yamila Corbett with renal   3. Strict I&Os  4. Pain control   5. Zosyn  6. PT/OT: Sit on edge of bed/up to chair today/ambulate  7. Protonix BID  8. IVF per renal  9. Labs in the am.   10. Increase TF rate to 20 ml/hr  11. Pt looks OK POD #9: Pt with bilious JERRI drainage suggestive of leak from perf ulcer repair. JERRI should be able to adequately drain fluid. Will increase TF today. Discussed with pt and her family she needs to get out of bed and use IS    [facility] is virtually unchanged from yesterday. Pain is somewhat well controlled. She has no nausea and no vomiting. She has passed flatus and has had a bowel movement. She is NPO. Current activity is up with assistance    OBJECTIVE  VITALS:  height is 5' 4\" (1.626 m) and weight is 171 lb 8 oz (77.8 kg). Her oral temperature is 98.2 °F (36.8 °C). Her blood pressure is 113/65 and her pulse is 87. Her respiration is 18 and oxygen saturation is 99%.    VITALS:  /65   Pulse 87   Temp 98.2 °F (36.8 °C) (Oral)   Resp 18   Ht 5' 4\" (1.626 m)   Wt 171 lb 8 oz (77.8 kg)   SpO2 99%   BMI 29.44 kg/m²   INTAKE/OUTPUT: Intake/Output Summary (Last 24 hours) at 7/7/2022 1528  Last data filed at 7/7/2022 1444  Gross per 24 hour   Intake 2334.44 ml   Output 1075 ml   Net 1259.44 ml     URINARY CATHETER OUTPUT (Curiel):     GENERAL: alert, cooperative, no distress  I/O last 3 completed shifts: In: 2514.4 [P.O.:180; I.V.:1520.2; NG/GT:148; IV Piggyback:666.2]  Out: 1915 [Urine:750; Emesis/NG output:500; Drains:665]  I/O this shift:   In: 0   Out: 280 [Emesis/NG output:80; Drains:200]    LABS  Recent Labs     07/07/22  0532   WBC 13.3*   HGB 8.1*   HCT 24.6*         K 3.7      CO2 25   BUN 27*   CREATININE 1.8*   PHOS 3.2   CALCIUM 7.6*     CBC with Differential:    Lab Results   Component Value Date/Time    WBC 13.3 07/07/2022 05:32 AM    RBC 2.80 07/07/2022 05:32 AM    RBC 4.41 08/02/2016 10:34 AM    HGB 8.1 07/07/2022 05:32 AM    HCT 24.6 07/07/2022 05:32 AM     07/07/2022 05:32 AM    MCV 87.8 07/07/2022 05:32 AM    MCH 29.0 07/07/2022 05:32 AM    MCHC 33.0 07/07/2022 05:32 AM    RDW 15.4 07/07/2022 05:32 AM    BANDSPCT 1 07/05/2022 04:36 AM    LYMPHOPCT 5.1 07/07/2022 05:32 AM    LYMPHOPCT 25.3 08/02/2016 10:34 AM    MONOPCT 4.6 07/07/2022 05:32 AM    BASOPCT 0.1 07/07/2022 05:32 AM    MONOSABS 0.6 07/07/2022 05:32 AM    LYMPHSABS 0.7 07/07/2022 05:32 AM    EOSABS 0.1 07/07/2022 05:32 AM    BASOSABS 0.0 07/07/2022 05:32 AM     CMP:    Lab Results   Component Value Date/Time     07/07/2022 05:32 AM    K 3.7 07/07/2022 05:32 AM    K 3.8 06/29/2022 05:19 AM     07/07/2022 05:32 AM    CO2 25 07/07/2022 05:32 AM    BUN 27 07/07/2022 05:32 AM    CREATININE 1.8 07/07/2022 05:32 AM    GFRAA 33 07/07/2022 05:32 AM    AGRATIO 0.8 06/29/2022 05:19 AM    LABGLOM 27 07/07/2022 05:32 AM    GLUCOSE 118 07/07/2022 05:32 AM    GLUCOSE 85 08/02/2016 10:34 AM    PROT 4.7 06/29/2022 05:19 AM    PROT 5.7 08/02/2016 10:34 AM    LABALBU 1.9 07/07/2022 05:32 AM    CALCIUM 7.6 07/07/2022 05:32 AM    BILITOT 0.6 06/29/2022 05:19 AM    ALKPHOS 84 06/29/2022 05:19 AM    AST 49 06/29/2022 05:19 AM    ALT 48 06/29/2022 05:19 AM         ILAN Montes - CNP  Electronically signed 7/7/2022 at 3:28 PM

## 2022-07-07 NOTE — PROGRESS NOTES
Pt's family went out to nurses station stating \" her nose thing needs to be put back in\". This RN walked into room to see that the NG tube had come out a little. Accessed chart to see where NG tube should be at; advanced back to the 65cm. Primary RN made aware. No signs of distress noted by this RN. Pt stable.     Linda Ramachandran, RN

## 2022-07-07 NOTE — FLOWSHEET NOTE
07/07/22 0032   Vital Signs   Temp 98.4 °F (36.9 °C)   Temp Source Oral   Heart Rate 73   Heart Rate Source Monitor   Resp 18   /69   BP Location Left upper arm   BP Method Automatic   MAP (Calculated) 86.67   Patient Position High fowlers   Level of Consciousness Alert (0)   MEWS Score 1   Oxygen Therapy   SpO2 98 %   O2 Device Nasal cannula   O2 Flow Rate (L/min) 2 L/min   Height and Weight   Weight 171 lb 8 oz (77.8 kg)   Weight Method Bed scale   BMI (Calculated) 29.5   Vitals taken, shown above. Patient is stable with no current needs at this time. Call light in reach. Bed in lowest position.

## 2022-07-07 NOTE — PROGRESS NOTES
Ramy Plaza, daughter of patient was attempted to be reached at this time since patient will be transferred to .

## 2022-07-07 NOTE — FLOWSHEET NOTE
07/07/22 1003   Vital Signs   /78   MAP (Calculated) 95.67   Pain Assessment   Pain Assessment 0-10   Pain Level 8   Patient's Stated Pain Goal 0 - No pain   Pain Location Abdomen   Pain Orientation Mid   Pain Descriptors Discomfort   Functional Pain Assessment Prevents or interferes some active activities and ADLs   Pain Type Surgical pain   Pain Frequency Continuous   Pain Onset On-going   Non-Pharmaceutical Pain Intervention(s) Repositioned   PRN morphine administered.

## 2022-07-07 NOTE — PROGRESS NOTES
Inpatient Occupational Therapy  Evaluation and Treatment    Unit: PCU  Date:  7/7/2022  Patient Name:    Mary Landis  Admitting diagnosis:  Gastric perforation, acute [K31.89]  Perforated peptic ulcer ([facility] [address] [K27.5]  Admit Date:  6/28/2022  Precautions/Restrictions/WB Status/ Lines/ Wounds/ Oxygen: fall risk, IV, bed/chair alarm, waddell catheter , supplemental O2 (2L), telemetry, telesitter and continuous pulse ox    Treatment Time:  2228-8651  Treatment Number: 1     Billable Treatment Time: 39 minutes   Total Treatment Time:   49   minutes    Patient Goals for Therapy:  \" sit at EOB \"      Discharge Recommendations: SNF (if declines will need 24/7 assist x2 people and home OT)  DME needs for discharge: defer to facility       Therapy recommendations for staff:   Assist of 2 bed mobility    History of Present Illness: s/p emergent repair of perf ulcer with j-tube insertion on 6/28 by Dr. Tk Rivas, subsequent DA and hypertension. Recent COVID from 6/22-24/22, h/o colon cancer. Home Health S4 Level Recommendation:  NA  AM-PAC Score: AM-PAC Inpatient Daily Activity Raw Score: 9    Preadmission Environment    Pt. Lives with spouse. Daughter and KARISSA live next door and are there every day. Daughter works from home. Home environment:            one story home  Steps to enter first floor: 2 steps to enter and hand rail unilateral  Steps to second floor:         N/A  Bathroom: walk in shower and standard height commode. Handrails in shower. Equipment owned: RW, SW, shower chair/bench and raised toilet  Pt sleeps in flat non-adjustable bed.     Preadmission Status:  Pt. Able to drive: Yes  Pt Fully independent with ADLs: Yes  Pt. Required assistance from family for: Cooking (daughter cooks 3 meals/day; pt is able to get into kitchen for simple things if needed)  Pt.  independent for transfers and gait and walked with No Device  History of falls          No     Pain   Yes  Location: abdomen  Rating: mild /10  Pain Medicine Status:Medicated Prior to therapy     Cognition    A&O x4   Able to follow 2 step commands     Subjective  Patient lying supine in bed with no family present   Pt agreeable to this OT eval & tx. Upper Extremity ROM:    WFL,  pt able to perform all bed mobility, transfers, and gait without ROM limitation. Upper Extremity Strength:    BUE strength impaired but not formally assessed w/ MMT    Upper Extremity Sensation    WFL    Upper Extremity Proprioception:  WFL    Coordination and Tone  WFL    Balance  Functional Sitting Balance:  Impaired CGA sitting at EOB  Functional Standing Balance:attempted, however not able to tolerate standing    Bed mobility:    Supine to sit: Max A of 2  Sit to supine:   Max A of 2  Rolling:    Not Tested  Scooting in sitting: Max A of 2  Scooting to head of bed:   Not Tested    Bridging:   Not Tested    Transfers:  Attempted standing at EOB with RW, however unable to stand after several attempts and became fatigued needing to lie down  Sit to stand:  Not Tested  Stand to sit:  Not Tested  Bed to chair:   Not Tested  Standard toilet: Not Tested  Bed to [facility]:  Not Tested    Dressing:      UE:   Not Tested  LE:    Total A     Bathing:    UE:  Not Tested  LE:  Not Tested    Eating:   Not Tested    Toileting:  Not Tested    Grooming: Not Tested    Activity Tolerance   Pt completed therapy session with complaints of weakness after sitting at EOB    Supine  SpO2: 98% on 2L O2  HR: 73  BP: 108/59  After sitting and layed back down  /72  HR76  SpO2 97%% 2L O2    Positioning Needs: In bed, call light and needs in reach. Alarm Set    Exercise / Activities Initiated:   N/A    Patient/Family Education:   Role of OT  Recommendations for DC    Assessment of Deficits: Pt seen for Occupational therapy evaluation in acute care setting. Pt demonstrated decreased Activity tolerance, ADLs, IADLs, Balance , Bed mobility, Safety Awareness, Strength, Transfers and Cognition.  Pt functioning below baseline and will likely benefit from skilled occupational therapy services to maximize safety and independence. Goal(s) : To be met in 3 Visits:  1). Bed to toilet/BSC: Max A    To be met in 5 Visits:  1). Supine to/from Sit:  Mod A  2). Upper Body Bathing:   Min A  3). Lower Body Bathing: Mod A  4). Upper Body Dressing:  Min A  5). Lower Body Dressing: Mod A  6). Pt to demonstrate UE exs x 15 reps with minimal cues    Rehabilitation Potential:  Good for goals listed above. Strengths for achieving goals include: PLOF and Family Support  Barriers to achieving goals include:  Complexity of condition, Pain and Weakness     Plan: To be seen 3-5 x/wk while in acute care setting for therapeutic exercises, bed mobility, transfers, dressing, bathing, family/patient education, ADL/IADL retraining, energy conservation training.      Azul Roman, OTR/L [id number]      If patient discharges from this facility prior to next visit, this note will serve as the Discharge Summary

## 2022-07-07 NOTE — PROGRESS NOTES
[facility]  Nephrology follow-up note           Reason for Consult: DA   Requesting Physician:  Dr. Jorge L Oliver history  Patient drowsy this morning  Creatinine at 1.8  Feeding via NG tube started on 7/6  Blood pressure stable  Continues to have significant JERRI drain      ROS: No fever or chills. Social: no Family at bedside. Scheduled Meds:   piperacillin-tazobactam  3,375 mg IntraVENous Q8H    lidocaine 1 % injection  5 mL IntraDERmal Once    dilTIAZem  180 mg Oral Daily    pantoprazole  40 mg IntraVENous BID    heparin (porcine)  5,000 Units SubCUTAneous 3 times per day    sodium chloride flush  5-40 mL IntraVENous 2 times per day     Continuous Infusions:   dextrose 5 % and 0.2 % NaCl 50 mL/hr at 07/07/22 0921    dextrose Stopped (07/01/22 0410)    sodium chloride Stopped (07/06/22 1402)     PRN Meds:.glucose, dextrose bolus **OR** dextrose bolus, glucagon (rDNA), dextrose, perflutren lipid microspheres, metoprolol, phenol, sodium chloride flush, sodium chloride, ondansetron **OR** ondansetron, morphine **OR** morphine, naloxone    History of Present Illness on 6/30/22:    74 y.o. yo female with PMH of colon cancer hypothyroidism, HLD, OA on mobic who is admitted for abd pain and bowel perf  Pt had been at [facility] from 6/23-24/22 for COVID pneumonia. Was treated with dex. Was also on meloxicam  Presented to ED on 6/27 for abd pain had CTA of chest/a/p and was discharged on naproxen  Came again on 6/28 and was noted to have bowel perf and underwent immediate surgery  Cr was 0.6 on 6/27, was 1.2 on 6/28 which increased to 2.4 on 6/29 and 3.9 on 6/30 when nephrology has been consulted, pt has been getting zosyn after surgery  Her BP dropped in 6/28 to 90s few times.  On 6/30 has had afib rvr    Physical exam:   Constitutional:  VITALS:  /78   Pulse 74   Temp 97 °F (36.1 °C) (Oral)   Resp 17   Ht 5' 4\" (1.626 m)   Wt 171 lb 8 oz (77.8 kg)   SpO2 99%   BMI 29.44 kg/m²      Gen: alert, awake  Neck: No JVD  Skin: Unremarkable  Cardiovascular:  S1, S2 without m/r/g   Respiratory: diminished  Abdomen:  soft, nt, nd,   Extremities: no lower extremity edema  Neuro/Psy: AAoriented times 3 ; moves all 4 ext    Data/  Recent Labs     07/05/22  0436 07/07/22  0532   WBC 14.3* 13.3*   HGB 9.4* 8.1*   HCT 29.0* 24.6*   MCV 88.2 87.8    227     Recent Labs     07/05/22  0436 07/06/22  0434 07/07/22  0532    141 140   K 3.2* 3.4* 3.7    106 106   CO2 23 25 25   GLUCOSE 177* 94 118*   PHOS 2.5 3.1 3.2   BUN 39* 32* 27*   CREATININE 1.8* 1.8* 1.8*   LABGLOM 27* 27* 27*   GFRAA 33* 33* 33*     CTA chest/a/p on 6/27/22  Impression   Chest-       1. No pulmonary embolus. 2. Multifocal ground-glass opacities, primarily in the mid and upper lungs. Atypical pneumonia, including COVID etiology is considered.  Other causes of   pneumonia and pneumonitis, such as hypersensitivity, are also possible. ---       Abdomen and pelvis-       1. Mural thickening of the gastric antrum.  Gastritis is considered. 2. Right inguinal hernia with small bowel content; no incarceration. 3. 2.8 cm aneurysm of the infrarenal abdominal aorta.  5 year follow-up   imaging is recommended. 4. Nonobstructive nephrolithiasis     CT a/p wo contrast 6/28/22  Impression   1. New large perforation involving the posterior gastric antrum likely from   ulcerative disease causing a moderate amount of pneumoperitoneum and ascites   throughout the abdomen and pelvis. 2. New trace left pleural effusion. 3. Residual contrast within the bilateral kidneys can be seen in setting of   renal failure. UA w 6-10 granular casts, 100 protein, RBC>100; WBCs 6-10  Urine Na 62 cr 79    Assessment  -DA likely has developed ATN w hypovolemia, hypotension, NSAIDs use and contrast use.      -Perforated prepyloric ulcer s/p laparotomy with repair n jejunostomy insertion on 6/28/22   On zosyn     -Metabolic acidosis from DA -Hypertension    -afib rvr per cardiology    -recent COVID from 6/22-24/22    -h/o colon cancer    Plan  -Continue volume expansion-currently on D5 quarter normal saline at 50 mL/h, if feeding can be advanced by the NG tube, will start giving free water  -PRN potassium replacement   -Renal dose meds and avoid nephrotoxins  -Follow plans from surgery       Keturah Klinefelter, MD  Office: [address] [facility]  Fax:    [address]o [phone]

## 2022-07-08 LAB
ALBUMIN SERPL-MCNC: 1.9 G/DL (ref 3.4–5)
ANION GAP SERPL CALCULATED.3IONS-SCNC: 8 MMOL/L (ref 3–16)
BASOPHILS ABSOLUTE: 0 K/UL (ref 0–0.2)
BASOPHILS RELATIVE PERCENT: 0.1 %
BUN BLDV-MCNC: 23 MG/DL (ref 7–20)
CALCIUM SERPL-MCNC: 8 MG/DL (ref 8.3–10.6)
CHLORIDE BLD-SCNC: 104 MMOL/L (ref 99–110)
CO2: 25 MMOL/L (ref 21–32)
CREAT SERPL-MCNC: 1.7 MG/DL (ref 0.6–1.2)
EOSINOPHILS ABSOLUTE: 0.1 K/UL (ref 0–0.6)
EOSINOPHILS RELATIVE PERCENT: 1.1 %
GFR AFRICAN AMERICAN: 36
GFR NON-AFRICAN AMERICAN: 29
GLUCOSE BLD-MCNC: 100 MG/DL (ref 70–99)
GLUCOSE BLD-MCNC: 109 MG/DL (ref 70–99)
GLUCOSE BLD-MCNC: 114 MG/DL (ref 70–99)
GLUCOSE BLD-MCNC: 136 MG/DL (ref 70–99)
GLUCOSE BLD-MCNC: 141 MG/DL (ref 70–99)
GLUCOSE BLD-MCNC: 143 MG/DL (ref 70–99)
GLUCOSE BLD-MCNC: 150 MG/DL (ref 70–99)
GLUCOSE BLD-MCNC: 164 MG/DL (ref 70–99)
HCT VFR BLD CALC: 24.9 % (ref 36–48)
HEMOGLOBIN: 8.2 G/DL (ref 12–16)
LYMPHOCYTES ABSOLUTE: 0.7 K/UL (ref 1–5.1)
LYMPHOCYTES RELATIVE PERCENT: 5.8 %
MCH RBC QN AUTO: 29.2 PG (ref 26–34)
MCHC RBC AUTO-ENTMCNC: 33 G/DL (ref 31–36)
MCV RBC AUTO: 88.4 FL (ref 80–100)
MONOCYTES ABSOLUTE: 0.6 K/UL (ref 0–1.3)
MONOCYTES RELATIVE PERCENT: 4.8 %
NEUTROPHILS ABSOLUTE: 11.1 K/UL (ref 1.7–7.7)
NEUTROPHILS RELATIVE PERCENT: 88.2 %
PDW BLD-RTO: 15.2 % (ref 12.4–15.4)
PERFORMED ON: ABNORMAL
PHOSPHORUS: 3 MG/DL (ref 2.5–4.9)
PLATELET # BLD: 233 K/UL (ref 135–450)
PMV BLD AUTO: 8.4 FL (ref 5–10.5)
POTASSIUM SERPL-SCNC: 3.5 MMOL/L (ref 3.5–5.1)
RBC # BLD: 2.82 M/UL (ref 4–5.2)
SODIUM BLD-SCNC: 137 MMOL/L (ref 136–145)
WBC # BLD: 12.6 K/UL (ref 4–11)

## 2022-07-08 PROCEDURE — 97110 THERAPEUTIC EXERCISES: CPT

## 2022-07-08 PROCEDURE — 2580000003 HC RX 258: Performed by: SURGERY

## 2022-07-08 PROCEDURE — 2700000000 HC OXYGEN THERAPY PER DAY

## 2022-07-08 PROCEDURE — 1200000000 HC SEMI PRIVATE

## 2022-07-08 PROCEDURE — 6360000002 HC RX W HCPCS: Performed by: SURGERY

## 2022-07-08 PROCEDURE — 85025 COMPLETE CBC W/AUTO DIFF WBC: CPT

## 2022-07-08 PROCEDURE — 97530 THERAPEUTIC ACTIVITIES: CPT

## 2022-07-08 PROCEDURE — C9113 INJ PANTOPRAZOLE SODIUM, VIA: HCPCS | Performed by: NURSE PRACTITIONER

## 2022-07-08 PROCEDURE — 99024 POSTOP FOLLOW-UP VISIT: CPT | Performed by: NURSE PRACTITIONER

## 2022-07-08 PROCEDURE — 80069 RENAL FUNCTION PANEL: CPT

## 2022-07-08 PROCEDURE — 6360000002 HC RX W HCPCS: Performed by: NURSE PRACTITIONER

## 2022-07-08 PROCEDURE — 94761 N-INVAS EAR/PLS OXIMETRY MLT: CPT

## 2022-07-08 RX ADMIN — MORPHINE SULFATE 2 MG: 2 INJECTION, SOLUTION INTRAMUSCULAR; INTRAVENOUS at 10:00

## 2022-07-08 RX ADMIN — PANTOPRAZOLE SODIUM 40 MG: 40 INJECTION, POWDER, LYOPHILIZED, FOR SOLUTION INTRAVENOUS at 09:56

## 2022-07-08 RX ADMIN — MORPHINE SULFATE 1 MG: 2 INJECTION, SOLUTION INTRAMUSCULAR; INTRAVENOUS at 15:38

## 2022-07-08 RX ADMIN — PANTOPRAZOLE SODIUM 40 MG: 40 INJECTION, POWDER, LYOPHILIZED, FOR SOLUTION INTRAVENOUS at 21:22

## 2022-07-08 RX ADMIN — PIPERACILLIN AND TAZOBACTAM 3375 MG: 3; .375 INJECTION, POWDER, LYOPHILIZED, FOR SOLUTION INTRAVENOUS at 00:23

## 2022-07-08 RX ADMIN — PIPERACILLIN AND TAZOBACTAM 3375 MG: 3; .375 INJECTION, POWDER, LYOPHILIZED, FOR SOLUTION INTRAVENOUS at 09:55

## 2022-07-08 RX ADMIN — HEPARIN SODIUM 5000 UNITS: 5000 INJECTION INTRAVENOUS; SUBCUTANEOUS at 06:42

## 2022-07-08 RX ADMIN — HEPARIN SODIUM 5000 UNITS: 5000 INJECTION INTRAVENOUS; SUBCUTANEOUS at 21:22

## 2022-07-08 RX ADMIN — HEPARIN SODIUM 5000 UNITS: 5000 INJECTION INTRAVENOUS; SUBCUTANEOUS at 15:06

## 2022-07-08 RX ADMIN — PIPERACILLIN AND TAZOBACTAM 3375 MG: 3; .375 INJECTION, POWDER, LYOPHILIZED, FOR SOLUTION INTRAVENOUS at 17:09

## 2022-07-08 ASSESSMENT — PAIN SCALES - WONG BAKER: WONGBAKER_NUMERICALRESPONSE: 0

## 2022-07-08 ASSESSMENT — PAIN DESCRIPTION - LOCATION: LOCATION: ABDOMEN

## 2022-07-08 ASSESSMENT — PAIN SCALES - GENERAL
PAINLEVEL_OUTOF10: 8
PAINLEVEL_OUTOF10: 9

## 2022-07-08 NOTE — PROGRESS NOTES
Per [facility] [place] from cardiology please call the office before that patient discharges she will need to be sent with a 30 day monitor.

## 2022-07-08 NOTE — FLOWSHEET NOTE
07/08/22 1523   Closed/Suction Drain Superior;Midline Stomach Bulb   Placement Date: 06/28/22   Present on Admission/Arrival: No  Inserted by: md di  Tube Number: 1  Orientation: Superior;Midline  Location: Stomach  Drain Tube Type:  Bulb  Size : 10mm  Tube Shape: Flat  Drain Reservoir Size (ml): 100 ml   Site Description Reddened   Drainage Appearance Green   Drain Status To bulb suction   Output (ml) 40 ml

## 2022-07-08 NOTE — PLAN OF CARE
Problem: Discharge Planning  Goal: Discharge to home or other facility with appropriate resources  Outcome: Progressing     Problem: Pain  Goal: Verbalizes/displays adequate comfort level or baseline comfort level  Outcome: Progressing     Problem: Safety - Adult  Goal: Free from fall injury  Outcome: Progressing     Problem: Skin/Tissue Integrity  Goal: Absence of new skin breakdown  Description: 1. Monitor for areas of redness and/or skin breakdown  2. Assess vascular access sites hourly  3. Every 4-6 hours minimum:  Change oxygen saturation probe site  4. Every 4-6 hours:  If on nasal continuous positive airway pressure, respiratory therapy assess nares and determine need for appliance change or resting period.   Outcome: Progressing     Problem: ABCDS Injury Assessment  Goal: Absence of physical injury  Outcome: Progressing     Problem: Nutrition Deficit:  Goal: Optimize nutritional status  Outcome: Progressing

## 2022-07-08 NOTE — PROGRESS NOTES
Comprehensive Nutrition Assessment    Type and Reason for Visit:  Reassess      Nutrition Recommendations/Plan:   1. Modified TF order - ADULT TUBE FEEDING; Jejunostomy; - Jevity 1.2 with a goal rate of 20 ml/hr (Goal Rate 60 ml/hr )x 20 hours + 30 ml water flushes every 4 hours + one proteinex P2Go once daily via feeding tube. 2. Monitor TF rate, intake, and tolerance + water flushes + administration of one proteinex P2Go once daily. 3. Monitor GI status, surgery notes, and plan of care. 4. Monitor nutrition-related labs, bowel function, and weight trends. Malnutrition Assessment:  Malnutrition Status: At risk for malnutrition (Comment) (07/04/22 1575)    Context:  Acute Illness     Findings of the 6 clinical characteristics of malnutrition:  Energy Intake:  50% or less of estimated energy requirements for 5 or more days  Weight Loss:  No significant weight loss     Body Fat Loss:  No significant body fat loss     Muscle Mass Loss:  No significant muscle mass loss    Fluid Accumulation:  No significant fluid accumulation     Strength:  Not Performed    Nutrition Assessment:    patient continues to improve from a nutritional standpoint AEB j-tube feeds and tolerating a rate of 20 ml/hr and  without issue at this time, however, she remains at risk for further compromise d/t need for EN as sole source of nutrition at this time, GI dysfunction, and altered nutrition-related labs; will continue NPO status and modify TF order to Jevity 1.2 with a goal rate of 20 ml/hr (Goal Rate of 60 ml/hr) x 20 hours + 30 ml water flushes every 4 hours + add one proteinex P2Go once daily    Nutrition Related Findings:    A & O x 4 ; POD #9 s/p repair of perf ulcer, j-tube insertion;J-tube in place, JERRI drain in place, +mild distention, NGT in place, no N/V, +flatus, + BM x 3 days ago;  Wound Type: Surgical Incision (surgical incision on abdomen)       Current Nutrition Intake & Therapies:    Average Meal Intake: NPO  Average Supplements Intake: NPO  Diet NPO Exceptions are: Ice Chips  ADULT TUBE FEEDING; Jejunostomy; Other Tube Feeding (specify); Jevity 1.2; Continuous; 20; No; 30; Q 6 hours; Protein; 1 Proteinex 2GO qd    Anthropometric Measures:  Height: 5' 4\" (162.6 cm)  Ideal Body Weight (IBW): 120 lbs (55 kg)    Admission Body Weight: 149 lb (67.6 kg) (obtained on 6/29/22; actual weight)  Current Body Weight: 171 lb (77.6 kg), 135.2 % IBW. Weight Source: Bed Scale  Current BMI (kg/m2): 29.3  Usual Body Weight: 149 lb (67.6 kg) (obtained on 6/29/22; actual weight)  % Weight Change (Calculated): 8.9  Weight Adjustment For: No Adjustment                 BMI Categories: Overweight (BMI 25.0-29. 9)    Estimated Daily Nutrient Needs:  Energy Requirements Based On: Kcal/kg  Weight Used for Energy Requirements: Current  Energy (kcal/day): 1480 - 1702 kcals based on 20-23 kcals/kg/CBW  Weight Used for Protein Requirements: Ideal  Protein (g/day): 77 - 83 g protein based on 1.4-1.5 g/kg/IBW  Method Used for Fluid Requirements: 1 ml/kcal  Fluid (ml/day): 1480 - 1702 ml    Nutrition Diagnosis:   · Inadequate oral intake related to altered GI function,altered GI structure,inadequate protein-energy intake as evidenced by NPO or clear liquid status due to medical condition,nutrition support - enteral nutrition,GI abnormality      Nutrition Interventions:   Food and/or Nutrient Delivery: Continue Current Tube Feeding  Nutrition Education/Counseling: No recommendation at this time  Coordination of Nutrition Care: Continue to monitor while inpatient       Goals:  Previous Goal Met: Progressing toward Goal(s)  Goals: Meet at least 75% of estimated needs,Tolerate nutrition support at goal rate,by next RD assessment       Nutrition Monitoring and Evaluation:   Behavioral-Environmental Outcomes: None Identified  Food/Nutrient Intake Outcomes: Enteral Nutrition Intake/Tolerance  Physical Signs/Symptoms Outcomes: Biochemical Data,Weight,Nutrition Focused Physical Findings,Hemodynamic Status,Fluid Status or Edema    Discharge Planning:     Too soon to determine     Alejandra Feliz, [address], LD  Contact: [phone]

## 2022-07-08 NOTE — PROGRESS NOTES
Patient transfer to [place] [address] from [facility]. Patient oriented to room, call light, bed rails, phone, lights and bathroom. Patient instructed about the schedule of the day including: vital sign frequency, lab draws, possible tests, frequency of MD and staff rounds, daily weights, I &O's and prescribed diet. Bed alarm on and functioning properly . Telemetry box in place, patient aware of placement and reason. Bed locked, in lowest position, side rails up 3/4, call light within reach. Recliner Assessment:     Patient is not able to demonstrate the ability to move from a reclining position to an upright position within the recliner due to weakness. Bedside Mobility Assessment Tool (BMAT):     Assessment Level 1- Sit and Shake    1. From a semi-reclined position, ask patient to sit up and rotate to a seated position at the side of the bed. Can use the bedrail. 2. Ask patient to reach out and grab your hand and shake making sure patient reaches across his/her midline. Pass- Patient is able to come to a seated position, maintain core strength. Maintains seated balance while reaching across midline. Move on to Assessment Level 2. Assessment Level 2- Stretch and Point   1. With patient in seated position at the side of the bed, have patient place both feet on the floor (or stool) with knees no higher than hips. 2. Ask patient to stretch one leg and straighten the knee, then bend the ankle/flex and point the toes. If appropriate, repeat with the other leg. Fail- Patient is unable to complete task. Patient is MOBILITY LEVEL 2. Assessment Level 3- Stand   1. Ask patient to elevate off the bed or chair (seated to standing) using an assistive device (cane, bedrail). 2. Patient should be able to raise buttocks off be and hold for a count of five. May repeat once. Fail- Patient unable to demonstrate standing stability. Patient is MOBILITY LEVEL 3. Assessment Level 4- Walk   1.  Ask patient to march in place at bedside. 2. Then ask patient to advance step and return each foot. Some medical conditions may render a patient from stepping backwards, use your best clinical judgement. Fail- Patient not able to complete tasks OR requires use of assistive device. Patient is MOBILITY LEVEL 3. Mobility Level- 1        4 Eyes Skin Assessment     The patient is being assess for   Transfer to New Unit    I agree that 2 RN's have performed a thorough Head to Toe Skin Assessment on the patient. ALL assessment sites listed below have been assessed. Areas assessed for pressure by both nurses:   [x]   Head, Face, and Ears   [x]   Shoulders, Back, and Chest, Abdomen  [x]   Arms, Elbows, and Hands   [x]   Coccyx, Sacrum, and Ischium  [x]   Legs, Feet, and Heels  Surgical incision noted with staples in place , bloody drainage noted. , severe excoriation noted to groin and crease of buttocks                          Skin Assessed Under all Medical Devices by both nurses:  NG              All Mepilex Borders were peeled back and area peeked at by both nurses:  No: n/a  Please list where Mepilex Borders are located:  n/a             **SHARE this note so that the co-signing nurse is able to place an eSignature**    Co-signer eSignature: Electronically signed by Sree Lopez RN on 7/9/22 at 8:05 PM EDT    Does the Patient have Skin Breakdown related to pressure?  n/a              Will Prevention initiated:  Yes   Wound Care Orders initiated:  Yes      [address]  nurse consulted for Pressure Injury (Stage 3,4, Unstageable, DTI, NWPT, Complex wounds)and New or Established Ostomies:  Midline surgical incision    Primary Nurse eSignature: Electronically signed by Susanne Olivares RN on 7/8/22 at 1:29 AM EDT                   .Eligio Sanchez

## 2022-07-08 NOTE — PLAN OF CARE
Problem: Pain  Goal: Verbalizes/displays adequate comfort level or baseline comfort level  5/7/7294 3720 by Oziel Urbano RN  Outcome: Progressing  7/8/2022 0056 by Ashanti Donohue RN  Outcome: Progressing     Problem: Safety - Adult  Goal: Free from fall injury  6/9/0046 8969 by Oziel Urbano RN  Outcome: Progressing  Flowsheets (Taken 7/8/2022 0057 by Ashanti Donohue RN)  Free From Fall Injury: Instruct family/caregiver on patient safety  7/8/2022 0056 by Ashanti Donohue RN  Outcome: Progressing     Problem: Skin/Tissue Integrity  Goal: Absence of new skin breakdown  Description: 1. Monitor for areas of redness and/or skin breakdown  2. Assess vascular access sites hourly  3. Every 4-6 hours minimum:  Change oxygen saturation probe site  4. Every 4-6 hours:  If on nasal continuous positive airway pressure, respiratory therapy assess nares and determine need for appliance change or resting period.   6/3/6765 6165 by Oziel Urbano RN  Outcome: Progressing  7/8/2022 0056 by Ashanti Donohue RN  Outcome: Progressing

## 2022-07-08 NOTE — FLOWSHEET NOTE
07/08/22 1844   NG/OG/NJ/NE Tube Nasogastric 18 fr Right nostril   Placement Date/Time: 06/28/22 1642   Present on Admission/Arrival: No  Inserted by: RUTH Richards CRNA  Insertion attempts: 1  Tube Type: Nasogastric  Tube Size (fr): 18 fr  Tube Location: Right nostril   Output (mL) 200 ml       Total out out for this RN's 12hrs.     Electronically signed by Hakan Silva RN on 1/7/4962 at 6:46 PM

## 2022-07-08 NOTE — PROGRESS NOTES
[email or web address]  Nephrology follow-up note           Reason for Consult: DA   Requesting Physician:  Dr. Noy Perkins history  Patient was seen and examined. Complains of pain in the abdomen, received IV pain meds. TF running. BP is good. HV=962vw the past 24H. ROS: No fever or chills. Social: Family at bedside and updated. Scheduled Meds:   piperacillin-tazobactam  3,375 mg IntraVENous Q8H    lidocaine 1 % injection  5 mL IntraDERmal Once    dilTIAZem  180 mg Oral Daily    pantoprazole  40 mg IntraVENous BID    heparin (porcine)  5,000 Units SubCUTAneous 3 times per day    sodium chloride flush  5-40 mL IntraVENous 2 times per day     Continuous Infusions:   dextrose Stopped (07/01/22 0410)    sodium chloride Stopped (07/06/22 1402)     PRN Meds:.glucose, dextrose bolus **OR** dextrose bolus, glucagon (rDNA), dextrose, perflutren lipid microspheres, metoprolol, phenol, sodium chloride flush, sodium chloride, ondansetron **OR** ondansetron, morphine **OR** morphine, naloxone    History of Present Illness on 6/30/22:    74 y.o. yo female with PMH of colon cancer hypothyroidism, HLD, OA on mobic who is admitted for abd pain and bowel perf  Pt had been at [facility] from 6/23-24/22 for COVID pneumonia. Was treated with dex. Was also on meloxicam  Presented to ED on 6/27 for abd pain had CTA of chest/a/p and was discharged on naproxen  Came again on 6/28 and was noted to have bowel perf and underwent immediate surgery  Cr was 0.6 on 6/27, was 1.2 on 6/28 which increased to 2.4 on 6/29 and 3.9 on 6/30 when nephrology has been consulted, pt has been getting zosyn after surgery  Her BP dropped in 6/28 to 90s few times.  On 6/30 has had afib rvr    Physical exam:   Constitutional:  VITALS:  /73   Pulse 68   Temp 97.6 °F (36.4 °C) (Oral)   Resp 18   Ht 5' 4\" (1.626 m)   Wt 171 lb 8 oz (77.8 kg)   SpO2 97%   BMI 29.44 kg/m²      Gen: alert, awake  Neck: No JVD  Skin: Unremarkable  Cardiovascular:  S1, S2 without m/r/g   Respiratory: (+) crackles bibasal  Abdomen:  soft, nd, mild tenderness  Extremities: 1+ bilateral lower extremity edema  Neuro/Psy: awake alert    Data/  Recent Labs     07/07/22  0532 07/08/22  0654   WBC 13.3* 12.6*   HGB 8.1* 8.2*   HCT 24.6* 24.9*   MCV 87.8 88.4    233     Recent Labs     07/06/22  0434 07/07/22  0532 07/08/22  0654    140 137   K 3.4* 3.7 3.5    106 104   CO2 25 25 25   GLUCOSE 94 118* 164*   PHOS 3.1 3.2 3.0   BUN 32* 27* 23*   CREATININE 1.8* 1.8* 1.7*   LABGLOM 27* 27* 29*   GFRAA 33* 33* 36*     CTA chest/a/p on 6/27/22  Impression   Chest-       1. No pulmonary embolus. 2. Multifocal ground-glass opacities, primarily in the mid and upper lungs. Atypical pneumonia, including COVID etiology is considered.  Other causes of   pneumonia and pneumonitis, such as hypersensitivity, are also possible. ---       Abdomen and pelvis-       1. Mural thickening of the gastric antrum.  Gastritis is considered. 2. Right inguinal hernia with small bowel content; no incarceration. 3. 2.8 cm aneurysm of the infrarenal abdominal aorta.  5 year follow-up   imaging is recommended. 4. Nonobstructive nephrolithiasis     CT a/p wo contrast 6/28/22  Impression   1. New large perforation involving the posterior gastric antrum likely from   ulcerative disease causing a moderate amount of pneumoperitoneum and ascites   throughout the abdomen and pelvis. 2. New trace left pleural effusion. 3. Residual contrast within the bilateral kidneys can be seen in setting of   renal failure. UA w 6-10 granular casts, 100 protein, RBC>100; WBCs 6-10  Urine Na 62 cr 79    Assessment  -DA likely has developed ATN w hypovolemia, hypotension, NSAIDs use and contrast use. -Perforated prepyloric ulcer s/p laparotomy with repair n jejunostomy insertion on 6/28/22   On zosyn     -Metabolic acidosis from DA, resolved    -Hypertension.  BP range is good. -afib rvr per cardiology    -recent Stephan from 6/22-24/22    -h/o colon cancer    Plan  -D/C IVF today. Noted that TF will be advanced today. -PRN potassium replacement   -Renal dose meds and avoid nephrotoxins  -Ok to remove waddell catheter. Bibiana Thrasher MD  Office: [phone]  Fax:    [phone]  [facility]

## 2022-07-08 NOTE — PROGRESS NOTES
Inpatient Physical Therapy Daily Treatment Note    Unit: PCU  Date:  7/8/2022  Patient Name:    Madiha Sloan  Admitting diagnosis:  Gastric perforation, acute [K31.89]  Perforated peptic ulcer ([address] [K27.5]  Admit Date:  6/28/2022  Precautions/Restrictions:  Fall risk, Bed/chair alarm, Lines -IV, Supplemental O2 (2L/min), Curiel catheter, NG tube, PICC left and Drains (JERRI), Telemetry, Continuous pulse oximetry and midline abdominal incision, telesitter      Discharge Recommendations: SNF (if declined SNF will need home with 24hr assist x 2 persons with home PT)  DME needs for discharge: defer to facility (if discharging home may need BSC and hospital bed, will CTA)       Therapy recommendation for EMS Transport: requires transport by cot due to need of 2 person assist for functional transfers and poor sitting tolerance due to abdominal pain    Therapy recommendations for staff:   Assist of 2 with use of MAXI-Move for all transfers and ambulation to/from chair   Assist of 2 for bed mobility (use log rolling and provide pillow for bracing during coughing and comfort)    History of Present Illness: S/P emergent repair of perf ulcer with j-tube insertion on 6/28 by Dr. mEma Dorado, subsequent DA and hypertension. Recent COVID from 6/22-24/22, h/o colon cancer. Home Health S4 Level Recommendation: Level 3 Safety  AM-PAC Mobility Score   AM-PAC Inpatient Mobility Raw Score : 8    Treatment Time:  11:50-12:13  Treatment number: 4  Timed Code Treatment Minutes: 23 minutes  Total Treatment Minutes: 23 minutes    Cognition    A&O Person, Place and Situation   Able to follow 2 step commands    Subjective  Patient lying supine in bed with family at bedside   Pt quite resistant to therapy, shaking her head, asked a number of times to come back tomorrow, unwilling to attempt sitting up to EOB despite heavy/extensive education on rationale and encouragement from family including daughter offering to help this writer.  Pt eventually compliant with limited AROM/AAROM in bed. Pain   Yes   Location: at abdominal surgical site  Rating:    moderate/10  Pain Medicine Status: Received pain med prior to tx     Bed Mobility  Pt refused  Supine to Sit:    Not Tested  Sit to Supine:   Not Tested  Rolling:   Not Tested  Scooting: Total A using hercules bed controls, scooting in sitting with Max A. Transfer Training  Pt refused   Sit to stand:   Not Tested  Stand to sit:   Not Tested  Bed to Chair:   Not Tested with use of N/A    Gait Training pt declined to ambulate; pt ambulated 0 ft. Stair Training deferred, pt unsafe/not appropriate to complete stairs at this time    Therapeutic Exercise all completed bilaterally unless indicated, mix of AROM/AAROM with verbal encouragement nearly each repetition. x 10 reps bilat :  ankle pumps, ankle PF into manual resistance, quad sets, hand open/close, elbow flex/ext, sup/pron, shldr flexion to ~120*. Balance  Sitting:  Not tested; N/A  Comments:    Standing: Not tested; Not Tested  Comments:    Patient Education      Extensive time spent this session educating pt RE importance of mobility as tolerated, providing sympathy and reassurance of situation, proposing an hourly schedule of activity to include 5 minutes in elevated HOB position (pts family educated on bed controls) and UE/LE ROM, for which daughter was provided with written HEP in a previous session. Positioning Needs       Pt in bed, alarm set, positioned in proper neutral alignment and pressure relief provided. Call light provided and all needs within reach    Activity Tolerance   Pt completed therapy session with No adverse symptoms noted w/activity    Other  N/A    Assessment :  Patient unwilling to complete any functional mobility today despite lengthy education and encouragement, including support of daughter. Says she is feeling too weak. Able to complete a bit of UE/LE AROM/AAROM.  Will continue to encourage mobility and progress at patient's tolerance. Recommending SNF upon discharge as patient functioning well below baseline, demonstrates good rehab potential and unable to return home due to limited or no family support, inability to negotiate stairs to enter home/bedroom/bathroom, burden of care beyond caregiver ability, home environment not conducive to patient recovery and limited safety awareness. Goals (all goals ongoing unless otherwise indicated)  To be met in 3 visits:  1). Independent with LE Ex x 10 reps  2). Supine to/from sit: Mod A  3). Bed to chair: CGA with walker     To be met in 6 visits:  1). Supine to/from sit: Supervision  2). Sit to/from stand: Supervision  3). Bed to chair: Supervision  4). Gait: Ambulate 50 ft.  with  SBA and use of LRAD (least restrictive assistive device)  5). Tolerate B LE exercises 3 sets of 10-15 reps  6). Ascend/descend 2 steps with CGA with use of hand rail unilateral and LRAD (least restrictive assistive device)    Plan   Continue with plan of care. Signature: Flordia Barthel, PT, DPT    If patient discharges from this facility prior to next visit, this note will serve as the Discharge Summary.

## 2022-07-08 NOTE — FLOWSHEET NOTE
07/08/22 0818   Vital Signs   Temp 97.6 °F (36.4 °C)   Temp Source Oral   Heart Rate 68   Heart Rate Source Monitor   Resp 18   /73   BP Location Left lower arm   BP Method Automatic   MAP (Calculated) 91   Patient Position Semi fowlers   Level of Consciousness Alert (0)   MEWS Score 1   Oxygen Therapy   SpO2 97 %   O2 Device Nasal cannula   O2 Flow Rate (L/min) 2.5 L/min       Shift assessment complete. See flow sheet. Scheduled meds given. See MAR. Patients head-toe complete, VS are logged, and active bowel sound noted in all four quadrants. Patient refused PO medications and stated she cannot swallow it. Patient rating pain 9/10 PRN Morphine given. Writer encouraged patient that she needs to get up and move, patient stated she is not getting out of bed today. PICC area C/D/I. Curiel draining, JERRI drain continues to drain green/gile, NG tube hooked to wall suction. Tube feeds set at 20 mL. Midline incision with staples and minimal drainage. Patients insertion sites are red and angry. Patients family is at bedside. No further needs  noted at this time. Call light and bedside table are within reach. The bed is locked and is in the lowest position. Juanjo Barriga RN

## 2022-07-08 NOTE — CARE COORDINATION
INTERDISCIPLINARY PLAN OF CARE CONFERENCE    Date/Time: 7/8/2022 10:44 AM  Completed by: Pam Harris RN, Case Management      Patient Name:  Madiha Sloan  YOB: 1950  Admitting Diagnosis: Gastric perforation, acute [K31.89]  Perforated peptic ulcer ([facility] [address] [K27.5]     Admit Date/Time:  6/28/2022  6:58 AM    Chart reviewed. Interdisciplinary team contacted or reviewed plan related to patient progress and discharge plans. Disciplines included Case Management, Nursing, and Dietitian. Current Status:ongoing  PT/OT recommendation for discharge plan of care: SNF    Expected D/C Disposition:  Home  Confirmed plan with patient and/or family Yes confirmed with: (name) pt and her daughterKarey with:pt and her daughterLuiz  Discharge Plan Comments: Reviewed chart. Role of discharge planner explained and patient verbalized understanding. Pt is alert and oriented. Pt agrees for CM to speak in front of and to spouse and daughter, Luiz Casanova. Pt is from home with spouse. Pt is aware that PT/OT recommend SNF and they declines. Pt has 24/7 assist and support at home and a lot of DME at home. Pt plans to return home. Pt is POD #10. Pt still has a NG and TF via [employer]. Pt is agreeable to [facility] (/[facility]) at d/c for PT/OT/SN/HHA. Rupa Mccormick with [facility] is aware and can accept pt at d/c. Yadira with [facility] is running benefits for Tube Feeds and [facility] [employer] [facility].      Home O2 in place on admit: No  Pt informed of need to bring portable home O2 tank on day of discharge for nursing to connect prior to leaving:  Not Indicated  Verbalized agreement/Understanding:  Not Indicated

## 2022-07-08 NOTE — FLOWSHEET NOTE
07/08/22 1113   Wound 07/08/22 Radial Proximal;Right infiltration that is now open and blisted   Date First Assessed/Time First Assessed: 07/08/22 1320   Present on Hospital Admission: No  Primary Wound Type: (c) Other (comment)  Location: Radial  Wound Location Orientation: Proximal;Right  Wound Description (Comments): infiltration that is now o. .. Wound Etiology Other  (infiltration)   Dressing Status New dressing applied   Wound Cleansed Cleansed with saline   Dressing/Treatment Xeroform;Petroleum gauze; Roll gauze   Treatment Team Notification   Reason for Communication Critical results   Type of Critical Result POC test   Critical Lab Result Type Glucose   Critical POC Result Type Glucose   Team Member Name [facility]   Treatment Team Role Nurse/Charge Nurse   Method of Communication Call   Response In department   Notification Time [id number]       Wound care consult placed.

## 2022-07-08 NOTE — PROGRESS NOTES
General Surgery   Daily Progress Note    Pt Name: Kishan Mcqueen Record Number: [id number]  Date of Birth 1950   Today's Date: 7/8/2022    ASSESSMENT  1. POD #10 s/p repair of perf ulcer, j-tube insertion  2. ABD: J-tube in place, JERRI drain in place, staples look good: small amount of bloody drainage noted, + diffuse tenderness which appears to continue to improve, +mild distention, NGT in place, no N/V, +flatus, + BM x 3 days ago    3. ARF improving: Cr 1.8->1.7  4. K+ 3.5  5. Leuks 13.3->15.9->14.2->14.3->13.3->12.6  6. Lipase 129 (pancreas was secondarily inflamed on ER CT  7. VSS  8. Right arm Bicarb infiltration: appears much improved  9. NGT: 80 ml out  10. F/C 700 ml out  11. JERRI 360 ml out : bilious today  12. Pt states \"I am just tired and I don't want to get up. \" Encouraged pt to participate in PT/OT    PLAN  1. NGT to CLWS  2. Renal seeing: OK to d/c nadira when [facility] with renal   3. Strict I&Os  4. Pain control   5. Zosyn  6. PT/OT: Sit on edge of bed/up to chair today/ambulate  7. Protonix BID  8. IVF per renal  9. Labs in the am.   10. Increase TF rate to 30 ml/hr (goal rate is 60 ml/hr)  11. Pt looks OK POD #10: Pt with bilious JERRI drainage suggestive of leak from perf ulcer repair. JERRI should be able to adequately drain fluid. Will increase TF today. Discussed with pt and her family she needs to get out of bed and use IS    Casimer Schlichter is virtually unchanged from yesterday. Pain is somewhat well controlled. She has no nausea and no vomiting. She has passed flatus and has had a bowel movement. She is NPO. Current activity is up with assistance    OBJECTIVE  VITALS:  height is 5' 4\" (1.626 m) and weight is 171 lb 8 oz (77.8 kg). Her oral temperature is 97.7 °F (36.5 °C). Her blood pressure is 145/79 (abnormal) and her pulse is 74. Her respiration is 18 and oxygen saturation is 99%.    VITALS:  BP (!) 145/79 Comment: just got done with physical therapy  Pulse 74   Temp 97.7 °F (36.5 °C) (Oral)   Resp 18   Ht 5' 4\" (1.626 m)   Wt 171 lb 8 oz (77.8 kg)   SpO2 99%   BMI 29.44 kg/m²   INTAKE/OUTPUT:      Intake/Output Summary (Last 24 hours) at 7/8/2022 1451  Last data filed at 7/8/2022 0955  Gross per 24 hour   Intake 205 ml   Output 1160 ml   Net -955 ml     URINARY CATHETER OUTPUT (Curiel):     GENERAL: alert, cooperative, no distress  I/O last 3 completed shifts: In: 353 [NG/GT:353]  Out: 1935 [Urine:700; Emesis/NG output:580; Drains:655]  I/O this shift:   In: 0   Out: 300 [Urine:300]    LABS  Recent Labs     07/08/22  0654   WBC 12.6*   HGB 8.2*   HCT 24.9*         K 3.5      CO2 25   BUN 23*   CREATININE 1.7*   PHOS 3.0   CALCIUM 8.0*     CBC with Differential:    Lab Results   Component Value Date/Time    WBC 12.6 07/08/2022 06:54 AM    RBC 2.82 07/08/2022 06:54 AM    RBC 4.41 08/02/2016 10:34 AM    HGB 8.2 07/08/2022 06:54 AM    HCT 24.9 07/08/2022 06:54 AM     07/08/2022 06:54 AM    MCV 88.4 07/08/2022 06:54 AM    MCH 29.2 07/08/2022 06:54 AM    MCHC 33.0 07/08/2022 06:54 AM    RDW 15.2 07/08/2022 06:54 AM    BANDSPCT 1 07/05/2022 04:36 AM    LYMPHOPCT 5.8 07/08/2022 06:54 AM    LYMPHOPCT 25.3 08/02/2016 10:34 AM    MONOPCT 4.8 07/08/2022 06:54 AM    BASOPCT 0.1 07/08/2022 06:54 AM    MONOSABS 0.6 07/08/2022 06:54 AM    LYMPHSABS 0.7 07/08/2022 06:54 AM    EOSABS 0.1 07/08/2022 06:54 AM    BASOSABS 0.0 07/08/2022 06:54 AM     CMP:    Lab Results   Component Value Date/Time     07/08/2022 06:54 AM    K 3.5 07/08/2022 06:54 AM    K 3.8 06/29/2022 05:19 AM     07/08/2022 06:54 AM    CO2 25 07/08/2022 06:54 AM    BUN 23 07/08/2022 06:54 AM    CREATININE 1.7 07/08/2022 06:54 AM    GFRAA 36 07/08/2022 06:54 AM    AGRATIO 0.8 06/29/2022 05:19 AM    LABGLOM 29 07/08/2022 06:54 AM    GLUCOSE 164 07/08/2022 06:54 AM    GLUCOSE 85 08/02/2016 10:34 AM    PROT 4.7 06/29/2022 05:19 AM    PROT 5.7 08/02/2016 10:34 AM    LABALBU 1.9 07/08/2022 06:54 AM CALCIUM 8.0 07/08/2022 06:54 AM    BILITOT 0.6 06/29/2022 05:19 AM    ALKPHOS 84 06/29/2022 05:19 AM    AST 49 06/29/2022 05:19 AM    ALT 48 06/29/2022 05:19 AM         ILAN Miles - CNP  Electronically signed 7/8/2022 at 2:51 PM

## 2022-07-08 NOTE — PROGRESS NOTES
Bedside Mobility Assessment Tool (BMAT):     Assessment Level 1- Sit and Shake    1. From a semi-reclined position, ask patient to sit up and rotate to a seated position at the side of the bed. Can use the bedrail. 2. Ask patient to reach out and grab your hand and shake making sure patient reaches across his/her midline. Fail- Patient is unable to perform tasks, patient is MOBILITY LEVEL 1. Assessment Level 2- Stretch and Point   1. With patient in seated position at the side of the bed, have patient place both feet on the floor (or stool) with knees no higher than hips. 2. Ask patient to stretch one leg and straighten the knee, then bend the ankle/flex and point the toes. If appropriate, repeat with the other leg. Fail- Patient is unable to complete task. Patient is MOBILITY LEVEL 2. Assessment Level 3- Stand   1. Ask patient to elevate off the bed or chair (seated to standing) using an assistive device (cane, bedrail). 2. Patient should be able to raise buttocks off be and hold for a count of five. May repeat once. Fail- Patient unable to demonstrate standing stability. Patient is MOBILITY LEVEL 3. Assessment Level 4- Walk   1. Ask patient to march in place at bedside. 2. Then ask patient to advance step and return each foot. Some medical conditions may render a patient from stepping backwards, use your best clinical judgement. Fail- Patient not able to complete tasks OR requires use of assistive device. Patient is MOBILITY LEVEL 3.        Mobility Level- 2

## 2022-07-09 LAB
ALBUMIN SERPL-MCNC: 1.7 G/DL (ref 3.4–5)
ANION GAP SERPL CALCULATED.3IONS-SCNC: 8 MMOL/L (ref 3–16)
BUN BLDV-MCNC: 19 MG/DL (ref 7–20)
CALCIUM SERPL-MCNC: 8 MG/DL (ref 8.3–10.6)
CHLORIDE BLD-SCNC: 107 MMOL/L (ref 99–110)
CO2: 24 MMOL/L (ref 21–32)
CREAT SERPL-MCNC: 1.6 MG/DL (ref 0.6–1.2)
GFR AFRICAN AMERICAN: 38
GFR NON-AFRICAN AMERICAN: 31
GLUCOSE BLD-MCNC: 104 MG/DL (ref 70–99)
GLUCOSE BLD-MCNC: 107 MG/DL (ref 70–99)
GLUCOSE BLD-MCNC: 108 MG/DL (ref 70–99)
GLUCOSE BLD-MCNC: 116 MG/DL (ref 70–99)
GLUCOSE BLD-MCNC: 117 MG/DL (ref 70–99)
GLUCOSE BLD-MCNC: 117 MG/DL (ref 70–99)
GLUCOSE BLD-MCNC: 124 MG/DL (ref 70–99)
PERFORMED ON: ABNORMAL
PHOSPHORUS: 3.3 MG/DL (ref 2.5–4.9)
POTASSIUM SERPL-SCNC: 3.6 MMOL/L (ref 3.5–5.1)
SODIUM BLD-SCNC: 139 MMOL/L (ref 136–145)

## 2022-07-09 PROCEDURE — 51798 US URINE CAPACITY MEASURE: CPT

## 2022-07-09 PROCEDURE — 94761 N-INVAS EAR/PLS OXIMETRY MLT: CPT

## 2022-07-09 PROCEDURE — 6370000000 HC RX 637 (ALT 250 FOR IP): Performed by: INTERNAL MEDICINE

## 2022-07-09 PROCEDURE — 80069 RENAL FUNCTION PANEL: CPT

## 2022-07-09 PROCEDURE — 6360000002 HC RX W HCPCS: Performed by: SURGERY

## 2022-07-09 PROCEDURE — 99024 POSTOP FOLLOW-UP VISIT: CPT | Performed by: SURGERY

## 2022-07-09 PROCEDURE — 6360000002 HC RX W HCPCS: Performed by: NURSE PRACTITIONER

## 2022-07-09 PROCEDURE — 2700000000 HC OXYGEN THERAPY PER DAY

## 2022-07-09 PROCEDURE — 2580000003 HC RX 258: Performed by: SURGERY

## 2022-07-09 PROCEDURE — C9113 INJ PANTOPRAZOLE SODIUM, VIA: HCPCS | Performed by: NURSE PRACTITIONER

## 2022-07-09 PROCEDURE — 2580000003 HC RX 258: Performed by: NURSE PRACTITIONER

## 2022-07-09 PROCEDURE — 1200000000 HC SEMI PRIVATE

## 2022-07-09 RX ORDER — NYSTATIN 100000 U/G
OINTMENT TOPICAL 2 TIMES DAILY
Status: DISCONTINUED | OUTPATIENT
Start: 2022-07-09 | End: 2022-07-11 | Stop reason: ALTCHOICE

## 2022-07-09 RX ADMIN — PIPERACILLIN AND TAZOBACTAM 3375 MG: 3; .375 INJECTION, POWDER, LYOPHILIZED, FOR SOLUTION INTRAVENOUS at 00:30

## 2022-07-09 RX ADMIN — PIPERACILLIN AND TAZOBACTAM 3375 MG: 3; .375 INJECTION, POWDER, LYOPHILIZED, FOR SOLUTION INTRAVENOUS at 08:41

## 2022-07-09 RX ADMIN — MORPHINE SULFATE 1 MG: 2 INJECTION, SOLUTION INTRAMUSCULAR; INTRAVENOUS at 14:56

## 2022-07-09 RX ADMIN — PANTOPRAZOLE SODIUM 40 MG: 40 INJECTION, POWDER, LYOPHILIZED, FOR SOLUTION INTRAVENOUS at 08:40

## 2022-07-09 RX ADMIN — HEPARIN SODIUM 5000 UNITS: 5000 INJECTION INTRAVENOUS; SUBCUTANEOUS at 05:09

## 2022-07-09 RX ADMIN — NYSTATIN: 100000 OINTMENT TOPICAL at 23:21

## 2022-07-09 RX ADMIN — SODIUM CHLORIDE, PRESERVATIVE FREE 10 ML: 5 INJECTION INTRAVENOUS at 20:43

## 2022-07-09 RX ADMIN — MORPHINE SULFATE 1 MG: 2 INJECTION, SOLUTION INTRAMUSCULAR; INTRAVENOUS at 20:42

## 2022-07-09 RX ADMIN — PIPERACILLIN AND TAZOBACTAM 3375 MG: 3; .375 INJECTION, POWDER, LYOPHILIZED, FOR SOLUTION INTRAVENOUS at 17:10

## 2022-07-09 RX ADMIN — MORPHINE SULFATE 1 MG: 2 INJECTION, SOLUTION INTRAMUSCULAR; INTRAVENOUS at 06:38

## 2022-07-09 RX ADMIN — PANTOPRAZOLE SODIUM 40 MG: 40 INJECTION, POWDER, LYOPHILIZED, FOR SOLUTION INTRAVENOUS at 20:42

## 2022-07-09 RX ADMIN — HEPARIN SODIUM 5000 UNITS: 5000 INJECTION INTRAVENOUS; SUBCUTANEOUS at 17:11

## 2022-07-09 ASSESSMENT — PAIN DESCRIPTION - LOCATION
LOCATION: ABDOMEN;INCISION
LOCATION: ABDOMEN

## 2022-07-09 ASSESSMENT — PAIN SCALES - GENERAL
PAINLEVEL_OUTOF10: 9
PAINLEVEL_OUTOF10: 6
PAINLEVEL_OUTOF10: 6

## 2022-07-09 ASSESSMENT — PAIN DESCRIPTION - ORIENTATION: ORIENTATION: MID

## 2022-07-09 ASSESSMENT — PAIN - FUNCTIONAL ASSESSMENT: PAIN_FUNCTIONAL_ASSESSMENT: ACTIVITIES ARE NOT PREVENTED

## 2022-07-09 ASSESSMENT — PAIN DESCRIPTION - DESCRIPTORS: DESCRIPTORS: BURNING;ACHING;CRAMPING

## 2022-07-09 NOTE — PROGRESS NOTES
Writer and PCA was able to get patient to roll back and forth in bed while we did bath wipes, bed change, and CHG wipes. Patient and family was educated on using the IS as well as leg and arm exercises in bed.

## 2022-07-09 NOTE — FLOWSHEET NOTE
07/09/22 0703   Vital Signs   Temp 97.5 °F (36.4 °C)   Temp Source Oral   Heart Rate 70   Heart Rate Source Monitor   Resp 18   BP (!) 147/79   BP Location Left lower arm   BP Method Automatic   MAP (Calculated) 101.67   Patient Position Semi fowlers   Level of Consciousness Alert (0)   MEWS Score 1   Oxygen Therapy   SpO2 94 %   O2 Device Nasal cannula   O2 Flow Rate (L/min) 2.5 L/min     Shift assessment complete. See flow sheet. Scheduled meds given. See MAR. Patients head-toe complete, VS are logged, and active bowel sound noted in all four quadrants. Patient continues to refuse any medication PO. Bladder scan completed and there was 168 mL, no voiding noted. However, patient could have voided when she had a large BM on the BSC. All feeding tubes where changed and a new bottle of feed has been hung. Both JERRI drain and J-tube insertion sites were cleansed with NS and split gauze was added to control drainage. Patient was encouraged to work with PT/OT. Aroma therapy patch given for nausea. No further needs  noted at this time. Call light and bedside table are within reach. The bed is locked and is in the lowest position. Madison Abdul RN

## 2022-07-09 NOTE — FLOWSHEET NOTE
07/09/22 1145   External Urinary Catheter   Placement Date/Time: 07/08/22 1520   Present on Admission/Arrival: No  Inserted by: [facility]  Insertion Practices: Connected to suction;Hand hygiene;Perineal cleansing   Urine Color Yellow   Output (mL) 200 mL

## 2022-07-09 NOTE — PROGRESS NOTES
Pt has not voided since waddell removal at 1730 per day shift RN, bladder scanned pt 58 ml in bladder, will recheck and continue to monitor output.  Oscar Perez, RN

## 2022-07-09 NOTE — CONSULTS
Consult for IV Bicarb infiltrated to right arm blisters/wound. POD #10 s/p repair of perf ulcer, j-tube insertion  ABD: J-tube in place, JERRI drain in place, staples intact with: small amount of bloody drainage noted, + diffuse tenderness which appears to continue to improve, +mild distention, NGT in place, no N/V, +flatus, + BM x 3 days ago        Recommend:  Clean with normal saline. Apply xeroform dressing, cover with dry dressing and roll guaze daily. Wound care to follow. Call wound care for deterioration [phone].       07/08/22 2047   Wound 07/08/22 Radial Proximal;Right infiltration that is now open and blisted   Date First Assessed/Time First Assessed: 07/08/22 1320   Present on Hospital Admission: No  Primary Wound Type: (c) Other (comment)  Location: Radial  Wound Location Orientation: Proximal;Right  Wound Description (Comments): infiltration that is now o. .. Wound Image     Wound Etiology Traumatic   Dressing Status New dressing applied   Wound Cleansed Cleansed with saline   Dressing/Treatment Xeroform;Petroleum gauze; Roll gauze   Dressing Change Due 07/09/22   Wound Length (cm) 15 cm   Wound Width (cm) 8 cm   Wound Depth (cm) 0.1 cm   Wound Surface Area (cm^2) 120 cm^2   Wound Volume (cm^3) 12 cm^3   Distance Tunneling (cm) 0 cm   Tunneling Position ___ O'Clock 0   Undermining Starts ___ O'Clock 0   Undermining Ends___ O'Clock 0   Undermining Maxium Distance (cm) 0   Wound Assessment Pink/red;Fluid filled blister; Other (Comment)  (deflated bulla)   Drainage Amount Small   Drainage Description Serous; Serosanguinous   Odor None   [place]peranza-wound Assessment Edematous; Ecchymosis   Margins Attached edges; Unattached edges; Defined edges   Wound Thickness Description not for Pressure Injury Partial thickness

## 2022-07-09 NOTE — PROGRESS NOTES
Progress Note    HISTORY     CC:  Abdominal pain and perforated bowel           We are following for    DA   CKD       Subjective/   HPI:  Curiel removed. Bladder Scan only 168 cc. Low urine volume. Scr stable. She is on TFs and goes have generalized swelling     ROS:  Constitutional:  No fevers, No Chills, + weakness  Cardiovascular:  No palpations, + edema  Respiratory:  No wheezing, no cough  Skin:  No rash, no itching  :  No hematuria, low urine volume     Social Hx:  No Family at the bedside     Past Medical and Surgical History:  - Reviewed, no changes     EXAM       Objective/     Vitals:    07/09/22 0408 07/09/22 3694 07/09/22 0703 07/09/22 0708   BP: 124/81  (!) 147/79    Pulse: 76  70    Resp: 16 18 18 18   Temp: 98.1 °F (36.7 °C)  97.5 °F (36.4 °C)    TempSrc: Oral  Oral    SpO2: 98%  94%    Weight:       Height:         24HR INTAKE/OUTPUT:      Intake/Output Summary (Last 24 hours) at 7/9/2022 5502  Last data filed at 7/9/2022 0703  Gross per 24 hour   Intake 682 ml   Output 1005 ml   Net -323 ml     Constitutional:  Ill appearing, NG  Eyes:  Pupils reactive, sclera clear   Neck:  Normal thyroid, no masses   Cardiovascular:  Regular, no rub  Respiratory:  No distress, no wheezing  Psychiatry:  Flat mood/affect, alert  Abdomen: +bs, soft, nt, no masses   Musculoskeletal: + LE edema, no clubbing   Lymphatics:  No LAD in neck, no supraclavicular nodes       MEDICAL DECISION MAKING       Data/  Recent Labs     07/07/22  0532 07/08/22  0654   WBC 13.3* 12.6*   HGB 8.1* 8.2*   HCT 24.6* 24.9*   MCV 87.8 88.4    233     Recent Labs     07/07/22  0532 07/08/22  0654 07/09/22  0515    137 139   K 3.7 3.5 3.6    104 107   CO2 25 25 24   GLUCOSE 118* 164* 117*   PHOS 3.2 3.0 3.3   BUN 27* 23* 19   CREATININE 1.8* 1.7* 1.6*   LABGLOM 27* 29* 31*   GFRAA 33* 36* 38*       Assessment/     -DA likely has developed ATN w hypovolemia, hypotension, NSAIDs use and contrast use.     Scr improved but urine volume is low      -Perforated prepyloric ulcer s/p laparotomy with repair n jejunostomy insertion on 6/28/22              On zosyn      -Metabolic acidosis from DA, resolved     -Hypertension. BP range is good.     -afib rvr per cardiology     -recent COVID from 6/22-24/22     -h/o colon cancer    Plan/     On TFs, continue holding IV fluids  Albumin is 1.7. We can monitor urine volume today and do bladder scan q shift to see if waddell needs to be related. If urine volume low and no urinary retention then would suspect intravascular volume depletion despite fluid overload on exam.  In that case we could try IV albumin with lasix tomorrow.          -----------------------------  Felecia Paiz M.D.   [facility]

## 2022-07-09 NOTE — PROGRESS NOTES
Pt has not voided since waddell removed, pt has 169 ml in bladder, perfect served Dr. Keisha Vasquez with this information, waiting on orders.  Abad Nam RN

## 2022-07-09 NOTE — PROGRESS NOTES
[facility] GENERAL SURGERY    Surgery Progress Note           POD # 11    PATIENT NAME: Lexis Gregg     TODAY'S DATE: 7/9/2022    INTERVAL HISTORY:    Pt complains of incisional pain. She is tolerating tube feeds. OBJECTIVE:   VITALS:  BP (!) 147/79   Pulse 70   Temp 97.5 °F (36.4 °C) (Oral)   Resp 18   Ht 5' 4\" (1.626 m)   Wt 171 lb 8 oz (77.8 kg)   SpO2 94%   BMI 29.44 kg/m²     INTAKE/OUTPUT:    I/O last 3 completed shifts: In: 26 [NG/GT:682]  Out: 1160 [Urine:750; Emesis/NG output:200; Drains:210]  I/O this shift:  In: -   Out: 245 [Urine:200; Drains:45]              CONSTITUTIONAL:  awake and alert  LUNGS:  diminished breath sounds right base and left base  ABDOMEN:   normal bowel sounds, soft, non-distended, drain with bilious drainage  INCISION: clean, dry    Data:  CBC: Recent Labs     07/07/22  0532 07/08/22  0654   WBC 13.3* 12.6*   HGB 8.1* 8.2*   HCT 24.6* 24.9*    233     BMP:    Recent Labs     07/07/22  0532 07/08/22  0654 07/09/22  0515    137 139   K 3.7 3.5 3.6    104 107   CO2 25 25 24   BUN 27* 23* 19   CREATININE 1.8* 1.7* 1.6*   GLUCOSE 118* 164* 117*     Hepatic: No results for input(s): AST, ALT, ALB, BILITOT, ALKPHOS in the last 72 hours. Mag:    No results for input(s): MG in the last 72 hours. Phos:     Recent Labs     07/07/22  0532 07/08/22  0654 07/09/22  0515   PHOS 3.2 3.0 3.3      INR: No results for input(s): INR in the last 72 hours. Radiology Review: N/A    ASSESSMENT AND PLAN:  74 y.o. female status post perforated ulcer repair with jejunostomy tube insertion. She now appears to have a postoperative leak which is contained by the drain. The output is decreasing. Continue IV fluid hydration, IV antibiotics, and supportive care. Increase tube feeds towards goal with plan to reach goal rate tomorrow.   I also encourage patient to be more active, use her spirometer, and participate in physical therapy        Electronically signed by [facility] Mikey Driver MD

## 2022-07-10 LAB
ALBUMIN SERPL-MCNC: 1.8 G/DL (ref 3.4–5)
ANION GAP SERPL CALCULATED.3IONS-SCNC: 10 MMOL/L (ref 3–16)
BASOPHILS ABSOLUTE: 0.1 K/UL (ref 0–0.2)
BASOPHILS RELATIVE PERCENT: 0.6 %
BUN BLDV-MCNC: 19 MG/DL (ref 7–20)
CALCIUM SERPL-MCNC: 8.1 MG/DL (ref 8.3–10.6)
CHLORIDE BLD-SCNC: 109 MMOL/L (ref 99–110)
CO2: 23 MMOL/L (ref 21–32)
CREAT SERPL-MCNC: 1.6 MG/DL (ref 0.6–1.2)
EOSINOPHILS ABSOLUTE: 0.2 K/UL (ref 0–0.6)
EOSINOPHILS RELATIVE PERCENT: 1.7 %
GFR AFRICAN AMERICAN: 38
GFR NON-AFRICAN AMERICAN: 31
GLUCOSE BLD-MCNC: 119 MG/DL (ref 70–99)
GLUCOSE BLD-MCNC: 126 MG/DL (ref 70–99)
GLUCOSE BLD-MCNC: 127 MG/DL (ref 70–99)
GLUCOSE BLD-MCNC: 127 MG/DL (ref 70–99)
GLUCOSE BLD-MCNC: 129 MG/DL (ref 70–99)
GLUCOSE BLD-MCNC: 141 MG/DL (ref 70–99)
GLUCOSE BLD-MCNC: 90 MG/DL (ref 70–99)
HCT VFR BLD CALC: 24.6 % (ref 36–48)
HEMOGLOBIN: 8.1 G/DL (ref 12–16)
LYMPHOCYTES ABSOLUTE: 0.7 K/UL (ref 1–5.1)
LYMPHOCYTES RELATIVE PERCENT: 7.5 %
MCH RBC QN AUTO: 29.1 PG (ref 26–34)
MCHC RBC AUTO-ENTMCNC: 33 G/DL (ref 31–36)
MCV RBC AUTO: 88.3 FL (ref 80–100)
MONOCYTES ABSOLUTE: 0.6 K/UL (ref 0–1.3)
MONOCYTES RELATIVE PERCENT: 6.5 %
NEUTROPHILS ABSOLUTE: 7.6 K/UL (ref 1.7–7.7)
NEUTROPHILS RELATIVE PERCENT: 83.7 %
PDW BLD-RTO: 15 % (ref 12.4–15.4)
PERFORMED ON: ABNORMAL
PERFORMED ON: NORMAL
PHOSPHORUS: 3.4 MG/DL (ref 2.5–4.9)
PLATELET # BLD: 284 K/UL (ref 135–450)
PMV BLD AUTO: 7.9 FL (ref 5–10.5)
POTASSIUM SERPL-SCNC: 3.3 MMOL/L (ref 3.5–5.1)
RBC # BLD: 2.78 M/UL (ref 4–5.2)
SODIUM BLD-SCNC: 142 MMOL/L (ref 136–145)
WBC # BLD: 9.1 K/UL (ref 4–11)

## 2022-07-10 PROCEDURE — 6360000002 HC RX W HCPCS: Performed by: SURGERY

## 2022-07-10 PROCEDURE — 99024 POSTOP FOLLOW-UP VISIT: CPT | Performed by: SURGERY

## 2022-07-10 PROCEDURE — 6370000000 HC RX 637 (ALT 250 FOR IP): Performed by: SURGERY

## 2022-07-10 PROCEDURE — P9047 ALBUMIN (HUMAN), 25%, 50ML: HCPCS | Performed by: INTERNAL MEDICINE

## 2022-07-10 PROCEDURE — 80069 RENAL FUNCTION PANEL: CPT

## 2022-07-10 PROCEDURE — 1200000000 HC SEMI PRIVATE

## 2022-07-10 PROCEDURE — 6360000002 HC RX W HCPCS: Performed by: NURSE PRACTITIONER

## 2022-07-10 PROCEDURE — C9113 INJ PANTOPRAZOLE SODIUM, VIA: HCPCS | Performed by: NURSE PRACTITIONER

## 2022-07-10 PROCEDURE — 97530 THERAPEUTIC ACTIVITIES: CPT

## 2022-07-10 PROCEDURE — 85025 COMPLETE CBC W/AUTO DIFF WBC: CPT

## 2022-07-10 PROCEDURE — 2700000000 HC OXYGEN THERAPY PER DAY

## 2022-07-10 PROCEDURE — 2580000003 HC RX 258: Performed by: SURGERY

## 2022-07-10 PROCEDURE — 2580000003 HC RX 258: Performed by: NURSE PRACTITIONER

## 2022-07-10 PROCEDURE — 6360000002 HC RX W HCPCS: Performed by: INTERNAL MEDICINE

## 2022-07-10 PROCEDURE — 6370000000 HC RX 637 (ALT 250 FOR IP): Performed by: INTERNAL MEDICINE

## 2022-07-10 PROCEDURE — 97110 THERAPEUTIC EXERCISES: CPT

## 2022-07-10 PROCEDURE — 94761 N-INVAS EAR/PLS OXIMETRY MLT: CPT

## 2022-07-10 RX ORDER — SALIVA STIMULANT COMB. NO.3
SPRAY, NON-AEROSOL (ML) MUCOUS MEMBRANE PRN
Status: DISCONTINUED | OUTPATIENT
Start: 2022-07-10 | End: 2022-07-28 | Stop reason: HOSPADM

## 2022-07-10 RX ORDER — FUROSEMIDE 10 MG/ML
40 INJECTION INTRAMUSCULAR; INTRAVENOUS ONCE
Status: COMPLETED | OUTPATIENT
Start: 2022-07-10 | End: 2022-07-10

## 2022-07-10 RX ORDER — ALBUMIN (HUMAN) 12.5 G/50ML
25 SOLUTION INTRAVENOUS EVERY 6 HOURS
Status: COMPLETED | OUTPATIENT
Start: 2022-07-10 | End: 2022-07-11

## 2022-07-10 RX ADMIN — SODIUM CHLORIDE, PRESERVATIVE FREE 10 ML: 5 INJECTION INTRAVENOUS at 22:15

## 2022-07-10 RX ADMIN — MORPHINE SULFATE 1 MG: 2 INJECTION, SOLUTION INTRAMUSCULAR; INTRAVENOUS at 17:13

## 2022-07-10 RX ADMIN — HEPARIN SODIUM 5000 UNITS: 5000 INJECTION INTRAVENOUS; SUBCUTANEOUS at 00:55

## 2022-07-10 RX ADMIN — ALBUMIN (HUMAN) 25 G: 0.25 INJECTION, SOLUTION INTRAVENOUS at 22:14

## 2022-07-10 RX ADMIN — PANTOPRAZOLE SODIUM 40 MG: 40 INJECTION, POWDER, LYOPHILIZED, FOR SOLUTION INTRAVENOUS at 11:28

## 2022-07-10 RX ADMIN — PANTOPRAZOLE SODIUM 40 MG: 40 INJECTION, POWDER, LYOPHILIZED, FOR SOLUTION INTRAVENOUS at 22:06

## 2022-07-10 RX ADMIN — POTASSIUM BICARBONATE 20 MEQ: 391 TABLET, EFFERVESCENT ORAL at 11:27

## 2022-07-10 RX ADMIN — FUROSEMIDE 40 MG: 10 INJECTION, SOLUTION INTRAMUSCULAR; INTRAVENOUS at 14:18

## 2022-07-10 RX ADMIN — PIPERACILLIN AND TAZOBACTAM 3375 MG: 3; .375 INJECTION, POWDER, LYOPHILIZED, FOR SOLUTION INTRAVENOUS at 12:14

## 2022-07-10 RX ADMIN — MORPHINE SULFATE 1 MG: 2 INJECTION, SOLUTION INTRAMUSCULAR; INTRAVENOUS at 09:52

## 2022-07-10 RX ADMIN — NYSTATIN: 100000 OINTMENT TOPICAL at 23:36

## 2022-07-10 RX ADMIN — HEPARIN SODIUM 5000 UNITS: 5000 INJECTION INTRAVENOUS; SUBCUTANEOUS at 11:28

## 2022-07-10 RX ADMIN — MORPHINE SULFATE 1 MG: 2 INJECTION, SOLUTION INTRAMUSCULAR; INTRAVENOUS at 22:47

## 2022-07-10 RX ADMIN — ALBUMIN (HUMAN) 25 G: 0.25 INJECTION, SOLUTION INTRAVENOUS at 10:50

## 2022-07-10 RX ADMIN — PIPERACILLIN AND TAZOBACTAM 3375 MG: 3; .375 INJECTION, POWDER, LYOPHILIZED, FOR SOLUTION INTRAVENOUS at 00:55

## 2022-07-10 RX ADMIN — HEPARIN SODIUM 5000 UNITS: 5000 INJECTION INTRAVENOUS; SUBCUTANEOUS at 17:13

## 2022-07-10 RX ADMIN — Medication: at 14:18

## 2022-07-10 RX ADMIN — NYSTATIN: 100000 OINTMENT TOPICAL at 11:30

## 2022-07-10 RX ADMIN — MORPHINE SULFATE 1 MG: 2 INJECTION, SOLUTION INTRAMUSCULAR; INTRAVENOUS at 05:01

## 2022-07-10 RX ADMIN — ALBUMIN (HUMAN) 25 G: 0.25 INJECTION, SOLUTION INTRAVENOUS at 17:17

## 2022-07-10 ASSESSMENT — PAIN DESCRIPTION - LOCATION: LOCATION: ABDOMEN

## 2022-07-10 ASSESSMENT — PAIN DESCRIPTION - DESCRIPTORS: DESCRIPTORS: ACHING;JABBING

## 2022-07-10 ASSESSMENT — PAIN SCALES - GENERAL
PAINLEVEL_OUTOF10: 8
PAINLEVEL_OUTOF10: 7
PAINLEVEL_OUTOF10: 8
PAINLEVEL_OUTOF10: 3

## 2022-07-10 NOTE — PROGRESS NOTES
The patient has not voided yet this shift. Bladder scanned the patient and got 164 mls. Patient states she feels like she voided, but the patient is dry and her purewick is empty. Dr. Lyndon grayson served.

## 2022-07-10 NOTE — FLOWSHEET NOTE
07/10/22 0757   Vital Signs   Temp 97 °F (36.1 °C)   Temp Source Oral   Heart Rate 76   Heart Rate Source Monitor   Resp 16   BP (!) 145/82   BP Location Right upper arm   BP Method Automatic   MAP (Calculated) 103   Patient Position Semi fowlers   Level of Consciousness Alert (0)   MEWS Score 1   Oxygen Therapy   SpO2 99 %   O2 Device Nasal cannula   O2 Flow Rate (L/min) 2 L/min     Shift assessment complete. See flow sheet. Scheduled meds given. See MAR. Patients head-toe complete, VS are logged, and active bowel sound noted in all four quadrants. Patient did fantastic this AM she was involved and willing to do many things for herself. All sites were cleaned with NS and split gauze/ABD pad was placed with minimal tape. No further needs  noted at this time. Call light and bedside table are within reach. The bed is locked and is in the lowest position. Kameron Sears RN

## 2022-07-10 NOTE — PROGRESS NOTES
Per Dr. Brody Oates no PO medications are to be given by mouth or put in NG. Patient is to have minimal ice chips only.

## 2022-07-10 NOTE — PROGRESS NOTES
[facility] GENERAL SURGERY    Surgery Progress Note           POD # 12    PATIENT NAME: Carolyn Tao     TODAY'S DATE: 7/10/2022    INTERVAL HISTORY:    Pt still with pain but feels better today. Per nursing, she has been moving better and participating in therapy after my urging. OBJECTIVE:   VITALS:  BP (!) 147/82   Pulse 71   Temp 98.1 °F (36.7 °C) (Oral)   Resp 16   Ht 5' 4\" (1.626 m)   Wt 171 lb 8 oz (77.8 kg)   SpO2 96%   BMI 29.44 kg/m²     INTAKE/OUTPUT:    I/O last 3 completed shifts: In: 9328 [NG/GT:1696]  Out: 1 [Urine:350; Emesis/NG output:900; Drains:290]  I/O this shift:  In: -   Out: 400 [Urine:300; Emesis/NG output:100]              CONSTITUTIONAL:  awake and alert  LUNGS:  diminished breath sounds right base and left base  ABDOMEN:   normal bowel sounds, soft, non-distended, JERRI with bilious drainage  INCISION: Clean with some mild erythema along the lower aspect. A few staples were removed and some old blood was evacuated. This was packed and a dressing was applied    Data:  CBC: Recent Labs     07/08/22  0654 07/10/22  0545   WBC 12.6* 9.1   HGB 8.2* 8.1*   HCT 24.9* 24.6*    284     BMP:    Recent Labs     07/08/22  0654 07/09/22  0515 07/10/22  0545    139 142   K 3.5 3.6 3.3*    107 109   CO2 25 24 23   BUN 23* 19 19   CREATININE 1.7* 1.6* 1.6*   GLUCOSE 164* 117* 119*     Hepatic: No results for input(s): AST, ALT, ALB, BILITOT, ALKPHOS in the last 72 hours. Mag:    No results for input(s): MG in the last 72 hours. Phos:     Recent Labs     07/08/22  0654 07/09/22  0515 07/10/22  0545   PHOS 3.0 3.3 3.4      INR: No results for input(s): INR in the last 72 hours. Radiology Review: N/A    ASSESSMENT AND PLAN:  74 y.o. female status post perforated ulcer repair with evidence of postoperative leak. This is controlled with the drain. Continue antibiotics and supportive care as well as nutritional supplementation with jejunostomy tube feedings.   Continue nasogastric decompression and nothing by mouth        Electronically signed by Joaquin Beckham MD

## 2022-07-10 NOTE — PROGRESS NOTES
Progress Note    HISTORY     CC:  Abdominal pain and perforated bowel           We are following for    DA   CKD       Subjective/   HPI:  Curiel removed. Urine volume very low but labs are stable. Bladder scans do not show significant retention. K is low.     ROS:  Constitutional:  No fevers, No Chills, + weakness  Cardiovascular:  No palpations, + edema  Respiratory:  No wheezing, no cough  Skin:  No rash, no itching  :  No hematuria, low urine volume     Social Hx:  No Family at the bedside     Past Medical and Surgical History:  - Reviewed, no changes     EXAM       Objective/     Vitals:    07/10/22 0047 07/10/22 0412 07/10/22 0531 07/10/22 0757   BP: 136/73 134/75  (!) 145/82   Pulse: 70 63  76   Resp: 17 16 16 16   Temp: 97.5 °F (36.4 °C) 97.1 °F (36.2 °C)  97 °F (36.1 °C)   TempSrc: Oral Oral  Oral   SpO2: 98% 99%  99%   Weight:       Height:         24HR INTAKE/OUTPUT:      Intake/Output Summary (Last 24 hours) at 7/10/2022 0818  Last data filed at 7/10/2022 0757  Gross per 24 hour   Intake 1014 ml   Output 1475 ml   Net -461 ml     Constitutional:  Ill appearing, NG  Eyes:  Pupils reactive, sclera clear   Neck:  Normal thyroid, no masses   Cardiovascular:  Regular, no rub  Respiratory:  No distress, no wheezing  Psychiatry:  Flat mood/affect, alert  Abdomen: +bs, soft, nt, no masses   Musculoskeletal: + LE edema, no clubbing   Lymphatics:  No LAD in neck, no supraclavicular nodes       MEDICAL DECISION MAKING       Data/  Recent Labs     07/08/22  0654 07/10/22  0545   WBC 12.6* 9.1   HGB 8.2* 8.1*   HCT 24.9* 24.6*   MCV 88.4 88.3    284     Recent Labs     07/08/22  0654 07/09/22  0515 07/10/22  0545    139 142   K 3.5 3.6 3.3*    107 109   CO2 25 24 23   GLUCOSE 164* 117* 119*   PHOS 3.0 3.3 3.4   BUN 23* 19 19   CREATININE 1.7* 1.6* 1.6*   LABGLOM 29* 31* 31*   GFRAA 36* 38* 38*       Assessment/     -DA likely has developed ATN w hypovolemia, hypotension, NSAIDs use and contrast use. Scr stable but urine volume is low. Bladder scans do not indicate urinary retention      -Perforated prepyloric ulcer s/p laparotomy with repair n jejunostomy insertion on 6/28/22              On zosyn      -Metabolic acidosis from DA, resolved     -Hypertension. BP range is good.     -afib rvr per cardiology     -recent COVID from 6/22-24/22     -h/o colon cancer    Plan/     On TFs  Will give IV albumin to increase intravascular refill rate. She has edema but could be intravascularly volume depleted related to severe hypoalbuminemia. Will start lasix.   If urine volume not picking up will need waddell replaced  Will schedule KCl via the NG   Follow labs closely        -----------------------------  Ash Minor M.D.   [facility]

## 2022-07-10 NOTE — PLAN OF CARE
Problem: Discharge Planning  Goal: Discharge to home or other facility with appropriate resources  7/10/2022 0023 by Nicole Renee RN  Outcome: Progressing  7/10/2022 0023 by Nicole Renee RN  Outcome: Progressing  2/2/8660 7510 by Derick Robles RN  Outcome: Progressing     Problem: Pain  Goal: Verbalizes/displays adequate comfort level or baseline comfort level  7/10/2022 0023 by Nicole Renee RN  Outcome: Progressing  7/10/2022 0023 by Nicole Renee RN  Outcome: Progressing  0/6/5075 2131 by Derick Robles RN  Outcome: Progressing     Problem: Safety - Adult  Goal: Free from fall injury  7/10/2022 0023 by Nicole Renee RN  Outcome: Progressing  7/10/2022 0023 by Nicole Renee RN  Outcome: Progressing  Flowsheets (Taken 6/3/9112 0650 by Derick Robles RN)  Free From Fall Injury: Instruct family/caregiver on patient safety  3/1/6611 6916 by Derick Robles RN  Outcome: Progressing     Problem: Skin/Tissue Integrity  Goal: Absence of new skin breakdown  Description: 1. Monitor for areas of redness and/or skin breakdown  2. Assess vascular access sites hourly  3. Every 4-6 hours minimum:  Change oxygen saturation probe site  4. Every 4-6 hours:  If on nasal continuous positive airway pressure, respiratory therapy assess nares and determine need for appliance change or resting period.   7/10/2022 0023 by Nicole Renee RN  Outcome: Progressing  7/10/2022 0023 by Nicole Renee RN  Outcome: Not Progressing  6/9/6425 0072 by Derick Robles RN  Outcome: Progressing     Problem: ABCDS Injury Assessment  Goal: Absence of physical injury  7/10/2022 0023 by Nicole Renee RN  Outcome: Progressing  7/10/2022 0023 by Nicole Renee RN  Outcome: Not Progressing  Flowsheets (Taken 7/0/2343 4511 by Derick Robles RN)  Absence of Physical Injury: Implement safety measures based on patient assessment  9/5/5261 0088 by Derick Robles RN  Outcome: Progressing     Problem: Nutrition Deficit:  Goal: Optimize nutritional status  7/10/2022 0023 by Aubrie Bowman RN  Outcome: Progressing  7/10/2022 0023 by Aubrie Bowman RN  Outcome: Not Progressing  7/2/3417 1442 by Angel Fishman RN  Outcome: Progressing Hemostasis: Electrocautery

## 2022-07-10 NOTE — FLOWSHEET NOTE
07/10/22 0952   Vital Signs   Resp 18   Pain Assessment   Pain Level 8   Opioid-Induced Sedation   POSS Score 1   Oxygen Therapy   SpO2 94 %   O2 Device None (Room air)     Patient is maintaining her O2 on RA. Patient did work with PT/OT and did maintain.

## 2022-07-10 NOTE — PROGRESS NOTES
2 persons  Sit to Supine:   Max A  and 2 persons  Rolling:   Min A plus handrail  Scooting: Total A to [facility]    Transfer Training  Unable to attempt-patient became light headed with room spinning dizziness that did not resolve sitting EOB, stable BP   Sit to stand:   Not Tested  Stand to sit:   Not Tested  Bed to Chair:   Not Tested with use of N/A    Therapeutic Exercise all completed bilaterally unless indicated  Ankle Pumps x 10 reps  Heel slides x 10 reps  Hip abduction: x 10 reps  SAQ and LAQ x 10  Balance  Sitting: Good - ; CGA-min A  Comments: occasional heavy posterior lean    Patient Education      Role of PT, POC, Discharge recommendations, DC recommendations, safety awareness, transfer techniques, HEP and calling for assist with mobility. Positioning Needs       Pt in bed, alarm set, positioned in proper neutral alignment and pressure relief provided. Call light provided and all needs within reach        Activity Tolerance   Pt completed therapy session with Dizziness noted with sitting EOBBP rest, 134/82  /88. Spo2 on RA >93%  Other  RN in room to discuss plan    Assessment :  Patient demonstrated good progress and participation with this session. Dizziness EOB may be due to prolonged immobility and pain medication. It may be better to attempt session toward the end of the pain medication dose  Recommending SNF upon discharge as patient functioning well below baseline, demonstrates good rehab potential and unable to return home due to inability to negotiate stairs to enter home/bedroom/bathroom, burden of care beyond caregiver ability and home environment not conducive to patient recovery. Goals (all goals ongoing unless otherwise indicated)  To be met in 3 visits:  1). Independent with LE Ex x 10 reps  2). Supine to/from sit: Mod A  3).  Bed to chair: CGA with walker     To be met in 6 visits:  1).  Supine to/from sit: Supervision  2).  Sit to/from stand: Supervision  3).  Bed to chair: Supervision  4).  Gait: Ambulate 50 ft.  with  SBA and use of LRAD (least restrictive assistive device)  5).  Tolerate B LE exercises 3 sets of 10-15 reps  6).  Ascend/descend 2 steps with CGA with use of hand rail unilateral and LRAD (least restrictive assistive device)    Plan   Continue with plan of care. Signature: Stepan Ferraro, PT #[id number]    If patient discharges from this facility prior to next visit, this note will serve as the Discharge Summary.

## 2022-07-11 LAB
ALBUMIN SERPL-MCNC: 3.2 G/DL (ref 3.4–5)
ANION GAP SERPL CALCULATED.3IONS-SCNC: 14 MMOL/L (ref 3–16)
BUN BLDV-MCNC: 21 MG/DL (ref 7–20)
CALCIUM SERPL-MCNC: 8 MG/DL (ref 8.3–10.6)
CHLORIDE BLD-SCNC: 109 MMOL/L (ref 99–110)
CO2: 26 MMOL/L (ref 21–32)
CREAT SERPL-MCNC: 1.6 MG/DL (ref 0.6–1.2)
GFR AFRICAN AMERICAN: 38
GFR NON-AFRICAN AMERICAN: 31
GLUCOSE BLD-MCNC: 115 MG/DL (ref 70–99)
GLUCOSE BLD-MCNC: 131 MG/DL (ref 70–99)
GLUCOSE BLD-MCNC: 132 MG/DL (ref 70–99)
GLUCOSE BLD-MCNC: 146 MG/DL (ref 70–99)
GLUCOSE BLD-MCNC: 147 MG/DL (ref 70–99)
GLUCOSE BLD-MCNC: 95 MG/DL (ref 70–99)
GLUCOSE BLD-MCNC: 99 MG/DL (ref 70–99)
MAGNESIUM: 1.7 MG/DL (ref 1.8–2.4)
PERFORMED ON: ABNORMAL
PERFORMED ON: NORMAL
PERFORMED ON: NORMAL
PHOSPHORUS: 3 MG/DL (ref 2.5–4.9)
POTASSIUM SERPL-SCNC: 3.3 MMOL/L (ref 3.5–5.1)
SODIUM BLD-SCNC: 149 MMOL/L (ref 136–145)

## 2022-07-11 PROCEDURE — 6370000000 HC RX 637 (ALT 250 FOR IP): Performed by: NURSE PRACTITIONER

## 2022-07-11 PROCEDURE — P9047 ALBUMIN (HUMAN), 25%, 50ML: HCPCS | Performed by: INTERNAL MEDICINE

## 2022-07-11 PROCEDURE — 6360000002 HC RX W HCPCS: Performed by: INTERNAL MEDICINE

## 2022-07-11 PROCEDURE — 1200000000 HC SEMI PRIVATE

## 2022-07-11 PROCEDURE — 6360000002 HC RX W HCPCS: Performed by: SURGERY

## 2022-07-11 PROCEDURE — 80069 RENAL FUNCTION PANEL: CPT

## 2022-07-11 PROCEDURE — 6360000002 HC RX W HCPCS: Performed by: NURSE PRACTITIONER

## 2022-07-11 PROCEDURE — 99024 POSTOP FOLLOW-UP VISIT: CPT | Performed by: NURSE PRACTITIONER

## 2022-07-11 PROCEDURE — 83735 ASSAY OF MAGNESIUM: CPT

## 2022-07-11 PROCEDURE — C9113 INJ PANTOPRAZOLE SODIUM, VIA: HCPCS | Performed by: NURSE PRACTITIONER

## 2022-07-11 PROCEDURE — 2580000003 HC RX 258: Performed by: SURGERY

## 2022-07-11 PROCEDURE — 2580000003 HC RX 258: Performed by: NURSE PRACTITIONER

## 2022-07-11 PROCEDURE — 6370000000 HC RX 637 (ALT 250 FOR IP): Performed by: SURGERY

## 2022-07-11 RX ORDER — OXYCODONE HCL 5 MG/5 ML
5 SOLUTION, ORAL ORAL EVERY 4 HOURS PRN
Status: DISCONTINUED | OUTPATIENT
Start: 2022-07-11 | End: 2022-07-28 | Stop reason: HOSPADM

## 2022-07-11 RX ORDER — POTASSIUM CHLORIDE 29.8 MG/ML
20 INJECTION INTRAVENOUS
Status: COMPLETED | OUTPATIENT
Start: 2022-07-11 | End: 2022-07-11

## 2022-07-11 RX ORDER — POTASSIUM CHLORIDE 20MEQ/15ML
40 LIQUID (ML) ORAL DAILY
Status: DISCONTINUED | OUTPATIENT
Start: 2022-07-11 | End: 2022-07-11

## 2022-07-11 RX ORDER — MAGNESIUM SULFATE IN WATER 40 MG/ML
2000 INJECTION, SOLUTION INTRAVENOUS ONCE
Status: COMPLETED | OUTPATIENT
Start: 2022-07-11 | End: 2022-07-11

## 2022-07-11 RX ORDER — POTASSIUM CHLORIDE 29.8 MG/ML
40 INJECTION INTRAVENOUS ONCE
Status: DISCONTINUED | OUTPATIENT
Start: 2022-07-11 | End: 2022-07-11 | Stop reason: SDUPTHER

## 2022-07-11 RX ADMIN — NYSTATIN: 100000 OINTMENT TOPICAL at 09:49

## 2022-07-11 RX ADMIN — POTASSIUM CHLORIDE 20 MEQ: 29.8 INJECTION INTRAVENOUS at 18:45

## 2022-07-11 RX ADMIN — MAGNESIUM SULFATE HEPTAHYDRATE 2000 MG: 40 INJECTION, SOLUTION INTRAVENOUS at 12:09

## 2022-07-11 RX ADMIN — OXYCODONE HYDROCHLORIDE 5 MG: 5 SOLUTION ORAL at 12:13

## 2022-07-11 RX ADMIN — POTASSIUM CHLORIDE 20 MEQ: 29.8 INJECTION INTRAVENOUS at 17:48

## 2022-07-11 RX ADMIN — MICONAZOLE NITRATE: 2 OINTMENT TOPICAL at 21:16

## 2022-07-11 RX ADMIN — ALBUMIN (HUMAN) 25 G: 0.25 INJECTION, SOLUTION INTRAVENOUS at 10:17

## 2022-07-11 RX ADMIN — HEPARIN SODIUM 5000 UNITS: 5000 INJECTION INTRAVENOUS; SUBCUTANEOUS at 09:59

## 2022-07-11 RX ADMIN — SODIUM CHLORIDE: 9 INJECTION, SOLUTION INTRAVENOUS at 17:49

## 2022-07-11 RX ADMIN — MORPHINE SULFATE 1 MG: 2 INJECTION, SOLUTION INTRAMUSCULAR; INTRAVENOUS at 21:25

## 2022-07-11 RX ADMIN — MORPHINE SULFATE 1 MG: 2 INJECTION, SOLUTION INTRAMUSCULAR; INTRAVENOUS at 05:27

## 2022-07-11 RX ADMIN — PIPERACILLIN AND TAZOBACTAM 3375 MG: 3; .375 INJECTION, POWDER, LYOPHILIZED, FOR SOLUTION INTRAVENOUS at 01:32

## 2022-07-11 RX ADMIN — PANTOPRAZOLE SODIUM 40 MG: 40 INJECTION, POWDER, LYOPHILIZED, FOR SOLUTION INTRAVENOUS at 21:13

## 2022-07-11 RX ADMIN — PANTOPRAZOLE SODIUM 40 MG: 40 INJECTION, POWDER, LYOPHILIZED, FOR SOLUTION INTRAVENOUS at 09:53

## 2022-07-11 RX ADMIN — PIPERACILLIN AND TAZOBACTAM 3375 MG: 3; .375 INJECTION, POWDER, LYOPHILIZED, FOR SOLUTION INTRAVENOUS at 19:04

## 2022-07-11 RX ADMIN — ALBUMIN (HUMAN) 25 G: 0.25 INJECTION, SOLUTION INTRAVENOUS at 15:56

## 2022-07-11 RX ADMIN — SODIUM CHLORIDE, PRESERVATIVE FREE 10 ML: 5 INJECTION INTRAVENOUS at 21:15

## 2022-07-11 RX ADMIN — ALBUMIN (HUMAN) 25 G: 0.25 INJECTION, SOLUTION INTRAVENOUS at 06:05

## 2022-07-11 RX ADMIN — SODIUM CHLORIDE, PRESERVATIVE FREE 10 ML: 5 INJECTION INTRAVENOUS at 09:54

## 2022-07-11 RX ADMIN — OXYCODONE HYDROCHLORIDE 5 MG: 5 SOLUTION ORAL at 17:27

## 2022-07-11 RX ADMIN — HEPARIN SODIUM 5000 UNITS: 5000 INJECTION INTRAVENOUS; SUBCUTANEOUS at 01:32

## 2022-07-11 RX ADMIN — HEPARIN SODIUM 5000 UNITS: 5000 INJECTION INTRAVENOUS; SUBCUTANEOUS at 17:44

## 2022-07-11 ASSESSMENT — PAIN DESCRIPTION - ORIENTATION
ORIENTATION: MID
ORIENTATION: MID
ORIENTATION: MID;OUTER
ORIENTATION: MID

## 2022-07-11 ASSESSMENT — PAIN DESCRIPTION - DESCRIPTORS
DESCRIPTORS: ACHING
DESCRIPTORS: ACHING;JABBING
DESCRIPTORS: ACHING;BURNING;SORE;TENDER
DESCRIPTORS: ACHING;BURNING;SORE

## 2022-07-11 ASSESSMENT — PAIN DESCRIPTION - LOCATION
LOCATION: ABDOMEN;BACK
LOCATION: ABDOMEN;BACK;HEAD
LOCATION: ABDOMEN
LOCATION: ABDOMEN

## 2022-07-11 ASSESSMENT — PAIN - FUNCTIONAL ASSESSMENT
PAIN_FUNCTIONAL_ASSESSMENT: PREVENTS OR INTERFERES WITH ALL ACTIVE AND SOME PASSIVE ACTIVITIES
PAIN_FUNCTIONAL_ASSESSMENT: INTOLERABLE, UNABLE TO DO ANY ACTIVE OR PASSIVE ACTIVITIES

## 2022-07-11 ASSESSMENT — PAIN SCALES - GENERAL
PAINLEVEL_OUTOF10: 8
PAINLEVEL_OUTOF10: 4
PAINLEVEL_OUTOF10: 9
PAINLEVEL_OUTOF10: 10
PAINLEVEL_OUTOF10: 10

## 2022-07-11 NOTE — PROGRESS NOTES
General Surgery   Daily Progress Note    Pt Name: Kishan Mcqueen Record Number: [id number]  Date of Birth 1950   Today's Date: 7/11/2022    ASSESSMENT  1. POD #13 s/p repair of perf ulcer, j-tube insertion  2. ABD: J-tube in place, JERRI drain in place, midline dressing changed: + old bloody drainage noted, 2 open areas packed with gauze and covered with 4x4 gauze, + diffuse tenderness which appears improved form last week, +mild distention, NGT in place, no N/V, +flatus, + BMs liquid brown, 1 during examination    3. ARF improving: Cr 1.6  4. K+ 3.3  5. Leuks 13.3->15.9->14.2->14.3->13.3->12.6->9.1  6. Lipase 129 (pancreas was secondarily inflamed on ER CT)  7. VSS  8. NGT: 900 ml out  9. F/C removed: UO OK  10. JERRI 10 ml out : bilious today  11. Pt states \"I am not getting better from all of this, I am dying here. \" Encouraged pt to participate in PT/OT. Spoke with pt and  at length: pt is getting better, she has to start moving. PLAN  1. NGT to CLWS  2. Renal seeing. 3. Strict I&Os  4. Pain control   5. Zosyn  6. PT/OT: Sit on edge of bed/up to chair today/ambulate  7. Protonix BID  8. IVF per renal  9. Labs in the am.   10. Wound care RN to see for excoriation. Discussed with RN: interdry to groin folds. Nystatin ordered  11. TF rate to 30 ml/hr (goal rate is 60 ml/hr)  12. Pt looks OK POD #13: Pt with bilious JERRI drainage suggestive of leak from perf ulcer repair. JERRI with decreased output which is a good sign, TF at 30 ml today: may advance TF to goal as tolerated. Coleen Alexandra is virtually unchanged from yesterday. Pain is somewhat well controlled. She has no nausea and no vomiting. She has passed flatus and has had a bowel movement. She is NPO and tolerating TF. Current activity is up with assistance    OBJECTIVE  VITALS:  height is 5' 4\" (1.626 m) and weight is 171 lb 8 oz (77.8 kg). Her oral temperature is 96.9 °F (36.1 °C).  Her blood pressure is 146/80 (abnormal) and her pulse is 68. Her respiration is 16 and oxygen saturation is 94%. VITALS:  BP (!) 146/80   Pulse 68   Temp 96.9 °F (36.1 °C) (Oral)   Resp 16   Ht 5' 4\" (1.626 m)   Wt 171 lb 8 oz (77.8 kg)   SpO2 94%   BMI 29.44 kg/m²   INTAKE/OUTPUT:      Intake/Output Summary (Last 24 hours) at 7/11/2022 1435  Last data filed at 7/11/2022 8593  Gross per 24 hour   Intake --   Output 2210 ml   Net -2210 ml     URINARY CATHETER OUTPUT (Curiel):     GENERAL: alert, cooperative, no distress  I/O last 3 completed shifts: In: 3603 [NG/GT:1014]  Out: 3225 [Urine:1850; Emesis/NG output:1300; Drains:75]  No intake/output data recorded. LABS  Recent Labs     07/10/22  0545 07/10/22  0545 07/11/22  0558   WBC 9.1  --   --    HGB 8.1*  --   --    HCT 24.6*  --   --      --   --       < > 149*   K 3.3*   < > 3.3*      < > 109   CO2 23   < > 26   BUN 19   < > 21*   CREATININE 1.6*   < > 1.6*   MG  --   --  1.70*   PHOS 3.4   < > 3.0   CALCIUM 8.1*   < > 8.0*    < > = values in this interval not displayed.      CBC with Differential:    Lab Results   Component Value Date/Time    WBC 9.1 07/10/2022 05:45 AM    RBC 2.78 07/10/2022 05:45 AM    RBC 4.41 08/02/2016 10:34 AM    HGB 8.1 07/10/2022 05:45 AM    HCT 24.6 07/10/2022 05:45 AM     07/10/2022 05:45 AM    MCV 88.3 07/10/2022 05:45 AM    MCH 29.1 07/10/2022 05:45 AM    MCHC 33.0 07/10/2022 05:45 AM    RDW 15.0 07/10/2022 05:45 AM    BANDSPCT 1 07/05/2022 04:36 AM    LYMPHOPCT 7.5 07/10/2022 05:45 AM    LYMPHOPCT 25.3 08/02/2016 10:34 AM    MONOPCT 6.5 07/10/2022 05:45 AM    BASOPCT 0.6 07/10/2022 05:45 AM    MONOSABS 0.6 07/10/2022 05:45 AM    LYMPHSABS 0.7 07/10/2022 05:45 AM    EOSABS 0.2 07/10/2022 05:45 AM    BASOSABS 0.1 07/10/2022 05:45 AM     CMP:    Lab Results   Component Value Date/Time     07/11/2022 05:58 AM    K 3.3 07/11/2022 05:58 AM    K 3.8 06/29/2022 05:19 AM     07/11/2022 05:58 AM    CO2 26 07/11/2022 05:58 AM    BUN 21

## 2022-07-11 NOTE — PROGRESS NOTES
Occupational/ Physical Therapy Attempt   Attempted occupational/physical therapy, however patient was receiving care from staff members during first attempt at therapy. Later, a second attempt for therapy was made, however patient declined stating that she was too sore. Patient was lethargic and easily irritable during the 2nd attempt with therapy and could not be educated or motivated to participate in the therapy. Patient wanted to be left alone. Patient will be followed up later as schedule permits.      Azul Roman, OTR/L [id number]  Christiano Parks, MS PT, # [id number]

## 2022-07-11 NOTE — PLAN OF CARE
Problem: Discharge Planning  Goal: Discharge to home or other facility with appropriate resources  7/11/2022 1857 by Fartun Vaca RN  Outcome: Progressing  7/11/2022 0630 by Colletta Nova, RN  Outcome: Progressing     Problem: Pain  Goal: Verbalizes/displays adequate comfort level or baseline comfort level  7/11/2022 1857 by Fartun Vaca RN  Outcome: Progressing  7/11/2022 0630 by Colletta Nova, RN  Outcome: Progressing     Problem: Safety - Adult  Goal: Free from fall injury  7/11/2022 1857 by Fartun Vaca RN  Outcome: Progressing  Flowsheets (Taken 7/11/2022 0631 by Colletta Nova, RN)  Free From Fall Injury: Instruct family/caregiver on patient safety  7/11/2022 0630 by Colletta Nova, RN  Outcome: Progressing     Problem: Skin/Tissue Integrity  Goal: Absence of new skin breakdown  Description: 1. Monitor for areas of redness and/or skin breakdown  2. Assess vascular access sites hourly  3. Every 4-6 hours minimum:  Change oxygen saturation probe site  4. Every 4-6 hours:  If on nasal continuous positive airway pressure, respiratory therapy assess nares and determine need for appliance change or resting period.   7/11/2022 1857 by Fartun Vaca RN  Outcome: Progressing  7/11/2022 0630 by Colletta Nova, RN  Outcome: Progressing     Problem: ABCDS Injury Assessment  Goal: Absence of physical injury  7/11/2022 1857 by Fartun Vaca RN  Outcome: Progressing  Flowsheets (Taken 7/11/2022 0631 by Colletta Nova, RN)  Absence of Physical Injury: Implement safety measures based on patient assessment  7/11/2022 0630 by Colletta Nova, RN  Outcome: Progressing     Problem: Nutrition Deficit:  Goal: Optimize nutritional status  7/11/2022 1857 by Fartun Vaca RN  Outcome: Progressing  7/11/2022 0630 by Colletta Nova, RN  Outcome: Progressing

## 2022-07-11 NOTE — PROGRESS NOTES
Progress Note    HISTORY     CC:  Abdominal pain and perforated bowel           We are following for     DA    CKD       Subjective/   HPI:  Potassium is low. Kidney function is stable. IV lasix given and urine volume improved. Sodium is up though     ROS:  Constitutional:  No fevers, No Chills, + weakness  Cardiovascular:  No palpations, + edema  Respiratory:  No wheezing, no cough  Skin:  No rash, no itching  :  No hematuria, urine volume picking up     Social Hx:  No Family at the bedside     Past Medical and Surgical History:  - Reviewed, no changes     EXAM       Objective/     Vitals:    07/11/22 0406 07/11/22 0527 07/11/22 0557 07/11/22 0713   BP: (!) 166/79   (!) 146/80   Pulse: 71   68   Resp: 14 16 16 16   Temp: 97 °F (36.1 °C)   96.9 °F (36.1 °C)   TempSrc: Axillary   Oral   SpO2: 100%   94%   Weight:       Height:         24HR INTAKE/OUTPUT:      Intake/Output Summary (Last 24 hours) at 7/11/2022 1041  Last data filed at 7/11/2022 0662  Gross per 24 hour   Intake --   Output 2210 ml   Net -2210 ml     Constitutional:  Ill appearing, NG  Eyes:  Pupils reactive, sclera clear   Neck:  Normal thyroid, no masses   Cardiovascular:  Regular, no rub  Respiratory:  No distress, no wheezing  Psychiatry:  Flat mood/affect, somnolent   Abdomen: +bs, soft, nt, no masses   Musculoskeletal: + LE edema, no clubbing   Lymphatics:  No LAD in neck, no supraclavicular nodes       MEDICAL DECISION MAKING       Data/  Recent Labs     07/10/22  0545   WBC 9.1   HGB 8.1*   HCT 24.6*   MCV 88.3        Recent Labs     07/09/22  0515 07/10/22  0545 07/11/22  0558    142 149*   K 3.6 3.3* 3.3*    109 109   CO2 24 23 26   GLUCOSE 117* 119* 131*   PHOS 3.3 3.4 3.0   MG  --   --  1.70*   BUN 19 19 21*   CREATININE 1.6* 1.6* 1.6*   LABGLOM 31* 31* 31*   GFRAA 38* 38* 38*       Assessment/     -DA likely has developed ATN w hypovolemia, hypotension, NSAIDs use and contrast use.     Scr stable / non-oliguric      -Perforated prepyloric ulcer s/p laparotomy with repair n jejunostomy insertion on 6/28/22              On zosyn      -Metabolic acidosis from DA, resolved     -Hypertension.  BP range is good.     -afib rvr per cardiology     -recent COVID from 6/22-24/22     -h/o colon cancer    Plan/     On TFs  Will give IV albumin to increase intravascular refill rate  Start free water 100 cc/hr via J tube  K replacement   Follow labs  PRN lasix        -----------------------------  Aries Saxena M.D.   [facility]

## 2022-07-11 NOTE — PLAN OF CARE
Problem: Discharge Planning  Goal: Discharge to home or other facility with appropriate resources  7/11/2022 0630 by Keila Tobin RN  Outcome: Progressing  8/34/9301 5652 by Derick Robles RN  Outcome: Progressing     Problem: Pain  Goal: Verbalizes/displays adequate comfort level or baseline comfort level  7/11/2022 0630 by Keila Tobin RN  Outcome: Progressing  8/16/9040 8500 by Derick Robles RN  Outcome: Progressing     Problem: Safety - Adult  Goal: Free from fall injury  7/11/2022 0630 by Keila Tobin RN  Outcome: Progressing  4/45/4114 6535 by Derick Robles RN  Outcome: Progressing     Problem: Skin/Tissue Integrity  Goal: Absence of new skin breakdown  Description: 1. Monitor for areas of redness and/or skin breakdown  2. Assess vascular access sites hourly  3. Every 4-6 hours minimum:  Change oxygen saturation probe site  4. Every 4-6 hours:  If on nasal continuous positive airway pressure, respiratory therapy assess nares and determine need for appliance change or resting period.   7/11/2022 0630 by Keila Tobin RN  Outcome: Progressing  4/75/1626 8727 by Derick Robles RN  Outcome: Progressing     Problem: ABCDS Injury Assessment  Goal: Absence of physical injury  7/11/2022 0630 by Keila Tobin RN  Outcome: Progressing  3/05/4437 2889 by Derick Robles RN  Outcome: Progressing     Problem: Nutrition Deficit:  Goal: Optimize nutritional status  7/11/2022 0630 by Keila Tobin RN  Outcome: Progressing  7/93/0741 5021 by Derick Robles RN  Outcome: Progressing

## 2022-07-11 NOTE — CARE COORDINATION
INTERDISCIPLINARY PLAN OF CARE CONFERENCE    Date/Time: 7/11/2022 4:48 PM  Completed by: Jill Bowman RN, Case Management      Patient Name:  Jah Nieves  YOB: 1950  Admitting Diagnosis: Gastric perforation, acute [K31.89]  Perforated peptic ulcer ([facility] [address] [K27.5]     Admit Date/Time:  6/28/2022  6:58 AM    Chart reviewed. Interdisciplinary team contacted or reviewed plan related to patient progress and discharge plans. Disciplines included Case Management, Nursing, and Dietitian. Current Status:ongoing; NGT to suction; TF at 30 ml today: may advance TF to goal as tolerated. PT/OT recommendation for discharge plan of care: SNF    Expected D/C Disposition:  TBD  Discharge Plan Comments: Chart reviewed. Attempted to meet with pt at bedside but she is working with RN to get cleaned up, per notes pt is refusing STR and plans to return home with [facility] with [facility]. Pt has 24/7 assist and support at home and a lot of DME at home. Pt plans to return home. Will follow.     Home O2 in place on admit: No

## 2022-07-11 NOTE — PROGRESS NOTES
Daughter Honey Clark updated. She will try to come tomorrow to encourage pt to do therapy/turns/agree to medical interventions.

## 2022-07-11 NOTE — FLOWSHEET NOTE
07/11/22 0713   Vital Signs   Temp 96.9 °F (36.1 °C)   Temp Source Oral   Heart Rate 68   Heart Rate Source Monitor   Resp 16   BP (!) 146/80   BP Location Right upper arm   BP Method Automatic   MAP (Calculated) 102   Patient Position Semi fowlers   Level of Consciousness Alert (0)   MEWS Score 1   Oxygen Therapy   SpO2 94 %   O2 Device None (Room air)   AM assessment completed, see flow sheet. Pt is alert and oriented, responds to voice - is sleepy. Vital signs are noted. Respirations are even & easy. No complaints voiced. Pt denies needs at this time. SR up x 2, and bed in low position. Call light is within reach.

## 2022-07-11 NOTE — CONSULTS
[facility] Wound Ostomy Continence Nurse  Consult Note       NAME:  Lani Mcqueen RECORD NUMBER:  [id number]  AGE: 74 y.o. GENDER: female  : 1950  TODAY'S DATE:  2022    Subjective  I don't want to turn. Reason for WOCN Evaluation and Assessment: Abd draining, buttocks reddened      Carolyn Tao is a 74 y.o. female referred by:   [x] Physician  [] Nursing  [] Other:     Wound Identification:  Wound Type: Right arm with blisters and open areas from IV Bicarb infiltrate. RIght and left lower buttoks and perineum with fungal rash + shearing from urine and stool incontinentce. Mid line abd staple line open in 2 areas with undermining along the incision line. Contributing Factors: edema, chronic pressure, decreased mobility, shear force, obesity, incontinence of stool and incontinence of urine    Wound History:  Pt had been at [facility] from - for COVID pneumonia. Was treated with dex. Was also on meloxicam  Presented to ED on 22 for abd pain had CTA of chest/a/p and was discharged on naproxen. Came again on 22 and was noted to have perforated pre-pyloric ulcer and underwent immediate surgery    PROCEDURE:  Repair of perforated prepyloric ulcer with primary suture and omental flap overlay and jejunostomy tube insertion on 22 by Dr Ryan Romero. .    Incontinent of urine and stool perineum inflamed from this moisture. Current Wound Care Treatment:  Moist to moist dressing to abdomin.     Patient Goal of Care:  [x] Wound Healing  [] Odor Control  [] Palliative Care  [] Pain Control   [] Other:         PAST MEDICAL HISTORY        Diagnosis Date    Arthritis     [facility]     Hyperlipidemia     Thyroid disease        PAST SURGICAL HISTORY    Past Surgical History:   Procedure Laterality Date    APPENDECTOMY  2009    CHOLECYSTECTOMY  2009    COLON SURGERY  2009    EPIDURAL STEROID INJECTION Left 4/15/2019    LEFT LUMBAR FIVE SACRAL ONE EPIDURAL STEROID INJECTION SITE CONFIRMED BY FLUOROSCOPY performed by Emanuel Martinez MD at [facility] Left 2019    LEFT LUMBAR FIVE SACRAL ONE EPIDURAL STEROID INJECTION SITE CONFIRMED BY FLUOROSCOPY performed by Emanuel Martinez MD at [facility] N/A 2022    LAPAROTOMY, REPAIR OF PERFORATED ULCER with jejunostomy insertion performed by Lovely Kussmaul, MD at [address]    Family History   Problem Relation Age of Onset    High Blood Pressure Sister     Cancer Sister     High Blood Pressure Brother     Heart Attack Brother     Cancer Brother        SOCIAL HISTORY    Social History     Tobacco Use    Smoking status: Former Smoker     Quit date: 3/17/2000     Years since quittin.3    Smokeless tobacco: Never Used   Vaping Use    Vaping Use: Never used   Substance Use Topics    Alcohol use: Yes     Alcohol/week: 0.0 standard drinks     Comment: socially- glass of wine    Drug use: Never       ALLERGIES    Allergies   Allergen Reactions    Bee Venom Swelling     Attacked by swarm of bees and required er visit    Adhesive Tape Other (See Comments)     Skin redness      Tetracyclines & Related Other (See Comments)     Mouth fungus      Sulfa Antibiotics Nausea And Vomiting       MEDICATIONS    No current facility-administered medications on file prior to encounter.      Current Outpatient Medications on File Prior to Encounter   Medication Sig Dispense Refill    ascorbic acid (VITAMIN C) 500 MG tablet Take 1 tablet by mouth 2 times daily for 7 days 14 tablet 0    zinc sulfate (ZINCATE) 220 (50 Zn) MG capsule Take 1 capsule by mouth daily for 7 days 7 capsule 0    naproxen (NAPROSYN) 500 MG tablet Take 1 tablet by mouth 2 times daily as needed for Pain 20 tablet 0    sucralfate (CARAFATE) 1 GM tablet Take 1 tablet by mouth 4 times daily for 15 days 60 tablet 0    famotidine (PEPCID) 10 MG tablet Take 2 tablets by mouth 2 times daily 120 tablet 0    butalbital-acetaminophen-caffeine (FIORICET, ESGIC) -40 MG per tablet Take 1 tablet by mouth every 6 hours as needed for Headaches 12 tablet 0    albuterol sulfate HFA (PROVENTIL;VENTOLIN;PROAIR) 108 (90 Base) MCG/ACT inhaler Inhale 2 puffs into the lungs every 6 hours as needed for Wheezing 18 g 3    PAXLOVID 20 x 150 MG & 10 x 100MG       levocetirizine (XYZAL) 5 MG tablet 5 mg nightly       aspirin 81 MG tablet Take 81 mg by mouth daily      atorvastatin (LIPITOR) 10 MG tablet Take 20 mg by mouth      levothyroxine (SYNTHROID) 137 MCG tablet 125 mcg Daily       citalopram (CELEXA) 20 MG tablet 20 mg daily       Omega-3 Fatty Acids (FISH OIL) 500 MG CAPS Take 1 g by mouth daily       PROBIOTIC PRODUCT PO Take by mouth         Objective  JERRI drain in place, staples intact but a few removed to allow for drainage of a hematoma. Current dressing saturated. NG to low wall suction. ube feedings to JTube. No waddell, incontinent of urine and stool. BP (!) 169/77   Pulse 76   Temp 97.8 °F (36.6 °C) (Oral)   Resp 16   Ht 5' 4\" (1.626 m)   Wt 171 lb 8 oz (77.8 kg)   SpO2 91%   BMI 29.44 kg/m²     LABS:  WBC:    Lab Results   Component Value Date/Time    WBC 9.1 07/10/2022 05:45 AM     H/H:    Lab Results   Component Value Date/Time    HGB 8.1 07/10/2022 05:45 AM    HCT 24.6 07/10/2022 05:45 AM     PTT:  No results found for: APTT, PTT[APTT}  PT/INR:    Lab Results   Component Value Date/Time    PROTIME 18.6 06/29/2022 05:19 AM    INR 1.57 06/29/2022 05:19 AM     HgBA1c:  No results found for: LABA1C    Assessment  Bright red mostly intact skin on right and left buttocks and perineum.   Some shearing noted on    Will Risk Score: Will Scale Score: 16    Patient Active Problem List   Diagnosis    Pneumonia due to 2019 novel coronavirus    Hypothyroidism    Chronic back pain    Hypoxia    COVID-19    Pneumonia due to COVID-19 virus    [facility]astric perforation ([facility]    Perforated peptic ulcer ([facility]    Atrial fibrillation ([facility]       Measurements:  Wound 07/08/22 Radial Proximal;Right infiltration that is now open and blisted (Active)   Wound Image    07/08/22 2101   Wound Etiology Other 07/10/22 0815   Dressing Status Clean;Dry; Intact 07/11/22 1546   Wound Cleansed Cleansed with saline 07/10/22 1355   Dressing/Treatment Xeroform;Roll gauze 07/10/22 1355   Number of days: 3     Incision 06/28/22 Abdomen Medial;Upper (Active)   Wound Image   07/11/22 1546   Dressing Status New drainage noted;New dressing applied 07/11/22 1546   Dressing Change Due 07/12/22 07/11/22 1546   Incision Cleansed Cleansed with saline 07/11/22 1546   Dressing/Treatment Moist to moist;Dry dressing;Tegaderm/transparent film dressing 07/11/22 1546   Incision Length (cm) 10.5 07/11/22 1546   Incision Width (cm) 1 cm 07/11/22 1546   Incision Depth (cm) 4 cm 07/11/22 1546   Closure Staples 07/11/22 1546   Margins Other (Comment) 07/11/22 1546   Incision Assessment Other (Comment) 07/11/22 1546   Drainage Amount Moderate 07/11/22 1546   Drainage Description Other (Comment); Sanguinous 07/11/22 1546   Odor None 07/11/22 1546   Gayle-incision Assessment Blanchable erythema 07/11/22 1546   Number of days: 13     Right and left buttocks with moisture, shearing and fungal issues:              Response to treatment:  With complaints of pain. Pain Assessment:  Severity:  3 / 10  Quality of pain: burning  Wound Pain Timing/Severity: intermittent  Premedicated: No    Plan   Plan of Care: Wound 07/08/22 Radial Proximal;Right infiltration that is now open and blisted-Dressing/Treatment: Xeroform,Roll gauze     Recommend:  Clean all wounds with normal saline. Apply xeroform dressing, cover with dry dressing and roll guaze daily to right arm IV inflitrate. Pre gayle wound skin of abd incision with barrier wipe.  Lightly pack small roll guaze moistened with salinge into 2 areas of abd incision (4 cm deep with underniming), cover with 4x4 guaze and Tegaderm. MD has requested not to place abd pad over dressing as there is hematoma that needs to drain and MD does not want a lot of drainage sitting in dressing. Prefers to have the dressing changed more frequently if needed. Clean buttocks and perineum with foamy cleanser. Mix @ 1 - 1.5 cm with remedy silicon moisturizing silicone cream to bilateral buttocks and perineum bid. Add low air loss pump to current isoflex / hercules mattress. Attempt to off load off buttocks at all times except for eating and when up in chair. Off loading pillow when up in chair. If patient refuses to turn please inform patient of the consequences of deterioration then document it. Wound care to follow. Call wound care for deterioration [phone]. Specialty Bed Required : Yes   [x] Low Air Loss Add low air loss pump  [x] Pressure Redistribution  [] Fluid Immersion  [] Bariatric  [] Total Pressure Relief  [x] Other: [place] mattress in place  Current Diet: Diet NPO  ADULT TUBE FEEDING; Jejunostomy; Other Tube Feeding (specify); Jevity 1.2; Continuous; 30; No; 30; Q 6 hours; Protein; one proteinex P2Go TWICE daily  Dietician consult:  Yes    Discharge Plan:  Placement for patient upon discharge: skilled nursing    Patient appropriate for Outpatient [address]: Yes    Referrals:  []  / discharge planner [facility] to [facility] [place], [facility] services. [] [facility]  [] Supplies  [] Other    Patient/Caregiver Teaching: Instructed on how her skin is already compromised from being incontinent. Stressed importance of letting staff know when needing chnaged and to not refuse turning when staff in room to assist her.   Level of patient/caregiver understanding able to:   [x] Indicates understanding       [x] Needs reinforcement  [] Unsuccessful      [] Verbal Understanding  [] Demonstrated understanding       [] No evidence of learning  [] Refused teaching         [] N/A       Electronically signed by Lee Warren, RN, MSN, Mani Amado on 7/11/2022 at 3:49 PM

## 2022-07-11 NOTE — PROGRESS NOTES
Pt is extremely resistive to any type of physical movement - refused therapy (insisted therapy leave her room). Pt states that she wants to die, that she is tired and sick of dealing with this process. Pt was re-educated multiple times about this recovery process and given emotional and spiritual support multiple times.

## 2022-07-11 NOTE — FLOWSHEET NOTE
07/10/22 2015   Vital Signs   Temp 99 °F (37.2 °C)   Temp Source Oral   Heart Rate 81   Heart Rate Source Monitor   Resp 16   BP (!) 170/89   BP Location Right upper arm   BP Method Automatic   MAP (Calculated) 116   Patient Position Semi fowlers   Level of Consciousness Alert (0)   MEWS Score 1   Oxygen Therapy   SpO2 95 %   O2 Device None (Room air)     Patient A+Ox4, vitals and assessments done at this time. Bed in lowest position, call light within reach.

## 2022-07-12 LAB
ALBUMIN SERPL-MCNC: 3.6 G/DL (ref 3.4–5)
ANION GAP SERPL CALCULATED.3IONS-SCNC: 9 MMOL/L (ref 3–16)
BASOPHILS ABSOLUTE: 0.1 K/UL (ref 0–0.2)
BASOPHILS RELATIVE PERCENT: 0.6 %
BUN BLDV-MCNC: 23 MG/DL (ref 7–20)
CALCIUM SERPL-MCNC: 8.4 MG/DL (ref 8.3–10.6)
CHLORIDE BLD-SCNC: 110 MMOL/L (ref 99–110)
CO2: 24 MMOL/L (ref 21–32)
CREAT SERPL-MCNC: 1.5 MG/DL (ref 0.6–1.2)
EOSINOPHILS ABSOLUTE: 0.1 K/UL (ref 0–0.6)
EOSINOPHILS RELATIVE PERCENT: 1.2 %
GFR AFRICAN AMERICAN: 41
GFR NON-AFRICAN AMERICAN: 34
GLUCOSE BLD-MCNC: 105 MG/DL (ref 70–99)
GLUCOSE BLD-MCNC: 131 MG/DL (ref 70–99)
GLUCOSE BLD-MCNC: 135 MG/DL (ref 70–99)
GLUCOSE BLD-MCNC: 137 MG/DL (ref 70–99)
GLUCOSE BLD-MCNC: 140 MG/DL (ref 70–99)
GLUCOSE BLD-MCNC: 140 MG/DL (ref 70–99)
GLUCOSE BLD-MCNC: 146 MG/DL (ref 70–99)
GLUCOSE BLD-MCNC: 150 MG/DL (ref 70–99)
HCT VFR BLD CALC: 22.4 % (ref 36–48)
HEMOGLOBIN: 7.5 G/DL (ref 12–16)
LYMPHOCYTES ABSOLUTE: 1 K/UL (ref 1–5.1)
LYMPHOCYTES RELATIVE PERCENT: 10.7 %
MAGNESIUM: 2 MG/DL (ref 1.8–2.4)
MCH RBC QN AUTO: 29.5 PG (ref 26–34)
MCHC RBC AUTO-ENTMCNC: 33.6 G/DL (ref 31–36)
MCV RBC AUTO: 88 FL (ref 80–100)
MONOCYTES ABSOLUTE: 0.6 K/UL (ref 0–1.3)
MONOCYTES RELATIVE PERCENT: 6.3 %
NEUTROPHILS ABSOLUTE: 7.8 K/UL (ref 1.7–7.7)
NEUTROPHILS RELATIVE PERCENT: 81.2 %
PDW BLD-RTO: 15.3 % (ref 12.4–15.4)
PERFORMED ON: ABNORMAL
PHOSPHORUS: 2.5 MG/DL (ref 2.5–4.9)
PLATELET # BLD: 300 K/UL (ref 135–450)
PMV BLD AUTO: 7.5 FL (ref 5–10.5)
POTASSIUM SERPL-SCNC: 3.7 MMOL/L (ref 3.5–5.1)
RBC # BLD: 2.55 M/UL (ref 4–5.2)
SODIUM BLD-SCNC: 143 MMOL/L (ref 136–145)
WBC # BLD: 9.6 K/UL (ref 4–11)

## 2022-07-12 PROCEDURE — 97110 THERAPEUTIC EXERCISES: CPT

## 2022-07-12 PROCEDURE — 6360000002 HC RX W HCPCS: Performed by: INTERNAL MEDICINE

## 2022-07-12 PROCEDURE — 80069 RENAL FUNCTION PANEL: CPT

## 2022-07-12 PROCEDURE — 99024 POSTOP FOLLOW-UP VISIT: CPT | Performed by: NURSE PRACTITIONER

## 2022-07-12 PROCEDURE — 85025 COMPLETE CBC W/AUTO DIFF WBC: CPT

## 2022-07-12 PROCEDURE — 97530 THERAPEUTIC ACTIVITIES: CPT

## 2022-07-12 PROCEDURE — 97535 SELF CARE MNGMENT TRAINING: CPT

## 2022-07-12 PROCEDURE — 6370000000 HC RX 637 (ALT 250 FOR IP): Performed by: NURSE PRACTITIONER

## 2022-07-12 PROCEDURE — 6360000002 HC RX W HCPCS: Performed by: NURSE PRACTITIONER

## 2022-07-12 PROCEDURE — C9113 INJ PANTOPRAZOLE SODIUM, VIA: HCPCS | Performed by: NURSE PRACTITIONER

## 2022-07-12 PROCEDURE — 1200000000 HC SEMI PRIVATE

## 2022-07-12 PROCEDURE — 2580000003 HC RX 258: Performed by: SURGERY

## 2022-07-12 PROCEDURE — 83735 ASSAY OF MAGNESIUM: CPT

## 2022-07-12 PROCEDURE — 6360000002 HC RX W HCPCS: Performed by: SURGERY

## 2022-07-12 PROCEDURE — 2580000003 HC RX 258: Performed by: NURSE PRACTITIONER

## 2022-07-12 RX ORDER — POTASSIUM CHLORIDE 29.8 MG/ML
20 INJECTION INTRAVENOUS
Status: COMPLETED | OUTPATIENT
Start: 2022-07-12 | End: 2022-07-12

## 2022-07-12 RX ORDER — FUROSEMIDE 10 MG/ML
40 INJECTION INTRAMUSCULAR; INTRAVENOUS ONCE
Status: COMPLETED | OUTPATIENT
Start: 2022-07-12 | End: 2022-07-12

## 2022-07-12 RX ADMIN — PIPERACILLIN AND TAZOBACTAM 3375 MG: 3; .375 INJECTION, POWDER, LYOPHILIZED, FOR SOLUTION INTRAVENOUS at 03:12

## 2022-07-12 RX ADMIN — HEPARIN SODIUM 5000 UNITS: 5000 INJECTION INTRAVENOUS; SUBCUTANEOUS at 16:54

## 2022-07-12 RX ADMIN — PIPERACILLIN AND TAZOBACTAM 3375 MG: 3; .375 INJECTION, POWDER, LYOPHILIZED, FOR SOLUTION INTRAVENOUS at 12:08

## 2022-07-12 RX ADMIN — MICONAZOLE NITRATE: 2 OINTMENT TOPICAL at 10:10

## 2022-07-12 RX ADMIN — MORPHINE SULFATE 1 MG: 2 INJECTION, SOLUTION INTRAMUSCULAR; INTRAVENOUS at 13:07

## 2022-07-12 RX ADMIN — PANTOPRAZOLE SODIUM 40 MG: 40 INJECTION, POWDER, LYOPHILIZED, FOR SOLUTION INTRAVENOUS at 09:54

## 2022-07-12 RX ADMIN — FUROSEMIDE 40 MG: 10 INJECTION, SOLUTION INTRAMUSCULAR; INTRAVENOUS at 10:30

## 2022-07-12 RX ADMIN — HEPARIN SODIUM 5000 UNITS: 5000 INJECTION INTRAVENOUS; SUBCUTANEOUS at 01:10

## 2022-07-12 RX ADMIN — HEPARIN SODIUM 5000 UNITS: 5000 INJECTION INTRAVENOUS; SUBCUTANEOUS at 09:54

## 2022-07-12 RX ADMIN — ONDANSETRON HYDROCHLORIDE 4 MG: 2 INJECTION, SOLUTION INTRAMUSCULAR; INTRAVENOUS at 15:58

## 2022-07-12 RX ADMIN — POTASSIUM CHLORIDE 20 MEQ: 29.8 INJECTION INTRAVENOUS at 12:04

## 2022-07-12 RX ADMIN — POTASSIUM CHLORIDE 20 MEQ: 29.8 INJECTION INTRAVENOUS at 10:30

## 2022-07-12 RX ADMIN — SODIUM CHLORIDE, PRESERVATIVE FREE 10 ML: 5 INJECTION INTRAVENOUS at 20:04

## 2022-07-12 RX ADMIN — PANTOPRAZOLE SODIUM 40 MG: 40 INJECTION, POWDER, LYOPHILIZED, FOR SOLUTION INTRAVENOUS at 22:40

## 2022-07-12 RX ADMIN — SODIUM CHLORIDE: 9 INJECTION, SOLUTION INTRAVENOUS at 18:51

## 2022-07-12 RX ADMIN — PIPERACILLIN AND TAZOBACTAM 3375 MG: 3; .375 INJECTION, POWDER, LYOPHILIZED, FOR SOLUTION INTRAVENOUS at 18:54

## 2022-07-12 RX ADMIN — SODIUM CHLORIDE, PRESERVATIVE FREE 10 ML: 5 INJECTION INTRAVENOUS at 09:55

## 2022-07-12 RX ADMIN — OXYCODONE HYDROCHLORIDE 5 MG: 5 SOLUTION ORAL at 10:29

## 2022-07-12 RX ADMIN — MICONAZOLE NITRATE: 2 OINTMENT TOPICAL at 20:03

## 2022-07-12 RX ADMIN — MORPHINE SULFATE 1 MG: 2 INJECTION, SOLUTION INTRAMUSCULAR; INTRAVENOUS at 03:12

## 2022-07-12 ASSESSMENT — PAIN DESCRIPTION - ORIENTATION: ORIENTATION: MID

## 2022-07-12 ASSESSMENT — PAIN SCALES - GENERAL
PAINLEVEL_OUTOF10: 8
PAINLEVEL_OUTOF10: 10
PAINLEVEL_OUTOF10: 0

## 2022-07-12 ASSESSMENT — PAIN DESCRIPTION - DESCRIPTORS
DESCRIPTORS: ACHING
DESCRIPTORS: ACHING
DESCRIPTORS: ACHING;BURNING

## 2022-07-12 ASSESSMENT — PAIN SCALES - WONG BAKER: WONGBAKER_NUMERICALRESPONSE: 0

## 2022-07-12 ASSESSMENT — PAIN DESCRIPTION - LOCATION
LOCATION: ABDOMEN;BACK
LOCATION: GENERALIZED
LOCATION: ABDOMEN;BACK

## 2022-07-12 ASSESSMENT — PAIN DESCRIPTION - PAIN TYPE: TYPE: ACUTE PAIN

## 2022-07-12 NOTE — PROGRESS NOTES
sounds are active; last documented BM was on 7/11/22 Wound Type: Surgical Incision (surgical incision on abdomen)       Current Nutrition Intake & Therapies:    Average Meal Intake: NPO  Average Supplements Intake: NPO  Current Tube Feeding (TF) Orders:  · Feeding Route: Jejunostomy  · Formula: Other Tube Feeding (Comment) (Jevity 1.2)  · Schedule: Continuous  · Feeding Regimen: Jevity 1.2 with a goal rate of 60 ml/hr x 20 hours  · Additives/Modulars: Protein (one proteinex P2Go once daily via feeding tube)  · Water Flushes: 100 ml/hr free water, per nephrology  · Current TF & Flush Orders Provides: Jevity 1.2 with a goal rate of 60 ml/hr x 20 hours = 1200 ml TV, 1440 kcals, 67 g protein, and 968 ml free water + + 52 g protein and 208 kcals from one proteinex P2Go TWICE daily (119 g protein and 1648 kcals total) + 100 ml/hr free water  · Goal TF & Flush Orders Provides: Jevity 1.2 with a goal rate of 60 ml/hr x 20 hours = 1200 ml TV, 1440 kcals, 67 g protein, and 968 ml free water + 26 g protein and 104 kcals from one proteinex P2Go once daily (93 g protein and 1544 kcals total) + 100 ml/hr free water      Anthropometric Measures:  Height: 5' 4\" (162.6 cm)  Ideal Body Weight (IBW): 120 lbs (55 kg)    Admission Body Weight: 149 lb (67.6 kg) (obtained on 6/29/22; actual weight)  Current Body Weight: 171 lb 8 oz (77.8 kg) (obtained on 7/7/22; actual weight), 142.9 % IBW. Weight Source: Bed Scale  Current BMI (kg/m2): 29.4  Usual Body Weight: 149 lb (67.6 kg) (obtained on 6/29/22; actual weight)  % Weight Change (Calculated): 15.1  Weight Adjustment For: No Adjustment                 BMI Categories: Overweight (BMI 25.0-29. 9)    Estimated Daily Nutrient Needs:  Energy Requirements Based On: Kcal/kg  Weight Used for Energy Requirements: Current  Energy (kcal/day): 1560 - 4923 kcals based on 20-23 kcals/kg/CBW  Weight Used for Protein Requirements: Ideal  Protein (g/day): 77 - 83 g protein based on 1.4-1.5 g/kg/IBW  Method Used for Fluid Requirements: 1 ml/kcal  Fluid (ml/day): 1560 - 1794 ml    Nutrition Diagnosis:   · Inadequate oral intake related to altered GI function,altered GI structure,inadequate protein-energy intake as evidenced by NPO or clear liquid status due to medical condition,nutrition support - enteral nutrition,GI abnormality      Nutrition Interventions:   Food and/or Nutrient Delivery: Continue NPO,Modify Tube Feeding  Nutrition Education/Counseling: No recommendation at this time  Coordination of Nutrition Care: Continue to monitor while inpatient,Coordination of Care       Goals:  Previous Goal Met: Goal(s) Achieved  Goals: Tolerate nutrition support at goal rate,by next RD assessment       Nutrition Monitoring and Evaluation:   Behavioral-Environmental Outcomes: None Identified  Food/Nutrient Intake Outcomes: Enteral Nutrition Intake/Tolerance  Physical Signs/Symptoms Outcomes: Biochemical Data,GI Status,Hemodynamic Status,Nutrition Focused Physical Findings,Skin,Weight    Discharge Planning:     Too soon to determine     Jesus Gtz, [address] [place] [address],   Contact: [phone]

## 2022-07-12 NOTE — PROGRESS NOTES
Occupational Therapy Daily Treatment Note    Unit: [address] [place]  Date:  2022  Patient Name:    Shaylee Ziegler  Admitting diagnosis:  Gastric perforation, acute [K31.89]  Per[facility] ([facility] [K27.5]  Admit Date:  2022  Precautions/Restrictions:    fall risk, IV, bed/chair alarm, waddell catheter , supplemental O2 (2L), telemetry, telesitter and continuous pulse ox      Discharge Recommendations: SNF  DME needs for discharge: defer to facility       Therapy recommendations for staff:   Assist of 2 with use of rolling walker (RW) for all transfers to/from [facility]  to/from chair with gait belt     AM-PAC Score: AM-PAC Inpatient Daily Activity Raw Score: 9  Home Health S4 Level: NA       Treatment Time:  2633-9250   Treatment number:  2   Timed code treatment minutes: 30minutes  Total treatment minutes:  40 minutes    History of Present Illness:   s/p emergent repair of perf ulcer with j-tube insertion on  by Dr. Cecy Matos, subsequent DA and hypertension. Recent COVID from -, h/o colon cancer.   Subjective:  Pt agreeable to participate in OT     Pain   Yes  Ratin/10  Location:abdomen   Pain Medicine Status: Received pain med prior to tx and pt requested pain meds after therapy- pts nurse notified       Bed Mobility:   Supine to Sit:  Mod  assist if two   Sit to Supine:  Not Tested  Rolling:           Min A   Scooting:        CGA    Transfer Training:   Sit to stand:   Min A   Stand to sit:  Min A   Bed to Chair:  Min A with RW and assist with lines   Bed to [facility]:   Not Tested  Standard toilet:   Not Tested    Activity Tolerance   Pt completed therapy session with pain and burning in LEs     ADL Training:   Upper body dressing: Not Tested  Upper body bathing:  SBA max assist to bathe back   Lower body dressing:  Max A   Lower body bathing:  Max A   Toileting:   Min A   Grooming/Hygiene:  SBA    Therapeutic Exercise:   Scapular retraction:  x10  Scapular protraction:  x10  Shoulder shrugs: x10    Patient Education:   Role of OT  Safe RW use/hand placement    Positioning Needs:   Pt up in chair, alarm set, positioned in proper neutral alignment and pressure relief provided. Family Present:  Yes    Assessment: The pt was agreeable to transfer to the chair and to the [facility] today. The pt was min assist to stand into the RW and min assist for transfer to [facility] and chair. The pt was able to brush hair when sitting beside and max assist to bathe back. The pt was max assist to wipe self after BSC use. The cooperative with OT today. Pt needs continued OT services to increase functional IND . GOALS  To be met in 3 Visits:  1). Bed to toilet/BSC: Max A- goal met 7-12-22 new goal SBA with RW      To be met in 5 Visits:  1). Supine to/from Sit:             Mod A goal met - 7 12 new goal min assist   2). Upper Body Bathing:         Min A  3). Lower Body Bathing: Mod A  4). Upper Body Dressing:       Min A  5). Lower Body Dressing: Mod A  6).  Pt to demonstrate UE exs x 15 reps with minimal cues         Plan: cont with POC    Zaid Carvajal OTR/L [id number]    If patient discharges from this facility prior to next visit, this note will serve as the Discharge Summary

## 2022-07-12 NOTE — PROGRESS NOTES
Inpatient Physical Therapy Daily Treatment Note    Unit: [facility]  Date:  7/12/2022  Patient Name:    Jah Nieves  Admitting diagnosis:  Gastric perforation, acute [K31.89]  Perforated peptic ulcer ([facility] [K27.5]  Admit Date:  6/28/2022  Precautions/Restrictions:  Fall risk, Bed/chair alarm and Lines -IV, NG tube, PICC left, Drains (JERRI) and abdominal tube feed, abdominal incision  Telemetry, continuous pulse ox    Discharge Recommendations: SNF    Comment: pt and family want to d/c home with 24/7 assist. Pending progress and more consistent tolerance to activity, recommendation may change. DME needs for discharge: defer to facility       Therapy recommendation for EMS Transport: requires transport by cot due to requires lift equiptment to transfer    Therapy recommendations for staff:   Ax2 to EOB  Depending on pt's fatigue level, either RW or STED with gait belt and Ax2 to transfer to BSC/chair. Pt has lift pad under recliner as well. History of Present Illness: S/P emergent repair of perf ulcer with j-tube insertion on 6/28 by Dr. Cobian, subsequent DA and hypertension. Recent COVID from 6/22-24/22, h/o colon cancer. Home Health S4 Level Recommendation: NA  AM-PAC Mobility Score   AM-PAC Inpatient Mobility Raw Score : [place] [address] scored a 10/24 on the AM-PAC short mobility form. Current research shows that an AM-PAC score of 17 or less is typically not associated with a discharge to the patient's home setting. If patient discharges prior to next session this note will serve as a discharge summary. Please see below for the latest assessment towards goals. Treatment Time:  6797-2541   Treatment number: 6 (progress note)   Timed Code Treatment Minutes: 40 minutes  Total Treatment Minutes:  40 minutes    Cognition    A&O orientation not directly assessed. Able to follow 2 step commands    Subjective  Patient lying supine in bed with family at bedside. Pt agreeable to this PT tx.  Wants to get up to use BSC. Pain   Yes  Location: abdomen  Ratin/10 with activity  Pain Medicine Status: Received pain med prior to tx, received morphine ~2 hr ago, another medication (unsure of what) ~30 min ago per pt. Bed Mobility   Supine to Sit:    Mod A  and 2 persons  Sit to Supine:   Not Tested   (up to chair to end session)  Rolling:   Not Tested  Scooting:   SBA to EOB in sitting, then to back of recliner in sitting. Transfer Training   Sit to stand:   Min A from EOB,      Min A from [address] [place] [address],[address] to sit:   Min A to BSC,      Min A to recliner  Bed to [facility]:   Min A  with use of gait belt and rolling walker (RW)   BSC to Recliner:  Min A  with use of gait belt and rolling walker (RW)    Gait gait completed as indicated below  Distance:      3 ft + 5 ft  Deviations (firm surface/linoleum):  decreased yakov, forward flexed posture and decreased step length bilaterally  Assistive Device Used:    gait belt and rolling walker (RW)  Level of Assist:    Min A   Comment: Pt says legs burn while standing. Stair Training deferred, pt unsafe/ not appropriate to complete stairs at this time    Therapeutic Exercise all completed bilaterally unless indicated  Ankle Pumps x 10 reps  SAQ x 10 reps    Balance  Sitting: Good - ; Supervision  Comments: at EOB x 10 minutes while taking vitals, pt able to brush her hair using RUE. Standing: Fair+; CGA/Min A  Comments: with RW. Legs a bit shaky (dec quad control). Patient Education      Role of PT, POC, Discharge recommendations, DC recommendations, safety awareness, transfer techniques, HEP and calling for assist with mobility. Positioning Needs       Pt reclined in chair, alarm set, positioned in proper neutral alignment and pressure relief provided. Call light provided and all needs within reach    Activity Tolerance   Pt completed therapy session with Nausea noted with completion of session.  Pt with no complaints of headache/dizziness when sitting up.  Seated EOB:  KN=456/97  Spo2 on RA 93%    Other  None. Assessment :  Patient was seen ~2 hours since last morphine dose, which seems to have helped with tolerance to sitting upright. She was able to complete bed>BSC>chair transfers with walker and min assist x 1. Pt was notably fatigued upon completion. Tolerated reclined position in chair to end session. Overall mobility limited by LE weakness and nausea. Will continue to progress per pt tolerance. Recommending SNF upon discharge as patient functioning well below baseline, demonstrates good rehab potential and unable to return home due to inability to negotiate stairs to enter home/bedroom/bathroom, burden of care beyond caregiver ability and home environment not conducive to patient recovery. Goals (all goals ongoing unless otherwise indicated)  To be met in 3 visits:  1). Independent with LE Ex x 10 reps  2). Supine to/from sit: Mod A  3). Bed to chair: CGA with walker     To be met in 6 visits:  1).  Supine to/from sit: Supervision  2).  Sit to/from stand: Supervision  3).  Bed to chair: Supervision  4).  Gait: Ambulate 50 ft.  with  SBA and use of LRAD (least restrictive assistive device)  5).  Tolerate B LE exercises 3 sets of 10-15 reps  6).  Ascend/descend 2 steps with CGA with use of hand rail unilateral and LRAD (least restrictive assistive device)    Plan   Continue with plan of care. Signature: Flordia Barthel, PT, DPT    If patient discharges from this facility prior to next visit, this note will serve as the Discharge Summary.

## 2022-07-12 NOTE — FLOWSHEET NOTE
07/11/22 2017   Vital Signs   Temp 98.5 °F (36.9 °C)   Temp Source Oral   Heart Rate 83   Heart Rate Source Monitor   Resp 20   BP (!) 174/84   BP Location Right upper arm   BP Method Automatic   MAP (Calculated) 114   Patient Position Semi fowlers   Level of Consciousness Alert (0)   MEWS Score 1   Oxygen Therapy   SpO2 95 %   O2 Device None (Room air)     Patient A+Ox4, vitals and assessments done at this time. Bed in lowest position, call light within reach.

## 2022-07-12 NOTE — FLOWSHEET NOTE
07/12/22 1115   Vital Signs   Temp 98.3 °F (36.8 °C)   Temp Source Oral   Heart Rate 67   Heart Rate Source Monitor   Resp 16   BP (!) 147/75   BP Location Right upper arm   BP Method Automatic   MAP (Calculated) 99   Patient Position Semi fowlers   Level of Consciousness Alert (0)   MEWS Score 1   Oxygen Therapy   SpO2 97 %   O2 Device None (Room air)   Pt resting in bed at this time, alert and oriented. Assessment completed, VS obtained. NG tube to right nare to CLWS. HOB elevated. Pt received PRN Oxy IR per j-tube this AM for complaints of increased generalized pain. Fall precautions in place, call light within reach.

## 2022-07-12 NOTE — PROGRESS NOTES
J-tube clogged. Unable to flush tube at this time.  Clot removal placed in tube, instructed to wait approx 2 hours before attempting flush per Chau Olguin NP.

## 2022-07-12 NOTE — PROGRESS NOTES
Progress Note    HISTORY     CC:  Abdominal pain and perforated bowel           We are following for     DA    CKD       Subjective/   HPI:  Sodium and potassium are better. Kidney function stable. Still with some swelling. Very flat. Says she is still in pain.     ROS:  Constitutional:  No fevers, No Chills, + weakness  Cardiovascular:  No palpations, + edema  Respiratory:  No wheezing, no cough  Skin:  No rash, no itching  :  No hematuria, urine volume is good     Social Hx:  No Family at the bedside     Past Medical and Surgical History:  - Reviewed, no changes     EXAM       Objective/     Vitals:    07/12/22 0312 07/12/22 0342 07/12/22 0345 07/12/22 0718   BP:   (!) 145/70 (!) 143/75   Pulse:   78 69   Resp: 16 16 16 16   Temp:   99.2 °F (37.3 °C) 98.6 °F (37 °C)   TempSrc:   Oral Oral   SpO2:   94% 96%   Weight:       Height:         24HR INTAKE/OUTPUT:      Intake/Output Summary (Last 24 hours) at 7/12/2022 0945  Last data filed at 7/12/2022 3093  Gross per 24 hour   Intake --   Output 160 ml   Net -160 ml     Constitutional:  Ill appearing, NG  Eyes:  Pupils reactive, sclera clear   Neck:  Normal thyroid, no masses   Cardiovascular:  Regular, no rub  Respiratory:  No distress, no wheezing  Psychiatry:  Flat mood/affect, somnolent   Abdomen: +bs, soft, nt, no masses   Musculoskeletal: + LE edema, no clubbing   Lymphatics:  No LAD in neck, no supraclavicular nodes       MEDICAL DECISION MAKING       Data/  Recent Labs     07/10/22  0545 07/12/22  0619   WBC 9.1 9.6   HGB 8.1* 7.5*   HCT 24.6* 22.4*   MCV 88.3 88.0    300     Recent Labs     07/10/22  0545 07/11/22  0558 07/12/22  0619    149* 143   K 3.3* 3.3* 3.7    109 110   CO2 23 26 24   GLUCOSE 119* 131* 140*   PHOS 3.4 3.0 2.5   MG  --  1.70* 2.00   BUN 19 21* 23*   CREATININE 1.6* 1.6* 1.5*   LABGLOM 31* 31* 34*   GFRAA 38* 38* 41*       Assessment/     -DA likely has developed ATN w hypovolemia, hypotension, NSAIDs

## 2022-07-12 NOTE — PROGRESS NOTES
Bedside Mobility Assessment Tool (BMAT):     Assessment Level 1- Sit and Shake    1. From a semi-reclined position, ask patient to sit up and rotate to a seated position at the side of the bed. Can use the bedrail. 2. Ask patient to reach out and grab your hand and shake making sure patient reaches across his/her midline. Fail- Patient is unable to perform tasks, patient is MOBILITY LEVEL 1. Assessment Level 2- Stretch and Point   1. With patient in seated position at the side of the bed, have patient place both feet on the floor (or stool) with knees no higher than hips. 2. Ask patient to stretch one leg and straighten the knee, then bend the ankle/flex and point the toes. If appropriate, repeat with the other leg. Fail- Patient is unable to complete task. Patient is MOBILITY LEVEL 2. Assessment Level 3- Stand   1. Ask patient to elevate off the bed or chair (seated to standing) using an assistive device (cane, bedrail). 2. Patient should be able to raise buttocks off be and hold for a count of five. May repeat once. Fail- Patient unable to demonstrate standing stability. Patient is MOBILITY LEVEL 3. Assessment Level 4- Walk   1. Ask patient to march in place at bedside. 2. Then ask patient to advance step and return each foot. Some medical conditions may render a patient from stepping backwards, use your best clinical judgement. Fail- Patient not able to complete tasks OR requires use of assistive device. Patient is MOBILITY LEVEL 3. Mobility Level- 3     Patient is not able to demonstrate the ability to move from a reclining position to an upright position within the recliner due to weakness.

## 2022-07-12 NOTE — PLAN OF CARE
Problem: Discharge Planning  Goal: Discharge to home or other facility with appropriate resources  7/11/2022 2251 by Endy Reyes RN  Outcome: Progressing  7/11/2022 1857 by Jeremías Thompson RN  Outcome: Progressing     Problem: Pain  Goal: Verbalizes/displays adequate comfort level or baseline comfort level  7/11/2022 2251 by Endy Reyes RN  Outcome: Progressing  7/11/2022 1857 by Jeremías Thompson RN  Outcome: Progressing     Problem: Safety - Adult  Goal: Free from fall injury  7/11/2022 2251 by Endy Reyes RN  Outcome: Progressing  7/11/2022 1857 by Jeremías Thompson RN  Outcome: Progressing  Flowsheets (Taken 7/11/2022 0631 by Endy Reyes RN)  Free From Fall Injury: Instruct family/caregiver on patient safety     Problem: Skin/Tissue Integrity  Goal: Absence of new skin breakdown  Description: 1. Monitor for areas of redness and/or skin breakdown  2. Assess vascular access sites hourly  3. Every 4-6 hours minimum:  Change oxygen saturation probe site  4. Every 4-6 hours:  If on nasal continuous positive airway pressure, respiratory therapy assess nares and determine need for appliance change or resting period.   7/11/2022 2251 by Endy Reyes RN  Outcome: Progressing  7/11/2022 1857 by Jeremías Thompson RN  Outcome: Progressing     Problem: ABCDS Injury Assessment  Goal: Absence of physical injury  7/11/2022 2251 by Endy Reyes RN  Outcome: Progressing  7/11/2022 1857 by Jeremías Thompson RN  Outcome: Progressing  Flowsheets (Taken 7/11/2022 0631 by Endy Reyes RN)  Absence of Physical Injury: Implement safety measures based on patient assessment     Problem: Nutrition Deficit:  Goal: Optimize nutritional status  7/11/2022 2251 by Endy Reyes RN  Outcome: Progressing  7/11/2022 1857 by Jeremías Thompson RN  Outcome: Progressing

## 2022-07-12 NOTE — PROGRESS NOTES
General Surgery   Daily Progress Note    Pt Name: Kishan Mcqueen Record Number: [id number]  Date of Birth 1950   Today's Date: 7/12/2022    ASSESSMENT  1. POD #14 s/p repair of perf ulcer, j-tube insertion  2. ABD: J-tube in place, JERRI drain in place, midline dressing changed: + old bloody drainage noted, 2 open areas packed with gauze and covered with 4x4 gauze, + diffuse tenderness which appears improved form last week, +mild distention, NGT in place, no N/V, +flatus, + BMs     3. ARF improving: Cr 1.3  4. K+ 3.7  5. Leuks 9.6  6. Lipase 129 (pancreas was secondarily inflamed on ER CT)  7. VSS  8. NGT: 900 ml out  9. F/C removed: UO OK  10. JERRI 160 ml out : bilious today  11. Pt states \"I am not sure I can do this, I am in a lot of pain. \" Continue to encouraged pt to participate in PT/OT. Spoke with pt and  at length: pt is getting better, she has to start moving. PLAN  1. NGT to CLWS  2. Renal seeing. 3. Strict I&Os  4. Pain control   5. Zosyn  6. PT/OT: Sit on edge of bed/up to chair today/ambulate  7. Protonix BID  8. IVF per renal  9. Labs in the am.   10. Wound care RN to see for excoriation. Discussed with RN: interdry to groin folds. Nystatin ordered  11. TF rate to 40 ml/hr (goal rate is 60 ml/hr)  12. Pt looks OK POD #14: Pt with bilious JERRI drainage suggestive of leak from perf ulcer repair. JERRI with decreased output which is a good sign, TF at 40 ml today: may advance TF to goal as tolerated. Mayi Dunlap is virtually unchanged from yesterday. Pain is somewhat well controlled. She has no nausea and no vomiting. She has passed flatus and has had a bowel movement. She is NPO and tolerating TF. Current activity is up with assistance    OBJECTIVE  VITALS:  height is 5' 4\" (1.626 m) and weight is 171 lb 8 oz (77.8 kg). Her oral temperature is 98.3 °F (36.8 °C). Her blood pressure is 147/75 (abnormal) and her pulse is 67.  Her respiration is 18 and oxygen saturation is 97%.   VITALS:  BP (!) 147/75   Pulse 67   Temp 98.3 °F (36.8 °C) (Oral)   Resp 18   Ht 5' 4\" (1.626 m)   Wt 171 lb 8 oz (77.8 kg)   SpO2 97%   BMI 29.44 kg/m²   INTAKE/OUTPUT:      Intake/Output Summary (Last 24 hours) at 7/12/2022 1342  Last data filed at 7/12/2022 7736  Gross per 24 hour   Intake --   Output 160 ml   Net -160 ml     URINARY CATHETER OUTPUT (Curiel):     GENERAL: alert, cooperative, no distress  I/O last 3 completed shifts: In: 100 [NG/GT:100]  Out: 2270 [Urine:1300; Emesis/NG output:800; Drains:170]  No intake/output data recorded.     LABS  Recent Labs     07/12/22  0619   WBC 9.6   HGB 7.5*   HCT 22.4*         K 3.7      CO2 24   BUN 23*   CREATININE 1.5*   MG 2.00   PHOS 2.5   CALCIUM 8.4     CBC with Differential:    Lab Results   Component Value Date/Time    WBC 9.6 07/12/2022 06:19 AM    RBC 2.55 07/12/2022 06:19 AM    RBC 4.41 08/02/2016 10:34 AM    HGB 7.5 07/12/2022 06:19 AM    HCT 22.4 07/12/2022 06:19 AM     07/12/2022 06:19 AM    MCV 88.0 07/12/2022 06:19 AM    MCH 29.5 07/12/2022 06:19 AM    MCHC 33.6 07/12/2022 06:19 AM    RDW 15.3 07/12/2022 06:19 AM    BANDSPCT 1 07/05/2022 04:36 AM    LYMPHOPCT 10.7 07/12/2022 06:19 AM    LYMPHOPCT 25.3 08/02/2016 10:34 AM    MONOPCT 6.3 07/12/2022 06:19 AM    BASOPCT 0.6 07/12/2022 06:19 AM    MONOSABS 0.6 07/12/2022 06:19 AM    LYMPHSABS 1.0 07/12/2022 06:19 AM    EOSABS 0.1 07/12/2022 06:19 AM    BASOSABS 0.1 07/12/2022 06:19 AM     CMP:    Lab Results   Component Value Date/Time     07/12/2022 06:19 AM    K 3.7 07/12/2022 06:19 AM    K 3.8 06/29/2022 05:19 AM     07/12/2022 06:19 AM    CO2 24 07/12/2022 06:19 AM    BUN 23 07/12/2022 06:19 AM    CREATININE 1.5 07/12/2022 06:19 AM    GFRAA 41 07/12/2022 06:19 AM    AGRATIO 0.8 06/29/2022 05:19 AM    LABGLOM 34 07/12/2022 06:19 AM    GLUCOSE 140 07/12/2022 06:19 AM    GLUCOSE 85 08/02/2016 10:34 AM    PROT 4.7 06/29/2022 05:19 AM    PROT 5.7 08/02/2016 10:34 AM    LABALBU 3.6 07/12/2022 06:19 AM    CALCIUM 8.4 07/12/2022 06:19 AM    BILITOT 0.6 06/29/2022 05:19 AM    ALKPHOS 84 06/29/2022 05:19 AM    AST 49 06/29/2022 05:19 AM    ALT 48 06/29/2022 05:19 AM         ILAN Yu - CNP  Electronically signed 7/12/2022 at 1:42 PM

## 2022-07-12 NOTE — PLAN OF CARE
Problem: Safety - Adult  Goal: Free from fall injury  Outcome: Progressing     Problem: Skin/Tissue Integrity  Goal: Absence of new skin breakdown  Description: 1. Monitor for areas of redness and/or skin breakdown  2. Assess vascular access sites hourly  3. Every 4-6 hours minimum:  Change oxygen saturation probe site  4. Every 4-6 hours:  If on nasal continuous positive airway pressure, respiratory therapy assess nares and determine need for appliance change or resting period.   Outcome: Progressing     Problem: ABCDS Injury Assessment  Goal: Absence of physical injury  Outcome: Progressing     Problem: Nutrition Deficit:  Goal: Optimize nutritional status  Outcome: Progressing

## 2022-07-13 LAB
ALBUMIN SERPL-MCNC: 3.2 G/DL (ref 3.4–5)
ANION GAP SERPL CALCULATED.3IONS-SCNC: 10 MMOL/L (ref 3–16)
BASOPHILS ABSOLUTE: 0.1 K/UL (ref 0–0.2)
BASOPHILS RELATIVE PERCENT: 0.9 %
BUN BLDV-MCNC: 20 MG/DL (ref 7–20)
CALCIUM SERPL-MCNC: 8.4 MG/DL (ref 8.3–10.6)
CHLORIDE BLD-SCNC: 102 MMOL/L (ref 99–110)
CO2: 24 MMOL/L (ref 21–32)
CREAT SERPL-MCNC: 1.4 MG/DL (ref 0.6–1.2)
EOSINOPHILS ABSOLUTE: 0.2 K/UL (ref 0–0.6)
EOSINOPHILS RELATIVE PERCENT: 2.4 %
GFR AFRICAN AMERICAN: 44
GFR NON-AFRICAN AMERICAN: 37
GLUCOSE BLD-MCNC: 101 MG/DL (ref 70–99)
GLUCOSE BLD-MCNC: 118 MG/DL (ref 70–99)
GLUCOSE BLD-MCNC: 136 MG/DL (ref 70–99)
GLUCOSE BLD-MCNC: 136 MG/DL (ref 70–99)
GLUCOSE BLD-MCNC: 139 MG/DL (ref 70–99)
GLUCOSE BLD-MCNC: 148 MG/DL (ref 70–99)
GLUCOSE BLD-MCNC: 94 MG/DL (ref 70–99)
HCT VFR BLD CALC: 23.4 % (ref 36–48)
HEMOGLOBIN: 7.7 G/DL (ref 12–16)
LYMPHOCYTES ABSOLUTE: 1.1 K/UL (ref 1–5.1)
LYMPHOCYTES RELATIVE PERCENT: 12.2 %
MCH RBC QN AUTO: 28.7 PG (ref 26–34)
MCHC RBC AUTO-ENTMCNC: 32.9 G/DL (ref 31–36)
MCV RBC AUTO: 87.4 FL (ref 80–100)
MONOCYTES ABSOLUTE: 0.6 K/UL (ref 0–1.3)
MONOCYTES RELATIVE PERCENT: 6.6 %
NEUTROPHILS ABSOLUTE: 6.9 K/UL (ref 1.7–7.7)
NEUTROPHILS RELATIVE PERCENT: 77.9 %
PDW BLD-RTO: 15.5 % (ref 12.4–15.4)
PERFORMED ON: ABNORMAL
PERFORMED ON: NORMAL
PHOSPHORUS: 2.8 MG/DL (ref 2.5–4.9)
PLATELET # BLD: 302 K/UL (ref 135–450)
PMV BLD AUTO: 7.8 FL (ref 5–10.5)
POTASSIUM SERPL-SCNC: 3.7 MMOL/L (ref 3.5–5.1)
RBC # BLD: 2.67 M/UL (ref 4–5.2)
SODIUM BLD-SCNC: 136 MMOL/L (ref 136–145)
WBC # BLD: 8.9 K/UL (ref 4–11)

## 2022-07-13 PROCEDURE — 6370000000 HC RX 637 (ALT 250 FOR IP): Performed by: NURSE PRACTITIONER

## 2022-07-13 PROCEDURE — 6360000002 HC RX W HCPCS: Performed by: SURGERY

## 2022-07-13 PROCEDURE — 2580000003 HC RX 258: Performed by: NURSE PRACTITIONER

## 2022-07-13 PROCEDURE — 1200000000 HC SEMI PRIVATE

## 2022-07-13 PROCEDURE — 85025 COMPLETE CBC W/AUTO DIFF WBC: CPT

## 2022-07-13 PROCEDURE — C9113 INJ PANTOPRAZOLE SODIUM, VIA: HCPCS | Performed by: NURSE PRACTITIONER

## 2022-07-13 PROCEDURE — 99024 POSTOP FOLLOW-UP VISIT: CPT | Performed by: NURSE PRACTITIONER

## 2022-07-13 PROCEDURE — 80069 RENAL FUNCTION PANEL: CPT

## 2022-07-13 PROCEDURE — 6360000002 HC RX W HCPCS: Performed by: NURSE PRACTITIONER

## 2022-07-13 PROCEDURE — 6360000002 HC RX W HCPCS: Performed by: INTERNAL MEDICINE

## 2022-07-13 PROCEDURE — 2580000003 HC RX 258: Performed by: SURGERY

## 2022-07-13 RX ORDER — FUROSEMIDE 10 MG/ML
40 INJECTION INTRAMUSCULAR; INTRAVENOUS ONCE
Status: COMPLETED | OUTPATIENT
Start: 2022-07-13 | End: 2022-07-13

## 2022-07-13 RX ADMIN — OXYCODONE HYDROCHLORIDE 5 MG: 5 SOLUTION ORAL at 11:17

## 2022-07-13 RX ADMIN — HEPARIN SODIUM 5000 UNITS: 5000 INJECTION INTRAVENOUS; SUBCUTANEOUS at 10:46

## 2022-07-13 RX ADMIN — PIPERACILLIN AND TAZOBACTAM 3375 MG: 3; .375 INJECTION, POWDER, LYOPHILIZED, FOR SOLUTION INTRAVENOUS at 03:02

## 2022-07-13 RX ADMIN — MICONAZOLE NITRATE: 2 OINTMENT TOPICAL at 20:31

## 2022-07-13 RX ADMIN — PIPERACILLIN AND TAZOBACTAM 3375 MG: 3; .375 INJECTION, POWDER, LYOPHILIZED, FOR SOLUTION INTRAVENOUS at 10:56

## 2022-07-13 RX ADMIN — MICONAZOLE NITRATE: 2 OINTMENT TOPICAL at 10:45

## 2022-07-13 RX ADMIN — ONDANSETRON HYDROCHLORIDE 4 MG: 2 INJECTION, SOLUTION INTRAMUSCULAR; INTRAVENOUS at 16:12

## 2022-07-13 RX ADMIN — OXYCODONE HYDROCHLORIDE 5 MG: 5 SOLUTION ORAL at 15:58

## 2022-07-13 RX ADMIN — FUROSEMIDE 40 MG: 10 INJECTION, SOLUTION INTRAMUSCULAR; INTRAVENOUS at 10:46

## 2022-07-13 RX ADMIN — HEPARIN SODIUM 5000 UNITS: 5000 INJECTION INTRAVENOUS; SUBCUTANEOUS at 16:13

## 2022-07-13 RX ADMIN — HEPARIN SODIUM 5000 UNITS: 5000 INJECTION INTRAVENOUS; SUBCUTANEOUS at 00:38

## 2022-07-13 RX ADMIN — SODIUM CHLORIDE, PRESERVATIVE FREE 10 ML: 5 INJECTION INTRAVENOUS at 10:45

## 2022-07-13 RX ADMIN — PIPERACILLIN AND TAZOBACTAM 3375 MG: 3; .375 INJECTION, POWDER, LYOPHILIZED, FOR SOLUTION INTRAVENOUS at 20:32

## 2022-07-13 RX ADMIN — PANTOPRAZOLE SODIUM 40 MG: 40 INJECTION, POWDER, LYOPHILIZED, FOR SOLUTION INTRAVENOUS at 10:45

## 2022-07-13 RX ADMIN — MORPHINE SULFATE 1 MG: 2 INJECTION, SOLUTION INTRAMUSCULAR; INTRAVENOUS at 20:46

## 2022-07-13 ASSESSMENT — PAIN SCALES - GENERAL
PAINLEVEL_OUTOF10: 9
PAINLEVEL_OUTOF10: 2
PAINLEVEL_OUTOF10: 9
PAINLEVEL_OUTOF10: 9
PAINLEVEL_OUTOF10: 2
PAINLEVEL_OUTOF10: 8
PAINLEVEL_OUTOF10: 2

## 2022-07-13 ASSESSMENT — PAIN - FUNCTIONAL ASSESSMENT
PAIN_FUNCTIONAL_ASSESSMENT: PREVENTS OR INTERFERES SOME ACTIVE ACTIVITIES AND ADLS
PAIN_FUNCTIONAL_ASSESSMENT: ACTIVITIES ARE NOT PREVENTED

## 2022-07-13 ASSESSMENT — PAIN DESCRIPTION - LOCATION
LOCATION: BACK

## 2022-07-13 ASSESSMENT — PAIN DESCRIPTION - DESCRIPTORS
DESCRIPTORS: ACHING;DISCOMFORT
DESCRIPTORS: ACHING

## 2022-07-13 ASSESSMENT — PAIN DESCRIPTION - ORIENTATION
ORIENTATION: MID
ORIENTATION: MID
ORIENTATION: LOWER

## 2022-07-13 NOTE — PLAN OF CARE
Problem: Discharge Planning  Goal: Discharge to home or other facility with appropriate resources  Outcome: Progressing  Flowsheets (Taken 7/12/2022 2010)  Discharge to home or other facility with appropriate resources: Identify barriers to discharge with patient and caregiver     Problem: Pain  Goal: Verbalizes/displays adequate comfort level or baseline comfort level  Outcome: Progressing  Flowsheets (Taken 7/12/2022 1945)  Verbalizes/displays adequate comfort level or baseline comfort level:   Encourage patient to monitor pain and request assistance   Assess pain using appropriate pain scale   Administer analgesics based on type and severity of pain and evaluate response   Implement non-pharmacological measures as appropriate and evaluate response   Consider cultural and social influences on pain and pain management   Notify Licensed Independent Practitioner if interventions unsuccessful or patient reports new pain     Problem: Safety - Adult  Goal: Free from fall injury  Outcome: Progressing     Problem: Skin/Tissue Integrity  Goal: Absence of new skin breakdown  Description: 1. Monitor for areas of redness and/or skin breakdown  2. Assess vascular access sites hourly  3. Every 4-6 hours minimum:  Change oxygen saturation probe site  4. Every 4-6 hours:  If on nasal continuous positive airway pressure, respiratory therapy assess nares and determine need for appliance change or resting period.   Outcome: Progressing     Problem: ABCDS Injury Assessment  Goal: Absence of physical injury  Outcome: Progressing     Problem: Nutrition Deficit:  Goal: Optimize nutritional status  Outcome: Progressing

## 2022-07-13 NOTE — CARE COORDINATION
INTERDISCIPLINARY PLAN OF CARE CONFERENCE    Date/Time: 7/13/2022 2:34 PM  Completed by: Vincenzo Trevino RN, Case Management      Patient Name:  Kim Beebe  YOB: 1950  Admitting Diagnosis: Gastric perforation, acute [K31.89]  Perforated peptic ulcer ([facility] [address] [K27.5]     Admit Date/Time:  6/28/2022  6:58 AM    Chart reviewed. Interdisciplinary team contacted or reviewed plan related to patient progress and discharge plans. Disciplines included Case Management, Nursing, and Dietitian. Current Status:inpt  PT/OT recommendation for discharge plan of care: SNF    Expected D/C Disposition:  Home  Confirmed plan with patient and/or family Yes confirmed with: (name) pt    Discharge Plan Comments: Chart reviewed. Plan continues home with spouse,+NGT/[place], TF. Pt declines SNF and plan SOC with [facility], frontload PT/OT/SN/HHA. Writer spoke with Deana Yates with [facility] and she will reach out to [facility] with [address] for price check on TF supplies. Following. 1510 pt now agreeable to short term rehab. NICOLE Estrada to f/u with pt in the AM r/t facility choices. Following.     Home O2 in place on admit: No  Pt informed of need to bring portable home O2 tank on day of discharge for nursing to connect prior to leaving:  Not Indicated  Verbalized agreement/Understanding:  Not Indicated

## 2022-07-13 NOTE — PLAN OF CARE
Problem: Discharge Planning  Goal: Discharge to home or other facility with appropriate resources  7/13/2022 1258 by Trinidad Todd RN  Outcome: Progressing  7/13/2022 0641 by Trell Aranda RN  Outcome: Progressing  Flowsheets (Taken 7/12/2022 2010)  Discharge to home or other facility with appropriate resources: Identify barriers to discharge with patient and caregiver     Problem: Pain  Goal: Verbalizes/displays adequate comfort level or baseline comfort level  7/13/2022 1258 by Trinidad Todd RN  Outcome: Progressing  7/13/2022 0641 by Trell Aranda RN  Outcome: Progressing  Flowsheets (Taken 7/12/2022 1945)  Verbalizes/displays adequate comfort level or baseline comfort level:   Encourage patient to monitor pain and request assistance   Assess pain using appropriate pain scale   Administer analgesics based on type and severity of pain and evaluate response   Implement non-pharmacological measures as appropriate and evaluate response   Consider cultural and social influences on pain and pain management   Notify Licensed Independent Practitioner if interventions unsuccessful or patient reports new pain     Problem: Safety - Adult  Goal: Free from fall injury  7/13/2022 1258 by Trinidad Todd RN  Outcome: Progressing  7/13/2022 0641 by Trell Aranda RN  Outcome: Progressing     Problem: Skin/Tissue Integrity  Goal: Absence of new skin breakdown  Description: 1. Monitor for areas of redness and/or skin breakdown  2. Assess vascular access sites hourly  3. Every 4-6 hours minimum:  Change oxygen saturation probe site  4. Every 4-6 hours:  If on nasal continuous positive airway pressure, respiratory therapy assess nares and determine need for appliance change or resting period.   7/13/2022 1258 by Trinidad Todd RN  Outcome: Progressing  7/13/2022 0641 by Trell Aranda RN  Outcome: Progressing     Problem: ABCDS Injury Assessment  Goal: Absence of physical injury  7/13/2022 1258 by Trinidad Todd RN  Outcome: Progressing  7/13/2022 0641 by Michaela Gallegos RN  Outcome: Progressing     Problem: Nutrition Deficit:  Goal: Optimize nutritional status  7/13/2022 1258 by Nellie Hutchison RN  Outcome: Progressing  7/13/2022 0641 by Michaela Gallegos RN  Outcome: Progressing

## 2022-07-13 NOTE — FLOWSHEET NOTE
7. 13.22/Case gregorioer asked the  to stop and see patient Yesenia Stevenson July 14,2022 07/13/22 1507   Encounter Summary   Service Provided For: Patient   Referral/Consult From: Nurse   Last Encounter  07/13/22   Complexity of Encounter Moderate   Begin Time 1510   End Time  1515  (1515)   Total Time Calculated 5 min        07/13/22 1507   Encounter Summary   Service Provided For: Patient   Referral/Consult From: Nurse   Last Encounter  07/13/22   Complexity of Encounter Moderate   Begin Time 1510   End Time  1515  (1515)   Total Time Calculated 5 min

## 2022-07-13 NOTE — PROGRESS NOTES
General Surgery   Daily Progress Note    Pt Name: Kishan Mcqueen Record Number: [id number]  Date of Birth 1950   Today's Date: 7/13/2022    ASSESSMENT  1. POD #15 s/p repair of perf ulcer, j-tube insertion  2. ABD: J-tube in place, JERRI drain in place, midline dressing changed: + old bloody drainage noted, 2 open areas packed with gauze and covered with 4x4 gauze, + diffuse tenderness which appears unchanged from eysterday, +mild distention, NGT in place, no N/V, +flatus, + BMs     3. ARF improving: Cr 1.4  4. K+ 3.7  5. Leuks 8.9  6. Lipase 129 (pancreas was secondarily inflamed on ER CT)  7. VSS  8. NGT: 1L out  9. F/C removed: UO OK  10. JERRI 15 ml out : creamy green/yellow  11. Pt states \"I still feel like I am dying here. \" Continue to encouraged pt to participate in PT/OT. Spoke with pt and  at length: pt is getting better, she has to start moving. PLAN  1. NGT to CLWS  2. Renal seeing. 3. Strict I&Os  4. Pain control   5. Zosyn  6. PT/OT: Sit on edge of bed/up to chair today/ambulate  7. Protonix BID  8. Labs in the am.   9. Wound care Rn seeing pt  10. TF rate at goal  11. BID dressing changes to midline incision. 12. Pt looks OK POD #15: Pt with bilious JERRI drainage suggestive of leak from perf ulcer repair. Pt stable, labs improving, TF at goal      Nyra Cera is virtually unchanged from yesterday. Pain is somewhat well controlled. She has no nausea and no vomiting. She has passed flatus and has had a bowel movement. She is NPO and tolerating TF. Current activity is up with assistance    OBJECTIVE  VITALS:  height is 5' 4\" (1.626 m) and weight is 171 lb 8 oz (77.8 kg). Her oral temperature is 97.3 °F (36.3 °C). Her blood pressure is 145/73 (abnormal) and her pulse is 69. Her respiration is 16 and oxygen saturation is 97%.    VITALS:  BP (!) 145/73   Pulse 69   Temp 97.3 °F (36.3 °C) (Oral)   Resp 16   Ht 5' 4\" (1.626 m)   Wt 171 lb 8 oz (77.8 kg)   SpO2 97% BMI 29.44 kg/m²   INTAKE/OUTPUT:      Intake/Output Summary (Last 24 hours) at 7/13/2022 1522  Last data filed at 7/13/2022 0555  Gross per 24 hour   Intake 1542 ml   Output 1015 ml   Net 527 ml     URINARY CATHETER OUTPUT (Curiel):     GENERAL: alert, cooperative, no distress  I/O last 3 completed shifts: In: 9538 [NG/GT:1542]  Out: 1175 [Emesis/NG output:1000; Drains:175]  No intake/output data recorded. LABS  Recent Labs     07/12/22  0619 07/12/22  0619 07/13/22  0605 07/13/22  1335   WBC 9.6   < >  --  8.9   HGB 7.5*   < >  --  7.7*   HCT 22.4*   < >  --  23.4*      < >  --  302      < > 136  --    K 3.7   < > 3.7  --       < > 102  --    CO2 24   < > 24  --    BUN 23*   < > 20  --    CREATININE 1.5*   < > 1.4*  --    MG 2.00  --   --   --    PHOS 2.5   < > 2.8  --    CALCIUM 8.4   < > 8.4  --     < > = values in this interval not displayed.      CBC with Differential:    Lab Results   Component Value Date/Time    WBC 8.9 07/13/2022 01:35 PM    RBC 2.67 07/13/2022 01:35 PM    RBC 4.41 08/02/2016 10:34 AM    HGB 7.7 07/13/2022 01:35 PM    HCT 23.4 07/13/2022 01:35 PM     07/13/2022 01:35 PM    MCV 87.4 07/13/2022 01:35 PM    MCH 28.7 07/13/2022 01:35 PM    MCHC 32.9 07/13/2022 01:35 PM    RDW 15.5 07/13/2022 01:35 PM    BANDSPCT 1 07/05/2022 04:36 AM    LYMPHOPCT 12.2 07/13/2022 01:35 PM    LYMPHOPCT 25.3 08/02/2016 10:34 AM    MONOPCT 6.6 07/13/2022 01:35 PM    BASOPCT 0.9 07/13/2022 01:35 PM    MONOSABS 0.6 07/13/2022 01:35 PM    LYMPHSABS 1.1 07/13/2022 01:35 PM    EOSABS 0.2 07/13/2022 01:35 PM    BASOSABS 0.1 07/13/2022 01:35 PM     CMP:    Lab Results   Component Value Date/Time     07/13/2022 06:05 AM    K 3.7 07/13/2022 06:05 AM    K 3.8 06/29/2022 05:19 AM     07/13/2022 06:05 AM    CO2 24 07/13/2022 06:05 AM    BUN 20 07/13/2022 06:05 AM    CREATININE 1.4 07/13/2022 06:05 AM    GFRAA 44 07/13/2022 06:05 AM    AGRATIO 0.8 06/29/2022 05:19 AM    LABGLOM 37 07/13/2022 06:05 AM    GLUCOSE 148 07/13/2022 06:05 AM    GLUCOSE 85 08/02/2016 10:34 AM    PROT 4.7 06/29/2022 05:19 AM    PROT 5.7 08/02/2016 10:34 AM    LABALBU 3.2 07/13/2022 06:05 AM    CALCIUM 8.4 07/13/2022 06:05 AM    BILITOT 0.6 06/29/2022 05:19 AM    ALKPHOS 84 06/29/2022 05:19 AM    AST 49 06/29/2022 05:19 AM    ALT 48 06/29/2022 05:19 AM         January Dias, APRN - CNP  Electronically signed 7/13/2022 at 3:22 PM

## 2022-07-13 NOTE — PROGRESS NOTES
Inpatient Physical Therapy Daily Treatment Note    Unit: [address]  Date:  7/13/2022  Patient Name:    Sudheer Smith  Admitting diagnosis:  Gastric perforation, acute [K31.89]  Perforated peptic ulcer ([address] [K27.5]  Admit Date:  6/28/2022  Precautions/Restrictions:  Fall risk, Bed/chair alarm and Lines -IV, NG tube, PICC left, Drains (JERRI) and abdominal tube feed, abdominal incision  Telemetry, continuous pulse ox    Discharge Recommendations: SNF    Comment: pt and family want to d/c home with 24/7 assist. Pending progress and more consistent tolerance to activity, recommendation may change. DME needs for discharge: defer to facility       Therapy recommendation for EMS Transport: requires transport by cot due to requires lift equiptment to transfer    Therapy recommendations for staff:   Ax2 to EOB  Depending on pt's fatigue level, either RW or STED with gait belt and Ax2 to transfer to BSC/chair. Pt has lift pad under recliner as well. History of Present Illness: S/P emergent repair of perf ulcer with j-tube insertion on 6/28 by Dr. Kathleen Freeman, subsequent DA and hypertension. Recent COVID from 6/22-24/22, h/o colon cancer. Home Health S4 Level Recommendation: NA  AM-PAC Mobility Score   AM-PAC Inpatient Mobility Raw Score : [place] scored a 10/24 on the AM-PAC short mobility form. Current research shows that an AM-PAC score of 17 or less is typically not associated with a discharge to the patient's home setting. If patient discharges prior to next session this note will serve as a discharge summary. Please see below for the latest assessment towards goals. Treatment Time:  5116-5428  Treatment number: 8   Timed Code Treatment Minutes: 33 minutes  Total Treatment Minutes:  33 minutes    Cognition    A&O orientation not directly assessed. Able to follow 2 step commands    Subjective  Patient sitting on [facility] with family at bedside. Pt agreeable to this PT tx.      Pain   Yes  Location: abdomen  Rating:    severe/10   Pain Medicine Status: Received pain med prior to tx, received morphine ~1.5hr ago per [facility]    Bed Mobility   Supine to Sit:    Not Tested   (pt up to [facility] upon start of session)  Sit to Supine:   Not Tested   (up to chair to end session)  Rolling:   Not Tested  Scooting:   Supervision to edge of [facility]    Transfer Training   Sit to stand:   Min A from edge of [facility],      Min A from [facility] (2 attempts, 1st attempts unsuccessful, improved after scooting to [facility])  Stand to sit:   Min A to recliner  BSC to Recliner:  Min A  with use of gait belt and rolling walker (RW)     Gait gait deferred due to fatigue; pt ambulated 0 ft. Stair Training deferred, pt unsafe/ not appropriate to complete stairs at this time    Therapeutic Exercise Ish deferred secondary to treatment focus on functional mobility and pt fatigued once transferred into chair. NA    Balance  Sitting: Good - ; Supervision  Comments: no overt LOB. Standing: Fair +; CGA /c STEDY and RW  Comments: 2 bouts of ~1 min standing (1 in STEDY and 1 /c RW) decreased standing endurance, noted by increased shakiness in legs (dec quad control). Patient Education      Role of PT, POC, Discharge recommendations, DC recommendations, safety awareness, transfer techniques, HEP and calling for assist with mobility    Positioning Needs       Pt reclined in chair, alarm set, positioned in proper neutral alignment and pressure relief provided. Call light provided and all needs within reach    Activity Tolerance   Pt completed therapy session with Dizziness noted with completion of session. Seated in recliner. BP: WFL  SpO2: on RA 97%  HR: 75 bpm    Other  None. Assessment :  Pt on BSC upon start of session, completed BSC>chair transfer /c STEDY and additional STS /c RW /c Min A.  Pt tolerated 2 bouts of ~1 min standing balance (1 in STEDY and 1 /c RW) however, was too fatigued after to complete any further exercise/mobility activities. Pt continues to demonstrate decreased BLE strength and endurance noted by increased shakiness after ~1 min of standing, however, this is improving. Will continue to progress per pt tolerance. Recommending SNF upon discharge as patient functioning well below baseline, demonstrates good rehab potential and unable to return home due to inability to negotiate stairs to enter home/bedroom/bathroom, burden of care beyond caregiver ability and home environment not conducive to patient recovery. Goals (all goals ongoing unless otherwise indicated)  To be met in 3 visits:  1). Independent with LE Ex x 10 reps  2). Supine to/from sit: Mod A  3). Bed to chair: CGA with walker     To be met in 6 visits:  1).  Supine to/from sit: Supervision  2).  Sit to/from stand: Supervision  3).  Bed to chair: Supervision  4).  Gait: Ambulate 50 ft.  with  SBA and use of LRAD (least restrictive assistive device)  5).  Tolerate B LE exercises 3 sets of 10-15 reps  6).  Ascend/descend 2 steps with CGA with use of hand rail unilateral and LRAD (least restrictive assistive device)    Plan   Continue with plan of care. Signature: Anne Gomez, SPT  Co-signature: Orlando Angeles, PT, DPT     If patient discharges from this facility prior to next visit, this note will serve as the Discharge Summary.

## 2022-07-13 NOTE — PROGRESS NOTES
Progress Note    HISTORY     CC:  Abdominal pain and perforated bowel           We are following for     DA    CKD       Subjective/   HPI:  Kidney function is stable. She is still very flat but she is more interactive today. Still on TFs and having a lot of pain.       ROS:  Constitutional:  No fevers, No Chills, + weakness  Cardiovascular:  No palpations, + edema  Respiratory:  No wheezing, no cough  Skin:  No rash, no itching  :  No hematuria, urine volume is good     Social Hx:  No Family at the bedside     Past Medical and Surgical History:  - Reviewed, no changes     EXAM       Objective/     Vitals:    07/12/22 1815 07/12/22 1945 07/12/22 2354 07/13/22 0416   BP:  (!) 158/83 123/74 (!) 140/74   Pulse:  75 69 71   Resp:  18 18 16   Temp: 97.7 °F (36.5 °C) 98.8 °F (37.1 °C) 98.5 °F (36.9 °C) 98.6 °F (37 °C)   TempSrc: Axillary Oral Oral Oral   SpO2:  97% 96% 94%   Weight:       Height:         24HR INTAKE/OUTPUT:      Intake/Output Summary (Last 24 hours) at 7/13/2022 0855  Last data filed at 7/13/2022 0555  Gross per 24 hour   Intake 1542 ml   Output 1015 ml   Net 527 ml     Constitutional:  Ill appearing, NG  Eyes:  Pupils reactive, sclera clear   Neck:  Normal thyroid, no masses   Cardiovascular:  Regular, no rub  Respiratory:  No distress, no wheezing  Psychiatry:  Flat mood/affect, somnolent   Abdomen: +bs, soft, nt, no masses   Musculoskeletal: + LE edema, no clubbing   Lymphatics:  No LAD in neck, no supraclavicular nodes       MEDICAL DECISION MAKING       Data/  Recent Labs     07/12/22  0619   WBC 9.6   HGB 7.5*   HCT 22.4*   MCV 88.0        Recent Labs     07/11/22  0558 07/12/22  0619 07/13/22  0605   * 143 136   K 3.3* 3.7 3.7    110 102   CO2 26 24 24   GLUCOSE 131* 140* 148*   PHOS 3.0 2.5 2.8   MG 1.70* 2.00  --    BUN 21* 23* 20   CREATININE 1.6* 1.5* 1.4*   LABGLOM 31* 34* 37*   GFRAA 38* 41* 44*       Assessment/     -DA likely has developed ATN w hypovolemia, hypotension, NSAIDs use and contrast use. Scr stable / non-oliguric      -Perforated prepyloric ulcer s/p laparotomy with repair n jejunostomy insertion on 6/28/22              On zosyn      -Metabolic acidosis from DA, resolved     -Hypertension.  BP range is good.     -afib rvr per cardiology     -recent COVID from 6/22-24/22     -h/o colon cancer    Plan/     On TFs  Gave IV albumin to increase intravascular refill rate  Free water 100 cc/hr via J tube  K replacement   Follow labs  PRN lasix - will give a dose today, if sodium drop again tomorrow will decrease free water   -----------------------------  Joanne Roberts M.D.   [facility]

## 2022-07-13 NOTE — PROGRESS NOTES
Occupational Therapy/ Physical Therapy  Attempted OT/PT treatment and the pt declined at this time. Encouraged the pt to participate and the pt continued to decline.   Kami Lehman OTR/L [id number]  Dandre Strickland, SPT

## 2022-07-14 LAB
ALBUMIN SERPL-MCNC: 3.2 G/DL (ref 3.4–5)
ANION GAP SERPL CALCULATED.3IONS-SCNC: 10 MMOL/L (ref 3–16)
BUN BLDV-MCNC: 22 MG/DL (ref 7–20)
CALCIUM SERPL-MCNC: 8.2 MG/DL (ref 8.3–10.6)
CHLORIDE BLD-SCNC: 101 MMOL/L (ref 99–110)
CO2: 26 MMOL/L (ref 21–32)
CREAT SERPL-MCNC: 1.4 MG/DL (ref 0.6–1.2)
GFR AFRICAN AMERICAN: 44
GFR NON-AFRICAN AMERICAN: 37
GLUCOSE BLD-MCNC: 102 MG/DL (ref 70–99)
GLUCOSE BLD-MCNC: 111 MG/DL (ref 70–99)
GLUCOSE BLD-MCNC: 120 MG/DL (ref 70–99)
GLUCOSE BLD-MCNC: 124 MG/DL (ref 70–99)
GLUCOSE BLD-MCNC: 124 MG/DL (ref 70–99)
GLUCOSE BLD-MCNC: 125 MG/DL (ref 70–99)
GLUCOSE BLD-MCNC: 143 MG/DL (ref 70–99)
PERFORMED ON: ABNORMAL
PHOSPHORUS: 3.5 MG/DL (ref 2.5–4.9)
POTASSIUM SERPL-SCNC: 3.5 MMOL/L (ref 3.5–5.1)
SODIUM BLD-SCNC: 137 MMOL/L (ref 136–145)

## 2022-07-14 PROCEDURE — 99024 POSTOP FOLLOW-UP VISIT: CPT | Performed by: NURSE PRACTITIONER

## 2022-07-14 PROCEDURE — 97530 THERAPEUTIC ACTIVITIES: CPT

## 2022-07-14 PROCEDURE — 80069 RENAL FUNCTION PANEL: CPT

## 2022-07-14 PROCEDURE — 97535 SELF CARE MNGMENT TRAINING: CPT

## 2022-07-14 PROCEDURE — 6360000002 HC RX W HCPCS: Performed by: SURGERY

## 2022-07-14 PROCEDURE — 6360000002 HC RX W HCPCS: Performed by: NURSE PRACTITIONER

## 2022-07-14 PROCEDURE — C9113 INJ PANTOPRAZOLE SODIUM, VIA: HCPCS | Performed by: NURSE PRACTITIONER

## 2022-07-14 PROCEDURE — 1200000000 HC SEMI PRIVATE

## 2022-07-14 PROCEDURE — 6370000000 HC RX 637 (ALT 250 FOR IP): Performed by: SURGERY

## 2022-07-14 PROCEDURE — 2580000003 HC RX 258: Performed by: SURGERY

## 2022-07-14 RX ORDER — ALBUTEROL SULFATE 90 UG/1
2 AEROSOL, METERED RESPIRATORY (INHALATION) EVERY 6 HOURS PRN
Status: DISCONTINUED | OUTPATIENT
Start: 2022-07-14 | End: 2022-07-28 | Stop reason: HOSPADM

## 2022-07-14 RX ADMIN — SODIUM CHLORIDE, PRESERVATIVE FREE 10 ML: 5 INJECTION INTRAVENOUS at 09:56

## 2022-07-14 RX ADMIN — MORPHINE SULFATE 1 MG: 2 INJECTION, SOLUTION INTRAMUSCULAR; INTRAVENOUS at 04:48

## 2022-07-14 RX ADMIN — MORPHINE SULFATE 1 MG: 2 INJECTION, SOLUTION INTRAMUSCULAR; INTRAVENOUS at 14:23

## 2022-07-14 RX ADMIN — MICONAZOLE NITRATE: 2 OINTMENT TOPICAL at 09:56

## 2022-07-14 RX ADMIN — PANTOPRAZOLE SODIUM 40 MG: 40 INJECTION, POWDER, LYOPHILIZED, FOR SOLUTION INTRAVENOUS at 09:56

## 2022-07-14 RX ADMIN — MORPHINE SULFATE 1 MG: 2 INJECTION, SOLUTION INTRAMUSCULAR; INTRAVENOUS at 10:26

## 2022-07-14 RX ADMIN — PANTOPRAZOLE SODIUM 40 MG: 40 INJECTION, POWDER, LYOPHILIZED, FOR SOLUTION INTRAVENOUS at 00:55

## 2022-07-14 RX ADMIN — MORPHINE SULFATE 1 MG: 2 INJECTION, SOLUTION INTRAMUSCULAR; INTRAVENOUS at 23:55

## 2022-07-14 RX ADMIN — ONDANSETRON HYDROCHLORIDE 4 MG: 2 INJECTION, SOLUTION INTRAMUSCULAR; INTRAVENOUS at 14:24

## 2022-07-14 RX ADMIN — HEPARIN SODIUM 5000 UNITS: 5000 INJECTION INTRAVENOUS; SUBCUTANEOUS at 09:55

## 2022-07-14 RX ADMIN — MORPHINE SULFATE 1 MG: 2 INJECTION, SOLUTION INTRAMUSCULAR; INTRAVENOUS at 21:27

## 2022-07-14 RX ADMIN — PANTOPRAZOLE SODIUM 40 MG: 40 INJECTION, POWDER, LYOPHILIZED, FOR SOLUTION INTRAVENOUS at 21:20

## 2022-07-14 RX ADMIN — HEPARIN SODIUM 5000 UNITS: 5000 INJECTION INTRAVENOUS; SUBCUTANEOUS at 00:55

## 2022-07-14 RX ADMIN — SODIUM CHLORIDE, PRESERVATIVE FREE 10 ML: 5 INJECTION INTRAVENOUS at 21:21

## 2022-07-14 RX ADMIN — HEPARIN SODIUM 5000 UNITS: 5000 INJECTION INTRAVENOUS; SUBCUTANEOUS at 18:43

## 2022-07-14 RX ADMIN — PIPERACILLIN AND TAZOBACTAM 3375 MG: 3; .375 INJECTION, POWDER, LYOPHILIZED, FOR SOLUTION INTRAVENOUS at 18:44

## 2022-07-14 RX ADMIN — MICONAZOLE NITRATE: 2 OINTMENT TOPICAL at 21:21

## 2022-07-14 RX ADMIN — PIPERACILLIN AND TAZOBACTAM 3375 MG: 3; .375 INJECTION, POWDER, LYOPHILIZED, FOR SOLUTION INTRAVENOUS at 10:30

## 2022-07-14 RX ADMIN — PIPERACILLIN AND TAZOBACTAM 3375 MG: 3; .375 INJECTION, POWDER, LYOPHILIZED, FOR SOLUTION INTRAVENOUS at 04:50

## 2022-07-14 RX ADMIN — HEPARIN SODIUM 5000 UNITS: 5000 INJECTION INTRAVENOUS; SUBCUTANEOUS at 23:22

## 2022-07-14 ASSESSMENT — PAIN DESCRIPTION - LOCATION
LOCATION: ABDOMEN;BACK
LOCATION: ABDOMEN
LOCATION: ABDOMEN;BACK

## 2022-07-14 ASSESSMENT — PAIN DESCRIPTION - ORIENTATION
ORIENTATION: LOWER;MID
ORIENTATION: MID

## 2022-07-14 ASSESSMENT — PAIN DESCRIPTION - ONSET
ONSET: ON-GOING
ONSET: ON-GOING

## 2022-07-14 ASSESSMENT — PAIN SCALES - GENERAL
PAINLEVEL_OUTOF10: 6
PAINLEVEL_OUTOF10: 6
PAINLEVEL_OUTOF10: 9
PAINLEVEL_OUTOF10: 6
PAINLEVEL_OUTOF10: 0
PAINLEVEL_OUTOF10: 8
PAINLEVEL_OUTOF10: 7

## 2022-07-14 ASSESSMENT — PAIN DESCRIPTION - DESCRIPTORS
DESCRIPTORS: ACHING;DISCOMFORT

## 2022-07-14 ASSESSMENT — PAIN SCALES - WONG BAKER
WONGBAKER_NUMERICALRESPONSE: 0
WONGBAKER_NUMERICALRESPONSE: 0

## 2022-07-14 ASSESSMENT — PAIN DESCRIPTION - PAIN TYPE
TYPE: ACUTE PAIN;SURGICAL PAIN
TYPE: SURGICAL PAIN

## 2022-07-14 ASSESSMENT — PAIN DESCRIPTION - FREQUENCY
FREQUENCY: CONTINUOUS
FREQUENCY: CONTINUOUS

## 2022-07-14 ASSESSMENT — PAIN - FUNCTIONAL ASSESSMENT
PAIN_FUNCTIONAL_ASSESSMENT: ACTIVITIES ARE NOT PREVENTED
PAIN_FUNCTIONAL_ASSESSMENT: ACTIVITIES ARE NOT PREVENTED
PAIN_FUNCTIONAL_ASSESSMENT: PREVENTS OR INTERFERES SOME ACTIVE ACTIVITIES AND ADLS

## 2022-07-14 NOTE — PROGRESS NOTES
Occupational Therapy Daily Treatment Note    Unit: 2 [place]  Date:  7/14/2022  Patient Name:    Arun Salinas  Admitting diagnosis:  Gastric perforation, acute [K31.89]  Perforated peptic ulcer ([address] [K27.5]  Admit Date:  6/28/2022  Precautions/Restrictions:    fall risk, IV, bed/chair alarm, waddell catheter , NG, telemetry, telesitter and continuous pulse ox      Discharge Recommendations: SNF  DME needs for discharge: defer to facility       Therapy recommendations for staff:   Assist of 2 with use of rolling walker (RW) for all transfers to/from [facility]  to/from chair with gait belt     AM-PAC Score: AM-PAC Inpatient Daily Activity Raw Score: 9  Home Health S4 Level: NA       Treatment Time:  5778-6644   Treatment number:  3   Timed code treatment minutes: 35minutes  Total treatment minutes:  35 minutes    History of Present Illness:   s/p emergent repair of perf ulcer with j-tube insertion on 6/28 by Dr. Dale Lopez, subsequent DA and hypertension. Recent COVID from 6/22-24/22, h/o colon cancer. Subjective:  Pt agreeable to participate in OT     Pain   Yes  Rating: significant   Location:abdomen   Pain Medicine Status: no pain med requested        Bed Mobility:   Supine to Sit:  NT   Sit to Supine:  Not Tested  Rolling:           NT  Scooting:        CGA in the chair     Transfer Training:   Sit to stand:   Min A  into the ste   Stand to sit:  Min A   BSC  to Chair:  Min A with stedy - PCAs already had pt up to the [facility] with the [facility]   Bed to [facility]:   Not Tested  Standard toilet:   Not Tested    Activity Tolerance   Pt completed therapy session with pain and LE weakness   Pt reported increased dizziness/light headiness when sitting into the chair from the [facility] BP WNLs - nurse notified   ADL Training:   Upper body dressing: Not Tested  Upper body bathing:  Not Tested    Lower body dressing:  Max A   Lower body bathing:  Max A   Toileting:   Min A   Grooming/Hygiene:  NA    Therapeutic Exercise:    Attempted to have the pt perform UE exercises and the pt declined due to \" not feeling good\"    Patient Education:   Safe hand placement in the stedy     Positioning Needs:   Pt up in chair, alarm set, positioned in proper neutral alignment and pressure relief provided. Family Present:  Yes    Assessment: The pt was on the [facility] with the PCAs and the therapist then assisted pt off the [facility] to the stedy with min assist of two. The pt assisted to the chair with min assist of two to stand from stedy to sit in the chair. The pt declined UE exercises and attempt to stand into the RW due to reports of significant pain and dizziness. Nurse notified of pts reports of dizziness and that BP was [place]. Pt needs continued OT services to increase functional IND . GOALS  To be met in 3 Visits:  1). Bed to toilet/BSC: Max A- goal met 7-12-22 new goal SBA with RW      To be met in 5 Visits:  1). Supine to/from Sit:             Mod A goal met - 7 12 new goal min assist   2). Upper Body Bathing:         Min A  3). Lower Body Bathing: Mod A  4). Upper Body Dressing:       Min A  5). Lower Body Dressing: Mod A  6).  Pt to demonstrate UE exs x 15 reps with minimal cues         Plan: cont with JOVAN Caballero OTR/L [id number]    If patient discharges from this facility prior to next visit, this note will serve as the Discharge Summary

## 2022-07-14 NOTE — FLOWSHEET NOTE
07/14/22 0734   Vital Signs   Temp 97.4 °F (36.3 °C)   Temp Source Oral   Heart Rate 68   Heart Rate Source Monitor   Resp 16   /67   BP Location Right upper arm   BP Method Automatic   MAP (Calculated) 85.67   Patient Position Semi fowlers   Level of Consciousness Alert (0)   MEWS Score 1   Oxygen Therapy   SpO2 95 %   O2 Device None (Room air)       Shift assessment complete. See flow sheet. Scheduled meds given. See MAR. Patients head-toe complete, VS are logged, and active bowel sound noted in all four quadrants. Patient rating pain 9/10 PRN pain medication given. See MAR. Flushes adjusted to 100 mL/hr. Tube feeds changed at 0600. All wounds care completed. No further needs  noted at this time. Call light and bedside table are within reach. The bed is locked and is in the lowest position. Natalio Ramsey RN

## 2022-07-14 NOTE — PLAN OF CARE
Problem: Discharge Planning  Goal: Discharge to home or other facility with appropriate resources  7/14/2022 0223 by Jerilyn Floyd RN  Outcome: Progressing  Flowsheets (Taken 7/13/2022 2024)  Discharge to home or other facility with appropriate resources: Identify barriers to discharge with patient and caregiver  7/13/2022 1258 by Floridalma Alegria RN  Outcome: Progressing     Problem: Pain  Goal: Verbalizes/displays adequate comfort level or baseline comfort level  7/14/2022 0223 by Jerilyn Floyd RN  Outcome: Progressing  Flowsheets (Taken 7/13/2022 2015)  Verbalizes/displays adequate comfort level or baseline comfort level:   Encourage patient to monitor pain and request assistance   Assess pain using appropriate pain scale   Administer analgesics based on type and severity of pain and evaluate response   Implement non-pharmacological measures as appropriate and evaluate response   Consider cultural and social influences on pain and pain management   Notify Licensed Independent Practitioner if interventions unsuccessful or patient reports new pain  7/13/2022 1258 by Floridalma Alegria RN  Outcome: Progressing     Problem: Safety - Adult  Goal: Free from fall injury  7/14/2022 0223 by Jerilyn Floyd RN  Outcome: Progressing  7/13/2022 1258 by Floridalma Alegria RN  Outcome: Progressing     Problem: Skin/Tissue Integrity  Goal: Absence of new skin breakdown  Description: 1. Monitor for areas of redness and/or skin breakdown  2. Assess vascular access sites hourly  3. Every 4-6 hours minimum:  Change oxygen saturation probe site  4. Every 4-6 hours:  If on nasal continuous positive airway pressure, respiratory therapy assess nares and determine need for appliance change or resting period.   7/14/2022 0223 by Jerilyn Floyd RN  Outcome: Progressing  7/13/2022 1258 by Floridalma Alegria RN  Outcome: Progressing     Problem: ABCDS Injury Assessment  Goal: Absence of physical injury  7/14/2022 0223 by Jerilyn Floyd RN  Outcome: Progressing  7/13/2022 1258 by Virginia Scott, RN  Outcome: Progressing     Problem: Nutrition Deficit:  Goal: Optimize nutritional status  7/14/2022 0223 by Mayra Ly RN  Outcome: Progressing  7/13/2022 1258 by Virginia Scott RN  Outcome: Progressing

## 2022-07-14 NOTE — PROGRESS NOTES
Progress Note    HISTORY     CC:  Abdominal pain and perforated bowel           We are following for     DA    CKD       Subjective/   HPI:  Kidney function is stable. She is still very flat but she is more interactive today. Still on TFs and having some pain.   Feels that swelling is better     ROS:  Constitutional:  No fevers, No Chills, + weakness  Cardiovascular:  No palpations, + edema  Respiratory:  No wheezing, no cough  Skin:  No rash, no itching  :  No hematuria, urine volume is good     Social Hx:  No Family at the bedside     Past Medical and Surgical History:  - Reviewed, no changes     EXAM       Objective/     Vitals:    07/14/22 1026 07/14/22 1056 07/14/22 1303 07/14/22 1423   BP:   134/74    Pulse:   71    Resp: 16 16 16 16   Temp:   99 °F (37.2 °C)    TempSrc:   Oral    SpO2:   97%    Weight:       Height:         24HR INTAKE/OUTPUT:      Intake/Output Summary (Last 24 hours) at 7/14/2022 1432  Last data filed at 7/14/2022 0745  Gross per 24 hour   Intake 4220.34 ml   Output 511 ml   Net 3709.34 ml     Constitutional:  Ill appearing, NG  Eyes:  Pupils reactive, sclera clear   Neck:  Normal thyroid, no masses   Cardiovascular:  Regular, no rub  Respiratory:  No distress, no wheezing  Psychiatry:  Flat mood/affect, somnolent   Abdomen: +bs, soft, nt, no masses   Musculoskeletal: + LE edema, no clubbing   Lymphatics:  No LAD in neck, no supraclavicular nodes       MEDICAL DECISION MAKING       Data/  Recent Labs     07/12/22  0619 07/13/22  1335   WBC 9.6 8.9   HGB 7.5* 7.7*   HCT 22.4* 23.4*   MCV 88.0 87.4    302     Recent Labs     07/12/22  0619 07/13/22  0605 07/14/22  0615    136 137   K 3.7 3.7 3.5    102 101   CO2 24 24 26   GLUCOSE 140* 148* 102*   PHOS 2.5 2.8 3.5   MG 2.00  --   --    BUN 23* 20 22*   CREATININE 1.5* 1.4* 1.4*   LABGLOM 34* 37* 37*   GFRAA 41* 44* 44*       Assessment/     -DA likely has developed ATN w hypovolemia, hypotension, NSAIDs use and contrast use. Scr stable / non-oliguric      -Perforated prepyloric ulcer s/p laparotomy with repair n jejunostomy insertion on 6/28/22              On zosyn      -Metabolic acidosis from DA, resolved     -Hypertension.  BP range is good.     -afib rvr per cardiology     -recent COVID from 6/22-24/22     -h/o colon cancer    Plan/     On TFs  Follow labs  PRN lasix   -----------------------------  Kavon Suárez M.D.   [facility]

## 2022-07-14 NOTE — FLOWSHEET NOTE
07/14/22 1303   Vital Signs   Temp 99 °F (37.2 °C)   Temp Source Oral   Heart Rate 71   Heart Rate Source Monitor   Resp 16   /74   BP Location Right upper arm   BP Method Automatic   MAP (Calculated) 94   Patient Position Sitting   Level of Consciousness Alert (0)   MEWS Score 1   Oxygen Therapy   SpO2 97 %   O2 Device None (Room air)       Patient up in chair for the last 2 hours. Did well.

## 2022-07-14 NOTE — FLOWSHEET NOTE
07/14/22 1441   Encounter Summary   Encounter Overview/Reason  Initial Encounter;Spiritual/Emotional Needs   Service Provided For: Patient and family together   Referral/Consult From: Other    Support System Spouse; Children;Family members   Last Encounter  07/14/22  (Prayer)   Complexity of Encounter Low   Begin Time 1000   End Time  1014   Total Time Calculated 14 min   Spiritual/Emotional needs   Type Emotional Distress   Assessment/Intervention/Outcome   Assessment Anxious; Concerns with suffering;Tearful   Intervention Active listening;Discussed illness injury and its impact;Nurtured Hope;Prayer (assurance of)/[place]   Outcome Connection/Belonging;Encouraged;Engaged in conversation;Expressed Gratitude;Peace negative...

## 2022-07-14 NOTE — PROGRESS NOTES
General Surgery   Daily Progress Note    Pt Name: Kishan Mcqueen Record Number: [id number]  Date of Birth 1950   Today's Date: 7/14/2022    ASSESSMENT  1. POD #16 s/p repair of perf ulcer, j-tube insertion  2. ABD: J-tube in place, JERRI drain in place, midline dressing changed: changed by RN,, + diffuse tenderness which appears slightly improved from eysterday, +mild distention, NGT in place, no N/V, +flatus, + BMs     3. ARF improving: Cr 1.4  4. K+ 3.5  5. Leuks 8.9  6. Lipase 129 (pancreas was secondarily inflamed on ER CT)  7. VSS  8. NGT: 500   9. UO OK  10. JERRI 10 ml: creamy green  11. Pt states \"I got up in a chair today, it was so painful. \" Continue to encouraged pt to participate in PT/OT. PLAN  1. NGT to CLWS  2. Renal seeing. 3. Strict I&Os  4. Pain control   5. Zosyn  6. PT/OT: Sit on edge of bed/up to chair today/ambulate  7. Protonix BID  8. Wound care Rn seeing pt  9. TF rate at goal  10. BID dressing changes to midline incision. 11. Pt looks OK POD #16: Pt with only 10 ml out x 2 days. If Manuel Mullins continues to have no output then, may do UGI on Monday. Kendrick Meredith is slightly improved from yesterday. Pain is better controlled. She has no nausea and no vomiting. She has passed flatus and has had a bowel movement. She is NPO and tolerating TF. Current activity is up with assistance    OBJECTIVE  VITALS:  height is 5' 4\" (1.626 m) and weight is 171 lb 8 oz (77.8 kg). Her oral temperature is 99 °F (37.2 °C). Her blood pressure is 134/74 and her pulse is 71. Her respiration is 16 and oxygen saturation is 97%.    VITALS:  /74   Pulse 71   Temp 99 °F (37.2 °C) (Oral)   Resp 16   Ht 5' 4\" (1.626 m)   Wt 171 lb 8 oz (77.8 kg)   SpO2 97%   BMI 29.44 kg/m²   INTAKE/OUTPUT:      Intake/Output Summary (Last 24 hours) at 7/14/2022 1523  Last data filed at 7/14/2022 0745  Gross per 24 hour   Intake 4220.34 ml   Output 511 ml   Net 3709.34 ml     URINARY CATHETER OUTPUT (Moisés):     GENERAL: alert, cooperative, no distress  I/O last 3 completed shifts: In: 5662.3 [I.V.:1435.4; NG/GT:2311; IV [id number]:4440]  Out: 971 [Urine:1; Emesis/NG output:950; Drains:20]  I/O this shift:  In: 100 [NG/GT:100]  Out: -     LABS  Recent Labs     07/12/22  0619 07/13/22  0605 07/13/22  1335 07/14/22  0615   WBC 9.6  --  8.9  --    HGB 7.5*  --  7.7*  --    HCT 22.4*  --  23.4*  --      --  302  --       < >  --  137   K 3.7   < >  --  3.5      < >  --  101   CO2 24   < >  --  26   BUN 23*   < >  --  22*   CREATININE 1.5*   < >  --  1.4*   MG 2.00  --   --   --    PHOS 2.5   < >  --  3.5   CALCIUM 8.4   < >  --  8.2*    < > = values in this interval not displayed.      CBC with Differential:    Lab Results   Component Value Date/Time    WBC 8.9 07/13/2022 01:35 PM    RBC 2.67 07/13/2022 01:35 PM    RBC 4.41 08/02/2016 10:34 AM    HGB 7.7 07/13/2022 01:35 PM    HCT 23.4 07/13/2022 01:35 PM     07/13/2022 01:35 PM    MCV 87.4 07/13/2022 01:35 PM    MCH 28.7 07/13/2022 01:35 PM    MCHC 32.9 07/13/2022 01:35 PM    RDW 15.5 07/13/2022 01:35 PM    BANDSPCT 1 07/05/2022 04:36 AM    LYMPHOPCT 12.2 07/13/2022 01:35 PM    LYMPHOPCT 25.3 08/02/2016 10:34 AM    MONOPCT 6.6 07/13/2022 01:35 PM    BASOPCT 0.9 07/13/2022 01:35 PM    MONOSABS 0.6 07/13/2022 01:35 PM    LYMPHSABS 1.1 07/13/2022 01:35 PM    EOSABS 0.2 07/13/2022 01:35 PM    BASOSABS 0.1 07/13/2022 01:35 PM     CMP:    Lab Results   Component Value Date/Time     07/14/2022 06:15 AM    K 3.5 07/14/2022 06:15 AM    K 3.8 06/29/2022 05:19 AM     07/14/2022 06:15 AM    CO2 26 07/14/2022 06:15 AM    BUN 22 07/14/2022 06:15 AM    CREATININE 1.4 07/14/2022 06:15 AM    GFRAA 44 07/14/2022 06:15 AM    AGRATIO 0.8 06/29/2022 05:19 AM    LABGLOM 37 07/14/2022 06:15 AM    GLUCOSE 102 07/14/2022 06:15 AM    GLUCOSE 85 08/02/2016 10:34 AM    PROT 4.7 06/29/2022 05:19 AM    PROT 5.7 08/02/2016 10:34 AM    LABALBU 3.2 07/14/2022

## 2022-07-14 NOTE — PROGRESS NOTES
Pt c/o of pain 8/10 in abdomen and lower back, gave 1 mg morphine IV, cleaned and changed pt, applied remedy and miconazole cream to buttocks and gayle area as ordered, changed drsg's on abdomen pt tolerated well. Pt refusing to be turned, educated pt about need to turn every 2 hours to prevent worsening of wounds on buttocks,  will continue to monitor.  Jacob Bal RN

## 2022-07-14 NOTE — PROGRESS NOTES
RT Inhaler-Nebulizer Bronchodilator Protocol Note    There is a bronchodilator order in the chart from a provider indicating to follow the RT Bronchodilator Protocol and there is an Initiate RT Inhaler-Nebulizer Bronchodilator Protocol order as well (see protocol at bottom of note). CXR Findings:  No results found. The findings from the last RT Protocol Assessment were as follows:   History Pulmonary Disease: (P) Chronic pulmonary disease  Respiratory Pattern: (P) Regular pattern and RR 12-20 bpm  Breath Sounds: (P) Clear breath sounds  Cough: (P) Strong, spontaneous, non-productive  Indication for Bronchodilator Therapy: (P) Decreased or absent breath sounds  Bronchodilator Assessment Score: (P) 2    Aerosolized bronchodilator medication orders have been revised according to the RT Inhaler-Nebulizer Bronchodilator Protocol below. Respiratory Therapist to perform RT Therapy Protocol Assessment initially then follow the protocol. Repeat RT Therapy Protocol Assessment PRN for score 0-3 or on second treatment, BID, and PRN for scores above 3. No Indications - adjust the frequency to every 6 hours PRN wheezing or bronchospasm, if no treatments needed after 48 hours then discontinue using Per Protocol order mode. If indication present, adjust the RT bronchodilator orders based on the Bronchodilator Assessment Score as indicated below. Use Inhaler orders unless patient has one or more of the following: on home nebulizer, not able to hold breath for 10 seconds, is not alert and oriented, cannot activate and use MDI correctly, or respiratory rate 25 breaths per minute or more, then use the equivalent nebulizer order(s) with same Frequency and PRN reasons based on the score. If a patient is on this medication at home then do not decrease Frequency below that used at home.     0-3 - enter or revise RT bronchodilator order(s) to equivalent RT Bronchodilator order with Frequency of every 4 hours PRN for wheezing or increased work of breathing using Per Protocol order mode. 4-6 - enter or revise RT Bronchodilator order(s) to two equivalent RT bronchodilator orders with one order with BID Frequency and one order with Frequency of every 4 hours PRN wheezing or increased work of breathing using Per Protocol order mode. 7-10 - enter or revise RT Bronchodilator order(s) to two equivalent RT bronchodilator orders with one order with TID Frequency and one order with Frequency of every 4 hours PRN wheezing or increased work of breathing using Per Protocol order mode. 11-13 - enter or revise RT Bronchodilator order(s) to one equivalent RT bronchodilator order with QID Frequency and an Albuterol order with Frequency of every 4 hours PRN wheezing or increased work of breathing using Per Protocol order mode. Greater than 13 - enter or revise RT Bronchodilator order(s) to one equivalent RT bronchodilator order with every 4 hours Frequency and an Albuterol order with Frequency of every 2 hours PRN wheezing or increased work of breathing using Per Protocol order mode. RT to enter RT Home Evaluation for COPD & MDI Assessment order using Per Protocol order mode.     Electronically signed by Jackline Goldmann, RCP on 7/14/2022 at 12:03 PM

## 2022-07-15 LAB
ALBUMIN SERPL-MCNC: 3.2 G/DL (ref 3.4–5)
ANION GAP SERPL CALCULATED.3IONS-SCNC: 9 MMOL/L (ref 3–16)
BASOPHILS ABSOLUTE: 0 K/UL (ref 0–0.2)
BASOPHILS RELATIVE PERCENT: 0.4 %
BUN BLDV-MCNC: 19 MG/DL (ref 7–20)
CALCIUM SERPL-MCNC: 8.2 MG/DL (ref 8.3–10.6)
CHLORIDE BLD-SCNC: 101 MMOL/L (ref 99–110)
CO2: 26 MMOL/L (ref 21–32)
CREAT SERPL-MCNC: 1.5 MG/DL (ref 0.6–1.2)
EOSINOPHILS ABSOLUTE: 0.2 K/UL (ref 0–0.6)
EOSINOPHILS RELATIVE PERCENT: 2.9 %
GFR AFRICAN AMERICAN: 41
GFR NON-AFRICAN AMERICAN: 34
GLUCOSE BLD-MCNC: 113 MG/DL (ref 70–99)
GLUCOSE BLD-MCNC: 115 MG/DL (ref 70–99)
GLUCOSE BLD-MCNC: 127 MG/DL (ref 70–99)
GLUCOSE BLD-MCNC: 136 MG/DL (ref 70–99)
GLUCOSE BLD-MCNC: 141 MG/DL (ref 70–99)
GLUCOSE BLD-MCNC: 93 MG/DL (ref 70–99)
HCT VFR BLD CALC: 22.4 % (ref 36–48)
HEMOGLOBIN: 7.5 G/DL (ref 12–16)
LYMPHOCYTES ABSOLUTE: 1.1 K/UL (ref 1–5.1)
LYMPHOCYTES RELATIVE PERCENT: 13.9 %
MCH RBC QN AUTO: 29.5 PG (ref 26–34)
MCHC RBC AUTO-ENTMCNC: 33.6 G/DL (ref 31–36)
MCV RBC AUTO: 87.6 FL (ref 80–100)
MONOCYTES ABSOLUTE: 0.6 K/UL (ref 0–1.3)
MONOCYTES RELATIVE PERCENT: 7.7 %
NEUTROPHILS ABSOLUTE: 6.1 K/UL (ref 1.7–7.7)
NEUTROPHILS RELATIVE PERCENT: 75.1 %
PDW BLD-RTO: 15.4 % (ref 12.4–15.4)
PERFORMED ON: ABNORMAL
PERFORMED ON: NORMAL
PHOSPHORUS: 3.6 MG/DL (ref 2.5–4.9)
PLATELET # BLD: 316 K/UL (ref 135–450)
PMV BLD AUTO: 7.9 FL (ref 5–10.5)
POTASSIUM SERPL-SCNC: 3.6 MMOL/L (ref 3.5–5.1)
RBC # BLD: 2.55 M/UL (ref 4–5.2)
SODIUM BLD-SCNC: 136 MMOL/L (ref 136–145)
WBC # BLD: 8.1 K/UL (ref 4–11)

## 2022-07-15 PROCEDURE — C9113 INJ PANTOPRAZOLE SODIUM, VIA: HCPCS | Performed by: NURSE PRACTITIONER

## 2022-07-15 PROCEDURE — 99024 POSTOP FOLLOW-UP VISIT: CPT | Performed by: NURSE PRACTITIONER

## 2022-07-15 PROCEDURE — 6370000000 HC RX 637 (ALT 250 FOR IP): Performed by: NURSE PRACTITIONER

## 2022-07-15 PROCEDURE — 6360000002 HC RX W HCPCS: Performed by: NURSE PRACTITIONER

## 2022-07-15 PROCEDURE — 80069 RENAL FUNCTION PANEL: CPT

## 2022-07-15 PROCEDURE — 85025 COMPLETE CBC W/AUTO DIFF WBC: CPT

## 2022-07-15 PROCEDURE — 2580000003 HC RX 258: Performed by: SURGERY

## 2022-07-15 PROCEDURE — 1200000000 HC SEMI PRIVATE

## 2022-07-15 PROCEDURE — 6360000002 HC RX W HCPCS: Performed by: SURGERY

## 2022-07-15 RX ADMIN — OXYCODONE HYDROCHLORIDE 5 MG: 5 SOLUTION ORAL at 16:49

## 2022-07-15 RX ADMIN — PIPERACILLIN AND TAZOBACTAM 3375 MG: 3; .375 INJECTION, POWDER, LYOPHILIZED, FOR SOLUTION INTRAVENOUS at 10:35

## 2022-07-15 RX ADMIN — MICONAZOLE NITRATE: 2 OINTMENT TOPICAL at 10:29

## 2022-07-15 RX ADMIN — Medication 10 ML: at 20:12

## 2022-07-15 RX ADMIN — PANTOPRAZOLE SODIUM 40 MG: 40 INJECTION, POWDER, LYOPHILIZED, FOR SOLUTION INTRAVENOUS at 10:28

## 2022-07-15 RX ADMIN — PANTOPRAZOLE SODIUM 40 MG: 40 INJECTION, POWDER, LYOPHILIZED, FOR SOLUTION INTRAVENOUS at 23:59

## 2022-07-15 RX ADMIN — ONDANSETRON HYDROCHLORIDE 4 MG: 2 INJECTION, SOLUTION INTRAMUSCULAR; INTRAVENOUS at 00:10

## 2022-07-15 RX ADMIN — HEPARIN SODIUM 5000 UNITS: 5000 INJECTION INTRAVENOUS; SUBCUTANEOUS at 10:28

## 2022-07-15 RX ADMIN — PIPERACILLIN AND TAZOBACTAM 3375 MG: 3; .375 INJECTION, POWDER, LYOPHILIZED, FOR SOLUTION INTRAVENOUS at 19:53

## 2022-07-15 RX ADMIN — ONDANSETRON HYDROCHLORIDE 4 MG: 2 INJECTION, SOLUTION INTRAMUSCULAR; INTRAVENOUS at 20:13

## 2022-07-15 RX ADMIN — OXYCODONE HYDROCHLORIDE 5 MG: 5 SOLUTION ORAL at 10:28

## 2022-07-15 RX ADMIN — SODIUM CHLORIDE, PRESERVATIVE FREE 10 ML: 5 INJECTION INTRAVENOUS at 10:29

## 2022-07-15 RX ADMIN — HEPARIN SODIUM 5000 UNITS: 5000 INJECTION INTRAVENOUS; SUBCUTANEOUS at 16:49

## 2022-07-15 RX ADMIN — MICONAZOLE NITRATE: 2 OINTMENT TOPICAL at 19:53

## 2022-07-15 RX ADMIN — HEPARIN SODIUM 5000 UNITS: 5000 INJECTION INTRAVENOUS; SUBCUTANEOUS at 23:59

## 2022-07-15 RX ADMIN — MORPHINE SULFATE 1 MG: 2 INJECTION, SOLUTION INTRAMUSCULAR; INTRAVENOUS at 20:11

## 2022-07-15 RX ADMIN — PIPERACILLIN AND TAZOBACTAM 3375 MG: 3; .375 INJECTION, POWDER, LYOPHILIZED, FOR SOLUTION INTRAVENOUS at 04:18

## 2022-07-15 ASSESSMENT — PAIN DESCRIPTION - ONSET: ONSET: ON-GOING

## 2022-07-15 ASSESSMENT — PAIN DESCRIPTION - ORIENTATION
ORIENTATION: MID

## 2022-07-15 ASSESSMENT — PAIN SCALES - GENERAL
PAINLEVEL_OUTOF10: 6
PAINLEVEL_OUTOF10: 7
PAINLEVEL_OUTOF10: 8
PAINLEVEL_OUTOF10: 2
PAINLEVEL_OUTOF10: 4

## 2022-07-15 ASSESSMENT — PAIN - FUNCTIONAL ASSESSMENT
PAIN_FUNCTIONAL_ASSESSMENT: ACTIVITIES ARE NOT PREVENTED
PAIN_FUNCTIONAL_ASSESSMENT: PREVENTS OR INTERFERES SOME ACTIVE ACTIVITIES AND ADLS
PAIN_FUNCTIONAL_ASSESSMENT: PREVENTS OR INTERFERES SOME ACTIVE ACTIVITIES AND ADLS

## 2022-07-15 ASSESSMENT — PAIN DESCRIPTION - LOCATION
LOCATION: ABDOMEN

## 2022-07-15 ASSESSMENT — PAIN DESCRIPTION - DESCRIPTORS
DESCRIPTORS: ACHING

## 2022-07-15 ASSESSMENT — PAIN DESCRIPTION - FREQUENCY: FREQUENCY: CONTINUOUS

## 2022-07-15 ASSESSMENT — PAIN DESCRIPTION - PAIN TYPE: TYPE: SURGICAL PAIN

## 2022-07-15 NOTE — PROGRESS NOTES
Progress Note    HISTORY     CC:  Abdominal pain and perforated bowel           We are following for     DA    CKD       Subjective/   HPI:  Kidney function is stable. She is still very flat but she is more interactive today. Still on TFs and having some pain. Feels that swelling is better. Getting intermittent lasix.     ROS:  Constitutional:  No fevers, No Chills, + weakness  Cardiovascular:  No palpations, + edema  Respiratory:  No wheezing, no cough  Skin:  No rash, no itching  :  No hematuria, urine volume is good     Social Hx:  Family at the bedside     Past Medical and Surgical History:  - Reviewed, no changes     EXAM       Objective/     Vitals:    07/15/22 0822 07/15/22 1028 07/15/22 1058 07/15/22 1125   BP: 123/70   (!) 148/79   Pulse: 68   70   Resp: 16 16 16 16   Temp: 97.4 °F (36.3 °C)   97.8 °F (36.6 °C)   TempSrc: Oral   Oral   SpO2: 96%   96%   Weight:       Height:         24HR INTAKE/OUTPUT:      Intake/Output Summary (Last 24 hours) at 7/15/2022 1549  Last data filed at 7/15/2022 0420  Gross per 24 hour   Intake 6296 ml   Output 180 ml   Net 6116 ml       Constitutional:  Ill appearing, NG  Eyes:  Pupils reactive, sclera clear   Neck:  Normal thyroid, no masses   Cardiovascular:  Regular, no rub  Respiratory:  No distress, no wheezing  Psychiatry:  Flat mood/affect, somnolent   Abdomen: +bs, soft, nt, no masses   Musculoskeletal: + LE edema, no clubbing   Lymphatics:  No LAD in neck, no supraclavicular nodes       MEDICAL DECISION MAKING       Data/  Recent Labs     07/13/22  1335 07/15/22  1250   WBC 8.9 8.1   HGB 7.7* 7.5*   HCT 23.4* 22.4*   MCV 87.4 87.6    316       Recent Labs     07/13/22  0605 07/14/22  0615 07/15/22  0420    137 136   K 3.7 3.5 3.6    101 101   CO2 24 26 26   GLUCOSE 148* 102* 136*   PHOS 2.8 3.5 3.6   BUN 20 22* 19   CREATININE 1.4* 1.4* 1.5*   LABGLOM 37* 37* 34*   GFRAA 44* 44* 41*         Assessment/     -DA likely has developed ATN w hypovolemia, hypotension, NSAIDs use and contrast use. Scr stable / non-oliguric    Plateau phase      -Perforated prepyloric ulcer s/p laparotomy with repair n jejunostomy insertion on 6/28/22              On zosyn      -Metabolic acidosis from DA, resolved     -Hypertension. BP range is good.      -Atrial Fibrillation      -recent COVID from 6/22-24/22     -h/o colon cancer    Plan/     On TFs  Follow labs  PRN lasix   -----------------------------  Ata Frausto M.D.   [facility]

## 2022-07-15 NOTE — CARE COORDINATION
INTERDISCIPLINARY PLAN OF CARE CONFERENCE    Date/Time: 7/15/2022 2:04 PM  Completed by: Darlene Vigil RN, Case Management      Patient Name:  Williams Gates  YOB: 1950  Admitting Diagnosis: Gastric perforation, acute [K31.89]  Perforated peptic ulcer ([facility] [address] [K27.5]     Admit Date/Time:  6/28/2022  6:58 AM    Chart reviewed. Interdisciplinary team contacted or reviewed plan related to patient progress and discharge plans. Disciplines included Case Management, Nursing, and Dietitian. Current Status:Stable  PT/OT recommendation for discharge plan of care: SNF    Expected D/C Disposition:  Rehab  Confirmed plan with patient and/or family Yes confirmed with: (name) spouse  Met with: patient  Discharge Plan Comments: Reviewed chart and met with pt at bedside. Plan is to go to [facility]. RReferral to Sully at ARU and she will review and follow. Has not accepted as of this time but will follow progress. Will follow.      Home O2 in place on admit: No  Pt informed of need to bring portable home O2 tank on day of discharge for nursing to connect prior to leaving:  Not Indicated  Verbalized agreement/Understanding:  Not Indicated

## 2022-07-15 NOTE — FLOWSHEET NOTE
07/14/22 2113   Vital Signs   Temp 97.8 °F (36.6 °C)   Temp Source Oral   Heart Rate 72   Heart Rate Source Monitor   Resp 16   /70   BP Location Right upper arm   MAP (Calculated) 88.67   Patient Position Semi fowlers   Level of Consciousness Alert (0)   MEWS Score 1   Oxygen Therapy   SpO2 95 %   O2 Device None (Room air)   Assessment complete- see flowsheets. NG to R nare patent and to CLWS, TF line patent and secure, JERRI to bulb suction; pt given PRN pain medication per pt request within PRN order parameters as nightly dressing change to be completed once pain medication effective per her request. Call light within reach, pt aware to call for any needs or changes. Will continue to monitor.   Jami Roman RN

## 2022-07-15 NOTE — CARE COORDINATION
[facility] - Acute Rehab Unit   After review, this patient is felt to be:       []  Appropriate for Acute Inpatient Rehab    []  Appropriate for Acute Inpatient Rehab Pending Insurance Authorization    []  Not appropriate for Acute Inpatient Rehab    [x]  Referral received and ARU reviewing patient; Evaluation ongoing with therapy progress and medical readiness     Will notify DCP with further updates.  Thank you for the Aldo Thomson RN

## 2022-07-15 NOTE — PROGRESS NOTES
General Surgery   Daily Progress Note    Pt Name: Kishan Mcqueen Record Number: [id number]  Date of Birth 1950   Today's Date: 7/15/2022    ASSESSMENT  POD #167s/p repair of perf ulcer, j-tube insertion  ABD: J-tube in place, JERRI drain in place, midline dressing changed: changed by RN, + diffuse tenderness which appears slightly improved from eysterday, +mild distention, NGT in place, no N/V, +flatus, + BMs     ARF improving: Cr 1.5  K+ 3.6  Leuks 8.1  VSS  NGT: 180  UO OK  JERRI 0 output  Pt states \"I am doing better but this is still awful. \" Continue to encouraged pt to participate in PT/OT. PLAN  NGT to CLWS  Renal seeing. Strict I&Os  Pain control   Zosyn  PT/OT: Sit on edge of bed/up to chair today/ambulate  Protonix BID  Wound care Rn seeing pt  TF rate at goal  BID dressing changes to midline incision. Pt looks OK POD #17: If Latasha Adrian continues to have no output then, may do UGI on Monday. Ella Bias is slightly improved from yesterday. Pain is better controlled. She has no nausea and no vomiting. She has passed flatus and has had a bowel movement. She is NPO and tolerating TF. Current activity is up with assistance    OBJECTIVE  VITALS:  height is 5' 4\" (1.626 m) and weight is 171 lb 8 oz (77.8 kg). Her oral temperature is 97.8 °F (36.6 °C). Her blood pressure is 148/79 (abnormal) and her pulse is 70. Her respiration is 16 and oxygen saturation is 96%. VITALS:  BP (!) 148/79   Pulse 70   Temp 97.8 °F (36.6 °C) (Oral)   Resp 16   Ht 5' 4\" (1.626 m)   Wt 171 lb 8 oz (77.8 kg)   SpO2 96%   BMI 29.44 kg/m²   INTAKE/OUTPUT:      Intake/Output Summary (Last 24 hours) at 7/15/2022 1315  Last data filed at 7/15/2022 0420  Gross per 24 hour   Intake 6296 ml   Output 180 ml   Net 6116 ml       URINARY CATHETER OUTPUT (Curiel):     GENERAL: alert, cooperative, no distress  I/O last 3 completed shifts: In: 8408 [I.V.:3574;  JE/TM:4398]  YWW: 378 [Emesis/NG output:680;

## 2022-07-15 NOTE — FLOWSHEET NOTE
AM assessment completed. See flowsheet. A/Ox4. LCTAB. Medications taken without difficulty. BS active x4. No c/o n/v/d. NG intact to R nare and connected to CLWS. +1 pitting edema noted to BLE. Patient c/o ABD pain 8/10. PRN Oxycodone given per MAR. Bed locked and in low position. Bed alarm on. Call light in reach.      07/15/22 0822   Vital Signs   Temp 97.4 °F (36.3 °C)   Temp Source Oral   Heart Rate 68   Heart Rate Source Monitor   Resp 16   /70   BP Location Right upper arm   BP Method Automatic   MAP (Calculated) 87.67   Patient Position Semi fowlers   Level of Consciousness Alert (0)   MEWS Score 1   Oxygen Therapy   SpO2 96 %   O2 Device None (Room air)

## 2022-07-15 NOTE — PLAN OF CARE
Problem: Discharge Planning  Goal: Discharge to home or other facility with appropriate resources  7/15/2022 1123 by Stella Joshi RN  Outcome: Progressing  7/14/2022 2305 by Marlon Mccrary RN  Outcome: Progressing  Flowsheets (Taken 1/36/5623 5948 by Stephanie Todd RN)  Discharge to home or other facility with appropriate resources:   Identify barriers to discharge with patient and caregiver   Arrange for needed discharge resources and transportation as appropriate   Identify discharge learning needs (meds, wound care, etc)   Arrange for interpreters to assist at discharge as needed   Refer to discharge planning if patient needs post-hospital services based on physician order or complex needs related to functional status, cognitive ability or social support system     Problem: Pain  Goal: Verbalizes/displays adequate comfort level or baseline comfort level  7/15/2022 1123 by Stella Joshi RN  Outcome: Progressing  7/14/2022 2305 by Marlon Mccrary RN  Outcome: Progressing     Problem: Safety - Adult  Goal: Free from fall injury  7/15/2022 1123 by Stella Joshi RN  Outcome: Progressing  Flowsheets (Taken 7/14/2022 2306 by Marlon Mccrary, RN)  Free From Fall Injury: Instruct family/caregiver on patient safety  7/14/2022 2305 by Marlon Mccrary RN  Outcome: Progressing     Problem: Skin/Tissue Integrity  Goal: Absence of new skin breakdown  Description: 1. Monitor for areas of redness and/or skin breakdown  2. Assess vascular access sites hourly  3. Every 4-6 hours minimum:  Change oxygen saturation probe site  4. Every 4-6 hours:  If on nasal continuous positive airway pressure, respiratory therapy assess nares and determine need for appliance change or resting period.   7/15/2022 1123 by Stella Joshi RN  Outcome: Progressing  7/14/2022 2305 by Marlon Mccrary RN  Outcome: Progressing     Problem: ABCDS Injury Assessment  Goal: Absence of physical injury  7/15/2022 1123 by Stella Joshi RN  Outcome: Progressing  Flowsheets (Taken 7/14/2022 2306 by Maranda Nieto RN)  Absence of Physical Injury: Implement safety measures based on patient assessment  7/14/2022 2305 by Maranda Nieto RN  Outcome: Progressing     Problem: Nutrition Deficit:  Goal: Optimize nutritional status  7/15/2022 1123 by Tim Salamanca RN  Outcome: Progressing  7/14/2022 2305 by Maranda Nieto RN  Outcome: Progressing

## 2022-07-16 LAB
ALBUMIN SERPL-MCNC: 3.2 G/DL (ref 3.4–5)
ANION GAP SERPL CALCULATED.3IONS-SCNC: 10 MMOL/L (ref 3–16)
BUN BLDV-MCNC: 17 MG/DL (ref 7–20)
CALCIUM SERPL-MCNC: 8.6 MG/DL (ref 8.3–10.6)
CHLORIDE BLD-SCNC: 99 MMOL/L (ref 99–110)
CO2: 25 MMOL/L (ref 21–32)
CREAT SERPL-MCNC: 1.4 MG/DL (ref 0.6–1.2)
GFR AFRICAN AMERICAN: 44
GFR NON-AFRICAN AMERICAN: 37
GLUCOSE BLD-MCNC: 102 MG/DL (ref 70–99)
GLUCOSE BLD-MCNC: 117 MG/DL (ref 70–99)
GLUCOSE BLD-MCNC: 125 MG/DL (ref 70–99)
GLUCOSE BLD-MCNC: 145 MG/DL (ref 70–99)
GLUCOSE BLD-MCNC: 94 MG/DL (ref 70–99)
GLUCOSE BLD-MCNC: 99 MG/DL (ref 70–99)
GLUCOSE BLD-MCNC: 99 MG/DL (ref 70–99)
PERFORMED ON: ABNORMAL
PERFORMED ON: NORMAL
PERFORMED ON: NORMAL
PHOSPHORUS: 3.7 MG/DL (ref 2.5–4.9)
POTASSIUM SERPL-SCNC: 4.1 MMOL/L (ref 3.5–5.1)
SODIUM BLD-SCNC: 134 MMOL/L (ref 136–145)

## 2022-07-16 PROCEDURE — 2580000003 HC RX 258: Performed by: SURGERY

## 2022-07-16 PROCEDURE — 99024 POSTOP FOLLOW-UP VISIT: CPT | Performed by: SURGERY

## 2022-07-16 PROCEDURE — 6370000000 HC RX 637 (ALT 250 FOR IP): Performed by: NURSE PRACTITIONER

## 2022-07-16 PROCEDURE — 1200000000 HC SEMI PRIVATE

## 2022-07-16 PROCEDURE — 80069 RENAL FUNCTION PANEL: CPT

## 2022-07-16 PROCEDURE — 6360000002 HC RX W HCPCS: Performed by: SURGERY

## 2022-07-16 PROCEDURE — 6360000002 HC RX W HCPCS: Performed by: NURSE PRACTITIONER

## 2022-07-16 PROCEDURE — 6370000000 HC RX 637 (ALT 250 FOR IP): Performed by: SURGERY

## 2022-07-16 PROCEDURE — C9113 INJ PANTOPRAZOLE SODIUM, VIA: HCPCS | Performed by: NURSE PRACTITIONER

## 2022-07-16 RX ADMIN — PIPERACILLIN AND TAZOBACTAM 3375 MG: 3; .375 INJECTION, POWDER, LYOPHILIZED, FOR SOLUTION INTRAVENOUS at 01:23

## 2022-07-16 RX ADMIN — Medication 10 ML: at 22:04

## 2022-07-16 RX ADMIN — HEPARIN SODIUM 5000 UNITS: 5000 INJECTION INTRAVENOUS; SUBCUTANEOUS at 16:14

## 2022-07-16 RX ADMIN — MICONAZOLE NITRATE: 2 OINTMENT TOPICAL at 20:34

## 2022-07-16 RX ADMIN — PANTOPRAZOLE SODIUM 40 MG: 40 INJECTION, POWDER, LYOPHILIZED, FOR SOLUTION INTRAVENOUS at 10:08

## 2022-07-16 RX ADMIN — MICONAZOLE NITRATE: 2 OINTMENT TOPICAL at 10:09

## 2022-07-16 RX ADMIN — MORPHINE SULFATE 1 MG: 2 INJECTION, SOLUTION INTRAMUSCULAR; INTRAVENOUS at 13:14

## 2022-07-16 RX ADMIN — PIPERACILLIN AND TAZOBACTAM 3375 MG: 3; .375 INJECTION, POWDER, LYOPHILIZED, FOR SOLUTION INTRAVENOUS at 11:22

## 2022-07-16 RX ADMIN — PANTOPRAZOLE SODIUM 40 MG: 40 INJECTION, POWDER, LYOPHILIZED, FOR SOLUTION INTRAVENOUS at 22:04

## 2022-07-16 RX ADMIN — MORPHINE SULFATE 1 MG: 2 INJECTION, SOLUTION INTRAMUSCULAR; INTRAVENOUS at 09:51

## 2022-07-16 RX ADMIN — ONDANSETRON HYDROCHLORIDE 4 MG: 2 INJECTION, SOLUTION INTRAMUSCULAR; INTRAVENOUS at 09:56

## 2022-07-16 RX ADMIN — MORPHINE SULFATE 1 MG: 2 INJECTION, SOLUTION INTRAMUSCULAR; INTRAVENOUS at 15:56

## 2022-07-16 RX ADMIN — OXYCODONE HYDROCHLORIDE 5 MG: 5 SOLUTION ORAL at 02:36

## 2022-07-16 RX ADMIN — PIPERACILLIN AND TAZOBACTAM 3375 MG: 3; .375 INJECTION, POWDER, LYOPHILIZED, FOR SOLUTION INTRAVENOUS at 18:04

## 2022-07-16 RX ADMIN — ONDANSETRON HYDROCHLORIDE 4 MG: 2 INJECTION, SOLUTION INTRAMUSCULAR; INTRAVENOUS at 02:36

## 2022-07-16 RX ADMIN — ONDANSETRON HYDROCHLORIDE 4 MG: 2 INJECTION, SOLUTION INTRAMUSCULAR; INTRAVENOUS at 22:04

## 2022-07-16 RX ADMIN — MORPHINE SULFATE 1 MG: 2 INJECTION, SOLUTION INTRAMUSCULAR; INTRAVENOUS at 22:12

## 2022-07-16 RX ADMIN — ONDANSETRON HYDROCHLORIDE 4 MG: 2 INJECTION, SOLUTION INTRAMUSCULAR; INTRAVENOUS at 17:23

## 2022-07-16 RX ADMIN — HEPARIN SODIUM 5000 UNITS: 5000 INJECTION INTRAVENOUS; SUBCUTANEOUS at 10:08

## 2022-07-16 RX ADMIN — MORPHINE SULFATE 1 MG: 2 INJECTION, SOLUTION INTRAMUSCULAR; INTRAVENOUS at 03:28

## 2022-07-16 RX ADMIN — HEPARIN SODIUM 5000 UNITS: 5000 INJECTION INTRAVENOUS; SUBCUTANEOUS at 23:58

## 2022-07-16 ASSESSMENT — PAIN SCALES - GENERAL
PAINLEVEL_OUTOF10: 8
PAINLEVEL_OUTOF10: 7
PAINLEVEL_OUTOF10: 9
PAINLEVEL_OUTOF10: 8

## 2022-07-16 ASSESSMENT — PAIN DESCRIPTION - LOCATION
LOCATION: ABDOMEN

## 2022-07-16 ASSESSMENT — PAIN DESCRIPTION - ORIENTATION
ORIENTATION: LEFT;RIGHT
ORIENTATION: MID
ORIENTATION: LEFT;RIGHT

## 2022-07-16 ASSESSMENT — PAIN DESCRIPTION - DESCRIPTORS
DESCRIPTORS: ACHING
DESCRIPTORS: ACHING;JABBING
DESCRIPTORS: ACHING;SORE
DESCRIPTORS: ACHING;THROBBING
DESCRIPTORS: ACHING
DESCRIPTORS: DULL;JABBING

## 2022-07-16 ASSESSMENT — PAIN DESCRIPTION - PAIN TYPE
TYPE: SURGICAL PAIN

## 2022-07-16 ASSESSMENT — PAIN DESCRIPTION - ONSET
ONSET: ON-GOING

## 2022-07-16 ASSESSMENT — PAIN - FUNCTIONAL ASSESSMENT
PAIN_FUNCTIONAL_ASSESSMENT: PREVENTS OR INTERFERES SOME ACTIVE ACTIVITIES AND ADLS

## 2022-07-16 ASSESSMENT — PAIN DESCRIPTION - FREQUENCY
FREQUENCY: CONTINUOUS

## 2022-07-16 NOTE — PROGRESS NOTES
Progress Note    HISTORY     CC:  Abdominal pain and perforated bowel           We are following for     DA    CKD       Subjective/   HPI:  Kidney function is stable. she is more interactive   Still on TFs and having some pain. Feels that swelling is better. Getting intermittent lasix.     ROS:  Constitutional:  No fevers, No Chills, + weakness  Cardiovascular:  No palpations, + edema  Respiratory:  No wheezing, no cough  Skin:  No rash, no itching  :  No hematuria, urine volume is good     Social Hx:  Family at the bedside     Past Medical and Surgical History:  - Reviewed, no changes     Scheduled Meds:   miconazole nitrate   Topical BID    piperacillin-tazobactam  3,375 mg IntraVENous Q8H    lidocaine 1 % injection  5 mL IntraDERmal Once    pantoprazole  40 mg IntraVENous BID    heparin (porcine)  5,000 Units SubCUTAneous 3 times per day    sodium chloride flush  5-40 mL IntraVENous 2 times per day     Continuous Infusions:   dextrose Stopped (07/01/22 0410)    sodium chloride 30 mL/hr at 07/12/22 1851     PRN Meds:.albuterol sulfate HFA, oxyCODONE, saliva substitute, glucose, dextrose bolus **OR** dextrose bolus, glucagon (rDNA), dextrose, perflutren lipid microspheres, metoprolol, phenol, sodium chloride flush, sodium chloride, ondansetron **OR** ondansetron, morphine **OR** [DISCONTINUED] morphine, naloxone      EXAM       Objective/     Vitals:    07/16/22 0410 07/16/22 0415 07/16/22 0733 07/16/22 1245   BP: (!) 141/74  129/72 126/79   Pulse: 67  67 74   Resp: 14  15 16   Temp: (!) 96.7 °F (35.9 °C) 97 °F (36.1 °C) 98.7 °F (37.1 °C) 98.7 °F (37.1 °C)   TempSrc: Axillary Oral Oral Oral   SpO2: 90%  94% 93%   Weight:       Height:         24HR INTAKE/OUTPUT:      Intake/Output Summary (Last 24 hours) at 7/16/2022 1515  Last data filed at 7/16/2022 1223  Gross per 24 hour   Intake 4648.68 ml   Output 382 ml   Net 4266.68 ml       Constitutional:  Ill appearing, NG  Eyes:  Pupils reactive, sclera clear   Neck:  Normal thyroid, no masses   Cardiovascular:  Regular, no rub  Respiratory:  No distress, no wheezing  Psychiatry:  Flat mood/affect, somnolent   Abdomen: +bs, soft, nt, no masses   Musculoskeletal: + LE edema, no clubbing   Lymphatics:  No LAD in neck, no supraclavicular nodes       MEDICAL DECISION MAKING       Data/  Recent Labs     07/15/22  1250   WBC 8.1   HGB 7.5*   HCT 22.4*   MCV 87.6          Recent Labs     07/14/22  0615 07/15/22  0420 07/16/22  0328    136 134*   K 3.5 3.6 4.1    101 99   CO2 26 26 25   GLUCOSE 102* 136* 99   PHOS 3.5 3.6 3.7   BUN 22* 19 17   CREATININE 1.4* 1.5* 1.4*   LABGLOM 37* 34* 37*   GFRAA 44* 41* 44*         Assessment/     -DA likely has developed ATN w hypovolemia, hypotension, NSAIDs use and contrast use. Scr stable / non-oliguric    Plateau phase      -Perforated prepyloric ulcer s/p laparotomy with repair n jejunostomy insertion on 6/28/22              On zosyn      -Metabolic acidosis from DA, resolved     -Hypertension. BP range is good.      -Atrial Fibrillation      -recent COVID from 6/22-24/22     -h/o colon cancer    Plan/     On TFs  Follow labs  PRN lasix   Avoid nephrotoxins  Strict intake and output

## 2022-07-16 NOTE — PLAN OF CARE
Problem: Pain  Goal: Verbalizes/displays adequate comfort level or baseline comfort level  7/15/2022 2248 by Cherie Raza RN  Outcome: Progressing  7/15/2022 1123 by Lucie Parks RN  Outcome: Progressing     Problem: Safety - Adult  Goal: Free from fall injury  7/15/2022 2248 by Cherie Raza RN  Outcome: Progressing  7/15/2022 1123 by Lucie Parks RN  Outcome: Progressing  Flowsheets (Taken 7/14/2022 2306 by Cherie Raza RN)  Free From Fall Injury: Instruct family/caregiver on patient safety     Problem: Skin/Tissue Integrity  Goal: Absence of new skin breakdown  Description: 1. Monitor for areas of redness and/or skin breakdown  2. Assess vascular access sites hourly  3. Every 4-6 hours minimum:  Change oxygen saturation probe site  4. Every 4-6 hours:  If on nasal continuous positive airway pressure, respiratory therapy assess nares and determine need for appliance change or resting period.   7/15/2022 2248 by Cherie Raza RN  Outcome: Progressing  7/15/2022 1123 by Lucie Parks RN  Outcome: Progressing     Problem: ABCDS Injury Assessment  Goal: Absence of physical injury  7/15/2022 2248 by Cherie Raza RN  Outcome: Progressing  7/15/2022 1123 by Lucie Parks RN  Outcome: Progressing  Flowsheets (Taken 7/14/2022 2306 by Cherie Raza RN)  Absence of Physical Injury: Implement safety measures based on patient assessment     Problem: Nutrition Deficit:  Goal: Optimize nutritional status  7/15/2022 2248 by Cherie Raza RN  Outcome: Progressing  7/15/2022 1123 by Lucie Parks RN  Outcome: Progressing

## 2022-07-16 NOTE — FLOWSHEET NOTE
07/16/22 0733   Vital Signs   Temp 98.7 °F (37.1 °C)   Temp Source Oral   Heart Rate 67   Heart Rate Source Monitor   Resp 15   /72   BP Location Left upper arm   BP Method Automatic   MAP (Calculated) 91   Patient Position Semi fowlers   Oxygen Therapy   SpO2 94 %   O2 Device None (Room air)   AM assessment complete. Medications given as ordered. PRN pain medication given for pain. JERRI drain and dressing intact, CDI, scant amt yellow residual fluid in bulb. J tube site intact with dressing CDI. LUIGI PICC dressing CDI, infusing without difficulty. Pt seems withdrawn, one word answers when asked questions, eyes closed when responding. Vital signs stable. Plan of care reviewed. Pt expalined importance of turning due to excoration and deteriorating skin on bottom. RN educated pt. On wound care, healing, repositioning, and offloading. Pt still refuses at this time No other needs identified at this time.

## 2022-07-16 NOTE — PROGRESS NOTES
Accessed chart to assist primary RN. PRN zofran given per pt request. No other needs identified at this time. Call light in reach.

## 2022-07-16 NOTE — PLAN OF CARE
Problem: Discharge Planning  Goal: Discharge to home or other facility with appropriate resources  7/16/2022 1223 by Jaimee Figueredo RN  Outcome: Progressing  7/15/2022 2248 by Danilo Ramirez RN  Outcome: Progressing     Problem: Pain  Goal: Verbalizes/displays adequate comfort level or baseline comfort level  7/16/2022 1223 by Jaimee Figueredo RN  Outcome: Progressing  7/15/2022 2248 by Danilo Ramirez RN  Outcome: Progressing     Problem: Safety - Adult  Goal: Free from fall injury  7/16/2022 1223 by Jaimee Figueredo RN  Outcome: Progressing  Flowsheets  Taken 7/16/2022 1222 by Jaimee Figueredo RN  Free From Fall Injury: Instruct family/caregiver on patient safety  Taken 7/15/2022 2249 by Danilo Ramirez RN  Free From Fall Injury: Instruct family/caregiver on patient safety  7/15/2022 2248 by Danilo Ramirez RN  Outcome: Progressing     Problem: Skin/Tissue Integrity  Goal: Absence of new skin breakdown  Description: 1. Monitor for areas of redness and/or skin breakdown  2. Assess vascular access sites hourly  3. Every 4-6 hours minimum:  Change oxygen saturation probe site  4. Every 4-6 hours:  If on nasal continuous positive airway pressure, respiratory therapy assess nares and determine need for appliance change or resting period.   7/16/2022 1223 by Jaimee Figueredo RN  Outcome: Progressing  7/15/2022 2248 by Danilo Ramirez RN  Outcome: Progressing     Problem: Nutrition Deficit:  Goal: Optimize nutritional status  7/16/2022 1223 by Jaimee Figueredo RN  Outcome: Progressing  7/15/2022 2248 by Danilo Ramirez RN  Outcome: Progressing

## 2022-07-16 NOTE — PROGRESS NOTES
[facility] GENERAL SURGERY    Surgery Progress Note           POD #  18    PATIENT NAME: Carolyn Tao     TODAY'S DATE: 7/16/2022    INTERVAL HISTORY:    Pt  resting comfortably, c/o occasional upper abdominal pains, no nausea/vomiting, on goal rate tube feeds. Still /w loose stools . OBJECTIVE:   VITALS:  /79   Pulse 74   Temp 98.7 °F (37.1 °C) (Oral)   Resp 16   Ht 5' 4\" (1.626 m)   Wt 171 lb 8 oz (77.8 kg)   SpO2 93%   BMI 29.44 kg/m²     INTAKE/OUTPUT:    I/O last 3 completed shifts: In: 07036.7 [P.O.:10; I.V.:3997; NG/GT:5431; IV Piggyback:599.7]  Out: 445 [Emesis/NG output:430; Drains:15]  I/O this shift: In: 907 [NG/GT:907]  Out: 127 [Urine:1; Emesis/NG output:125; Stool:1]              CONSTITUTIONAL:  fatigued and alert  LUNGS:     ABDOMEN:    , soft, non-distended, non-tender   INCISION: clean, dry, no drainage, sero-sanguinous drainage in JERRI drain    Data:  CBC:   Recent Labs     07/15/22  1250   WBC 8.1   HGB 7.5*   HCT 22.4*        BMP:    Recent Labs     07/14/22  0615 07/15/22  0420 07/16/22  0328    136 134*   K 3.5 3.6 4.1    101 99   CO2 26 26 25   BUN 22* 19 17   CREATININE 1.4* 1.5* 1.4*   GLUCOSE 102* 136* 99     Hepatic: No results for input(s): AST, ALT, ALB, BILITOT, ALKPHOS in the last 72 hours. Mag:    No results for input(s): MG in the last 72 hours. Phos:     Recent Labs     07/14/22  0615 07/15/22  0420 07/16/22  0328   PHOS 3.5 3.6 3.7      INR: No results for input(s): INR in the last 72 hours.       Radiology Review:       ASSESSMENT AND PLAN:  74 y.o. female status post repair of perf ulcer, j-tube insertion   - cont tube feeds at goal  - cont IV abx (Zosyn)   - PT/OT   - anticipate repeat UGI on Monday to reassess for leak from ulcer repair         Electronically signed by Sudheer Huffman MD

## 2022-07-16 NOTE — FLOWSHEET NOTE
07/16/22 0410   Vital Signs   Temp (!) 96.7 °F (35.9 °C)   Temp Source Axillary   Heart Rate 67   Heart Rate Source Monitor   Resp 14   BP (!) 141/74   BP Location Right lower arm   BP Method Automatic   MAP (Calculated) 96.33   Patient Position Semi fowlers   Level of Consciousness Alert (0)   MEWS Score 0   Oxygen Therapy   SpO2 90 %   O2 Device None (Room air)   Oral temp recheck Maranda@[email or web address]; midline incision and other abd sites cleaned and dressing changed at this time per pt permission per orders- pt reports not feeling that great this morning, requesting to be left alone to rest remainder of this shift if possible. Call light within reach, bed alarm in place; J tube patent, secured with [email or web address] Pt aware to call for any needs or changes. Will continue to monitor.   Librado Mao RN

## 2022-07-16 NOTE — PROGRESS NOTES
Pt declines midline dressing change at this time, declines assist repositioning, requesting to be allowed to sleep. Call light within reach, bed alarm in place.   Jami Roman RN

## 2022-07-16 NOTE — PLAN OF CARE
Problem: Pain  Goal: Verbalizes/displays adequate comfort level or baseline comfort level  7/16/2022 1951 by Emily Mares RN  Outcome: Progressing  7/16/2022 1223 by Al Rich RN  Outcome: Progressing     Problem: Safety - Adult  Goal: Free from fall injury  7/16/2022 1951 by Emily Mares RN  Outcome: Progressing  7/16/2022 1223 by Al Rich RN  Outcome: Progressing  Flowsheets  Taken 7/16/2022 1222 by Al Rich RN  Free From Fall Injury: Instruct family/caregiver on patient safety  Taken 7/15/2022 2249 by Emily Mares RN  Free From Fall Injury: Instruct family/caregiver on patient safety     Problem: Skin/Tissue Integrity  Goal: Absence of new skin breakdown  Description: 1. Monitor for areas of redness and/or skin breakdown  2. Assess vascular access sites hourly  3. Every 4-6 hours minimum:  Change oxygen saturation probe site  4. Every 4-6 hours:  If on nasal continuous positive airway pressure, respiratory therapy assess nares and determine need for appliance change or resting period.   7/16/2022 1951 by Emily Mares RN  Outcome: Progressing  7/16/2022 1223 by Al Rich RN  Outcome: Progressing     Problem: ABCDS Injury Assessment  Goal: Absence of physical injury  7/16/2022 1951 by Emily Mares RN  Outcome: Progressing  7/16/2022 1223 by Al Rich RN  Outcome: Progressing  Flowsheets (Taken 7/15/2022 2249 by Emily Mares RN)  Absence of Physical Injury: Implement safety measures based on patient assessment     Problem: Nutrition Deficit:  Goal: Optimize nutritional status  7/16/2022 1951 by Emily Mares RN  Outcome: Progressing  7/16/2022 1223 by Al Rich RN  Outcome: Progressing

## 2022-07-16 NOTE — FLOWSHEET NOTE
07/15/22 1959   Vital Signs   Temp 98 °F (36.7 °C)   Temp Source Oral   Heart Rate 76   Heart Rate Source Monitor   Resp 16   /70   BP Location Right upper arm   MAP (Calculated) 91.33   Patient Position Semi fowlers   Level of Consciousness Alert (0)   MEWS Score 1   Oxygen Therapy   SpO2 95 %   O2 Device None (Room air)   Assessment complete- see flowsheets. PRN meds given per pt request within PRN order parameters, pt declines further needs at this time; TF and J tube secured and at goal, NG to R nare patent to CLWS, JERRI to bulb suction. Call light within reach, pt aware to call for any needs or changes. Will continue to monitor.   Lionel Case RN

## 2022-07-17 LAB
ALBUMIN SERPL-MCNC: 3 G/DL (ref 3.4–5)
ANION GAP SERPL CALCULATED.3IONS-SCNC: 10 MMOL/L (ref 3–16)
BUN BLDV-MCNC: 16 MG/DL (ref 7–20)
CALCIUM SERPL-MCNC: 8.3 MG/DL (ref 8.3–10.6)
CHLORIDE BLD-SCNC: 102 MMOL/L (ref 99–110)
CO2: 25 MMOL/L (ref 21–32)
CREAT SERPL-MCNC: 1.4 MG/DL (ref 0.6–1.2)
GFR AFRICAN AMERICAN: 44
GFR NON-AFRICAN AMERICAN: 37
GLUCOSE BLD-MCNC: 114 MG/DL (ref 70–99)
GLUCOSE BLD-MCNC: 119 MG/DL (ref 70–99)
GLUCOSE BLD-MCNC: 126 MG/DL (ref 70–99)
GLUCOSE BLD-MCNC: 132 MG/DL (ref 70–99)
GLUCOSE BLD-MCNC: 136 MG/DL (ref 70–99)
GLUCOSE BLD-MCNC: 139 MG/DL (ref 70–99)
GLUCOSE BLD-MCNC: 140 MG/DL (ref 70–99)
PERFORMED ON: ABNORMAL
PHOSPHORUS: 3.3 MG/DL (ref 2.5–4.9)
POTASSIUM SERPL-SCNC: 3.8 MMOL/L (ref 3.5–5.1)
SODIUM BLD-SCNC: 137 MMOL/L (ref 136–145)

## 2022-07-17 PROCEDURE — 1200000000 HC SEMI PRIVATE

## 2022-07-17 PROCEDURE — 99024 POSTOP FOLLOW-UP VISIT: CPT | Performed by: SURGERY

## 2022-07-17 PROCEDURE — C9113 INJ PANTOPRAZOLE SODIUM, VIA: HCPCS | Performed by: NURSE PRACTITIONER

## 2022-07-17 PROCEDURE — 6370000000 HC RX 637 (ALT 250 FOR IP): Performed by: NURSE PRACTITIONER

## 2022-07-17 PROCEDURE — 6360000002 HC RX W HCPCS: Performed by: SURGERY

## 2022-07-17 PROCEDURE — 6360000002 HC RX W HCPCS: Performed by: NURSE PRACTITIONER

## 2022-07-17 PROCEDURE — 36592 COLLECT BLOOD FROM PICC: CPT

## 2022-07-17 PROCEDURE — 2580000003 HC RX 258: Performed by: SURGERY

## 2022-07-17 PROCEDURE — 80069 RENAL FUNCTION PANEL: CPT

## 2022-07-17 RX ORDER — PROMETHAZINE HYDROCHLORIDE 25 MG/ML
12.5 INJECTION, SOLUTION INTRAMUSCULAR; INTRAVENOUS EVERY 6 HOURS PRN
Status: DISCONTINUED | OUTPATIENT
Start: 2022-07-17 | End: 2022-07-28 | Stop reason: HOSPADM

## 2022-07-17 RX ADMIN — OXYCODONE HYDROCHLORIDE 5 MG: 5 SOLUTION ORAL at 03:16

## 2022-07-17 RX ADMIN — MICONAZOLE NITRATE: 2 OINTMENT TOPICAL at 08:04

## 2022-07-17 RX ADMIN — OXYCODONE HYDROCHLORIDE 5 MG: 5 SOLUTION ORAL at 09:44

## 2022-07-17 RX ADMIN — HEPARIN SODIUM 5000 UNITS: 5000 INJECTION INTRAVENOUS; SUBCUTANEOUS at 16:25

## 2022-07-17 RX ADMIN — ONDANSETRON HYDROCHLORIDE 4 MG: 2 INJECTION, SOLUTION INTRAMUSCULAR; INTRAVENOUS at 14:10

## 2022-07-17 RX ADMIN — PANTOPRAZOLE SODIUM 40 MG: 40 INJECTION, POWDER, LYOPHILIZED, FOR SOLUTION INTRAVENOUS at 09:43

## 2022-07-17 RX ADMIN — MORPHINE SULFATE 1 MG: 2 INJECTION, SOLUTION INTRAMUSCULAR; INTRAVENOUS at 16:23

## 2022-07-17 RX ADMIN — OXYCODONE HYDROCHLORIDE 5 MG: 5 SOLUTION ORAL at 21:10

## 2022-07-17 RX ADMIN — PIPERACILLIN AND TAZOBACTAM 3375 MG: 3; .375 INJECTION, POWDER, LYOPHILIZED, FOR SOLUTION INTRAVENOUS at 03:21

## 2022-07-17 RX ADMIN — HEPARIN SODIUM 5000 UNITS: 5000 INJECTION INTRAVENOUS; SUBCUTANEOUS at 09:43

## 2022-07-17 RX ADMIN — OXYCODONE HYDROCHLORIDE 5 MG: 5 SOLUTION ORAL at 14:12

## 2022-07-17 RX ADMIN — SODIUM CHLORIDE, PRESERVATIVE FREE 10 ML: 5 INJECTION INTRAVENOUS at 09:44

## 2022-07-17 RX ADMIN — PIPERACILLIN AND TAZOBACTAM 3375 MG: 3; .375 INJECTION, POWDER, LYOPHILIZED, FOR SOLUTION INTRAVENOUS at 11:46

## 2022-07-17 RX ADMIN — PIPERACILLIN AND TAZOBACTAM 3375 MG: 3; .375 INJECTION, POWDER, LYOPHILIZED, FOR SOLUTION INTRAVENOUS at 19:37

## 2022-07-17 ASSESSMENT — PAIN - FUNCTIONAL ASSESSMENT
PAIN_FUNCTIONAL_ASSESSMENT: PREVENTS OR INTERFERES SOME ACTIVE ACTIVITIES AND ADLS
PAIN_FUNCTIONAL_ASSESSMENT: PREVENTS OR INTERFERES SOME ACTIVE ACTIVITIES AND ADLS
PAIN_FUNCTIONAL_ASSESSMENT: INTOLERABLE, UNABLE TO DO ANY ACTIVE OR PASSIVE ACTIVITIES
PAIN_FUNCTIONAL_ASSESSMENT: PREVENTS OR INTERFERES SOME ACTIVE ACTIVITIES AND ADLS
PAIN_FUNCTIONAL_ASSESSMENT: PREVENTS OR INTERFERES SOME ACTIVE ACTIVITIES AND ADLS

## 2022-07-17 ASSESSMENT — PAIN DESCRIPTION - LOCATION
LOCATION: ABDOMEN
LOCATION: ABDOMEN;BACK
LOCATION: ABDOMEN

## 2022-07-17 ASSESSMENT — PAIN SCALES - GENERAL
PAINLEVEL_OUTOF10: 8
PAINLEVEL_OUTOF10: 8
PAINLEVEL_OUTOF10: 9
PAINLEVEL_OUTOF10: 10
PAINLEVEL_OUTOF10: 7
PAINLEVEL_OUTOF10: 7
PAINLEVEL_OUTOF10: 6
PAINLEVEL_OUTOF10: 8
PAINLEVEL_OUTOF10: 0
PAINLEVEL_OUTOF10: 10

## 2022-07-17 ASSESSMENT — PAIN DESCRIPTION - ORIENTATION
ORIENTATION: MID
ORIENTATION: LEFT;RIGHT

## 2022-07-17 ASSESSMENT — PAIN DESCRIPTION - FREQUENCY
FREQUENCY: CONTINUOUS
FREQUENCY: CONTINUOUS

## 2022-07-17 ASSESSMENT — PAIN DESCRIPTION - DESCRIPTORS
DESCRIPTORS: ACHING;SORE
DESCRIPTORS: ACHING

## 2022-07-17 ASSESSMENT — PAIN SCALES - WONG BAKER: WONGBAKER_NUMERICALRESPONSE: 0

## 2022-07-17 ASSESSMENT — PAIN DESCRIPTION - ONSET
ONSET: ON-GOING
ONSET: ON-GOING

## 2022-07-17 ASSESSMENT — PAIN DESCRIPTION - PAIN TYPE: TYPE: SURGICAL PAIN

## 2022-07-17 ASSESSMENT — PAIN DESCRIPTION - DIRECTION: RADIATING_TOWARDS: BACK

## 2022-07-17 NOTE — FLOWSHEET NOTE
07/16/22 1945   Vital Signs   Temp 97.9 °F (36.6 °C)   Temp Source Oral   Heart Rate 72   Heart Rate Source Monitor   Resp 16   /65   BP Location Right lower arm   BP Method Automatic   MAP (Calculated) 86   Patient Position Lying right side   Level of Consciousness Alert (0)   MEWS Score 1   Oxygen Therapy   SpO2 94 %   O2 Device None (Room air)   Assessment complete- see flowsheets. PRN medication given at this time; pt continues to c/o nausea and abd pain, see eMAR and flowsheets for associated medication administration details. Pt aware to call for any needs or changes, has call light within reach and bed alarm in place. NG to CLWS secured and patent, J tube patent w/ TF running at goal; JERRI to bulb suction. Dressing change declined by pt at this time, requesting to wait until pain medication effective. Pt aware to call when willing to complete dressing change. Will continue to monitor.   Ladi Guevara RN

## 2022-07-17 NOTE — PROGRESS NOTES
Pt c/o mild nausea at this time; unable to give IV PRN antiemetic at this time as pt NPO with without J tube route or additional IV antiemetic order in place; MD messaged regarding this and pt request for nausea medication. TEZ camacho remains patent and to CLWS at this time.   Emily Mares RN

## 2022-07-17 NOTE — FLOWSHEET NOTE
07/17/22 0800   Vital Signs   Temp 97.7 °F (36.5 °C)   Temp Source Oral   Heart Rate 66   Heart Rate Source Monitor   Resp 16   /76   BP Location Right upper arm   MAP (Calculated) 94.33   Patient Position Semi fowlers   Level of Consciousness Alert (0)   MEWS Score 1   Pain Assessment   Pain Assessment 0-10   Pain Level 8   Pain Location Abdomen   Pain Orientation Mid   Pain Descriptors Aching   Functional Pain Assessment Prevents or interferes some active activities and ADLs   Pain Radiating Towards back   Pain Frequency Continuous   Pain Onset On-going   Non-Pharmaceutical Pain Intervention(s) Repositioned   Response to Pain Intervention Pain improved but above pain goal   Side Effects No reported side effects   AM assessment completed, see flow sheet. Pt is alert and oriented. Vital signs are WNL. Respirations are even & easy. Pt up to chair, did great. No complaints voiced. Pt denies needs at this time. SR up x 2, and bed in low position. Call light is within reach.

## 2022-07-17 NOTE — PROGRESS NOTES
Patient is able to demonstrate the ability to move from a reclining position to an upright position within the recliner. Bedside Mobility Assessment Tool (BMAT):     Assessment Level 1- Sit and Shake    1. From a semi-reclined position, ask patient to sit up and rotate to a seated position at the side of the bed. Can use the bedrail. 2. Ask patient to reach out and grab your hand and shake making sure patient reaches across his/her midline. Pass- Patient is able to come to a seated position, maintain core strength. Maintains seated balance while reaching across midline. Move on to Assessment Level 2. Assessment Level 2- Stretch and Point   1. With patient in seated position at the side of the bed, have patient place both feet on the floor (or stool) with knees no higher than hips. 2. Ask patient to stretch one leg and straighten the knee, then bend the ankle/flex and point the toes. If appropriate, repeat with the other leg. Fail- Patient is unable to complete task. Patient is MOBILITY LEVEL 2. Assessment Level 3- Stand   1. Ask patient to elevate off the bed or chair (seated to standing) using an assistive device (cane, bedrail). 2. Patient should be able to raise buttocks off be and hold for a count of five. May repeat once. Fail- Patient unable to demonstrate standing stability. Patient is MOBILITY LEVEL 3. Assessment Level 4- Walk   1. Ask patient to march in place at bedside. 2. Then ask patient to advance step and return each foot. Some medical conditions may render a patient from stepping backwards, use your best clinical judgement. Fail- Patient not able to complete tasks OR requires use of assistive device. Patient is MOBILITY LEVEL 3.        Mobility Level- 2

## 2022-07-17 NOTE — FLOWSHEET NOTE
07/17/22 0415   Vital Signs   Temp 97 °F (36.1 °C)   Temp Source Axillary   Heart Rate 68   Heart Rate Source Monitor   Resp 14   /72   BP Location Right lower arm   MAP (Calculated) 91.67   Patient Position Semi fowlers   Level of Consciousness Alert (0)   MEWS Score 0   Oxygen Therapy   SpO2 94 %   O2 Device None (Room air)   Midline dressing change, JERRI dressing change, and J tube dressing change/care complete at this time, pt denies further needs and is aware to call for any needs or changes. Call light within reach. Will continue to monitor.   Paul Connor RN

## 2022-07-17 NOTE — PROGRESS NOTES
Progress Note    HISTORY     CC:  Abdominal pain and perforated bowel           We are following for     DA    CKD       Subjective/   HPI:  Kidney function is stable. Getting intermittent lasix.   Overall doing well    ROS:  Constitutional:  No fevers, No Chills, + weakness  Cardiovascular:  No palpations, + edema  Respiratory:  No wheezing, no cough  Skin:  No rash, no itching  :  No hematuria, urine volume is good     Social Hx: No family at the bedside     Past Medical and Surgical History:  - Reviewed, no changes     Scheduled Meds:   miconazole nitrate   Topical BID    piperacillin-tazobactam  3,375 mg IntraVENous Q8H    lidocaine 1 % injection  5 mL IntraDERmal Once    pantoprazole  40 mg IntraVENous BID    heparin (porcine)  5,000 Units SubCUTAneous 3 times per day    sodium chloride flush  5-40 mL IntraVENous 2 times per day     Continuous Infusions:   dextrose Stopped (07/01/22 0410)    sodium chloride 30 mL/hr at 07/12/22 1851     PRN Meds:.albuterol sulfate HFA, oxyCODONE, saliva substitute, glucose, dextrose bolus **OR** dextrose bolus, glucagon (rDNA), dextrose, perflutren lipid microspheres, metoprolol, phenol, sodium chloride flush, sodium chloride, ondansetron **OR** ondansetron, morphine **OR** [DISCONTINUED] morphine, naloxone      EXAM       Objective/     Vitals:    07/17/22 0001 07/17/22 0415 07/17/22 0800 07/17/22 0944   BP: (!) 144/77 131/72 131/76    Pulse: 74 68 66    Resp: 16 14 16 16   Temp: 97.2 °F (36.2 °C) 97 °F (36.1 °C) 97.7 °F (36.5 °C)    TempSrc: Axillary Axillary Oral    SpO2: 95% 94%     Weight:  156 lb 1.6 oz (70.8 kg)     Height:  5' 4\" (1.626 m)       24HR INTAKE/OUTPUT:      Intake/Output Summary (Last 24 hours) at 7/17/2022 1044  Last data filed at 7/17/2022 0424  Gross per 24 hour   Intake 3990 ml   Output 578 ml   Net 3412 ml       Constitutional:  Ill appearing, NG  Eyes:  Pupils reactive, sclera clear   Neck:  Normal thyroid, no masses   Cardiovascular: Regular, no rub  Respiratory:  No distress, no wheezing  Psychiatry:  Flat mood/affect, somnolent   Abdomen: +bs, soft, nt, no masses   Musculoskeletal: + LE edema, no clubbing   Lymphatics:  No LAD in neck, no supraclavicular nodes       MEDICAL DECISION MAKING       Data/  Recent Labs     07/15/22  1250   WBC 8.1   HGB 7.5*   HCT 22.4*   MCV 87.6          Recent Labs     07/15/22  0420 07/16/22  0328 07/17/22  0320    134* 137   K 3.6 4.1 3.8    99 102   CO2 26 25 25   GLUCOSE 136* 99 140*   PHOS 3.6 3.7 3.3   BUN 19 17 16   CREATININE 1.5* 1.4* 1.4*   LABGLOM 34* 37* 37*   GFRAA 41* 44* 44*         Assessment/     -DA likely has developed ATN w hypovolemia, hypotension, NSAIDs use and contrast use. Scr stable / non-oliguric    Plateau phase      -Perforated prepyloric ulcer s/p laparotomy with repair n jejunostomy insertion on 6/28/22              On zosyn      -Metabolic acidosis from DA, resolved     -Hypertension. BP range is good.      -Atrial Fibrillation      -recent COVID from 6/22-24/22     -h/o colon cancer    Plan/     On TFs  Follow labs  PRN lasix   Avoid nephrotoxins  Strict intake and output

## 2022-07-17 NOTE — PROGRESS NOTES
[facility] GENERAL SURGERY    Surgery Progress Note           POD #  19    PATIENT NAME: Brennen Otero     TODAY'S DATE: 7/17/2022    INTERVAL HISTORY:    Pt  resting, still c/o occasional nausea and pain but no emesis. On goal rate tube feeds. OBJECTIVE:   VITALS:  /74   Pulse 68   Temp 99 °F (37.2 °C) (Oral)   Resp 16   Ht 5' 4\" (1.626 m)   Wt 156 lb 1.6 oz (70.8 kg)   SpO2 95%   BMI 26.79 kg/m²     INTAKE/OUTPUT:    I/O last 3 completed shifts: In: 8039 [P.O.:20; I.V.:756; NG/GT:7263]  Out: 735 [Urine:1; Emesis/NG output:725; Drains:8; Stool:1]  No intake/output data recorded. CONSTITUTIONAL:  fatigued and alert  LUNGS:     ABDOMEN:    soft, non-distended,     INCISION: clean, dry, good granulation tissue, healing,  scant biliopurulent  drainage in JERRI    Data:  CBC:   Recent Labs     07/15/22  1250   WBC 8.1   HGB 7.5*   HCT 22.4*        BMP:    Recent Labs     07/15/22  0420 07/16/22  0328 07/17/22  0320    134* 137   K 3.6 4.1 3.8    99 102   CO2 26 25 25   BUN 19 17 16   CREATININE 1.5* 1.4* 1.4*   GLUCOSE 136* 99 140*     Hepatic: No results for input(s): AST, ALT, ALB, BILITOT, ALKPHOS in the last 72 hours. Mag:    No results for input(s): MG in the last 72 hours. Phos:     Recent Labs     07/15/22  0420 07/16/22  0328 07/17/22  0320   PHOS 3.6 3.7 3.3      INR: No results for input(s): INR in the last 72 hours.       Radiology Review:       ASSESSMENT AND PLAN:  74 y.o. female status post repair of perf ulcer, j-tube insertion with leak from repair, managed with jejunal feeds, IV abx, awaiting eventual healing of the site   - re-check UGI tomorrow to assess for further leak   - cont local wound care   - cont IV abx         Electronically signed by Nallely Garcia MD

## 2022-07-18 LAB
ALBUMIN SERPL-MCNC: 3 G/DL (ref 3.4–5)
ANION GAP SERPL CALCULATED.3IONS-SCNC: 10 MMOL/L (ref 3–16)
BUN BLDV-MCNC: 14 MG/DL (ref 7–20)
CALCIUM SERPL-MCNC: 8.3 MG/DL (ref 8.3–10.6)
CHLORIDE BLD-SCNC: 108 MMOL/L (ref 99–110)
CO2: 25 MMOL/L (ref 21–32)
CREAT SERPL-MCNC: 1.2 MG/DL (ref 0.6–1.2)
GFR AFRICAN AMERICAN: 53
GFR NON-AFRICAN AMERICAN: 44
GLUCOSE BLD-MCNC: 116 MG/DL (ref 70–99)
GLUCOSE BLD-MCNC: 123 MG/DL (ref 70–99)
GLUCOSE BLD-MCNC: 123 MG/DL (ref 70–99)
GLUCOSE BLD-MCNC: 124 MG/DL (ref 70–99)
GLUCOSE BLD-MCNC: 131 MG/DL (ref 70–99)
GLUCOSE BLD-MCNC: 132 MG/DL (ref 70–99)
GLUCOSE BLD-MCNC: 97 MG/DL (ref 70–99)
PERFORMED ON: ABNORMAL
PERFORMED ON: NORMAL
PHOSPHORUS: 3.4 MG/DL (ref 2.5–4.9)
POTASSIUM SERPL-SCNC: 4.5 MMOL/L (ref 3.5–5.1)
SODIUM BLD-SCNC: 143 MMOL/L (ref 136–145)

## 2022-07-18 PROCEDURE — 6360000002 HC RX W HCPCS: Performed by: SURGERY

## 2022-07-18 PROCEDURE — 99024 POSTOP FOLLOW-UP VISIT: CPT | Performed by: NURSE PRACTITIONER

## 2022-07-18 PROCEDURE — 80069 RENAL FUNCTION PANEL: CPT

## 2022-07-18 PROCEDURE — 2580000003 HC RX 258: Performed by: SURGERY

## 2022-07-18 PROCEDURE — 36592 COLLECT BLOOD FROM PICC: CPT

## 2022-07-18 PROCEDURE — 1200000000 HC SEMI PRIVATE

## 2022-07-18 PROCEDURE — 6360000002 HC RX W HCPCS: Performed by: NURSE PRACTITIONER

## 2022-07-18 PROCEDURE — 6370000000 HC RX 637 (ALT 250 FOR IP): Performed by: NURSE PRACTITIONER

## 2022-07-18 PROCEDURE — C9113 INJ PANTOPRAZOLE SODIUM, VIA: HCPCS | Performed by: NURSE PRACTITIONER

## 2022-07-18 RX ADMIN — MORPHINE SULFATE 1 MG: 2 INJECTION, SOLUTION INTRAMUSCULAR; INTRAVENOUS at 09:31

## 2022-07-18 RX ADMIN — PANTOPRAZOLE SODIUM 40 MG: 40 INJECTION, POWDER, LYOPHILIZED, FOR SOLUTION INTRAVENOUS at 00:10

## 2022-07-18 RX ADMIN — PANTOPRAZOLE SODIUM 40 MG: 40 INJECTION, POWDER, LYOPHILIZED, FOR SOLUTION INTRAVENOUS at 09:33

## 2022-07-18 RX ADMIN — PIPERACILLIN AND TAZOBACTAM 3375 MG: 3; .375 INJECTION, POWDER, LYOPHILIZED, FOR SOLUTION INTRAVENOUS at 14:51

## 2022-07-18 RX ADMIN — PIPERACILLIN AND TAZOBACTAM 3375 MG: 3; .375 INJECTION, POWDER, LYOPHILIZED, FOR SOLUTION INTRAVENOUS at 22:46

## 2022-07-18 RX ADMIN — SODIUM CHLORIDE, PRESERVATIVE FREE 30 ML: 5 INJECTION INTRAVENOUS at 09:32

## 2022-07-18 RX ADMIN — MICONAZOLE NITRATE: 2 OINTMENT TOPICAL at 09:33

## 2022-07-18 RX ADMIN — PANTOPRAZOLE SODIUM 40 MG: 40 INJECTION, POWDER, LYOPHILIZED, FOR SOLUTION INTRAVENOUS at 22:38

## 2022-07-18 RX ADMIN — HEPARIN SODIUM 5000 UNITS: 5000 INJECTION INTRAVENOUS; SUBCUTANEOUS at 00:10

## 2022-07-18 RX ADMIN — SODIUM CHLORIDE 25 ML: 9 INJECTION, SOLUTION INTRAVENOUS at 14:50

## 2022-07-18 RX ADMIN — OXYCODONE HYDROCHLORIDE 5 MG: 5 SOLUTION ORAL at 15:49

## 2022-07-18 RX ADMIN — SODIUM CHLORIDE, PRESERVATIVE FREE 10 ML: 5 INJECTION INTRAVENOUS at 22:38

## 2022-07-18 RX ADMIN — HEPARIN SODIUM 5000 UNITS: 5000 INJECTION INTRAVENOUS; SUBCUTANEOUS at 09:32

## 2022-07-18 RX ADMIN — HEPARIN SODIUM 5000 UNITS: 5000 INJECTION INTRAVENOUS; SUBCUTANEOUS at 18:26

## 2022-07-18 RX ADMIN — PIPERACILLIN AND TAZOBACTAM 3375 MG: 3; .375 INJECTION, POWDER, LYOPHILIZED, FOR SOLUTION INTRAVENOUS at 02:18

## 2022-07-18 RX ADMIN — MICONAZOLE NITRATE: 2 OINTMENT TOPICAL at 22:41

## 2022-07-18 ASSESSMENT — PAIN DESCRIPTION - DESCRIPTORS
DESCRIPTORS: STABBING
DESCRIPTORS: STABBING

## 2022-07-18 ASSESSMENT — PAIN DESCRIPTION - LOCATION
LOCATION: ABDOMEN
LOCATION: ABDOMEN

## 2022-07-18 ASSESSMENT — PAIN DESCRIPTION - PAIN TYPE
TYPE: ACUTE PAIN
TYPE: ACUTE PAIN;SURGICAL PAIN

## 2022-07-18 ASSESSMENT — PAIN SCALES - GENERAL
PAINLEVEL_OUTOF10: 9
PAINLEVEL_OUTOF10: 9
PAINLEVEL_OUTOF10: 0
PAINLEVEL_OUTOF10: 4

## 2022-07-18 ASSESSMENT — PAIN DESCRIPTION - ONSET
ONSET: ON-GOING
ONSET: ON-GOING

## 2022-07-18 ASSESSMENT — PAIN DESCRIPTION - ORIENTATION
ORIENTATION: UPPER;RIGHT;LEFT
ORIENTATION: UPPER;RIGHT;LEFT

## 2022-07-18 ASSESSMENT — PAIN DESCRIPTION - FREQUENCY
FREQUENCY: CONTINUOUS
FREQUENCY: CONTINUOUS

## 2022-07-18 NOTE — PROGRESS NOTES
Occupational Therapy/ Physical Therapy   Attempted OT/PT eval and the pt declined. Encouraged pt to participate and the pt declined.   Yoly Norman OTR/L [id number]

## 2022-07-18 NOTE — FLOWSHEET NOTE
07/18/22 0057   Assessment   Charting Type Shift assessment   Psychosocial   Psychosocial (WDL) X   Patient Behaviors Agitated; Apathetic; Withdrawn   Neurological   Neuro (WDL) WDL   Level of Consciousness Alert (0)   Orientation Level Oriented X4   [place] Coma Scale   Eye Opening 4   Best Verbal Response 5   Best Motor Response 6   [place] Coma Scale Score 15   HEENT (Head, Ears, Eyes, Nose, & Throat)   HEENT (WDL) X   Nose Other (comment)  (NG Right nare)   Teeth Other (comment)  (transplants, permanant)   Respiratory   Respiratory (WDL) WDL   Respiratory Pattern Regular   Respiratory Depth Normal   Respiratory Quality/Effort Unlabored   Chest Assessment Chest expansion symmetrical;Trachea midline   L Breath Sounds Clear   R Breath Sounds Clear   Cardiac   Cardiac (WDL) WDL   Cardiac Regularity Regular   Heart Sounds S1, S2   Cardiac Rhythm Sinus rhythm   Cardiac Monitor   Telemetry Monitor Alarm Parameters 2 [place]    Telemetry Box Number 30   Gastrointestinal   Abdominal (WDL) X   Abdomen Inspection Soft;Rounded;Gastrostomy; Other (Comment);Surgical scar  (JERRI drain)   RUQ Bowel Sounds Active   LUQ Bowel Sounds Active   RLQ Bowel Sounds Active   LLQ Bowel Sounds Active   GI Symptoms Distention   Tenderness Tenderness   NG/OG/NJ/NE Tube Nasogastric 18 fr Right nostril   Placement Date/Time: 06/28/22 1642   Present on Admission/Arrival: No  Inserted by: RUTH Richards CRNA  Insertion attempts: 1  Tube Type: Nasogastric  Tube Size (fr): 18 fr  Tube Location: Right nostril   Surrounding Skin Clean, dry & intact   Securement device Tape   Status Suction-low continuous   NG/OG/NJ/NE External Measurement (cm) 60 cm   Drainage Appearance Brown;Green   Gastrostomy/Enterostomy/Jejunostomy Tube PEG-Jejunostomy LLQ   Placement Date/Time: 06/28/22 1800   Present on Admission/Arrival: Yes  Inserted by: OR  Type: PEG-Jejunostomy  Location: LLQ   Site Description Reddened;Clean, dry & intact   J Port Status Infusing   Surrounding Skin Dry; Intact; Reddened   Dressing Status Clean, dry & intact   Dressing Type Split gauze   Tube Feeding Other Tube Feeding (must specify product in comment)  (Jevity 1.2)   Tube feeding/verify rate (mL/hr) 60 mL/hr   Tube Feeding Supplement (Product) Protein Modular   Tube Feeding Intake (mL) 720 ml   Genitourinary   Genitourinary (WDL) X  (incontinent at times)   Suprapubic Tenderness No   Dysuria (Pain/Burning w/Urination) No   Urine Assessment   Urinary Status Voiding   Peripheral Vascular   Peripheral Vascular (WDL) WDL   Edema Right lower extremity; Left lower extremity   RLE Edema +1   LLE Edema +1   Skin Integumentary    Skin Integumentary (WDL) X   Skin Color Pale   Skin Condition/Temp Warm;Dry   Skin Integrity Wound (see LDA)   Location sacrum   Skin Integrity Site 2   Skin Integrity Location 2 Incision (see LDA)   Location 2 ABD   Skin Integrity Site 5   Skin Integrity Location 5 Excoriation   Location 5 gayle area   Musculoskeletal   Musculoskeletal (WDL) X  (generalized weakness)   Wound 07/08/22 Radial Proximal;Right infiltration that is now open and blisted   Date First Assessed/Time First Assessed: 07/08/22 1320   Present on Hospital Admission: No  Primary Wound Type: (c) Other (comment)  Location: Radial  Wound Location Orientation: Proximal;Right  Wound Description (Comments): infiltration that is now o. .. Wound Etiology Other   Dressing Status Clean;Dry; Intact   Dressing/Treatment Open to air   Drainage Amount None   Wound 07/08/22 Buttocks   Date First Assessed/Time First Assessed: 07/08/22 0755   Present on Hospital Admission: No  Location: Buttocks   Dressing Status Other (Comment)  (open to air, miconazole with silicone applied)   Wound Cleansed Wound cleanser   Dressing/Treatment Other (comment)  (remedy mixed with miconazole)   Wound Assessment Pink/red;Purple/maroon   Drainage Amount None   Odor None   Gayle-wound Assessment Excoriated   Incision 06/28/22 Abdomen Medial;Upper   Date First Assessed: 06/28/22   Present on Hospital Admission: No  Location: Abdomen  Incision Location Orientation: Medial;Upper  Incision Outcome: Well approximated   Dressing Status Clean;Dry; Intact   Dressing Change Due 07/18/22   Dressing/Treatment Dry dressing   Drainage Amount Small   [place]-incision Assessment Blanchable erythema; Warm   Closed/Suction Drain Superior;Midline Stomach Bulb   Placement Date: 06/28/22   Present on Admission/Arrival: No  Inserted by: md di  Tube Number: 1  Orientation: Superior;Midline  Location: Stomach  Drain Tube Type:  Bulb  Size : 10mm  Tube Shape: Flat  Drain Reservoir Size (ml): 100 ml   Site Description Reddened;Scabbed   Dressing Status Clean, dry & intact   Drain Status To bulb suction   Output (ml) 30 ml

## 2022-07-18 NOTE — PROGRESS NOTES
General Surgery   Daily Progress Note    Pt Name: Kishan Mcqueen Record Number: [id number]  Date of Birth 1950   Today's Date: 7/18/2022    ASSESSMENT  POD #20 s/p repair of perf ulcer, j-tube insertion  ABD: J-tube in place, JERRI drain in place, midline dressing changed: + yeast under Tegaderm, left upper staples open to air, , + diffuse tenderness which appears slightly improved from eysterday, +mild distention, NGT in place, no N/V, +flatus, + BMs     ARF improving: Cr 1.2  K+ 4.5  Leuks 8.1  VSS  NGT: 700  UO OK  JERRI 30 output: brown   Pt states \"I am just so tired of this place and everyone just talks about how I need to get out of bed all day. \" Continue to encouraged pt to participate in PT/OT. PLAN  NGT to CLWS  Renal seeing. Strict I&Os  Pain control   Zosyn  PT/OT: Sit on edge of bed/up to chair today/ambulate  Protonix BID  Wound care Rn seeing pt  TF rate at goal  BID dressing changes to midline incision. Pt looks OK POD #20: Planning UGI in the am    Subhash Kaur is slightly improved from yesterday. Pain is better controlled. She has no nausea and no vomiting. She has passed flatus and has had a bowel movement. She is NPO and tolerating TF. Current activity is up with assistance    OBJECTIVE  VITALS:  height is 5' 4\" (1.626 m) and weight is 156 lb 1.6 oz (70.8 kg). Her oral temperature is 97 °F (36.1 °C). Her blood pressure is 138/74 and her pulse is 73. Her respiration is 18 and oxygen saturation is 96%. VITALS:  /74   Pulse 73   Temp 97 °F (36.1 °C) (Oral)   Resp 18   Ht 5' 4\" (1.626 m)   Wt 156 lb 1.6 oz (70.8 kg)   SpO2 96%   BMI 26.79 kg/m²   INTAKE/OUTPUT:      Intake/Output Summary (Last 24 hours) at 7/18/2022 1402  Last data filed at 7/18/2022 1227  Gross per 24 hour   Intake 1222 ml   Output 855 ml   Net 367 ml       URINARY CATHETER OUTPUT (Curiel):     GENERAL: alert, cooperative, no distress  I/O last 3 completed shifts:   In: 3964 [P.O.:10; I.V.:333; NG/GT:3621]  Out: 1180 [Emesis/NG output:1150; Drains:30]  I/O this shift:   In: 502 [I.V.:30; NG/GT:472]  Out: 125 [Urine:125]    LABS  Recent Labs     07/18/22 0615      K 4.5      CO2 25   BUN 14   CREATININE 1.2   PHOS 3.4   CALCIUM 8.3       CBC with Differential:    Lab Results   Component Value Date/Time    WBC 8.1 07/15/2022 12:50 PM    RBC 2.55 07/15/2022 12:50 PM    RBC 4.41 08/02/2016 10:34 AM    HGB 7.5 07/15/2022 12:50 PM    HCT 22.4 07/15/2022 12:50 PM     07/15/2022 12:50 PM    MCV 87.6 07/15/2022 12:50 PM    MCH 29.5 07/15/2022 12:50 PM    MCHC 33.6 07/15/2022 12:50 PM    RDW 15.4 07/15/2022 12:50 PM    BANDSPCT 1 07/05/2022 04:36 AM    LYMPHOPCT 13.9 07/15/2022 12:50 PM    LYMPHOPCT 25.3 08/02/2016 10:34 AM    MONOPCT 7.7 07/15/2022 12:50 PM    BASOPCT 0.4 07/15/2022 12:50 PM    MONOSABS 0.6 07/15/2022 12:50 PM    LYMPHSABS 1.1 07/15/2022 12:50 PM    EOSABS 0.2 07/15/2022 12:50 PM    BASOSABS 0.0 07/15/2022 12:50 PM     CMP:    Lab Results   Component Value Date/Time     07/18/2022 06:15 AM    K 4.5 07/18/2022 06:15 AM    K 3.8 06/29/2022 05:19 AM     07/18/2022 06:15 AM    CO2 25 07/18/2022 06:15 AM    BUN 14 07/18/2022 06:15 AM    CREATININE 1.2 07/18/2022 06:15 AM    GFRAA 53 07/18/2022 06:15 AM    AGRATIO 0.8 06/29/2022 05:19 AM    LABGLOM 44 07/18/2022 06:15 AM    GLUCOSE 123 07/18/2022 06:15 AM    GLUCOSE 85 08/02/2016 10:34 AM    PROT 4.7 06/29/2022 05:19 AM    PROT 5.7 08/02/2016 10:34 AM    LABALBU 3.0 07/18/2022 06:15 AM    CALCIUM 8.3 07/18/2022 06:15 AM    BILITOT 0.6 06/29/2022 05:19 AM    ALKPHOS 84 06/29/2022 05:19 AM    AST 49 06/29/2022 05:19 AM    ALT 48 06/29/2022 05:19 AM         ILAN Davies CNP  Electronically signed 7/18/2022 at 2:02 PM     I

## 2022-07-18 NOTE — FLOWSHEET NOTE
Pt A/Ox4. VSS. Acute pain 9/10, see assessment below. Pt unlabored; respirations even, regular, effortless. RA. No distress noted. Shift assessment complete. See flowsheet. AM med's given. PRN morphine given for pain. See MAR. NGT to Rt nare, connected to CLWS, yellow. PEG tube infusing TF as ordered, changed TF & tubing. Assisted pt up to chair. Denies needs at this time. Side rails 2/4. Chair alarm on. Bed in low position. Call light within reach. 07/18/22 0926   Vital Signs   Temp 97 °F (36.1 °C)   Temp Source Oral   Heart Rate 73   Heart Rate Source Monitor   Resp 18   /74   BP Location Right upper arm   BP Method Automatic   MAP (Calculated) 95.33   Patient Position Up in chair   Level of Consciousness Alert (0)   MEWS Score 1   Pain Assessment   Pain Assessment 0-10   Pain Level 9   Pain Location Abdomen   Pain Orientation Upper;Right;Left  (right & left epigastric)   Pain Descriptors Stabbing   Functional Pain Assessment Prevents or interferes some active activities and ADLs   Pain Type Acute pain   Pain Frequency Continuous   Pain Onset On-going   Non-Pharmaceutical Pain Intervention(s) Repositioned; Rest   Oxygen Therapy   SpO2 96 %   O2 Device None (Room air)

## 2022-07-18 NOTE — CONSULTS
[facility] Wound Ostomy Continence Nurse  Follow-up Progress Note       NAME:  Keila Roy  MEDICAL RECORD NUMBER:  [id number]  AGE:  74 y.o. GENDER:  female  :  1950  TODAY'S DATE:  2022    Subjective: Pt sitting up in chair, states buttocks still itch and burn   Wound Identification:  Wound Type: non-healing surgical and ICD  Contributing Factors: malnutrition, incontinence of stool, and incontinence of urine        Patient Goal of Care:  [x] Wound Healing  [] Odor Control  [] Palliative Care  [] Pain Control   [] Other:     Objective:    /74   Pulse 73   Temp 97 °F (36.1 °C) (Oral)   Resp 18   Ht 5' 4\" (1.626 m)   Wt 156 lb 1.6 oz (70.8 kg)   SpO2 96%   BMI 26.79 kg/m²   Will Risk Score: Will Scale Score: 14  Assessment:   Measurements:  Wound 22 Radial Proximal;Right infiltration that is now open and blisted (Active)   Wound Image    22 2101   Wound Etiology Surgical 22 0955   Dressing Status Clean;Dry; Intact 22 0057   Wound Cleansed Cleansed with saline 22 1011   Dressing/Treatment Open to air 22 0057   Dressing Change Due 22 2114   Drainage Amount None 22 0057   Number of days: 10       Wound 22 Buttocks (Active)   Wound Image   22 1013   Dressing Status Other (Comment) 22 0955   Wound Cleansed Wound cleanser 22 0955   Dressing/Treatment Other (comment) 22 0955   Wound Assessment Pink/red;Purple/maroon 22 0955   Drainage Amount None 22 0955   Odor None 22 0955   Esperanza-wound Assessment Excoriated 22 0955   Number of days: 10   Midline incision:  2 openings along incision line, proximal measures 1x1x3.6cm, distal measures 1.5x1.5x2.6cm, both 100% red, moist with granulation tissue present.   Esperanza incisional skin with macular red rash with red satellite lesions consistent with yeast.    Buttocks/sacrum:  Improved from admission, photo taken 7/16.  Resolving fungal rash. Pt continues to complains of burning and itching. Continue Remedy and miconazole powder combination. Response to treatment:  Well tolerated by patient. Pain Assessment:  Severity:  0 / 10  Quality of pain: N/A  Wound Pain Timing/Severity: none  Premedicated: No  Plan: Discontinue use of Tegaderm on midline incision, fungal rash developed. Sprinkle AF powder to skin surrounding midline incision, dust off excess and dab over powder with barrier wipe prior to applying dressing. Plan of Care: Wound 07/08/22 Radial Proximal;Right infiltration that is now open and blisted-Dressing/Treatment: Open to air  Wound 07/08/22 Buttocks-Dressing/Treatment: Other (comment) (remedy mixed with miconazole)    Specialty Bed Required : No   [] Low Air Loss   [] Pressure Redistribution  [] Fluid Immersion  [] Bariatric  [] Total Pressure Relief  [] Other:     Current Diet: Diet NPO  ADULT TUBE FEEDING; Jejunostomy;  Other Tube Feeding (specify); Jevity 1.2; Continuous; 40; Yes; 10; Q 6 hours; 60; 100; Q 1 hour; Protein; one proteinex P2Go once daily  Dietician consult:  Yes    Discharge Plan:  Placement for patient upon discharge: skilled nursing   Patient appropriate for Outpatient [address]: No    Referrals:  [x]   [] [facility]  [] Supplies  [] Other    Patient/Caregiver Teaching:  Level of patient/caregiver understanding able to:   [] Indicates understanding       [] Needs reinforcement  [] Unsuccessful      [x] Verbal Understanding  [] Demonstrated understanding       [] No evidence of learning  [] Refused teaching         [] N/A       Electronically signed by Savita Shipman RN, CWOCN on 7/18/2022 at 1:46 PM

## 2022-07-18 NOTE — PROGRESS NOTES
Comprehensive Nutrition Assessment    Type and Reason for Visit:  Reassess    Nutrition Recommendations/Plan:   Continue ADULT TUBE FEEDING; J-tube; Other formula - Jevity 1.2 with a goal rate of 60 ml/hr x 20 hours + 100 ml/hr free water + one proteinex P2Go once daily via feeding tube. Monitor TF rate, intake, and tolerance + water flushes + administration of one proteinex P2Go once daily. Monitor GI status, surgery notes, and plan of care. Monitor nutrition-related labs, bowel function, and weight trends. Malnutrition Assessment:  Malnutrition Status:   At risk for malnutrition (07/18/22 1434)    Context:  Acute Illness     Findings of the 6 clinical characteristics of malnutrition:  Energy Intake:  No significant decrease in energy intake (she has received TF at goal rate x 5+ days)  Weight Loss:  No significant weight loss     Body Fat Loss:  No significant body fat loss     Muscle Mass Loss:  No significant muscle mass loss    Fluid Accumulation:  No significant fluid accumulation     Strength:  Not Performed    Nutrition Assessment:    patient remains unchanged from a nutritional standpoint since last RD assessment; she remains at risk for further compromise d/t extended admission of 20 days thus far, GI dysfunction, and need for EN as sole source of nutrition; will continue Jevity 1.2 with a goal rate of 60 ml/hr x 20 hours + 100 ml/hr free water + one proteinex P2Go once daily via feeding tube    Nutrition Related Findings:    patient is A & O x 4; patient declined to work with PT/OT today despite encouragement from patient's treatment team; patient is POD #20 s/p repair of perforated ulcer and j-tube insertion; + UGI in the am; abdomen is soft, round, distended, and bowel sounds are active; + BM documented today Wound Type: Surgical Incision (surgical incision on abdomen)       Current Nutrition Intake & Therapies:    Average Meal Intake: NPO  Average Supplements Intake: NPO  Current Tube Feeding (TF) Orders:  Feeding Route: Jejunostomy  Formula: Other Tube Feeding (Comment) (Jevity 1.2)  Schedule: Continuous  Feeding Regimen: Jevity 1.2 with a goal rate of 60 ml/hr x 20 hours  Additives/Modulars: Protein (one proteinex P2Go once daily via feeding tube)  Water Flushes: 100 ml/hr free water, per nephrology  Current TF & Flush Orders Provides: TF is infusing at goal rate and patient is tolerating well  Goal TF & Flush Orders Provides: Jevity 1.2 with a goal rate of 60 ml/hr x 20 hours = 1200 ml TV, 1440 kcals, 67 g protein, and 968 ml free water + 26 g protein and 104 kcals from one proteinex P2Go once daily (93 g protein and 1544 kcals total) + 100 ml/hr free water    Anthropometric Measures:  Height: 5' 4\" (162.6 cm)  Ideal Body Weight (IBW): 120 lbs (55 kg)    Admission Body Weight: 149 lb (67.6 kg) (obtained on 6/29/22; actual weight)  Current Body Weight: 156 lb 1.6 oz (70.8 kg) (obtained on 7/17/22; actual weight), 130.1 % IBW. Weight Source: Bed Scale  Current BMI (kg/m2): 26.8  Usual Body Weight: 149 lb (67.6 kg) (obtained on 6/29/22; actual weight)  % Weight Change (Calculated): 4.8  Weight Adjustment For: No Adjustment                 BMI Categories: Overweight (BMI 25.0-29. 9)    Estimated Daily Nutrient Needs:  Energy Requirements Based On: Kcal/kg  Weight Used for Energy Requirements: Current  Energy (kcal/day): 1420 - 1633 kcals based on 20-23 kcals/kg/CBW  Weight Used for Protein Requirements: Ideal  Protein (g/day): 77 - 83 g protein based on 1.4-1.5 g/kg/IBW  Method Used for Fluid Requirements: 1 ml/kcal  Fluid (ml/day): 1420 - 1633 ml    Nutrition Diagnosis:   Inadequate oral intake related to altered GI function, altered GI structure, inadequate protein-energy intake as evidenced by NPO or clear liquid status due to medical condition, nutrition support - enteral nutrition, GI abnormality    Nutrition Interventions:   Food and/or Nutrient Delivery: Continue NPO, Continue Current Tube Feeding  Nutrition Education/Counseling: No recommendation at this time  Coordination of Nutrition Care: Continue to monitor while inpatient, Coordination of Care       Goals:  Previous Goal Met: Goal(s) Achieved  Goals: Tolerate nutrition support at goal rate, by next RD assessment       Nutrition Monitoring and Evaluation:   Behavioral-Environmental Outcomes: None Identified  Food/Nutrient Intake Outcomes: Enteral Nutrition Intake/Tolerance  Physical Signs/Symptoms Outcomes: Biochemical Data, GI Status, Hemodynamic Status, Nutrition Focused Physical Findings, Skin, Weight    Discharge Planning:     Too soon to determine     Nely Boggs, [address] [place] [address], LD  Contact: [phone]

## 2022-07-18 NOTE — PROGRESS NOTES
Pt has been pleasant and sleeping throughout shift until now. Upon awaking she is tearful, verbally aggressive, and non-compliant. Patient complains of nausea however is refusing antiemetic medication. Patient given basin and sat upright incase of vomiting. Patient refusing vitals to be collected at this time as well.

## 2022-07-18 NOTE — PROGRESS NOTES
[facility] Progress Note           Subjective/   74y.o. year old female who we are seeing in consultation for DA. HPI:  Renal function better, urinating though not collecting. Denies any shortness of breath. ROS:  Intake reduced, on TF's, +weak. Objective/   GEN:  Chronically ill, BP (!) 147/76   Pulse 72   Temp 98.6 °F (37 °C) (Oral)   Resp 18   Ht 5' 4\" (1.626 m)   Wt 156 lb 1.6 oz (70.8 kg)   SpO2 95%   BMI 26.79 kg/m²   HEENT: non-icteric, no JVD  CV: S1, S2 without m/r/g;  + LE edema  RESP: CTA B without w/r/r; breathing wnl  ABD: +bs, soft, nt, no hsm  SKIN: warm, no rashes    Data/  No results for input(s): WBC, HGB, HCT, MCV, PLT in the last 72 hours. Recent Labs     07/16/22  0328 07/17/22  0320 07/18/22  0615   * 137 143   K 4.1 3.8 4.5   CL 99 102 108   CO2 25 25 25   GLUCOSE 99 140* 123*   PHOS 3.7 3.3 3.4   BUN 17 16 14   CREATININE 1.4* 1.4* 1.2   LABGLOM 37* 37* 44*   GFRAA 44* 44* 53*       Assessment/     -DA likely has developed ATN w hypovolemia, hypotension, NSAIDs use and contrast use. Scr improving / non-oliguric     -Perforated prepyloric ulcer s/p laparotomy with repair n jejunostomy insertion on 6/28/22              On zosyn     -Metabolic acidosis from DA, resolved     -Hypertension. BP range is good. -Atrial Fibrillation     -Recent COVID from 6/22-24/22     -h/o colon cancer       Plan/     - Prn lasix  - Trend labs, bp's, & urine output    ____________________________________  Nadia Lees MD  [facility]  [email or web address]  Office: [phone]

## 2022-07-19 ENCOUNTER — APPOINTMENT (OUTPATIENT)
Dept: GENERAL RADIOLOGY | Age: 72
DRG: 326 | End: 2022-07-19
Payer: MEDICARE

## 2022-07-19 LAB
ALBUMIN SERPL-MCNC: 3.3 G/DL (ref 3.4–5)
ANION GAP SERPL CALCULATED.3IONS-SCNC: 15 MMOL/L (ref 3–16)
BUN BLDV-MCNC: 14 MG/DL (ref 7–20)
CALCIUM SERPL-MCNC: 8.9 MG/DL (ref 8.3–10.6)
CHLORIDE BLD-SCNC: 101 MMOL/L (ref 99–110)
CO2: 20 MMOL/L (ref 21–32)
CREAT SERPL-MCNC: 1.2 MG/DL (ref 0.6–1.2)
GFR AFRICAN AMERICAN: 53
GFR NON-AFRICAN AMERICAN: 44
GLUCOSE BLD-MCNC: 111 MG/DL (ref 70–99)
GLUCOSE BLD-MCNC: 114 MG/DL (ref 70–99)
GLUCOSE BLD-MCNC: 115 MG/DL (ref 70–99)
GLUCOSE BLD-MCNC: 83 MG/DL (ref 70–99)
GLUCOSE BLD-MCNC: 84 MG/DL (ref 70–99)
GLUCOSE BLD-MCNC: 87 MG/DL (ref 70–99)
PERFORMED ON: ABNORMAL
PERFORMED ON: ABNORMAL
PERFORMED ON: NORMAL
PHOSPHORUS: 3.5 MG/DL (ref 2.5–4.9)
POTASSIUM SERPL-SCNC: 4.6 MMOL/L (ref 3.5–5.1)
SODIUM BLD-SCNC: 136 MMOL/L (ref 136–145)

## 2022-07-19 PROCEDURE — 05HA33Z INSERTION OF INFUSION DEVICE INTO LEFT BRACHIAL VEIN, PERCUTANEOUS APPROACH: ICD-10-PCS | Performed by: SURGERY

## 2022-07-19 PROCEDURE — C9113 INJ PANTOPRAZOLE SODIUM, VIA: HCPCS | Performed by: NURSE PRACTITIONER

## 2022-07-19 PROCEDURE — 74240 X-RAY XM UPR GI TRC 1CNTRST: CPT

## 2022-07-19 PROCEDURE — 36415 COLL VENOUS BLD VENIPUNCTURE: CPT

## 2022-07-19 PROCEDURE — 99024 POSTOP FOLLOW-UP VISIT: CPT | Performed by: NURSE PRACTITIONER

## 2022-07-19 PROCEDURE — 1200000000 HC SEMI PRIVATE

## 2022-07-19 PROCEDURE — 6360000002 HC RX W HCPCS: Performed by: NURSE PRACTITIONER

## 2022-07-19 PROCEDURE — 6360000002 HC RX W HCPCS: Performed by: SURGERY

## 2022-07-19 PROCEDURE — 2580000003 HC RX 258: Performed by: SURGERY

## 2022-07-19 PROCEDURE — 80069 RENAL FUNCTION PANEL: CPT

## 2022-07-19 RX ADMIN — MORPHINE SULFATE 1 MG: 2 INJECTION, SOLUTION INTRAMUSCULAR; INTRAVENOUS at 18:02

## 2022-07-19 RX ADMIN — HEPARIN SODIUM 5000 UNITS: 5000 INJECTION INTRAVENOUS; SUBCUTANEOUS at 09:46

## 2022-07-19 RX ADMIN — MICONAZOLE NITRATE: 2 OINTMENT TOPICAL at 09:46

## 2022-07-19 RX ADMIN — PIPERACILLIN AND TAZOBACTAM 3375 MG: 3; .375 INJECTION, POWDER, LYOPHILIZED, FOR SOLUTION INTRAVENOUS at 19:27

## 2022-07-19 RX ADMIN — HEPARIN SODIUM 5000 UNITS: 5000 INJECTION INTRAVENOUS; SUBCUTANEOUS at 00:21

## 2022-07-19 RX ADMIN — HEPARIN SODIUM 5000 UNITS: 5000 INJECTION INTRAVENOUS; SUBCUTANEOUS at 18:02

## 2022-07-19 RX ADMIN — MORPHINE SULFATE 1 MG: 2 INJECTION, SOLUTION INTRAMUSCULAR; INTRAVENOUS at 06:07

## 2022-07-19 ASSESSMENT — PAIN SCALES - GENERAL
PAINLEVEL_OUTOF10: 8
PAINLEVEL_OUTOF10: 10
PAINLEVEL_OUTOF10: 0
PAINLEVEL_OUTOF10: 9

## 2022-07-19 ASSESSMENT — PAIN DESCRIPTION - DESCRIPTORS: DESCRIPTORS: ACHING;DISCOMFORT

## 2022-07-19 ASSESSMENT — PAIN DESCRIPTION - LOCATION
LOCATION: BACK;ABDOMEN
LOCATION: ABDOMEN;INCISION;BACK

## 2022-07-19 ASSESSMENT — PAIN DESCRIPTION - ORIENTATION: ORIENTATION: MID;LOWER

## 2022-07-19 ASSESSMENT — PAIN - FUNCTIONAL ASSESSMENT: PAIN_FUNCTIONAL_ASSESSMENT: ACTIVITIES ARE NOT PREVENTED

## 2022-07-19 NOTE — CARE COORDINATION
[facility] - Acute Rehab Unit   After review, this patient is felt to be:       []  Appropriate for Acute Inpatient Rehab    []  Appropriate for Acute Inpatient Rehab Pending Insurance Authorization    [x]  Not appropriate for Acute Inpatient Rehab    []  Referral received and ARU reviewing patient; Evaluation ongoing. Patient is consistently refusing therapy and therefore is not a good ARU candidate. Pt appears more SNF appropriate per medical director and therapy team. ARU to sign off. D/w NICOLE, Tete. Thank you.    Ray Gatica M.A, DANIEL-SLP/ CBIS   Clinical Liaison

## 2022-07-19 NOTE — PROGRESS NOTES
Pt's PICC line occluded, perfect served DR. Alejandro to get order for cathflo, waiting on order. Called lab to draw pt's morning labs.  Matteo Fuller RN

## 2022-07-19 NOTE — PROGRESS NOTES
Left PICC occluded. Cath patrick failed. Overwire exchange from single lumen PICC to single midline. Midline placed d/t availability of single lumen PICC's. Vessel size will not allow for a upsize to dual lumen PICC. Line flushes with ease. Patient tolerated well.   Migue Trevino RN

## 2022-07-19 NOTE — FLOWSHEET NOTE
07/18/22 1946   Handoff   Communication Given Shift Handoff   Handoff Given To [employer] Received From [facility] Communication Face to Face; At bedside   Time Handoff Given 1946   End of Shift Check Performed Yes

## 2022-07-19 NOTE — PROGRESS NOTES
General Surgery   Daily Progress Note    Pt Name: Kishan Mcqueen Record Number: [id number]  Date of Birth 1950   Today's Date: 7/19/2022    ASSESSMENT  POD #21 s/p repair of perf ulcer, j-tube insertion  ABD: J-tube in place, JERRI drain in place, midline dressing changed by RN, + diffuse tenderness which appears improved, +mild distention, NGT in place, no N/V, +flatus, + BMs     ARF improving: Cr 1.2  K+ 4.6  Leuks 8.1  VSS  NGT: 700  UO OK  JERRI 0 ml out over last 24 hours  Pt states \"I am at least trying to get up on the toilet\" Continue to encouraged pt to participate in PT/OT. PLAN  NGT to CLWS  Renal seeing. Strict I&Os  Pain control   Zosyn  PT/OT: Sit on edge of bed/up to chair today/ambulate  Protonix BID  Wound care Rn seeing pt  TF rate at goal  BID dressing changes to midline incision. Pt looks OK POD #21: Upper GI performed thi peri, discussed ith rads, moderate extravasation of contrast noted. Pt NGT to CLWS (discussed with RN). Will discuss with Dr. Yadira Priest. Mattie Ordonez is unchanged from yesterday. Pain is better controlled. She has no nausea and no vomiting. She has passed flatus and has had a bowel movement. She is NPO and tolerating TF. Current activity is up with assistance    OBJECTIVE  VITALS:  height is 5' 4\" (1.626 m) and weight is 156 lb 1.6 oz (70.8 kg). Her oral temperature is 97.4 °F (36.3 °C). Her blood pressure is 142/83 (abnormal) and her pulse is 73. Her respiration is 16 and oxygen saturation is 92%.    VITALS:  BP (!) 142/83   Pulse 73   Temp 97.4 °F (36.3 °C) (Oral)   Resp 16   Ht 5' 4\" (1.626 m)   Wt 156 lb 1.6 oz (70.8 kg)   SpO2 92%   BMI 26.79 kg/m²   INTAKE/OUTPUT:      Intake/Output Summary (Last 24 hours) at 7/19/2022 1138  Last data filed at 7/19/2022 0549  Gross per 24 hour   Intake 2892 ml   Output 650 ml   Net 2242 ml       URINARY CATHETER OUTPUT (Curiel):     GENERAL: alert, cooperative, no distress  I/O last 3 completed shifts: In: 4114 [I.V.:30; NG/GT:4084]  Out: 65 [Urine:250; Emesis/NG output:400; Drains:30]  No intake/output data recorded.     LABS  Recent Labs     07/19/22  0753      K 4.6      CO2 20*   BUN 14   CREATININE 1.2   PHOS 3.5   CALCIUM 8.9       CBC with Differential:    Lab Results   Component Value Date/Time    WBC 8.1 07/15/2022 12:50 PM    RBC 2.55 07/15/2022 12:50 PM    RBC 4.41 08/02/2016 10:34 AM    HGB 7.5 07/15/2022 12:50 PM    HCT 22.4 07/15/2022 12:50 PM     07/15/2022 12:50 PM    MCV 87.6 07/15/2022 12:50 PM    MCH 29.5 07/15/2022 12:50 PM    MCHC 33.6 07/15/2022 12:50 PM    RDW 15.4 07/15/2022 12:50 PM    BANDSPCT 1 07/05/2022 04:36 AM    LYMPHOPCT 13.9 07/15/2022 12:50 PM    LYMPHOPCT 25.3 08/02/2016 10:34 AM    MONOPCT 7.7 07/15/2022 12:50 PM    BASOPCT 0.4 07/15/2022 12:50 PM    MONOSABS 0.6 07/15/2022 12:50 PM    LYMPHSABS 1.1 07/15/2022 12:50 PM    EOSABS 0.2 07/15/2022 12:50 PM    BASOSABS 0.0 07/15/2022 12:50 PM     CMP:    Lab Results   Component Value Date/Time     07/19/2022 07:53 AM    K 4.6 07/19/2022 07:53 AM    K 3.8 06/29/2022 05:19 AM     07/19/2022 07:53 AM    CO2 20 07/19/2022 07:53 AM    BUN 14 07/19/2022 07:53 AM    CREATININE 1.2 07/19/2022 07:53 AM    GFRAA 53 07/19/2022 07:53 AM    AGRATIO 0.8 06/29/2022 05:19 AM    LABGLOM 44 07/19/2022 07:53 AM    GLUCOSE 111 07/19/2022 07:53 AM    GLUCOSE 85 08/02/2016 10:34 AM    PROT 4.7 06/29/2022 05:19 AM    PROT 5.7 08/02/2016 10:34 AM    LABALBU 3.3 07/19/2022 07:53 AM    CALCIUM 8.9 07/19/2022 07:53 AM    BILITOT 0.6 06/29/2022 05:19 AM    ALKPHOS 84 06/29/2022 05:19 AM    AST 49 06/29/2022 05:19 AM    ALT 48 06/29/2022 05:19 AM         ILAN Copeland CNP  Electronically signed 7/19/2022 at 11:38 AM     I

## 2022-07-19 NOTE — PLAN OF CARE
Problem: Discharge Planning  Goal: Discharge to home or other facility with appropriate resources  Outcome: Progressing Towards Goal  Flowsheets (Taken 7/18/2022 0922)  Discharge to home or other facility with appropriate resources: Arrange for needed discharge resources and transportation as appropriate     Problem: Pain  Goal: Verbalizes/displays adequate comfort level or baseline comfort level  Outcome: Progressing Towards Goal     Problem: Safety - Adult  Goal: Free from fall injury  Outcome: Progressing Towards Goal     Problem: Skin/Tissue Integrity  Goal: Absence of new skin breakdown  Description: 1. Monitor for areas of redness and/or skin breakdown  2. Assess vascular access sites hourly  3. Every 4-6 hours minimum:  Change oxygen saturation probe site  4. Every 4-6 hours:  If on nasal continuous positive airway pressure, respiratory therapy assess nares and determine need for appliance change or resting period.   Outcome: Progressing Towards Goal     Problem: ABCDS Injury Assessment  Goal: Absence of physical injury  Outcome: Progressing Towards Goal     Problem: Nutrition Deficit:  Goal: Optimize nutritional status  Outcome: Progressing Towards Goal  Flowsheets (Taken 7/18/2022 1426 by Waqas Swenson, RD, LD)  Nutrient intake appropriate for improving, restoring, or maintaining nutritional needs:   Assess nutritional status and recommend course of action   Recommend, monitor, and adjust tube feedings and TPN/PPN based on assessed needs

## 2022-07-19 NOTE — PROGRESS NOTES
[facility] Progress Note           Subjective/   74y.o. year old female who we are seeing in consultation for DA. HPI:  Renal function better, urinating though not collecting all. Denies any shortness of breath. ROS:  Intake reduced, on TF's, +weak. Objective/   GEN:  Chronically ill, BP (!) 142/83   Pulse 73   Temp 97.4 °F (36.3 °C) (Oral)   Resp 16   Ht 5' 4\" (1.626 m)   Wt 156 lb 1.6 oz (70.8 kg)   SpO2 92%   BMI 26.79 kg/m²   HEENT: non-icteric, no JVD  CV: S1, S2 without m/r/g;  + LE edema  RESP: CTA B without w/r/r; breathing wnl  ABD: +bs, soft, nt, no hsm  SKIN: warm, no rashes    Data/  No results for input(s): WBC, HGB, HCT, MCV, PLT in the last 72 hours. Recent Labs     07/17/22  0320 07/18/22  0615 07/19/22  0753    143 136   K 3.8 4.5 4.6    108 101   CO2 25 25 20*   GLUCOSE 140* 123* 111*   PHOS 3.3 3.4 3.5   BUN 16 14 14   CREATININE 1.4* 1.2 1.2   LABGLOM 37* 44* 44*   GFRAA 44* 53* 53*         Assessment/     -DA likely has developed ATN w hypovolemia, hypotension, NSAIDs use and contrast use. Scr improving / non-oliguric     -Perforated prepyloric ulcer s/p laparotomy with repair n jejunostomy insertion on 6/28/22              On zosyn     -Metabolic acidosis from DA, resolved     -Hypertension. BP range is good. -Atrial Fibrillation     -Recent COVID from 6/22-24/22     -h/o colon cancer    -Hypernatremia - better with free water replacement via NG       Plan/     - Reduce free water to 200 ml q4 hours  - Prn lasix  - Trend labs, bp's, & urine output    ____________________________________  Marlena Ellington MD  [facility]  [email or web address]  Office: [phone]

## 2022-07-19 NOTE — PROGRESS NOTES
Occupational Therapy/Physical Therapy  Attempted OT/PT treatment and the pt declined. The pt indicated she was too tired and did not open eyes during conversation. Pt declined bed exercises as well.   Stephenie Olvera OTR/L [id number]  Violeta Salazar, PT, DPT

## 2022-07-19 NOTE — CARE COORDINATION
NICOLE spoke with Anjana Bazan from [address] [place] [address] to inquire about status of referral. Per PT/OT notes, pt has not been willing to work with them x2 days. Pt is not appropriate at this time for IPR level of care. Pt will need to work with therapy to be appropriate for SNF as well. UGI today. Will follow for further plans per Gen Sx and discuss with pt/ family. Will follow and assist with needs as able.      Tabitha Luna, ABDULAZIZ

## 2022-07-19 NOTE — FLOWSHEET NOTE
Prn oxycodone given at 1549 via J-tube, flushed with water post administration.  See MAR.        07/18/22 1522 07/18/22 1533   Vital Signs   Temp 98.6 °F (37 °C)  --    Temp Source Oral  --    Heart Rate 72  --    Heart Rate Source Monitor  --    Resp 18 18   BP (!) 147/76  --    BP Location Right upper arm  --    BP Method Automatic  --    MAP (Calculated) 99.67  --    Patient Position Semi fowlers  --    Level of Consciousness Alert (0)  --    MEWS Score 1  --    Pain Assessment   Pain Assessment  --  0-10   Pain Level  --  9   Patient's Stated Pain Goal  --  0 - No pain   Pain Location  --  Abdomen   Pain Orientation  --  Upper;Right;Left   Pain Descriptors  --  Stabbing   Functional Pain Assessment  --  Prevents or interferes some active activities and ADLs   Pain Type  --  Acute pain;Surgical pain   Pain Frequency  --  Continuous   Pain Onset  --  On-going   Non-Pharmaceutical Pain Intervention(s)  --  Rest   Opioid-Induced Sedation   POSS Score  --  1   Oxygen Therapy   SpO2 95 %  --    O2 Device None (Room air)  --

## 2022-07-20 ENCOUNTER — APPOINTMENT (OUTPATIENT)
Dept: INTERVENTIONAL RADIOLOGY/VASCULAR | Age: 72
DRG: 326 | End: 2022-07-20
Payer: MEDICARE

## 2022-07-20 LAB
ALBUMIN SERPL-MCNC: 3.2 G/DL (ref 3.4–5)
ANION GAP SERPL CALCULATED.3IONS-SCNC: 12 MMOL/L (ref 3–16)
BUN BLDV-MCNC: 12 MG/DL (ref 7–20)
CALCIUM SERPL-MCNC: 8.7 MG/DL (ref 8.3–10.6)
CHLORIDE BLD-SCNC: 102 MMOL/L (ref 99–110)
CO2: 24 MMOL/L (ref 21–32)
CREAT SERPL-MCNC: 1.3 MG/DL (ref 0.6–1.2)
GFR AFRICAN AMERICAN: 48
GFR NON-AFRICAN AMERICAN: 40
GLUCOSE BLD-MCNC: 112 MG/DL (ref 70–99)
GLUCOSE BLD-MCNC: 123 MG/DL (ref 70–99)
GLUCOSE BLD-MCNC: 95 MG/DL (ref 70–99)
GLUCOSE BLD-MCNC: 98 MG/DL (ref 70–99)
PERFORMED ON: ABNORMAL
PERFORMED ON: NORMAL
PERFORMED ON: NORMAL
PHOSPHORUS: 4.1 MG/DL (ref 2.5–4.9)
POTASSIUM SERPL-SCNC: 4.3 MMOL/L (ref 3.5–5.1)
SODIUM BLD-SCNC: 138 MMOL/L (ref 136–145)

## 2022-07-20 PROCEDURE — C9113 INJ PANTOPRAZOLE SODIUM, VIA: HCPCS | Performed by: NURSE PRACTITIONER

## 2022-07-20 PROCEDURE — 36410 VNPNXR 3YR/> PHY/QHP DX/THER: CPT

## 2022-07-20 PROCEDURE — 1200000000 HC SEMI PRIVATE

## 2022-07-20 PROCEDURE — 2580000003 HC RX 258: Performed by: SURGERY

## 2022-07-20 PROCEDURE — C1751 CATH, INF, PER/CENT/MIDLINE: HCPCS

## 2022-07-20 PROCEDURE — 6370000000 HC RX 637 (ALT 250 FOR IP): Performed by: NURSE PRACTITIONER

## 2022-07-20 PROCEDURE — 80069 RENAL FUNCTION PANEL: CPT

## 2022-07-20 PROCEDURE — 6360000002 HC RX W HCPCS: Performed by: NURSE PRACTITIONER

## 2022-07-20 PROCEDURE — 6360000002 HC RX W HCPCS: Performed by: SURGERY

## 2022-07-20 PROCEDURE — 99024 POSTOP FOLLOW-UP VISIT: CPT | Performed by: NURSE PRACTITIONER

## 2022-07-20 RX ADMIN — HEPARIN SODIUM 5000 UNITS: 5000 INJECTION INTRAVENOUS; SUBCUTANEOUS at 10:56

## 2022-07-20 RX ADMIN — HEPARIN SODIUM 5000 UNITS: 5000 INJECTION INTRAVENOUS; SUBCUTANEOUS at 18:56

## 2022-07-20 RX ADMIN — MORPHINE SULFATE 1 MG: 2 INJECTION, SOLUTION INTRAMUSCULAR; INTRAVENOUS at 05:13

## 2022-07-20 RX ADMIN — MICONAZOLE NITRATE: 2 OINTMENT TOPICAL at 22:07

## 2022-07-20 RX ADMIN — SODIUM CHLORIDE, PRESERVATIVE FREE 10 ML: 5 INJECTION INTRAVENOUS at 00:05

## 2022-07-20 RX ADMIN — PANTOPRAZOLE SODIUM 40 MG: 40 INJECTION, POWDER, LYOPHILIZED, FOR SOLUTION INTRAVENOUS at 10:56

## 2022-07-20 RX ADMIN — PIPERACILLIN AND TAZOBACTAM 3375 MG: 3; .375 INJECTION, POWDER, LYOPHILIZED, FOR SOLUTION INTRAVENOUS at 22:06

## 2022-07-20 RX ADMIN — PANTOPRAZOLE SODIUM 40 MG: 40 INJECTION, POWDER, LYOPHILIZED, FOR SOLUTION INTRAVENOUS at 22:02

## 2022-07-20 RX ADMIN — HEPARIN SODIUM 5000 UNITS: 5000 INJECTION INTRAVENOUS; SUBCUTANEOUS at 00:20

## 2022-07-20 RX ADMIN — PIPERACILLIN AND TAZOBACTAM 3375 MG: 3; .375 INJECTION, POWDER, LYOPHILIZED, FOR SOLUTION INTRAVENOUS at 05:13

## 2022-07-20 RX ADMIN — MICONAZOLE NITRATE: 2 OINTMENT TOPICAL at 10:57

## 2022-07-20 RX ADMIN — ONDANSETRON HYDROCHLORIDE 4 MG: 2 INJECTION, SOLUTION INTRAMUSCULAR; INTRAVENOUS at 05:15

## 2022-07-20 RX ADMIN — ONDANSETRON HYDROCHLORIDE 4 MG: 2 INJECTION, SOLUTION INTRAMUSCULAR; INTRAVENOUS at 10:56

## 2022-07-20 RX ADMIN — MORPHINE SULFATE 1 MG: 2 INJECTION, SOLUTION INTRAMUSCULAR; INTRAVENOUS at 00:21

## 2022-07-20 RX ADMIN — PANTOPRAZOLE SODIUM 40 MG: 40 INJECTION, POWDER, LYOPHILIZED, FOR SOLUTION INTRAVENOUS at 00:05

## 2022-07-20 RX ADMIN — PIPERACILLIN AND TAZOBACTAM 3375 MG: 3; .375 INJECTION, POWDER, LYOPHILIZED, FOR SOLUTION INTRAVENOUS at 14:49

## 2022-07-20 RX ADMIN — MICONAZOLE NITRATE: 2 OINTMENT TOPICAL at 00:06

## 2022-07-20 RX ADMIN — MORPHINE SULFATE 1 MG: 2 INJECTION, SOLUTION INTRAMUSCULAR; INTRAVENOUS at 10:56

## 2022-07-20 RX ADMIN — OXYCODONE HYDROCHLORIDE 5 MG: 5 SOLUTION ORAL at 20:08

## 2022-07-20 ASSESSMENT — PAIN DESCRIPTION - LOCATION
LOCATION: ABDOMEN;BACK;HEAD
LOCATION: BACK;RIB CAGE
LOCATION: BACK;RIB CAGE

## 2022-07-20 ASSESSMENT — PAIN DESCRIPTION - DESCRIPTORS
DESCRIPTORS: ACHING
DESCRIPTORS: ACHING

## 2022-07-20 ASSESSMENT — PAIN SCALES - GENERAL
PAINLEVEL_OUTOF10: 8
PAINLEVEL_OUTOF10: 9
PAINLEVEL_OUTOF10: 8

## 2022-07-20 NOTE — FLOWSHEET NOTE
return each foot. Some medical conditions may render a patient from stepping backwards, use your best clinical judgement. Fail- Patient not able to complete tasks OR requires use of assistive device. Patient is MOBILITY LEVEL 3.        Mobility Level- 2

## 2022-07-20 NOTE — CONSULTS
Palliative Care Initial Note  Palliative Care Admit date:07/20/2022  ACP/Palliative Care this date.     Velma Lopez RN Palliative Care

## 2022-07-20 NOTE — PROGRESS NOTES
Shift report received from Mj Perry, [facility].

## 2022-07-20 NOTE — PROGRESS NOTES
200 ml   Output 610 ml   Net -410 ml       URINARY CATHETER OUTPUT (Curiel):     GENERAL: alert, flat affect, crying slightly, upset at times. I/O last 3 completed shifts: In: 2263 [NG/GT:2263]  Out: 1010 [Urine:500; Emesis/NG output:500; Drains:10]  No intake/output data recorded.     LABS  Recent Labs     07/20/22 0524      K 4.3      CO2 24   BUN 12   CREATININE 1.3*   PHOS 4.1   CALCIUM 8.7       CBC with Differential:    Lab Results   Component Value Date/Time    WBC 8.1 07/15/2022 12:50 PM    RBC 2.55 07/15/2022 12:50 PM    RBC 4.41 08/02/2016 10:34 AM    HGB 7.5 07/15/2022 12:50 PM    HCT 22.4 07/15/2022 12:50 PM     07/15/2022 12:50 PM    MCV 87.6 07/15/2022 12:50 PM    MCH 29.5 07/15/2022 12:50 PM    MCHC 33.6 07/15/2022 12:50 PM    RDW 15.4 07/15/2022 12:50 PM    BANDSPCT 1 07/05/2022 04:36 AM    LYMPHOPCT 13.9 07/15/2022 12:50 PM    LYMPHOPCT 25.3 08/02/2016 10:34 AM    MONOPCT 7.7 07/15/2022 12:50 PM    BASOPCT 0.4 07/15/2022 12:50 PM    MONOSABS 0.6 07/15/2022 12:50 PM    LYMPHSABS 1.1 07/15/2022 12:50 PM    EOSABS 0.2 07/15/2022 12:50 PM    BASOSABS 0.0 07/15/2022 12:50 PM     CMP:    Lab Results   Component Value Date/Time     07/20/2022 05:24 AM    K 4.3 07/20/2022 05:24 AM    K 3.8 06/29/2022 05:19 AM     07/20/2022 05:24 AM    CO2 24 07/20/2022 05:24 AM    BUN 12 07/20/2022 05:24 AM    CREATININE 1.3 07/20/2022 05:24 AM    GFRAA 48 07/20/2022 05:24 AM    AGRATIO 0.8 06/29/2022 05:19 AM    LABGLOM 40 07/20/2022 05:24 AM    GLUCOSE 112 07/20/2022 05:24 AM    GLUCOSE 85 08/02/2016 10:34 AM    PROT 4.7 06/29/2022 05:19 AM    PROT 5.7 08/02/2016 10:34 AM    LABALBU 3.2 07/20/2022 05:24 AM    CALCIUM 8.7 07/20/2022 05:24 AM    BILITOT 0.6 06/29/2022 05:19 AM    ALKPHOS 84 06/29/2022 05:19 AM    AST 49 06/29/2022 05:19 AM    ALT 48 06/29/2022 05:19 AM         ILAN Pacheco - CNP  Electronically signed 7/20/2022 at 3:44 PM     I

## 2022-07-20 NOTE — PROGRESS NOTES
Pt has new midline. Able to hang antibiotic. Tried to flush the tube feed and still occluded. Pushed 2 more doses of clog buster.  Tube feed will need to have the water flush changed from 100ml to 200ml every 4 hours per Nephro

## 2022-07-20 NOTE — PROGRESS NOTES
Tried to give altapase through PICC. Still occluded. Called PICC team back. They will come and access to see if they can wire or will need a new line.

## 2022-07-20 NOTE — PROGRESS NOTES
[facility] Progress Note           Subjective/   74y.o. year old female who we are seeing in consultation for DA. HPI:  Renal function better. Denies any shortness of breath. ROS:  Intake reduced, on TF's, +weak. Objective/   GEN:  Chronically ill, BP (!) 129/59   Pulse 71   Temp 98.1 °F (36.7 °C) (Axillary)   Resp 18   Ht 5' 4\" (1.626 m)   Wt 156 lb 1.6 oz (70.8 kg)   SpO2 96%   BMI 26.79 kg/m²   HEENT: non-icteric, no JVD  CV: S1, S2 without m/r/g;  + LE edema  RESP: CTA B without w/r/r; breathing wnl  ABD: +bs, soft, nt, no hsm  SKIN: warm, no rashes    Data/  No results for input(s): WBC, HGB, HCT, MCV, PLT in the last 72 hours. Recent Labs     07/18/22  0615 07/19/22  0753 07/20/22  0524    136 138   K 4.5 4.6 4.3    101 102   CO2 25 20* 24   GLUCOSE 123* 111* 112*   PHOS 3.4 3.5 4.1   BUN 14 14 12   CREATININE 1.2 1.2 1.3*   LABGLOM 44* 44* 40*   GFRAA 53* 53* 48*         Assessment/     -DA likely has developed ATN w hypovolemia, hypotension, NSAIDs use and contrast use. Scr improving / non-oliguric     -Perforated prepyloric ulcer s/p laparotomy with repair n jejunostomy insertion on 6/28/22              On zosyn     -Metabolic acidosis from DA, resolved     -Hypertension. BP range is good. -Atrial Fibrillation     -Recent COVID from 6/22-24/22     -h/o colon cancer    -Hypernatremia - better with free water replacement via NG       Plan/     - Reduced free water to 200 ml q4 hours  - Prn lasix  - Trend labs, bp's, & urine output    ____________________________________  Spenser Landry MD  [facility]  [email or web address]  Office: [phone]

## 2022-07-20 NOTE — PROGRESS NOTES
Call back from Gen Surgery, Dr Lyndon Berry, about pt occuluded midline. He stated we can try the cathflow or just wait till morning and contact PICC team. Order is placed for cathflow. New tube feed hung at 60/hr. Dr Marny Favre to hold Zosyn since no access.

## 2022-07-20 NOTE — PROGRESS NOTES
Attempted to hang abx but new midline would not flush. Charge RN was called into see if she could help. There seems to be a small blood clot in line and not even sure the cath flow will work. Perfect serve [employer]. No new orders, Dang has not been able to run.

## 2022-07-20 NOTE — CARE COORDINATION
INTERDISCIPLINARY PLAN OF CARE CONFERENCE    Date/Time: 7/20/2022 5:49 PM  Completed by: Skylar Alfaro RN, Case Management      Patient Name:  Kim Beebe  YOB: 1950  Admitting Diagnosis: Gastric perforation, acute [K31.89]  Perforated peptic ulcer ([facility] [address] [K27.5]     Admit Date/Time:  6/28/2022  6:58 AM    Chart reviewed. Interdisciplinary team contacted or reviewed plan related to patient progress and discharge plans. Disciplines included Case Management, Nursing, and Dietitian. Current Status:Stable  PT/OT recommendation for discharge plan of care: SNF    Expected D/C Disposition:  Rehab  Confirmed plan with patient and/or family Yes confirmed with: (name) spouse  Met with: patient and spouse  Discharge Plan Comments:  Reviewed chart and met with pt and spouse at bedside. Discussed that ARU is unable to accept as not participating in therapy. Pt and spouse both states pt still has \"leak\" and will start theapy when leak resolved. Explained that pt needs to participate in therapy as lying in bed will increase weakness and will need rehab. Pt states will not go to SNF. Discussed options and spouse and pt do not want to discuss any more today as pt not dc ready. Update January NP. Will cont to follow.     Home O2 in place on admit: No  Pt informed of need to bring portable home O2 tank on day of discharge for nursing to connect prior to leaving:  Not Indicated  Verbalized agreement/Understanding:  Not Indicated

## 2022-07-20 NOTE — PROGRESS NOTES
Pt was placed back on tube feed but line was occluded. NG to CLWS. Spoke to [facility] about PICC and Jtube not flushing. Clog buster was used on Jtube. Called PICC team. Informed to try dressing change to see if line is kinked.

## 2022-07-21 LAB
ALBUMIN SERPL-MCNC: 2.8 G/DL (ref 3.4–5)
ANION GAP SERPL CALCULATED.3IONS-SCNC: 10 MMOL/L (ref 3–16)
ANION GAP SERPL CALCULATED.3IONS-SCNC: 9 MMOL/L (ref 3–16)
ANISOCYTOSIS: ABNORMAL
ATYPICAL LYMPHOCYTE RELATIVE PERCENT: 2 % (ref 0–6)
BANDED NEUTROPHILS RELATIVE PERCENT: 1 % (ref 0–7)
BASOPHILS ABSOLUTE: 0 K/UL (ref 0–0.2)
BASOPHILS RELATIVE PERCENT: 0 %
BUN BLDV-MCNC: 12 MG/DL (ref 7–20)
BUN BLDV-MCNC: 13 MG/DL (ref 7–20)
CALCIUM SERPL-MCNC: 8.2 MG/DL (ref 8.3–10.6)
CALCIUM SERPL-MCNC: 8.4 MG/DL (ref 8.3–10.6)
CHLORIDE BLD-SCNC: 101 MMOL/L (ref 99–110)
CHLORIDE BLD-SCNC: 102 MMOL/L (ref 99–110)
CO2: 23 MMOL/L (ref 21–32)
CO2: 25 MMOL/L (ref 21–32)
CREAT SERPL-MCNC: 1.2 MG/DL (ref 0.6–1.2)
CREAT SERPL-MCNC: 1.3 MG/DL (ref 0.6–1.2)
EOSINOPHILS ABSOLUTE: 0.2 K/UL (ref 0–0.6)
EOSINOPHILS RELATIVE PERCENT: 2 %
GFR AFRICAN AMERICAN: 48
GFR AFRICAN AMERICAN: 53
GFR NON-AFRICAN AMERICAN: 40
GFR NON-AFRICAN AMERICAN: 44
GLUCOSE BLD-MCNC: 112 MG/DL (ref 70–99)
GLUCOSE BLD-MCNC: 115 MG/DL (ref 70–99)
GLUCOSE BLD-MCNC: 123 MG/DL (ref 70–99)
GLUCOSE BLD-MCNC: 123 MG/DL (ref 70–99)
GLUCOSE BLD-MCNC: 127 MG/DL (ref 70–99)
GLUCOSE BLD-MCNC: 132 MG/DL (ref 70–99)
GLUCOSE BLD-MCNC: 90 MG/DL (ref 70–99)
HCT VFR BLD CALC: 22.1 % (ref 36–48)
HEMOGLOBIN: 7.6 G/DL (ref 12–16)
LYMPHOCYTES ABSOLUTE: 1.7 K/UL (ref 1–5.1)
LYMPHOCYTES RELATIVE PERCENT: 14 %
MCH RBC QN AUTO: 30.5 PG (ref 26–34)
MCHC RBC AUTO-ENTMCNC: 34.2 G/DL (ref 31–36)
MCV RBC AUTO: 89.2 FL (ref 80–100)
MONOCYTES ABSOLUTE: 0.4 K/UL (ref 0–1.3)
MONOCYTES RELATIVE PERCENT: 4 %
NEUTROPHILS ABSOLUTE: 8.3 K/UL (ref 1.7–7.7)
NEUTROPHILS RELATIVE PERCENT: 77 %
PDW BLD-RTO: 17.1 % (ref 12.4–15.4)
PERFORMED ON: ABNORMAL
PERFORMED ON: NORMAL
PHOSPHORUS: 3.5 MG/DL (ref 2.5–4.9)
PLATELET # BLD: 401 K/UL (ref 135–450)
PLATELET SLIDE REVIEW: ADEQUATE
PMV BLD AUTO: 7.2 FL (ref 5–10.5)
POTASSIUM SERPL-SCNC: 4.3 MMOL/L (ref 3.5–5.1)
POTASSIUM SERPL-SCNC: 4.4 MMOL/L (ref 3.5–5.1)
RBC # BLD: 2.47 M/UL (ref 4–5.2)
SLIDE REVIEW: ABNORMAL
SODIUM BLD-SCNC: 134 MMOL/L (ref 136–145)
SODIUM BLD-SCNC: 136 MMOL/L (ref 136–145)
WBC # BLD: 10.6 K/UL (ref 4–11)

## 2022-07-21 PROCEDURE — 2580000003 HC RX 258: Performed by: SURGERY

## 2022-07-21 PROCEDURE — 6360000002 HC RX W HCPCS: Performed by: NURSE PRACTITIONER

## 2022-07-21 PROCEDURE — C9113 INJ PANTOPRAZOLE SODIUM, VIA: HCPCS | Performed by: NURSE PRACTITIONER

## 2022-07-21 PROCEDURE — 97530 THERAPEUTIC ACTIVITIES: CPT

## 2022-07-21 PROCEDURE — 6360000002 HC RX W HCPCS: Performed by: SURGERY

## 2022-07-21 PROCEDURE — 97116 GAIT TRAINING THERAPY: CPT

## 2022-07-21 PROCEDURE — 80069 RENAL FUNCTION PANEL: CPT

## 2022-07-21 PROCEDURE — 97110 THERAPEUTIC EXERCISES: CPT

## 2022-07-21 PROCEDURE — 99024 POSTOP FOLLOW-UP VISIT: CPT | Performed by: NURSE PRACTITIONER

## 2022-07-21 PROCEDURE — 97535 SELF CARE MNGMENT TRAINING: CPT

## 2022-07-21 PROCEDURE — 1200000000 HC SEMI PRIVATE

## 2022-07-21 PROCEDURE — 85025 COMPLETE CBC W/AUTO DIFF WBC: CPT

## 2022-07-21 RX ADMIN — MORPHINE SULFATE 1 MG: 2 INJECTION, SOLUTION INTRAMUSCULAR; INTRAVENOUS at 16:25

## 2022-07-21 RX ADMIN — ONDANSETRON HYDROCHLORIDE 4 MG: 2 INJECTION, SOLUTION INTRAMUSCULAR; INTRAVENOUS at 14:02

## 2022-07-21 RX ADMIN — HEPARIN SODIUM 5000 UNITS: 5000 INJECTION INTRAVENOUS; SUBCUTANEOUS at 11:51

## 2022-07-21 RX ADMIN — HEPARIN SODIUM 5000 UNITS: 5000 INJECTION INTRAVENOUS; SUBCUTANEOUS at 01:00

## 2022-07-21 RX ADMIN — PIPERACILLIN AND TAZOBACTAM 3375 MG: 3; .375 INJECTION, POWDER, LYOPHILIZED, FOR SOLUTION INTRAVENOUS at 22:56

## 2022-07-21 RX ADMIN — HEPARIN SODIUM 5000 UNITS: 5000 INJECTION INTRAVENOUS; SUBCUTANEOUS at 17:25

## 2022-07-21 RX ADMIN — PIPERACILLIN AND TAZOBACTAM 3375 MG: 3; .375 INJECTION, POWDER, LYOPHILIZED, FOR SOLUTION INTRAVENOUS at 13:45

## 2022-07-21 RX ADMIN — MICONAZOLE NITRATE: 2 OINTMENT TOPICAL at 11:52

## 2022-07-21 RX ADMIN — PANTOPRAZOLE SODIUM 40 MG: 40 INJECTION, POWDER, LYOPHILIZED, FOR SOLUTION INTRAVENOUS at 11:51

## 2022-07-21 RX ADMIN — SODIUM CHLORIDE: 9 INJECTION, SOLUTION INTRAVENOUS at 13:44

## 2022-07-21 RX ADMIN — PIPERACILLIN AND TAZOBACTAM 3375 MG: 3; .375 INJECTION, POWDER, LYOPHILIZED, FOR SOLUTION INTRAVENOUS at 05:49

## 2022-07-21 RX ADMIN — MICONAZOLE NITRATE: 2 OINTMENT TOPICAL at 21:35

## 2022-07-21 ASSESSMENT — PAIN SCALES - GENERAL
PAINLEVEL_OUTOF10: 8
PAINLEVEL_OUTOF10: 8

## 2022-07-21 NOTE — PROGRESS NOTES
General Surgery   Daily Progress Note    Pt Name: Kishan Mcqueen Record Number: [id number]  Date of Birth 1950   Today's Date: 7/21/2022    ASSESSMENT  POD #23 s/p repair of perf ulcer, j-tube insertion  UGI 7/19: moderate amount of contrast extravasation  ABD: J-tube in place, JERRI drain in place, midline dressing changed: 2 open areas look good, serosanguinous drainage, + diffuse tenderness which appears improved and unchanged, +mild distention, NGT in place and has been clamped for 24 hours, no N/V, +flatus, + BMs     ARF improving: Cr 1.3  K+ 4.3  Leuks 10.6  VSS  NGT: 100  UO OK  JERRI with minimal drainage in the bulb: thick tan/yellow  Pt states \"I don't want to talk to you, I don't want to do any of this anymore. I am sick of being sick. \". PLAN  NGT leave clamp but, leave pt NPO  Renal seeing. Strict I&Os  Pain control   Zosyn  PT/OT: OOB/Ambulate: discussed with PT/OT: they will see the patient every day this weekend, we are going to re-evaluate the patient for ARU on Monday  Protonix BID  Wound care Rn seeing pt  TF rate at goal  BID dressing changes to midline incision. Pt looks OK POD #23: Pt with post-op ulcer repair leak on UGI which appears to be controlled with the JERRI drain. We are planning PT/OT this weekend. Pt is agreeable to try. Discussed at length with pt and her two daughter for 1 hour. Planning to have ARU reevaluate on Monday. Discussed with social work. Kendrick Meredith is unchanged from yesterday. Pain is better controlled. She has no nausea and no vomiting. She has passed flatus and has had a bowel movement. She is NPO and tolerating TF. Current activity is up with assistance    OBJECTIVE  VITALS:  height is 5' 4\" (1.626 m) and weight is 156 lb 1.6 oz (70.8 kg). Her oral temperature is 100.1 °F (37.8 °C). Her blood pressure is 143/74 (abnormal) and her pulse is 74. Her respiration is 18 and oxygen saturation is 95%.    VITALS:  BP (!) 143/74   Pulse 74 Temp 100.1 °F (37.8 °C) (Oral)   Resp 18   Ht 5' 4\" (1.626 m)   Wt 156 lb 1.6 oz (70.8 kg)   SpO2 95%   BMI 26.79 kg/m²   INTAKE/OUTPUT:      Intake/Output Summary (Last 24 hours) at 7/21/2022 1349  Last data filed at 7/21/2022 0541  Gross per 24 hour   Intake 1291 ml   Output 2150 ml   Net -859 ml       URINARY CATHETER OUTPUT (Curiel):     GENERAL: alert, pleasant, in better spirits today   I/O last 3 completed shifts: In: 7287 [NG/GT:1491]  Out: 65 [Urine:2650; Emesis/NG output:100; Drains:10]  No intake/output data recorded.     LABS  Recent Labs     07/21/22  0557 07/21/22  1145   WBC  --  10.6   HGB  --  7.6*   HCT  --  22.1*   PLT  --  401   * 136   K 4.4 4.3    102   CO2 23 25   BUN 12 13   CREATININE 1.2 1.3*   PHOS 3.5  --    CALCIUM 8.2* 8.4       CBC with Differential:    Lab Results   Component Value Date/Time    WBC 10.6 07/21/2022 11:45 AM    RBC 2.47 07/21/2022 11:45 AM    RBC 4.41 08/02/2016 10:34 AM    HGB 7.6 07/21/2022 11:45 AM    HCT 22.1 07/21/2022 11:45 AM     07/21/2022 11:45 AM    MCV 89.2 07/21/2022 11:45 AM    MCH 30.5 07/21/2022 11:45 AM    MCHC 34.2 07/21/2022 11:45 AM    RDW 17.1 07/21/2022 11:45 AM    BANDSPCT 1 07/05/2022 04:36 AM    LYMPHOPCT 13.9 07/15/2022 12:50 PM    LYMPHOPCT 25.3 08/02/2016 10:34 AM    MONOPCT 7.7 07/15/2022 12:50 PM    BASOPCT 0.4 07/15/2022 12:50 PM    MONOSABS 0.6 07/15/2022 12:50 PM    LYMPHSABS 1.1 07/15/2022 12:50 PM    EOSABS 0.2 07/15/2022 12:50 PM    BASOSABS 0.0 07/15/2022 12:50 PM     CMP:    Lab Results   Component Value Date/Time     07/21/2022 11:45 AM    K 4.3 07/21/2022 11:45 AM    K 3.8 06/29/2022 05:19 AM     07/21/2022 11:45 AM    CO2 25 07/21/2022 11:45 AM    BUN 13 07/21/2022 11:45 AM    CREATININE 1.3 07/21/2022 11:45 AM    GFRAA 48 07/21/2022 11:45 AM    AGRATIO 0.8 06/29/2022 05:19 AM    LABGLOM 40 07/21/2022 11:45 AM    GLUCOSE 112 07/21/2022 11:45 AM    GLUCOSE 85 08/02/2016 10:34 AM    PROT 4.7 06/29/2022 05:19 AM    PROT 5.7 08/02/2016 10:34 AM    LABALBU 2.8 07/21/2022 05:57 AM    CALCIUM 8.4 07/21/2022 11:45 AM    BILITOT 0.6 06/29/2022 05:19 AM    ALKPHOS 84 06/29/2022 05:19 AM    AST 49 06/29/2022 05:19 AM    ALT 48 06/29/2022 05:19 AM         Amber Marliss Sandhoff, APRN - CNP  Electronically signed 7/21/2022 at 1:49 PM     I

## 2022-07-21 NOTE — PROGRESS NOTES
Occupational Therapy Daily Treatment Note    Unit: [address] [place]  Date:  7/21/2022  Patient Name:    Jah Nieves  Admitting diagnosis:  Gastric perforation, acute [K31.89]  Perforated peptic ulcer ([facility] [address] [K27.5]  Admit Date:  6/28/2022  Precautions/Restrictions:    fall risk, IV, bed/chair alarm, waddell catheter , NG, telemetry, telesitter and continuous pulse ox      Discharge Recommendations ARU   DME needs for discharge: defer to facility       Therapy recommendations for staff:   Assist of 2 with use of rolling walker (RW) for all transfers to/from [facility]  to/from chair with gait belt     AM-PAC Score: AM-PAC Inpatient Daily Activity Raw Score: 9  Home Health S4 Level: NA       Treatment Time:  0678-4391   Treatment number:  4  Timed code treatment minutes: 60 minutes  Total treatment minutes:  60minutes    History of Present Illness:   s/p emergent repair of perf ulcer with j-tube insertion on 6/28 by Dr. Cobian, subsequent DA and hypertension. Recent COVID from 6/22-24/22, h/o colon cancer. Subjective:  Pt agreeable to participate in OT     Pain   Yes  Rating: significant / mod   Location:abdomen   Pain Medicine Status: no pain med requested        Bed Mobility:   Supine to Sit:  min assist   Sit to Supine:  min assist   Rolling:           NT  Scooting:        total assist in the bed      Transfer Training:   Sit to stand:   Min A   of two with RW and gait belt   Stand to sit:  Min A   BSC  to bed :  Min A with RW and gait belt   Bed to BSC:   Min a with RW and gait belt   Standard toilet:   Not Tested    Activity Tolerance   Pt completed therapy session with pain and LE weakness and decreased endurance   ADL Training:   Upper body dressing: Not Tested  Upper body bathing:  Not Tested    Lower body dressing:  Max A   Lower body bathing:  Max A   Toileting:   Min A   Grooming/Hygiene:    max assist   Pt has an open area on buttocks and pts nurse okayed a bandage to be put over open area.  Clean pure wick placed on

## 2022-07-21 NOTE — PROGRESS NOTES
Inpatient Physical Therapy Daily Treatment Note    Unit: 2 [place]  Date:  7/21/2022  Patient Name:    Sudheer Smith  Admitting diagnosis:  Gastric perforation, acute [K31.89]  Perforated peptic ulcer ([facility] [address] [K27.5]  Admit Date:  6/28/2022  Precautions/Restrictions:  Fall risk, Bed/chair alarm and Lines -IV, NG tube, PICC left, Drains (JERRI) and abdominal tube feed , abdominal incision  Telemetry, continuous pulse ox    Discharge Recommendations: SNF    Comment: discussed ARU option with pt and family today. Discussed that pt will need to demonstrate consistent participation with therapy in coming days; pt acknowledged understanding. Given indep PLOF, she would be a good ARU candidate if able to demo improved participation going forward. DME needs for discharge: defer to facility       Therapy recommendation for EMS Transport: can transport by wheelchair    Therapy recommendations for staff:   Assist of 1 for transfers to the chair or BSC with walker and gait belt. History of Present Illness: S/P emergent repair of perf ulcer with j-tube insertion on 6/28 by Dr. Kathleen Freeman, subsequent DA and hypertension. Recent COVID from 6/22-24/22, h/o colon cancer. Home Health S4 Level Recommendation: NA  AM-PAC Mobility Score   AM-PAC Inpatient Mobility Raw Score : 12  Latrice MARANDA Felipe scored a 12/24 on the AM-PAC short mobility form. Current research shows that an AM-PAC score of 17 or less is typically not associated with a discharge to the patient's home setting. If patient discharges prior to next session this note will serve as a discharge summary. Please see below for the latest assessment towards goals. Treatment Time:  14:45-15:45  Treatment number: 9   Timed Code Treatment Minutes: 60 minutes  Total Treatment Minutes:  60 minutes    Cognition    A&O orientation not directly assessed. Able to follow 2 step commands    Subjective  Patient lying supine in bed with family at bedside (2 daughters).   Pt agreeable to this PT tx. Pain   Yes  Location: abdomen (moderate), bottom (severe)  Rating:    severe/10   Pain Medicine Status: No request made. Mobility and repositioning for comfort. Bed Mobility   Supine to Sit:    Min A      Sit to Supine:   Min A    Rolling:   Not Tested  Scooting:   Supervision   Bridging:  Partial double-leg bridge  Bridging:  Partial single-leg bridge, able to initiate ~1/4 roll to both sides. Transfer Training   Sit to stand:   Min A from EOB     Min A from [facility] (2 trials)  Stand to sit:   CGA to BSC (2 trials)     CGA to EOB  Bed <> BSC:  Min A  with use of gait belt and rolling walker (RW)     Gait gait deferred due to fatigue; pt ambulated 0 ft. Stair Training deferred, pt unsafe/ not appropriate to complete stairs at this time    Therapeutic Exercise all completed bilaterally unless indicated   Single leg bridge x 3 reps  LEW reach cross-body x 3 reps  LEW reach cross-body with SLE bridge x 2 reps     Balance  Sitting: Good - ; Supervision  Comments: at EOB and BSC - extended duration. Standing: Fair +; CGA /c RW  Comments: 2 bouts of extended static standing during toileting. 3 minutes and 1 minute respectively. Pt assumed mildly flexed posture to facilitate posterior pericare by OT; PT provided CGA and cues for breathing/relaxation. Pt very fatigued upon completion of 3 minute bout. She said \"my legs feel shaky\". Heavy reliance of BUEs on walker for support. She is able to stand fully upright when cued to do so, but preferentially maintains a fwd flexed posture (when not participating in toileting tasks). Patient Education      Role of PT, POC, Discharge recommendations, DC recommendations, safety awareness, transfer techniques, HEP and calling for assist with mobility. Lengthy discussion RE ARU option; pt and family receptive. Pt agreeable to participate in future therapy sessions.      Positioning Needs       Pt in bed, alarm set, positioned in proper neutral alignment and discharges from this facility prior to next visit, this note will serve as the Discharge Summary.

## 2022-07-21 NOTE — PROGRESS NOTES
[facility] Progress Note           Subjective/   74y.o. year old female who we are seeing in consultation for DA. HPI:  Renal function better. Denies any shortness of breath. ROS:  Intake reduced, on TF's, +weak. Objective/   GEN:  Chronically ill, /63   Pulse 71   Temp 98.4 °F (36.9 °C) (Axillary)   Resp 18   Ht 5' 4\" (1.626 m)   Wt 156 lb 1.6 oz (70.8 kg)   SpO2 93%   BMI 26.79 kg/m²   HEENT: non-icteric, no JVD  CV: S1, S2 without m/r/g;  + LE edema  RESP: CTA B without w/r/r; breathing wnl  ABD: +bs, soft, nt, no hsm  SKIN: warm, no rashes    Data/  No results for input(s): WBC, HGB, HCT, MCV, PLT in the last 72 hours. Recent Labs     07/19/22  0753 07/20/22  0524 07/21/22  0557    138 134*   K 4.6 4.3 4.4    102 101   CO2 20* 24 23   GLUCOSE 111* 112* 115*   PHOS 3.5 4.1 3.5   BUN 14 12 12   CREATININE 1.2 1.3* 1.2   LABGLOM 44* 40* 44*   GFRAA 53* 48* 53*         Assessment/     -DA likely has developed ATN w hypovolemia, hypotension, NSAIDs use and contrast use. Scr improving / non-oliguric     -Perforated prepyloric ulcer s/p laparotomy with repair n jejunostomy insertion on 6/28/22              On zosyn     -Metabolic acidosis from DA, resolved     -Hypertension. BP range is good. -Atrial Fibrillation     -Recent COVID from 6/22-24/22     -h/o colon cancer    -Hypernatremia - better with free water replacement via NG       Plan/     - Reduce free water to 200 ml q6 hours  - Prn lasix  - Trend labs, bp's, & urine output    ____________________________________  Erin Roy MD  [facility]  [email or web address]  Office: [phone]

## 2022-07-21 NOTE — PROGRESS NOTES
Attempted to flush mid line left arm with 10 ml syringe unsuccessfully. Using a 3 ml syringe, line was flushed with good blood return. See PM shift assessment. Patient is sleeping; IV zosyn infusing at this time. J tube feeding in progress @ 60 ml/hr; no residual noted. NG tube clamped; no n/v noted. Midline dressing changed. Patient remains sleeping; respirs e/e, call light in reach.

## 2022-07-21 NOTE — PROGRESS NOTES
Shift assessment complete - See flow sheet. Medications given - See [facility]     Patient with  complaints of pain at this time. Patient denies any further needs. Bed alarm is set for patient safety/. A/O Call light in reach  Bedside Mobility Assessment Tool (BMAT):     Assessment Level 1- Sit and Shake    1. From a semi-reclined position, ask patient to sit up and rotate to a seated position at the side of the bed. Can use the bedrail. 2. Ask patient to reach out and grab your hand and shake making sure patient reaches across his/her midline. Fail- Patient is unable to perform tasks, patient is MOBILITY LEVEL 1. Assessment Level 2- Stretch and Point   1. With patient in seated position at the side of the bed, have patient place both feet on the floor (or stool) with knees no higher than hips. 2. Ask patient to stretch one leg and straighten the knee, then bend the ankle/flex and point the toes. If appropriate, repeat with the other leg. Fail- Patient is unable to complete task. Patient is MOBILITY LEVEL 2. Assessment Level 3- Stand   1. Ask patient to elevate off the bed or chair (seated to standing) using an assistive device (cane, bedrail). 2. Patient should be able to raise buttocks off be and hold for a count of five. May repeat once. Fail- Patient unable to demonstrate standing stability. Patient is MOBILITY LEVEL 3. Assessment Level 4- Walk   1. Ask patient to march in place at bedside. 2. Then ask patient to advance step and return each foot. Some medical conditions may render a patient from stepping backwards, use your best clinical judgement. Fail- Patient not able to complete tasks OR requires use of assistive device. Patient is MOBILITY LEVEL 3.        Mobility Level- 2

## 2022-07-22 LAB
ALBUMIN SERPL-MCNC: 2.9 G/DL (ref 3.4–5)
ANION GAP SERPL CALCULATED.3IONS-SCNC: 9 MMOL/L (ref 3–16)
BUN BLDV-MCNC: 14 MG/DL (ref 7–20)
CALCIUM SERPL-MCNC: 8.3 MG/DL (ref 8.3–10.6)
CHLORIDE BLD-SCNC: 104 MMOL/L (ref 99–110)
CO2: 23 MMOL/L (ref 21–32)
CREAT SERPL-MCNC: 1.3 MG/DL (ref 0.6–1.2)
GFR AFRICAN AMERICAN: 48
GFR NON-AFRICAN AMERICAN: 40
GLUCOSE BLD-MCNC: 102 MG/DL (ref 70–99)
GLUCOSE BLD-MCNC: 114 MG/DL (ref 70–99)
GLUCOSE BLD-MCNC: 118 MG/DL (ref 70–99)
GLUCOSE BLD-MCNC: 123 MG/DL (ref 70–99)
GLUCOSE BLD-MCNC: 136 MG/DL (ref 70–99)
PERFORMED ON: ABNORMAL
PHOSPHORUS: 3.4 MG/DL (ref 2.5–4.9)
POTASSIUM SERPL-SCNC: 4.7 MMOL/L (ref 3.5–5.1)
SODIUM BLD-SCNC: 136 MMOL/L (ref 136–145)

## 2022-07-22 PROCEDURE — 2580000003 HC RX 258: Performed by: SURGERY

## 2022-07-22 PROCEDURE — 97530 THERAPEUTIC ACTIVITIES: CPT

## 2022-07-22 PROCEDURE — 6360000002 HC RX W HCPCS: Performed by: NURSE PRACTITIONER

## 2022-07-22 PROCEDURE — 99024 POSTOP FOLLOW-UP VISIT: CPT | Performed by: NURSE PRACTITIONER

## 2022-07-22 PROCEDURE — 97535 SELF CARE MNGMENT TRAINING: CPT

## 2022-07-22 PROCEDURE — 97116 GAIT TRAINING THERAPY: CPT

## 2022-07-22 PROCEDURE — 80069 RENAL FUNCTION PANEL: CPT

## 2022-07-22 PROCEDURE — 6360000002 HC RX W HCPCS: Performed by: SURGERY

## 2022-07-22 PROCEDURE — 1200000000 HC SEMI PRIVATE

## 2022-07-22 PROCEDURE — 99024 POSTOP FOLLOW-UP VISIT: CPT | Performed by: SURGERY

## 2022-07-22 PROCEDURE — C9113 INJ PANTOPRAZOLE SODIUM, VIA: HCPCS | Performed by: NURSE PRACTITIONER

## 2022-07-22 RX ADMIN — PIPERACILLIN AND TAZOBACTAM 3375 MG: 3; .375 INJECTION, POWDER, LYOPHILIZED, FOR SOLUTION INTRAVENOUS at 21:34

## 2022-07-22 RX ADMIN — PANTOPRAZOLE SODIUM 40 MG: 40 INJECTION, POWDER, LYOPHILIZED, FOR SOLUTION INTRAVENOUS at 00:27

## 2022-07-22 RX ADMIN — HEPARIN SODIUM 5000 UNITS: 5000 INJECTION INTRAVENOUS; SUBCUTANEOUS at 00:27

## 2022-07-22 RX ADMIN — SODIUM CHLORIDE, PRESERVATIVE FREE 10 ML: 5 INJECTION INTRAVENOUS at 21:30

## 2022-07-22 RX ADMIN — MORPHINE SULFATE 1 MG: 2 INJECTION, SOLUTION INTRAMUSCULAR; INTRAVENOUS at 11:32

## 2022-07-22 RX ADMIN — PIPERACILLIN AND TAZOBACTAM 3375 MG: 3; .375 INJECTION, POWDER, LYOPHILIZED, FOR SOLUTION INTRAVENOUS at 14:54

## 2022-07-22 RX ADMIN — PANTOPRAZOLE SODIUM 40 MG: 40 INJECTION, POWDER, LYOPHILIZED, FOR SOLUTION INTRAVENOUS at 21:30

## 2022-07-22 RX ADMIN — ONDANSETRON HYDROCHLORIDE 4 MG: 2 INJECTION, SOLUTION INTRAMUSCULAR; INTRAVENOUS at 08:58

## 2022-07-22 RX ADMIN — MICONAZOLE NITRATE: 2 OINTMENT TOPICAL at 21:32

## 2022-07-22 RX ADMIN — MORPHINE SULFATE 1 MG: 2 INJECTION, SOLUTION INTRAMUSCULAR; INTRAVENOUS at 16:54

## 2022-07-22 RX ADMIN — HEPARIN SODIUM 5000 UNITS: 5000 INJECTION INTRAVENOUS; SUBCUTANEOUS at 08:30

## 2022-07-22 RX ADMIN — HEPARIN SODIUM 5000 UNITS: 5000 INJECTION INTRAVENOUS; SUBCUTANEOUS at 16:23

## 2022-07-22 RX ADMIN — PIPERACILLIN AND TAZOBACTAM 3375 MG: 3; .375 INJECTION, POWDER, LYOPHILIZED, FOR SOLUTION INTRAVENOUS at 06:21

## 2022-07-22 RX ADMIN — MORPHINE SULFATE 1 MG: 2 INJECTION, SOLUTION INTRAMUSCULAR; INTRAVENOUS at 08:58

## 2022-07-22 RX ADMIN — PANTOPRAZOLE SODIUM 40 MG: 40 INJECTION, POWDER, LYOPHILIZED, FOR SOLUTION INTRAVENOUS at 08:31

## 2022-07-22 RX ADMIN — MICONAZOLE NITRATE: 2 OINTMENT TOPICAL at 08:31

## 2022-07-22 RX ADMIN — SODIUM CHLORIDE, PRESERVATIVE FREE 10 ML: 5 INJECTION INTRAVENOUS at 08:31

## 2022-07-22 RX ADMIN — ONDANSETRON HYDROCHLORIDE 4 MG: 2 INJECTION, SOLUTION INTRAMUSCULAR; INTRAVENOUS at 16:53

## 2022-07-22 ASSESSMENT — PAIN DESCRIPTION - PAIN TYPE: TYPE: ACUTE PAIN;SURGICAL PAIN

## 2022-07-22 ASSESSMENT — PAIN SCALES - WONG BAKER: WONGBAKER_NUMERICALRESPONSE: 2

## 2022-07-22 ASSESSMENT — PAIN DESCRIPTION - ORIENTATION
ORIENTATION: RIGHT;LEFT;MID
ORIENTATION: MID;LOWER
ORIENTATION: RIGHT;LEFT;MID

## 2022-07-22 ASSESSMENT — PAIN - FUNCTIONAL ASSESSMENT
PAIN_FUNCTIONAL_ASSESSMENT: ACTIVITIES ARE NOT PREVENTED
PAIN_FUNCTIONAL_ASSESSMENT: ACTIVITIES ARE NOT PREVENTED

## 2022-07-22 ASSESSMENT — PAIN SCALES - GENERAL
PAINLEVEL_OUTOF10: 9
PAINLEVEL_OUTOF10: 7

## 2022-07-22 ASSESSMENT — PAIN DESCRIPTION - LOCATION
LOCATION: ABDOMEN;BACK

## 2022-07-22 ASSESSMENT — PAIN DESCRIPTION - DESCRIPTORS
DESCRIPTORS: ACHING

## 2022-07-22 ASSESSMENT — PAIN DESCRIPTION - ONSET: ONSET: ON-GOING

## 2022-07-22 ASSESSMENT — PAIN DESCRIPTION - FREQUENCY: FREQUENCY: CONTINUOUS

## 2022-07-22 NOTE — PROGRESS NOTES
saturation is 94%. VITALS:  BP (!) 140/72   Pulse 76   Temp 98.2 °F (36.8 °C) (Oral)   Resp 16   Ht 5' 4\" (1.626 m)   Wt 156 lb 1.6 oz (70.8 kg)   SpO2 94%   BMI 26.79 kg/m²   INTAKE/OUTPUT:      Intake/Output Summary (Last 24 hours) at 7/22/2022 1349  Last data filed at 7/22/2022 0406  Gross per 24 hour   Intake --   Output 1110 ml   Net -1110 ml       URINARY CATHETER OUTPUT (Curiel):     GENERAL: alert, pleasant, in better spirits today   I/O last 3 completed shifts: In: 7158 [NG/GT:1291]  Out: 1289 [Urine:3250; Drains:10]  No intake/output data recorded.     LABS  Recent Labs     07/21/22  1145 07/22/22  0644   WBC 10.6  --    HGB 7.6*  --    HCT 22.1*  --      --     136   K 4.3 4.7    104   CO2 25 23   BUN 13 14   CREATININE 1.3* 1.3*   PHOS  --  3.4   CALCIUM 8.4 8.3       CBC with Differential:    Lab Results   Component Value Date/Time    WBC 10.6 07/21/2022 11:45 AM    RBC 2.47 07/21/2022 11:45 AM    RBC 4.41 08/02/2016 10:34 AM    HGB 7.6 07/21/2022 11:45 AM    HCT 22.1 07/21/2022 11:45 AM     07/21/2022 11:45 AM    MCV 89.2 07/21/2022 11:45 AM    MCH 30.5 07/21/2022 11:45 AM    MCHC 34.2 07/21/2022 11:45 AM    RDW 17.1 07/21/2022 11:45 AM    BANDSPCT 1 07/21/2022 11:45 AM    LYMPHOPCT 14.0 07/21/2022 11:45 AM    LYMPHOPCT 25.3 08/02/2016 10:34 AM    MONOPCT 4.0 07/21/2022 11:45 AM    BASOPCT 0.0 07/21/2022 11:45 AM    MONOSABS 0.4 07/21/2022 11:45 AM    LYMPHSABS 1.7 07/21/2022 11:45 AM    EOSABS 0.2 07/21/2022 11:45 AM    BASOSABS 0.0 07/21/2022 11:45 AM     CMP:    Lab Results   Component Value Date/Time     07/22/2022 06:44 AM    K 4.7 07/22/2022 06:44 AM    K 3.8 06/29/2022 05:19 AM     07/22/2022 06:44 AM    CO2 23 07/22/2022 06:44 AM    BUN 14 07/22/2022 06:44 AM    CREATININE 1.3 07/22/2022 06:44 AM    GFRAA 48 07/22/2022 06:44 AM    AGRATIO 0.8 06/29/2022 05:19 AM    LABGLOM 40 07/22/2022 06:44 AM    GLUCOSE 118 07/22/2022 06:44 AM    GLUCOSE 85 08/02/2016 10:34 AM    PROT 4.7 06/29/2022 05:19 AM    PROT 5.7 08/02/2016 10:34 AM    LABALBU 2.9 07/22/2022 06:44 AM    CALCIUM 8.3 07/22/2022 06:44 AM    BILITOT 0.6 06/29/2022 05:19 AM    ALKPHOS 84 06/29/2022 05:19 AM    AST 49 06/29/2022 05:19 AM    ALT 48 06/29/2022 05:19 AM         January Dias, APRN - CNP  Electronically signed 7/22/2022 at 1:49 PM     Patient seen and agree with above and more than half of the total time was spent by me on the encounter.      Atilio Sifuentes MD    I

## 2022-07-22 NOTE — PROGRESS NOTES
Occupational Therapy Daily Treatment Note    Unit: 2 [place]  Date:  7/22/2022  Patient Name:    Williams Gates  Admitting diagnosis:  Gastric perforation, acute [K31.89]  Perforated peptic ulcer ([facility] [address] [K27.5]  Admit Date:  6/28/2022  Precautions/Restrictions:    fall risk, IV, bed/chair alarm, waddell catheter , NG, telemetry, telesitter and continuous pulse ox,       Discharge Recommendations ARU   DME needs for discharge: defer to facility       Therapy recommendations for staff:   Assist of 2 with use of rolling walker (RW) for all transfers to/from [facility]  to/from chair with gait belt     AM-PAC Score: AM-PAC Inpatient Daily Activity Raw Score: 9  Home Health S4 Level: NA       Treatment Time:  920-1018  Treatment number:  5  Timed code treatment minutes: 58 minutes  Total treatment minutes:  58 minutes    History of Present Illness:   s/p emergent repair of perf ulcer with j-tube insertion on 6/28 by Dr. Chrissy Gilbert, subsequent DA and hypertension. Recent COVID from 6/22-24/22, h/o colon cancer. Subjective:  Pt agreeable to participate in OT     Pain   Yes  Rating: Mild , occasional back pain   Location:abdomen   Pain Medicine Status: no pain med requested        Bed Mobility:   Supine to Sit:  min assist   Sit to Supine:  NT  Rolling:           NT  Scooting:        total assist in the bed      Transfer Training:   Sit to stand:   Min A   with RW and gait belt   Stand to sit:  Min A   Bed to BSC:   Min a with RW and gait belt   Standard toilet:   Not Tested    Activity Tolerance   Pt completed therapy session with pain and LE weakness and decreased endurance     ADL Training: Difficult for Pt to perform ADL around multiple lines   Upper body dressing: Min A   Upper body bathing: Mod A   Lower body dressing:  Max A   Lower body bathing:  Max A   Toileting:    Mod A   Grooming/Hygiene:    Setup to brush teeth     Therapeutic Exercise:   N/A    Patient Education:   Role of OT  Recommendations for D/C planning  Benefit of participating in ADLs and transfers and out of bed activity    Positioning Needs:   Pt on [facility] with physical therapist assisting Pt back to chair    Family Present:  No    Assessment: Pt agreeable to participate in ADL even though fatigued after performing care with nursing staff. Pt has multiple lines interfering with ability to perform ADL with independence. Pt tolerated session well and did best with social conversation for emotional support and distraction to reduce anxious thoughts of anticipation of movements and potential  pain. Pt will benefit from continue opportunities to perform ADLs and transfers with support of skilled therapists to increase functional status and confidence to return home to prior level of functioning with care from her . GOALS  To be met in 3 Visits:  1). Bed to toilet/BSC: Max A- goal met 7-12-22 new goal SBA with RW      To be met in 5 Visits:  1). Supine to/from Sit:             Mod A goal met - 7 12 new goal min assist, goal met 7/22 increase to SBA   2). Upper Body Bathing:         Min A,   3). Lower Body Bathing: Mod A  4). Upper Body Dressing:       Min A, goal met 7/22, increase to SBA  5). Lower Body Dressing: Mod A  6).  Pt to demonstrate UE exs x 15 reps with minimal cues         Plan: cont with POC    Azul Roman, OTR/L [id number]      If patient discharges from this facility prior to next visit, this note will serve as the Discharge Summary

## 2022-07-22 NOTE — PROGRESS NOTES
AM Shift assessment completed. Pt is alert and oriented. Vital signs are WNL. Respirations are even & easy. Patient has generalized discomfort. Tolerable at this time. Tube feeds continuous at 60 ml/hr tolerating well. NG in place; clamped at this time; no nausea reported. Patient BM noted; changed; cream applied with mepilex. Pt denies needs at this time. SR up x 2 and bed in low position. Call light is within reach. Will monitor. .Bedside Mobility Assessment Tool (BMAT):     Assessment Level 1- Sit and Shake    1. From a semi-reclined position, ask patient to sit up and rotate to a seated position at the side of the bed. Can use the bedrail. 2. Ask patient to reach out and grab your hand and shake making sure patient reaches across his/her midline. Pass- Patient is able to come to a seated position, maintain core strength. Maintains seated balance while reaching across midline. Move on to Assessment Level 2. Assessment Level 2- Stretch and Point   1. With patient in seated position at the side of the bed, have patient place both feet on the floor (or stool) with knees no higher than hips. 2. Ask patient to stretch one leg and straighten the knee, then bend the ankle/flex and point the toes. If appropriate, repeat with the other leg. Pass- Patient is able to demonstrate appropriate quad strength on intended weight bearing limb(s). Move onto Assessment Level 3. Assessment Level 3- Stand   1. Ask patient to elevate off the bed or chair (seated to standing) using an assistive device (cane, bedrail). 2. Patient should be able to raise buttocks off be and hold for a count of five. May repeat once. Pass- Patient maintains standing stability for at least 5 seconds, proceed to assessment level 4. Assessment Level 4- Walk   1. Ask patient to march in place at bedside. 2. Then ask patient to advance step and return each foot.  Some medical conditions may render a patient from stepping backwards, use your best clinical judgement. Fail- Patient not able to complete tasks OR requires use of assistive device. Patient is MOBILITY LEVEL 3.        Mobility Level- 3

## 2022-07-22 NOTE — PROGRESS NOTES
Patient complaint of abdominal pain and back pain from working with PT/OT; PRN given per pt request. Pt up in chair at this time. Call light within reach.

## 2022-07-22 NOTE — CARE COORDINATION
INTERDISCIPLINARY PLAN OF CARE CONFERENCE    Date/Time: 7/22/2022 2:17 PM  Completed by: Andree Causey RN, Case Management      Patient Name:  Nimo Menendez  YOB: 1950  Admitting Diagnosis: Gastric perforation, acute [K31.89]  Perforated peptic ulcer ([facility] [address] [K27.5]     Admit Date/Time:  6/28/2022  6:58 AM    Chart reviewed. Interdisciplinary team contacted or reviewed plan related to patient progress and discharge plans. Disciplines included Case Management, Nursing, and Dietitian. Current Status:Stable  PT/OT recommendation for discharge plan of care: SNF    Expected D/C Disposition:  Rehab  Confirmed plan with patient and/or family Yes confirmed with: (name) spouse  Met with: patient  Discharge Plan Comments: Reviewed chart and spoke with pt and spouse at bedside. Therapy at bedside. Current rec is for SNF but per therapy will cont to assess for poss ARU. Pt and spouse aware  of goal. Will cont to follow and make referral as appropriate.     Home O2 in place on admit: No  Pt informed of need to bring portable home O2 tank on day of discharge for nursing to connect prior to leaving:  Not Indicated  Verbalized agreement/Understanding:  Not Indicated

## 2022-07-22 NOTE — PROGRESS NOTES
PM Shift report given to [facility] RN at bedside. Patient is stable. All end of shift needs have been met. No further assistance needed at this time.

## 2022-07-22 NOTE — PROGRESS NOTES
Patient resting at this time. Eyes closed respirations easy and unlabored. Will continue to monitor.

## 2022-07-22 NOTE — PROGRESS NOTES
Comprehensive Nutrition Assessment    Type and Reason for Visit:  Reassess    Nutrition Recommendations/Plan:   NPO   Continue the enteral feeding of Jevity 1.2 @ goal rate of 60 ml/hr with 1 PRO2GO qd and 200 ml water flush q 6 hr     Malnutrition Assessment:  Malnutrition Status: At risk for malnutrition (Comment) (07/18/22 8040)    Context:  Acute Illness     Findings of the 6 clinical characteristics of malnutrition:  Energy Intake:  No significant decrease in energy intake (she has received TF at goal rate x 5+ days)  Weight Loss:  No significant weight loss     Body Fat Loss:  No significant body fat loss     Muscle Mass Loss:  No significant muscle mass loss    Fluid Accumulation:  No significant fluid accumulation     Strength:  Not Performed    Nutrition Assessment:    patient remains stable from a nutritional standpoint since last RD assessment with adequate tolerance of enteral feeding routine and stable weight; she remains at risk for further compromise d/t extended admission of 20 days thus far, GI dysfunction, and need for EN as sole source of nutrition; will continue Jevity 1.2 with a goal rate of 60 ml/hr x 20 hours + 200 ml/hr free water + one proteinex P2Go once daily via feeding tube    Nutrition Related Findings:    pt was asleep at time of visit; POD #24 Perf  Ulcer repair and J Tube placement; NGT in place but clamped; + Flatus; + BM; TF infusing @ 60ml/hr ; + Edema; Wound Type: Surgical Incision (surgical incision on abdomen)       Current Nutrition Intake & Therapies:    Average Meal Intake: NPO  Average Supplements Intake: NPO  Current Tube Feeding (TF) Orders:  Feeding Route: Jejunostomy  Formula:  Other Tube Feeding (Comment) (Jevity 1.2)  Schedule: Continuous  Feeding Regimen: Jevity 1.2 with a goal rate of 60 ml/hr x 20 hours  Additives/Modulars: Protein (one proteinex P2Go once daily via feeding tube)  Water Flushes: 100 ml/hr free water, per nephrology  Current TF & Flush Orders Provides: TF is infusing at goal rate and patient is tolerating well  Goal TF & Flush Orders Provides: Jevity 1.2 with a goal rate of 60 ml/hr x 20 hours = 1200 ml TV, 1440 kcals, 67 g protein, and 968 ml free water + 26 g protein and 104 kcals from one proteinex P2Go once daily (93 g protein and 1544 kcals total) + 100 ml/hr free water    Anthropometric Measures:  Height: 5' 4\" (162.6 cm)  Ideal Body Weight (IBW): 120 lbs (55 kg)    Admission Body Weight: 149 lb (67.6 kg) (obtained on 6/29/22; actual weight)  Current Body Weight: 156 lb 1.6 oz (70.8 kg) (obtained on 7/17/22; actual weight), 130.1 % IBW. Weight Source: Bed Scale  Current BMI (kg/m2): 26.8  Usual Body Weight: 149 lb (67.6 kg) (obtained on 6/29/22; actual weight)  % Weight Change (Calculated): 4.8  Weight Adjustment For: No Adjustment                 BMI Categories: Overweight (BMI 25.0-29. 9)    Estimated Daily Nutrient Needs:  Energy Requirements Based On: Kcal/kg  Weight Used for Energy Requirements: Current  Energy (kcal/day): 1420 - 1633 kcals based on 20-23 kcals/kg/CBW  Weight Used for Protein Requirements: Ideal  Protein (g/day): 77 - 83 g protein based on 1.4-1.5 g/kg/IBW  Method Used for Fluid Requirements: 1 ml/kcal  Fluid (ml/day): 1420 - 1633 ml    Nutrition Diagnosis:   Inadequate oral intake related to altered GI function, altered GI structure, inadequate protein-energy intake as evidenced by NPO or clear liquid status due to medical condition, nutrition support - enteral nutrition, GI abnormality    Nutrition Interventions:   Food and/or Nutrient Delivery: Continue Current Tube Feeding, Continue NPO  Nutrition Education/Counseling: No recommendation at this time  Coordination of Nutrition Care: Continue to monitor while inpatient       Goals:  Previous Goal Met: Progressing toward Goal(s)  Goals: by next RD assessment       Nutrition Monitoring and Evaluation:   Behavioral-Environmental Outcomes: None Identified  Food/Nutrient Intake Outcomes: Enteral Nutrition Intake/Tolerance  Physical Signs/Symptoms Outcomes: Biochemical Data, Nutrition Focused Physical Findings, Weight, GI Status, Fluid Status or Edema    Discharge Planning:     Too soon to determine     Magnus Evans, [address] [place] [address], LD  Contact: [phone]

## 2022-07-22 NOTE — PROGRESS NOTES
Handoff report and transfer of care given at bedside to Shy Villarreal. Patient in stable condition, denies needs/concerns at this time. Call light within reach.

## 2022-07-22 NOTE — PLAN OF CARE
Problem: Discharge Planning  Goal: Discharge to home or other facility with appropriate resources  7/22/2022 1034 by Tawny Reid RN  Outcome: Progressing  7/22/2022 0629 by Karyn Mason RN  Outcome: Progressing     Problem: Pain  Goal: Verbalizes/displays adequate comfort level or baseline comfort level  7/22/2022 1034 by Tawny Reid RN  Outcome: Progressing  7/22/2022 0629 by Karyn Mason RN  Outcome: Progressing     Problem: Safety - Adult  Goal: Free from fall injury  7/22/2022 1034 by Tawny Reid RN  Outcome: Progressing  7/22/2022 0629 by Karyn Mason RN  Outcome: Progressing     Problem: Skin/Tissue Integrity  Goal: Absence of new skin breakdown  Description: 1. Monitor for areas of redness and/or skin breakdown  2. Assess vascular access sites hourly  3. Every 4-6 hours minimum:  Change oxygen saturation probe site  4. Every 4-6 hours:  If on nasal continuous positive airway pressure, respiratory therapy assess nares and determine need for appliance change or resting period.   7/22/2022 1034 by Tawny Reid RN  Outcome: Progressing  7/22/2022 0629 by Karyn Mason RN  Outcome: Progressing     Problem: ABCDS Injury Assessment  Goal: Absence of physical injury  7/22/2022 1034 by Tawny Reid RN  Outcome: Progressing  7/22/2022 0629 by Karyn Mason RN  Outcome: Progressing     Problem: Nutrition Deficit:  Goal: Optimize nutritional status  7/22/2022 1034 by Tawny Reid RN  Outcome: Progressing  7/22/2022 0629 by Karyn Mason RN  Outcome: Progressing

## 2022-07-22 NOTE — PROGRESS NOTES
[facility] Progress Note           Subjective/   74y.o. year old female who we are seeing in consultation for DA. HPI:  Renal function better. Denies any shortness of breath. ROS:  Intake reduced, on TF's, +weak. Objective/   GEN:  Chronically ill, /74   Pulse 73   Temp 98.4 °F (36.9 °C) (Oral)   Resp 18   Ht 5' 4\" (1.626 m)   Wt 156 lb 1.6 oz (70.8 kg)   SpO2 94%   BMI 26.79 kg/m²   HEENT: non-icteric, no JVD  CV: S1, S2 without m/r/g;  + LE edema  RESP: CTA B without w/r/r; breathing wnl  ABD: +bs, soft, nt, no hsm  SKIN: warm, no rashes    Data/  Recent Labs     07/21/22  1145   WBC 10.6   HGB 7.6*   HCT 22.1*   MCV 89.2        Recent Labs     07/20/22  0524 07/21/22  0557 07/21/22  1145 07/22/22  0644    134* 136 136   K 4.3 4.4 4.3 4.7    101 102 104   CO2 24 23 25 23   GLUCOSE 112* 115* 112* 118*   PHOS 4.1 3.5  --  3.4   BUN 12 12 13 14   CREATININE 1.3* 1.2 1.3* 1.3*   LABGLOM 40* 44* 40* 40*   GFRAA 48* 53* 48* 48*         Assessment/     -DA likely has developed ATN w hypovolemia, hypotension, NSAIDs use and contrast use. Scr improving / non-oliguric     -Perforated prepyloric ulcer s/p laparotomy with repair n jejunostomy insertion on 6/28/22              On zosyn     -Metabolic acidosis from DA, resolved     -Hypertension. BP range is good. -Atrial Fibrillation     -Recent COVID from 6/22-24/22     -h/o colon cancer    -Hypernatremia - better with free water replacement via NG       Plan/     - Free water to 200 ml q6 hours  - Prn lasix  - Trend labs, bp's, & urine output    No further recommendations at this time  Will sign off for now  Call with questions    ____________________________________  Jose Luis Rubio MD  [facility]  [email or web address]  Office: [phone]

## 2022-07-22 NOTE — PROGRESS NOTES
Inpatient Physical Therapy Daily Treatment Note    Unit: 2 [place]  Date:  7/22/2022  Patient Name:    Iver Favre  Admitting diagnosis:  Gastric perforation, acute [K31.89]  Perforated peptic ulcer ([facility] [address] [K27.5]  Admit Date:  6/28/2022  Precautions/Restrictions:  Fall risk, Bed/chair alarm and Lines -IV, NG tube, PICC left, Drains (JERRI) and abdominal tube feed , abdominal incision  Telemetry, continuous pulse ox    Discharge Recommendations: SNF    Comment: again discussed ARU option with pt and family today. Reminded pt that she will need to demonstrate consistent participation with therapy in coming days; pt acknowledged understanding. Given indep PLOF, she would be a good ARU candidate if able to demo consistently improved participation going forward. DME needs for discharge: defer to facility       Therapy recommendation for EMS Transport: can transport by wheelchair    Therapy recommendations for staff:   Assist of 1 for transfers to the chair or BSC with walker and gait belt. History of Present Illness: S/P emergent repair of perf ulcer with j-tube insertion on 6/28 by Dr. Rojas Ahn, subsequent DA and hypertension. Recent COVID from 6/22-24/22, h/o colon cancer. Home Health S4 Level Recommendation: NA  AM-PAC Mobility Score   AM-PAC Inpatient Mobility Raw Score : 12  Vickie L Adams Crigler scored a 12/24 on the AM-PAC short mobility form. Current research shows that an AM-PAC score of 17 or less is typically not associated with a discharge to the patient's home setting. If patient discharges prior to next session this note will serve as a discharge summary. Please see below for the latest assessment towards goals. Treatment Time:  09:55-10:44  Treatment number: 9   Timed Code Treatment Minutes: 49 minutes  Total Treatment Minutes:  49 minutes    Cognition    A&O orientation not directly assessed. Able to follow 2 step commands    Subjective  Patient sitting EOB with  OT in room finishing up ADLs .   Pt agreeable to this PT tx. Needs to use BSC.  arrived towards end of session. Pain   Yes  Location: \"I'm stiff and sore everywhere\"; specifically back and bottom. Rating:    moderate/10 (unrated)  Pain Medicine Status: No request made. Mobility and repositioning for comfort. Bed Mobility   Supine to Sit:    Not Tested (up to EOB with OT at [address] [place] [address])  Sit to Supine:   Not Tested (settled in chair at end of session)   Rolling:   Not Tested  Scooting:   Supervision to scoot back in recliner. Bridging:  Not Tested    Transfer Training   Sit to stand:   Min A from EOB     Min A from [facility] (3 trials)  Stand to sit:   CGA to BSC (3 trials)     CGA to EOB  Bed > BSC:   Min A  with use of gait belt and rolling walker (RW)     Gait gait completed as indicated below  Distance:      18 ft  Deviations (firm surface/linoleum):  decreased yakov, forward flexed posture, and decreased step length bilaterally, reduced foot clearance bilaterally  Assistive Device Used:    rolling walker (RW)  Level of Assist:    CGA primarily, with intermittent Min A for postural cues and walker management  Comment: Cued on posture and fluidity of steps    Stair Training deferred, pt unsafe/ not appropriate to complete stairs at this time    Therapeutic Exercise Ish deferred secondary to treatment focus on functional mobility. Verbal reminders to patient/family to encourage LE ROM and trunk flexion away from pillow support while seated upright in chair. Balance  Sitting: Good - ; Supervision  Comments: at EOB and BSC (extended duration). Standing: Fair +; CGA /c RW  Comments: 2 bouts of extended static standing during toileting. 2 minutes and 1 minute respectively. Pt assumed mildly flexed posture to facilitate posterior pericare by PT; OT provided CGA and cues for breathing/relaxation. These standing bouts are still quite fatiguing but less so than yesterday.      Patient Education      Role of PT, POC, Discharge recommendations, DC recommendations, safety awareness, transfer techniques, HEP and calling for assist with mobility. Another discussion RE ARU option; pt and family receptive. Pt agreeable to participate in future therapy sessions. Positioning Needs       Pt up in chair, alarm set, positioned in proper neutral alignment and pressure relief provided. Call light provided and all needs within reach. Jacqueline Tilley left out at end of session to encourage mobility. PCA/RN aware. Activity Tolerance   Pt completed therapy session with No adverse symptoms noted w/activity. Other  Open wound at R gluteal fold - ointment applied per RN, and new sacral Mepilex applied. Assessment :  Pt demonstrated good progress today with regards to activity tolerance. She ambulated 18 ft within the room at CGA/Min A. Pt remains very fatigued and generally deconditioned. Will continue to progress mobility per pt tolerance. Recommending SNF upon discharge as patient functioning well below baseline, demonstrates good rehab potential and unable to return home due to inability to negotiate stairs to enter home/bedroom/bathroom, burden of care beyond caregiver ability and home environment not conducive to patient recovery. Goals (all goals ongoing unless otherwise indicated)  To be met in 3 visits:  1). Independent with LE Ex x 10 reps  2). Supine to/from sit: Mod A  3). Bed to chair: CGA with walker     To be met in 6 visits:  1). Supine to/from sit: Supervision  2). Sit to/from stand: Supervision  3). Bed to chair: Supervision  4). Gait: Ambulate 50 ft.  with  SBA and use of LRAD (least restrictive assistive device)  5). Tolerate B LE exercises 3 sets of 10-15 reps  6). Ascend/descend 2 steps with CGA with use of hand rail unilateral and LRAD (least restrictive assistive device)    Plan   Continue with plan of care.     signature: Lidia Mccoy, PT, DPT     If patient discharges from this facility prior to next visit, this note will serve as the Discharge Summary.

## 2022-07-23 LAB
ALBUMIN SERPL-MCNC: 3 G/DL (ref 3.4–5)
ANION GAP SERPL CALCULATED.3IONS-SCNC: 8 MMOL/L (ref 3–16)
BUN BLDV-MCNC: 15 MG/DL (ref 7–20)
CALCIUM SERPL-MCNC: 8.5 MG/DL (ref 8.3–10.6)
CHLORIDE BLD-SCNC: 102 MMOL/L (ref 99–110)
CO2: 24 MMOL/L (ref 21–32)
CREAT SERPL-MCNC: 1.3 MG/DL (ref 0.6–1.2)
GFR AFRICAN AMERICAN: 48
GFR NON-AFRICAN AMERICAN: 40
GLUCOSE BLD-MCNC: 101 MG/DL (ref 70–99)
GLUCOSE BLD-MCNC: 107 MG/DL (ref 70–99)
GLUCOSE BLD-MCNC: 115 MG/DL (ref 70–99)
GLUCOSE BLD-MCNC: 121 MG/DL (ref 70–99)
GLUCOSE BLD-MCNC: 122 MG/DL (ref 70–99)
GLUCOSE BLD-MCNC: 134 MG/DL (ref 70–99)
PERFORMED ON: ABNORMAL
PHOSPHORUS: 3.7 MG/DL (ref 2.5–4.9)
POTASSIUM SERPL-SCNC: 4.5 MMOL/L (ref 3.5–5.1)
SODIUM BLD-SCNC: 134 MMOL/L (ref 136–145)

## 2022-07-23 PROCEDURE — 80069 RENAL FUNCTION PANEL: CPT

## 2022-07-23 PROCEDURE — 6360000002 HC RX W HCPCS: Performed by: SURGERY

## 2022-07-23 PROCEDURE — 97530 THERAPEUTIC ACTIVITIES: CPT

## 2022-07-23 PROCEDURE — 6360000002 HC RX W HCPCS: Performed by: NURSE PRACTITIONER

## 2022-07-23 PROCEDURE — 2580000003 HC RX 258: Performed by: SURGERY

## 2022-07-23 PROCEDURE — 6370000000 HC RX 637 (ALT 250 FOR IP): Performed by: SURGERY

## 2022-07-23 PROCEDURE — 99024 POSTOP FOLLOW-UP VISIT: CPT | Performed by: SURGERY

## 2022-07-23 PROCEDURE — 97116 GAIT TRAINING THERAPY: CPT

## 2022-07-23 PROCEDURE — C9113 INJ PANTOPRAZOLE SODIUM, VIA: HCPCS | Performed by: NURSE PRACTITIONER

## 2022-07-23 PROCEDURE — 1200000000 HC SEMI PRIVATE

## 2022-07-23 RX ADMIN — HEPARIN SODIUM 5000 UNITS: 5000 INJECTION INTRAVENOUS; SUBCUTANEOUS at 10:15

## 2022-07-23 RX ADMIN — PIPERACILLIN AND TAZOBACTAM 3375 MG: 3; .375 INJECTION, POWDER, LYOPHILIZED, FOR SOLUTION INTRAVENOUS at 14:37

## 2022-07-23 RX ADMIN — PIPERACILLIN AND TAZOBACTAM 3375 MG: 3; .375 INJECTION, POWDER, LYOPHILIZED, FOR SOLUTION INTRAVENOUS at 05:42

## 2022-07-23 RX ADMIN — PANTOPRAZOLE SODIUM 40 MG: 40 INJECTION, POWDER, LYOPHILIZED, FOR SOLUTION INTRAVENOUS at 10:15

## 2022-07-23 RX ADMIN — HEPARIN SODIUM 5000 UNITS: 5000 INJECTION INTRAVENOUS; SUBCUTANEOUS at 01:35

## 2022-07-23 RX ADMIN — HEPARIN SODIUM 5000 UNITS: 5000 INJECTION INTRAVENOUS; SUBCUTANEOUS at 17:41

## 2022-07-23 RX ADMIN — MICONAZOLE NITRATE: 2 OINTMENT TOPICAL at 12:38

## 2022-07-23 RX ADMIN — ONDANSETRON HYDROCHLORIDE 4 MG: 2 INJECTION, SOLUTION INTRAMUSCULAR; INTRAVENOUS at 12:38

## 2022-07-23 RX ADMIN — SODIUM CHLORIDE, PRESERVATIVE FREE 10 ML: 5 INJECTION INTRAVENOUS at 10:18

## 2022-07-23 RX ADMIN — MORPHINE SULFATE 1 MG: 2 INJECTION, SOLUTION INTRAMUSCULAR; INTRAVENOUS at 10:39

## 2022-07-23 RX ADMIN — ONDANSETRON HYDROCHLORIDE 4 MG: 2 INJECTION, SOLUTION INTRAMUSCULAR; INTRAVENOUS at 06:11

## 2022-07-23 ASSESSMENT — PAIN SCALES - GENERAL
PAINLEVEL_OUTOF10: 8
PAINLEVEL_OUTOF10: 0

## 2022-07-23 ASSESSMENT — PAIN DESCRIPTION - ORIENTATION: ORIENTATION: MID;LEFT

## 2022-07-23 ASSESSMENT — PAIN - FUNCTIONAL ASSESSMENT: PAIN_FUNCTIONAL_ASSESSMENT: PREVENTS OR INTERFERES SOME ACTIVE ACTIVITIES AND ADLS

## 2022-07-23 ASSESSMENT — PAIN DESCRIPTION - DESCRIPTORS: DESCRIPTORS: ACHING

## 2022-07-23 ASSESSMENT — PAIN DESCRIPTION - LOCATION: LOCATION: ABDOMEN

## 2022-07-23 NOTE — PLAN OF CARE
Problem: Discharge Planning  Goal: Discharge to home or other facility with appropriate resources  Outcome: Progressing     Problem: Pain  Goal: Verbalizes/displays adequate comfort level or baseline comfort level  Outcome: Progressing     Problem: Safety - Adult  Goal: Free from fall injury  Outcome: Progressing  Flowsheets (Taken 7/23/2022 9051)  Free From Fall Injury: Instruct family/caregiver on patient safety     Problem: Skin/Tissue Integrity  Goal: Absence of new skin breakdown  Description: 1. Monitor for areas of redness and/or skin breakdown  2. Assess vascular access sites hourly  3. Every 4-6 hours minimum:  Change oxygen saturation probe site  4. Every 4-6 hours:  If on nasal continuous positive airway pressure, respiratory therapy assess nares and determine need for appliance change or resting period.   Outcome: Progressing     Problem: ABCDS Injury Assessment  Goal: Absence of physical injury  Outcome: Progressing  Flowsheets (Taken 7/23/2022 8421)  Absence of Physical Injury: Implement safety measures based on patient assessment

## 2022-07-23 NOTE — PROGRESS NOTES
[facility] GENERAL SURGERY    Surgery Progress Note           POD #  85    PATIENT NAME: Ricky Mistry     TODAY'S DATE: 7/23/2022    INTERVAL HISTORY:    Pt with mild pain. OBJECTIVE:   VITALS:  /71   Pulse 74   Temp 97.9 °F (36.6 °C) (Oral)   Resp 18   Ht 5' 4\" (1.626 m)   Wt 156 lb 1.6 oz (70.8 kg)   SpO2 95%   BMI 26.79 kg/m²     INTAKE/OUTPUT:    I/O last 3 completed shifts: In: 1629 [I.V.:1629]  Out: 1110 [Urine:1100; Drains:10]  I/O this shift:  In: 10 [I.V.:10]  Out: -               CONSTITUTIONAL:  awake and aler  ABDOMEN:   normal bowel sounds, soft, non-distended, mild tender, shaun minimal   INCISION: clean    Data:  CBC:   Recent Labs     07/21/22  1145   WBC 10.6   HGB 7.6*   HCT 22.1*        BMP:    Recent Labs     07/21/22  1145 07/22/22  0644 07/23/22  0545    136 134*   K 4.3 4.7 4.5    104 102   CO2 25 23 24   BUN 13 14 15   CREATININE 1.3* 1.3* 1.3*   GLUCOSE 112* 118* 115*     Hepatic: No results for input(s): AST, ALT, ALB, BILITOT, ALKPHOS in the last 72 hours. Mag:    No results for input(s): MG in the last 72 hours. Phos:     Recent Labs     07/21/22  0557 07/22/22  0644 07/23/22  0545   PHOS 3.5 3.4 3.7      INR: No results for input(s): INR in the last 72 hours.       Radiology Review:  NA    ASSESSMENT AND PLAN:  74 y.o. female status post ulcer repair and jtube  Continue tube feeds at goal rate  PPI, Abx  ARU eval Monday         Electronically signed by Ellis Yates MD

## 2022-07-23 NOTE — PROGRESS NOTES
Inpatient Physical Therapy Daily Treatment Note    Unit: 2 [place]  Date:  7/23/2022  Patient Name:    Iver Favre  Admitting diagnosis:  Gastric perforation, acute [K31.89]  Perforated pept[facility] ulcer ([facility] [K27.5]  Admit Date:  6/28/2022  Precautions/Restrictions:  Fall risk, Bed/chair alarm and Lines -IV, NG tube, PICC left, Drains (JERRI) and abdominal tube feed , abdominal incision  Telemetry, continuous pulse ox    Discharge Recommendations: SNF    Comment: again discussed ARU option with pt and family today. Reminded pt that she will need to demonstrate consistent participation with therapy in coming days; pt acknowledged understanding. Given indep PLOF, she would be a good ARU candidate if able to demo consistently improved participation going forward. Will re-assess Monday. DME needs for discharge: defer to facility       Therapy recommendation for EMS Transport: can transport by wheelchair    Therapy recommendations for staff:   Assist of 1 for transfers to the chair or BSC with walker and gait belt. History of Present Illness: S/P emergent repair of perf ulcer with j-tube insertion on 6/28 by Dr. Rojas Ahn, subsequent DA and hypertension. Recent COVID from 6/22-24/22, h/o colon cancer. Home Health S4 Level Recommendation: NA  AM-PAC Mobility Score   AM-PAC Inpatient Mobility Raw Score : 12  Vickie L Adams Crigler scored a 12/24 on the AM-PAC short mobility form. Current research shows that an AM-PAC score of 17 or less is typically not associated with a discharge to the patient's home setting. If patient discharges prior to next session this note will serve as a discharge summary. Please see below for the latest assessment towards goals. Treatment Time:  13:10-14:05  Treatment number: 10   Timed Code Treatment Minutes: 55 minutes  Total Treatment Minutes:  55 minutes    Cognition    A&O orientation not directly assessed. WFL.   Able to follow 2 step commands    Subjective  Patient lying supine in bed with family at bedside (). Pt agreeable to this PT tx. Says she's \"wiped\" from just being up with staff at [facility]. Asks for a few minutes of \"just talking\" prior to initiating mobility. Pain   Yes  Location: \"shoulders all the way down to my piggies\"  Ratin/10  Pain Medicine Status: No request made. Mobility and repositioning for comfort. Bed Mobility   Supine to Sit:    SBA with HOB elevated 30*  Sit to Supine:   SBA with HOB elevated 30*  Reclined to Long Sitting:  SBA   Rolling:   Not Tested  Scooting:   Supervision to scoot fwd at EOB and fwd / back in recliner. Supervision to scoot to [facility] in long sitting  Bridging:  Not Tested    Transfer Training   Sit to stand:   CGA from EOB     CGA from recliner  Stand to sit:   CGA to recliner     CGA to EOB  Bed > Chair:   Min A  with use of No AD (handhold assist)    Gait gait completed as indicated below  Trial 1: with walker. Distance:      15 ft  Deviations (firm surface/linoleum):  decreased yakov, forward flexed posture, and decreased step length bilaterally, reduced foot clearance bilaterally  Assistive Device Used:    rolling walker (RW)  Level of Assist:    CGA   Comment: Cued on upright posture and fluidity of steps  Trial 1: with single HHA   Note: trialed without the walker because pt tends to hold strong  on walker (stronger than needed based on demonstrated balance / LE strength), thus attempting to see if we can progress pt's ambulation activity tolerance with less effort loss through UEs. Distance:      15 ft   Deviations (firm surface/linoleum):  decreased yakov, forward flexed posture, and decreased step length bilaterally, reduced foot clearance bilaterally. Assistive Device Used:    Hand hold assist in L hand  Level of Assist:    Min A (through L hand); Min A for balance at gait belt. Comment: Cued on fluidity of steps, extending step length. Multiple instances of minor LOB to R, for which Min A correction needed. - 7/23 goal met  3). Bed to chair: CGA with walker      To be met in 6 visits:  1). Supine to/from sit: Supervision  2). Sit to/from stand: Supervision  3). Bed to chair: Supervision  4). Gait: Ambulate 50 ft.  with  SBA and use of LRAD (least restrictive assistive device)  5). Tolerate B LE exercises 3 sets of 10-15 reps  6). Ascend/descend 2 steps with CGA with use of hand rail unilateral and LRAD (least restrictive assistive device)    Plan   Continue with plan of care. signature: Guy Friday, PT, DPT     If patient discharges from this facility prior to next visit, this note will serve as the Discharge Summary.

## 2022-07-23 NOTE — PROGRESS NOTES
Handoff report and transfer of care given at bedside to [facility]. Patient in stable condition, denies needs/concerns at this time. Call light within reach.

## 2022-07-23 NOTE — PLAN OF CARE
Problem: Discharge Planning  Goal: Discharge to home or other facility with appropriate resources  7/23/2022 0403 by Miky Vargas RN  Outcome: Progressing  7/23/2022 0403 by Miky Vargas RN  Outcome: Progressing     Problem: Pain  Goal: Verbalizes/displays adequate comfort level or baseline comfort level  7/23/2022 0403 by Miky Vargas RN  Outcome: Progressing  7/23/2022 0403 by Miky Vargas RN  Outcome: Progressing     Problem: Safety - Adult  Goal: Free from fall injury  7/23/2022 0403 by Miky Vargas RN  Outcome: Progressing  7/23/2022 0403 by Miky Vargas RN  Outcome: Progressing     Problem: Skin/Tissue Integrity  Goal: Absence of new skin breakdown  Description: 1. Monitor for areas of redness and/or skin breakdown  2. Assess vascular access sites hourly  3. Every 4-6 hours minimum:  Change oxygen saturation probe site  4. Every 4-6 hours:  If on nasal continuous positive airway pressure, respiratory therapy assess nares and determine need for appliance change or resting period.   7/23/2022 0403 by Miky Vargas RN  Outcome: Progressing  7/23/2022 0403 by Miky Vargas RN  Outcome: Progressing     Problem: ABCDS Injury Assessment  Goal: Absence of physical injury  7/23/2022 0403 by Miky Vargas RN  Outcome: Progressing  7/23/2022 0403 by Miky Vargas RN  Outcome: Progressing     Problem: Nutrition Deficit:  Goal: Optimize nutritional status  7/23/2022 0403 by Miky Vargas RN  Outcome: Progressing  Flowsheets (Taken 7/22/2022 1630 by Carlo Cazares RD, LD)  Nutrient intake appropriate for improving, restoring, or maintaining nutritional needs:   Assess nutritional status and recommend course of action   Monitor oral intake, labs, and treatment plans   Recommend appropriate diets, oral nutritional supplements, and vitamin/mineral supplements   Recommend, monitor, and adjust tube feedings and TPN/PPN based on assessed needs  7/23/2022 0403 by Miky Vargas RN  Outcome: Progressing  Flowsheets (Taken 7/22/2022 1630 by Magnus Evans, RD, LD)  Nutrient intake appropriate for improving, restoring, or maintaining nutritional needs:   Assess nutritional status and recommend course of action   Monitor oral intake, labs, and treatment plans   Recommend appropriate diets, oral nutritional supplements, and vitamin/mineral supplements   Recommend, monitor, and adjust tube feedings and TPN/PPN based on assessed needs

## 2022-07-23 NOTE — PROGRESS NOTES

## 2022-07-24 LAB
ALBUMIN SERPL-MCNC: 3.4 G/DL (ref 3.4–5)
ANION GAP SERPL CALCULATED.3IONS-SCNC: 5 MMOL/L (ref 3–16)
BUN BLDV-MCNC: 8 MG/DL (ref 7–20)
CALCIUM SERPL-MCNC: 8.9 MG/DL (ref 8.3–10.6)
CHLORIDE BLD-SCNC: 101 MMOL/L (ref 99–110)
CO2: 25 MMOL/L (ref 21–32)
CREAT SERPL-MCNC: 0.6 MG/DL (ref 0.6–1.2)
GFR AFRICAN AMERICAN: >60
GFR NON-AFRICAN AMERICAN: >60
GLUCOSE BLD-MCNC: 110 MG/DL (ref 70–99)
GLUCOSE BLD-MCNC: 115 MG/DL (ref 70–99)
GLUCOSE BLD-MCNC: 126 MG/DL (ref 70–99)
GLUCOSE BLD-MCNC: 137 MG/DL (ref 70–99)
GLUCOSE BLD-MCNC: 306 MG/DL (ref 70–99)
GLUCOSE BLD-MCNC: 88 MG/DL (ref 70–99)
GLUCOSE BLD-MCNC: 89 MG/DL (ref 70–99)
PERFORMED ON: ABNORMAL
PERFORMED ON: NORMAL
PERFORMED ON: NORMAL
PHOSPHORUS: 2.1 MG/DL (ref 2.5–4.9)
POTASSIUM SERPL-SCNC: 4.8 MMOL/L (ref 3.5–5.1)
SODIUM BLD-SCNC: 131 MMOL/L (ref 136–145)

## 2022-07-24 PROCEDURE — 36415 COLL VENOUS BLD VENIPUNCTURE: CPT

## 2022-07-24 PROCEDURE — 6360000002 HC RX W HCPCS: Performed by: SURGERY

## 2022-07-24 PROCEDURE — 97530 THERAPEUTIC ACTIVITIES: CPT

## 2022-07-24 PROCEDURE — 1200000000 HC SEMI PRIVATE

## 2022-07-24 PROCEDURE — 6360000002 HC RX W HCPCS: Performed by: NURSE PRACTITIONER

## 2022-07-24 PROCEDURE — 2580000003 HC RX 258: Performed by: SURGERY

## 2022-07-24 PROCEDURE — 99024 POSTOP FOLLOW-UP VISIT: CPT | Performed by: SURGERY

## 2022-07-24 PROCEDURE — 80069 RENAL FUNCTION PANEL: CPT

## 2022-07-24 PROCEDURE — C9113 INJ PANTOPRAZOLE SODIUM, VIA: HCPCS | Performed by: NURSE PRACTITIONER

## 2022-07-24 PROCEDURE — 97535 SELF CARE MNGMENT TRAINING: CPT

## 2022-07-24 RX ADMIN — HEPARIN SODIUM 5000 UNITS: 5000 INJECTION INTRAVENOUS; SUBCUTANEOUS at 18:35

## 2022-07-24 RX ADMIN — HEPARIN SODIUM 5000 UNITS: 5000 INJECTION INTRAVENOUS; SUBCUTANEOUS at 08:07

## 2022-07-24 RX ADMIN — PIPERACILLIN AND TAZOBACTAM 3375 MG: 3; .375 INJECTION, POWDER, LYOPHILIZED, FOR SOLUTION INTRAVENOUS at 18:33

## 2022-07-24 RX ADMIN — PIPERACILLIN AND TAZOBACTAM 3375 MG: 3; .375 INJECTION, POWDER, LYOPHILIZED, FOR SOLUTION INTRAVENOUS at 08:07

## 2022-07-24 RX ADMIN — PIPERACILLIN AND TAZOBACTAM 3375 MG: 3; .375 INJECTION, POWDER, LYOPHILIZED, FOR SOLUTION INTRAVENOUS at 00:04

## 2022-07-24 RX ADMIN — PANTOPRAZOLE SODIUM 40 MG: 40 INJECTION, POWDER, LYOPHILIZED, FOR SOLUTION INTRAVENOUS at 08:07

## 2022-07-24 RX ADMIN — MICONAZOLE NITRATE: 2 OINTMENT TOPICAL at 08:09

## 2022-07-24 RX ADMIN — MORPHINE SULFATE 1 MG: 2 INJECTION, SOLUTION INTRAMUSCULAR; INTRAVENOUS at 13:41

## 2022-07-24 RX ADMIN — SODIUM CHLORIDE: 9 INJECTION, SOLUTION INTRAVENOUS at 18:32

## 2022-07-24 RX ADMIN — PANTOPRAZOLE SODIUM 40 MG: 40 INJECTION, POWDER, LYOPHILIZED, FOR SOLUTION INTRAVENOUS at 00:00

## 2022-07-24 RX ADMIN — ONDANSETRON HYDROCHLORIDE 4 MG: 2 INJECTION, SOLUTION INTRAMUSCULAR; INTRAVENOUS at 13:41

## 2022-07-24 RX ADMIN — MICONAZOLE NITRATE: 2 OINTMENT TOPICAL at 00:05

## 2022-07-24 RX ADMIN — HEPARIN SODIUM 5000 UNITS: 5000 INJECTION INTRAVENOUS; SUBCUTANEOUS at 00:01

## 2022-07-24 ASSESSMENT — PAIN SCALES - GENERAL: PAINLEVEL_OUTOF10: 9

## 2022-07-24 NOTE — PLAN OF CARE
Problem: Discharge Planning  Goal: Discharge to home or other facility with appropriate resources  7/24/2022 0758 by Deshaun Zayas RN  Outcome: Progressing  7/23/2022 1909 by Kevin Wallace RN  Outcome: Progressing     Problem: Pain  Goal: Verbalizes/displays adequate comfort level or baseline comfort level  7/24/2022 0758 by Deshaun Zayas RN  Outcome: Progressing  7/23/2022 1909 by Kevin Wallace RN  Outcome: Progressing     Problem: Safety - Adult  Goal: Free from fall injury  7/24/2022 0758 by Deshaun Zayas RN  Outcome: Progressing  7/23/2022 1909 by Kevin Wallace RN  Outcome: Progressing  Flowsheets (Taken 7/23/2022 1629)  Free From Fall Injury: Instruct family/caregiver on patient safety     Problem: Skin/Tissue Integrity  Goal: Absence of new skin breakdown  Description: 1. Monitor for areas of redness and/or skin breakdown  2. Assess vascular access sites hourly  3. Every 4-6 hours minimum:  Change oxygen saturation probe site  4. Every 4-6 hours:  If on nasal continuous positive airway pressure, respiratory therapy assess nares and determine need for appliance change or resting period.   7/24/2022 0758 by Deshaun Zayas RN  Outcome: Progressing  7/23/2022 1909 by Kevin Wallace RN  Outcome: Progressing     Problem: ABCDS Injury Assessment  Goal: Absence of physical injury  7/24/2022 0758 by Deshaun Zayas RN  Outcome: Progressing  7/23/2022 1909 by Kevin Wallace RN  Outcome: Progressing  Flowsheets (Taken 7/23/2022 1629)  Absence of Physical Injury: Implement safety measures based on patient assessment     Problem: Nutrition Deficit:  Goal: Optimize nutritional status  Outcome: Progressing

## 2022-07-24 NOTE — PROGRESS NOTES
[facility] GENERAL SURGERY    Surgery Progress Note           POD # 26    PATIENT NAME: Eliezer Alvares     TODAY'S DATE: 7/24/2022    INTERVAL HISTORY:    Pt with mild pain. OBJECTIVE:   VITALS:  /60   Pulse 79   Temp 99.1 °F (37.3 °C) (Oral)   Resp 16   Ht 5' 4\" (1.626 m)   Wt 156 lb 1.6 oz (70.8 kg)   SpO2 96%   BMI 26.79 kg/m²     INTAKE/OUTPUT:    I/O last 3 completed shifts: In: 1849 [I.V.:1639; NG/GT:210]  Out: 355 [Urine:350; Drains:5]  No intake/output data recorded. CONSTITUTIONAL:  awake and aler  ABDOMEN:   normal bowel sounds, soft, non-distended, mild tender, shaun minimal and purulent  INCISION: clean    Data:  CBC:   Recent Labs     07/21/22  1145   WBC 10.6   HGB 7.6*   HCT 22.1*          BMP:    Recent Labs     07/22/22  0644 07/23/22  0545 07/24/22  0748    134* 131*   K 4.7 4.5 4.8    102 101   CO2 23 24 25   BUN 14 15 8   CREATININE 1.3* 1.3* 0.6   GLUCOSE 118* 115* 306*       Hepatic: No results for input(s): AST, ALT, ALB, BILITOT, ALKPHOS in the last 72 hours. Mag:    No results for input(s): MG in the last 72 hours. Phos:     Recent Labs     07/22/22  0644 07/23/22  0545 07/24/22  0748   PHOS 3.4 3.7 2.1*        INR: No results for input(s): INR in the last 72 hours.       Radiology Review:  NA    ASSESSMENT AND PLAN:  74 y.o. female status post ulcer repair and jtube  Continue tube feeds at goal rate  PPI, Abx  ARU eval Monday         Electronically signed by Linda Duran MD

## 2022-07-24 NOTE — PROGRESS NOTES
Shift assessment complete - See flow sheet. Medications given - See [facility]     Patient with no complaints of pain at this time. Patient denies any further needs. Bed alarm is Deferred for low/med risk, A/O with steady gait   Call light in reach  Bedside Mobility Assessment Tool (BMAT):     Assessment Level 1- Sit and Shake    1. From a semi-reclined position, ask patient to sit up and rotate to a seated position at the side of the bed. Can use the bedrail. 2. Ask patient to reach out and grab your hand and shake making sure patient reaches across his/her midline. Fail- Patient is unable to perform tasks, patient is MOBILITY LEVEL 1. Assessment Level 2- Stretch and Point   1. With patient in seated position at the side of the bed, have patient place both feet on the floor (or stool) with knees no higher than hips. 2. Ask patient to stretch one leg and straighten the knee, then bend the ankle/flex and point the toes. If appropriate, repeat with the other leg. Pass- Patient is able to demonstrate appropriate quad strength on intended weight bearing limb(s). Move onto Assessment Level 3. Assessment Level 3- Stand   1. Ask patient to elevate off the bed or chair (seated to standing) using an assistive device (cane, bedrail). 2. Patient should be able to raise buttocks off be and hold for a count of five. May repeat once. Pass- Patient maintains standing stability for at least 5 seconds, proceed to assessment level 4. Assessment Level 4- Walk   1. Ask patient to march in place at bedside. 2. Then ask patient to advance step and return each foot. Some medical conditions may render a patient from stepping backwards, use your best clinical judgement. Fail- Patient not able to complete tasks OR requires use of assistive device. Patient is MOBILITY LEVEL 3.        Mobility Level- 3

## 2022-07-24 NOTE — PROGRESS NOTES
Handoff report and transfer of care given at bedside to [facility]. Patient in stable condition, denies needs/concerns at this time. Call light within reach.

## 2022-07-24 NOTE — PROGRESS NOTES
Occupational Therapy Daily Treatment Note    Unit: 2 [place]  Date:  7/24/2022  Patient Name:    Laura Pavon  Admitting diagnosis:  Gastric perforation, acute [K31.89]  Perforated peptic ulcer ([facility] [K27.5]  Admit Date:  6/28/2022  Precautions/Restrictions:  fall risk, IV, bed/chair alarm, incision (abdomen + drain), NGT, J Tube, telemetry, continuous pulse ox, [place] (hard of hearing), and code status (Full), PICC left     Treatment Time:  7108-1106  Treatment number:  6   Total Treatment Time:   40 minutes    Patient Goals for Session:  \" to go home \"      Discharge Recommendations: SNF  DME needs for discharge: defer to facility       Therapy recommendations for staff:  Assist of 1 with use of rolling walker and gait belt for all transfers to/from BSC/chair    History of Present Illness: s/p emergent repair of perf ulcer with j-tube insertion on 6/28 by Dr. Lyndon Lowe, subsequent DA and hypertension. Recent COVID from 6/22-24/22, h/o colon cancer. Home Health S4 Level Recommendation:  NA    AM-PAC Score: AM-PAC Inpatient Daily Activity Raw Score: 15  Pt scored a 15/24 on the AM-PAC ADL Inpatient form. Current research shows that an AM-PAC score of 17 or less is typically not associated with a discharge to the patient's home setting. Subjective:  Pt supine in bed upon therapist arrival. Pt agreeable to work with therapy this date. Pain   Yes  Rating: mild  Location: abdomen    Pain Medicine Status: Received pain med prior to tx      Cognition    A&O orientation not directly assessed.     Able to follow 2 step commands    Balance:  Functional Sitting Balance:  WFL   Comments: good on BSC   Functional Standing Balance:Impaired   Comments: posterior lean at [facility] for gayle care trial     Bed Mobility   Supine to Sit:               CGA  Sit to Supine:               Min A   Rolling:                        Not Tested  Scooting:                     SBA at EOB     Transfer Training                    Sit to stand: CGA  Stand to sit:                 CGA  Bed to BSC:                CGA with use of gait belt and rolling walker (RW),plus total assist to manage lines    Activities of Daily Living:   UB Dressing:   Not Tested  LB Dressing:    Not Tested  UB Bathing:  Not Tested  LB Bathing:  Not Tested  Feeding:  Not Tested  Grooming:   minimal assistance (25%)  Toileting:  moderate assistance (50%)    Activity Tolerance:   Pt completed therapy session with SOB noted with activity   HR (bpm) SpO2 (%) Comments   Supine, at rest 76 94    Seated at  83 Appeared SOB   Seated eob 96 91 Recovered quickly     Therapeutic Exercise:   N/A    Patient Education:   Role of OT  Recommendations for DC    Positioning Needs: In bed, call light and needs in reach. Alarm Set    Family Present:  No    Assessment: Pt with good progress this date. Increased encouragement needed for participation in therapy. Reviewed d/c recommendation with pt this date. Pt reports she does not feel she would be able to complete the amount of therapy needed for ARU at this time. Agreeable to consider SNF at the end of the session. GOALS  To be met in 3 Visits:  1). Bed to toilet/BSC: Max A- goal met 7-12-22 new goal SBA with RW     To be met in 5 Visits:  1). Supine to/from Sit:             Mod A goal met - 7 12 new goal min assist, goal met 7/22 increase to SBA   2). Upper Body Bathing:         Min A,   3). Lower Body Bathing: Mod A  4). Upper Body Dressing:       Min A, goal met 7/22, increase to SBA  5). Lower Body Dressing: Mod A  6).  Pt to demonstrate UE exs x 15 reps with minimal cues       Plan: cont with POC      Freddy Maki, DILEEP, OTR/L   [id number]       If patient discharges from this facility prior to next visit, this note will serve as the Discharge Summary

## 2022-07-24 NOTE — PROGRESS NOTES
Inpatient Physical Therapy Daily Treatment Note    Unit: 2 [place]  Date:  7/24/2022  Patient Name:    Christelle Laguna  Admitting diagnosis:  Gastric perforation, acute [K31.89]  Perforated peptic ulcer ([address] [K27.5]  Admit Date:  6/28/2022  Precautions/Restrictions:  Fall risk, Bed/chair alarm, Lines -IV, NG tube, PICC left, and Drains (JERRI), [place] (hard of hearing), and Telemetry, Jtube      Discharge Recommendations: SNF      Comment: again discussed ARU option with pt. Reminded pt that she will need to demonstrate consistent participation with therapy in coming days and the therapy particiaption while at ARU, pt acknowledged understanding. Given indep PLOF, she would be a good ARU candidate if able to demo consistently improved participation going forward. Will re-assess Monday. DME needs for discharge: defer to facility       Therapy recommendation for EMS Transport: can transport by wheelchair    Therapy recommendations for staff:   Assist of 1 with use of rolling walker (RW) and gait belt for all transfers to/from [facility]  to/from [facility]    History of Present Illness:        History of Chief Complaint:  per Frame:  \"Latrice Gilbert is a 74 y.o. female who presented to the ER with severe, worsening, constant abdominal pain. The patient states she was + for COVID and admitted to the hospital. She improved and was discharged home on 6/22. She was on steroids at home. She developed upper abdominal pain around 6/25. She had a decreased appetite and \"felt full quickly. \" She had dry heaves. A CT was performed and demonstrated gastritis. She was discharged home on Naprosyn and Pepcid. Per the patient's daughter, the pepcid was unavailable at the pharmacy but, they were able to get some later. In addition, the patient was taken tylenol and Advil (around the clock as directed), along with the Naprosyn. She drinks 2 glasses of wine per week. She states yesterday, the pain became worse. She couldn't get comfortable.  She tried Zofran and Naprosyn but, this did not help. She had chills without fevers, she had nausea and dry heaves. She came back to the ER because the pain was so much worse. In the ER, a CT was performed and demonstrates a gastric perforation. We will admit this patient and plan for surgery to treat her perforation. \"  Patient is s/p ulcer repair and j.tube     Home Health S4 Level Recommendation: NA  AM-PAC Mobility Score   AM-PAC Inpatient Mobility Raw Score : [id number] Jesse Michel scored a 15/24 on the AM-PAC short mobility form. Current research shows that an AM-PAC score of 17 or less is typically not associated with a discharge to the patient's home setting. If patient discharges prior to next session this note will serve as a discharge summary. Please see below for the latest assessment towards goals. Treatment Time:  15:00-15:40  Treatment number: 11  Timed Code Treatment Minutes: 40 minutes  Total Treatment Minutes:  40  minutes    Cognition    A&O orientation not directly assessed. Able to follow 2 step commands    Subjective  Patient lying supine in bed with no family present   Pt agreeable to this PT tx. Pain   Yes  Location: abdomen  Rating:    mild/10  Pain Medicine Status: Received pain med prior to tx     Bed Mobility   Supine to Sit:    CGA  Sit to Supine:   Min A   Rolling:   Not Tested  Scooting:   SBA at EOB    Transfer Training     Sit to stand:   CGA  Stand to sit:   CGA  Bed to BSC:   CGA with use of gait belt and rolling walker (RW),plus total assist to manage lines    Gait Training gait completed as indicated below  Distance:      2,2 ft and turning  Deviations (firm surface/linoleum):  decreased yakov, forward flexed posture, and step to pattern  Assistive Device Used:    gait belt and rolling walker (RW)  Level of Assist:    CGA  Comment: unsteady with second walk/transfer back to bed. Patient appeared fatigued and SOB . Spo2 84%    Stair Training deferred, pt unsafe/not appropriate to complete stairs at this time        Balance  Sitting:  Good ; Supervision  Comments:     Standing: Fair -; Min A   Comments: unsteady standing to complete pericare,1 LOB in posterior direction    Patient Education      Role of PT, POC, Discharge recommendations, DC recommendations, safety awareness, transfer techniques, and calling for assist with mobility. Positioning Needs       Pt in bed, alarm set, positioned in proper neutral alignment and pressure relief provided. Call light provided and all needs within reach    Activity Tolerance   Pt completed therapy session with SOB noted with activity . HR (bpm) SpO2 (%) Comments   Supine, at rest 76 94    Seated at  83 Appeared SOB   End of session 96 91 Recovered quickly        Other  RN aware of Spo2 with activity    Assessment :  Patient demonstrated good progress during this session. Patient fatigued quickly,appeared SOB  and demonstrated decreased Spo2 . Recommending continued activity progression using RW with careful monitoring and supplemental oxygen as needed to keep Spo2 above 90%    Goals (all goals ongoing unless otherwise indicated)  To be met in 3 visits:  1). Independent with LE Ex x 10 reps  2). Supine to/from sit: Mod A - 7/23 goal met  3). Bed to chair: CGA with walker -7/24 goal met     To be met in 6 visits:  1). Supine to/from sit: Supervision  2). Sit to/from stand: Supervision  3). Bed to chair: Supervision  4). Gait: Ambulate 50 ft.  with  SBA and use of LRAD (least restrictive assistive device)  5). Tolerate B LE exercises 3 sets of 10-15 reps  6). Ascend/descend 2 steps with CGA with use of hand rail unilateral and LRAD (least restrictive assistive device)     Plan   Continue with plan of care. Signature: Stephenie Pitts, PT #[id number]     If patient discharges from this facility prior to next visit, this note will serve as the Discharge Summary.

## 2022-07-25 ENCOUNTER — APPOINTMENT (OUTPATIENT)
Dept: CT IMAGING | Age: 72
DRG: 326 | End: 2022-07-25
Payer: MEDICARE

## 2022-07-25 LAB
ALBUMIN SERPL-MCNC: 3 G/DL (ref 3.4–5)
ANION GAP SERPL CALCULATED.3IONS-SCNC: 8 MMOL/L (ref 3–16)
BUN BLDV-MCNC: 18 MG/DL (ref 7–20)
CALCIUM SERPL-MCNC: 8.6 MG/DL (ref 8.3–10.6)
CHLORIDE BLD-SCNC: 105 MMOL/L (ref 99–110)
CO2: 24 MMOL/L (ref 21–32)
CREAT SERPL-MCNC: 1.3 MG/DL (ref 0.6–1.2)
GFR AFRICAN AMERICAN: 48
GFR NON-AFRICAN AMERICAN: 40
GLUCOSE BLD-MCNC: 106 MG/DL (ref 70–99)
GLUCOSE BLD-MCNC: 131 MG/DL (ref 70–99)
GLUCOSE BLD-MCNC: 132 MG/DL (ref 70–99)
GLUCOSE BLD-MCNC: 134 MG/DL (ref 70–99)
GLUCOSE BLD-MCNC: 136 MG/DL (ref 70–99)
GLUCOSE BLD-MCNC: 86 MG/DL (ref 70–99)
PERFORMED ON: ABNORMAL
PERFORMED ON: NORMAL
PHOSPHORUS: 3.3 MG/DL (ref 2.5–4.9)
POTASSIUM SERPL-SCNC: 4.6 MMOL/L (ref 3.5–5.1)
SODIUM BLD-SCNC: 137 MMOL/L (ref 136–145)

## 2022-07-25 PROCEDURE — 6360000002 HC RX W HCPCS: Performed by: NURSE PRACTITIONER

## 2022-07-25 PROCEDURE — 1200000000 HC SEMI PRIVATE

## 2022-07-25 PROCEDURE — 6360000002 HC RX W HCPCS: Performed by: SURGERY

## 2022-07-25 PROCEDURE — 99024 POSTOP FOLLOW-UP VISIT: CPT | Performed by: NURSE PRACTITIONER

## 2022-07-25 PROCEDURE — 80069 RENAL FUNCTION PANEL: CPT

## 2022-07-25 PROCEDURE — 74176 CT ABD & PELVIS W/O CONTRAST: CPT

## 2022-07-25 PROCEDURE — C9113 INJ PANTOPRAZOLE SODIUM, VIA: HCPCS | Performed by: NURSE PRACTITIONER

## 2022-07-25 PROCEDURE — 6370000000 HC RX 637 (ALT 250 FOR IP): Performed by: NURSE PRACTITIONER

## 2022-07-25 PROCEDURE — 6360000004 HC RX CONTRAST MEDICATION: Performed by: NURSE PRACTITIONER

## 2022-07-25 PROCEDURE — 2580000003 HC RX 258: Performed by: SURGERY

## 2022-07-25 PROCEDURE — 36592 COLLECT BLOOD FROM PICC: CPT

## 2022-07-25 RX ADMIN — ONDANSETRON HYDROCHLORIDE 4 MG: 2 INJECTION, SOLUTION INTRAMUSCULAR; INTRAVENOUS at 11:59

## 2022-07-25 RX ADMIN — MICONAZOLE NITRATE: 2 OINTMENT TOPICAL at 00:09

## 2022-07-25 RX ADMIN — HEPARIN SODIUM 5000 UNITS: 5000 INJECTION INTRAVENOUS; SUBCUTANEOUS at 00:09

## 2022-07-25 RX ADMIN — PANTOPRAZOLE SODIUM 40 MG: 40 INJECTION, POWDER, LYOPHILIZED, FOR SOLUTION INTRAVENOUS at 08:42

## 2022-07-25 RX ADMIN — OXYCODONE HYDROCHLORIDE 5 MG: 5 SOLUTION ORAL at 16:40

## 2022-07-25 RX ADMIN — SODIUM CHLORIDE: 9 INJECTION, SOLUTION INTRAVENOUS at 16:03

## 2022-07-25 RX ADMIN — PIPERACILLIN AND TAZOBACTAM 3375 MG: 3; .375 INJECTION, POWDER, LYOPHILIZED, FOR SOLUTION INTRAVENOUS at 08:42

## 2022-07-25 RX ADMIN — SODIUM CHLORIDE, PRESERVATIVE FREE 10 ML: 5 INJECTION INTRAVENOUS at 08:43

## 2022-07-25 RX ADMIN — HEPARIN SODIUM 5000 UNITS: 5000 INJECTION INTRAVENOUS; SUBCUTANEOUS at 16:37

## 2022-07-25 RX ADMIN — HEPARIN SODIUM 5000 UNITS: 5000 INJECTION INTRAVENOUS; SUBCUTANEOUS at 08:43

## 2022-07-25 RX ADMIN — PANTOPRAZOLE SODIUM 40 MG: 40 INJECTION, POWDER, LYOPHILIZED, FOR SOLUTION INTRAVENOUS at 00:09

## 2022-07-25 RX ADMIN — PIPERACILLIN AND TAZOBACTAM 3375 MG: 3; .375 INJECTION, POWDER, LYOPHILIZED, FOR SOLUTION INTRAVENOUS at 00:11

## 2022-07-25 RX ADMIN — MICONAZOLE NITRATE: 2 OINTMENT TOPICAL at 08:42

## 2022-07-25 RX ADMIN — MORPHINE SULFATE 1 MG: 2 INJECTION, SOLUTION INTRAMUSCULAR; INTRAVENOUS at 12:01

## 2022-07-25 RX ADMIN — IOHEXOL 50 ML: 240 INJECTION, SOLUTION INTRATHECAL; INTRAVASCULAR; INTRAVENOUS; ORAL at 11:23

## 2022-07-25 RX ADMIN — PIPERACILLIN AND TAZOBACTAM 3375 MG: 3; .375 INJECTION, POWDER, LYOPHILIZED, FOR SOLUTION INTRAVENOUS at 16:03

## 2022-07-25 ASSESSMENT — PAIN DESCRIPTION - DESCRIPTORS
DESCRIPTORS: ACHING;CRAMPING
DESCRIPTORS: ACHING;BURNING

## 2022-07-25 ASSESSMENT — PAIN SCALES - GENERAL
PAINLEVEL_OUTOF10: 6
PAINLEVEL_OUTOF10: 8
PAINLEVEL_OUTOF10: 0

## 2022-07-25 ASSESSMENT — PAIN DESCRIPTION - LOCATION
LOCATION: ABDOMEN
LOCATION: ABDOMEN

## 2022-07-25 ASSESSMENT — PAIN DESCRIPTION - ORIENTATION
ORIENTATION: MID
ORIENTATION: MID

## 2022-07-25 NOTE — ACP (ADVANCE CARE PLANNING)
Advance Care Planning     Advance Care Planning Inpatient Note  Spiritual Care Department    Today's Date: 6/30/2022  Unit: [facility] PCU TELEMETRY    Received request from IDT Member. Upon review of chart and communication with care team, patient's decision making abilities are not in question. . Patient, Spouse and Son-in-law was/were present in the room during visit. Goals of ACP Conversation:  Discuss advance care planning documents    Health Care Decision Makers:       Primary Decision Maker: Romeo Goodson - [phone]    Summary:  No Decision Maker named by patient at this time    Assessment:  Pt not feeing well. Pt said, \"Except God, I do not have anyone in mind to make those decisions at this time. \" Tali Koch document given to pt for her reading. Spiritual care will continue to follow up.     Interventions:  Provided education on documents for clarity and greater understanding  Reviewed but did not complete ACP document      Outcomes/Plan:  ACP Discussion: Postponed    Electronically signed by Chaplain Masood on 6/30/2022 at 2:37 PM
Advance Care Planning     General Advance Care Planning (ACP) Conversation    Date of Conversation: 6/28/2022  Conducted with: Patient with Decision Making Capacity and pt's  Nicho Chowdhury at [facility]:    Primary Decision Maker: Meño Barrera - Child - [phone]  Click here to [facility] [address] including selection of the Healthcare Decision Maker Relationship (ie \"Primary\"). Today we documented desired Decision Maker(s), who is (are) NOT Legal Next of Kin. ACP documents are required for decision maker authority.  RN consulted spiritual care    Content/Action Overview:    Reviewed DNR/DNI and patient elects Full Code (Attempt Resuscitation)    Length of Voluntary ACP Conversation in minutes:  <16 minutes (Non-Billable)    LOIDA Cain
Advance Care Planning   The patient has the following advanced directives on file:  Advance Directives       Power of [address] ACP-Advance Directive ACP-Power of     Not on File Not on File Not on File Not on File            The patient has appointed the following active healthcare agents:    Primary Decision Maker: Tony Fisher - [phone]    The Patient has the following current code status:    Code Status: Full Code    Visit Documentation:  I discussed [address] with Mary Landis today which included the importance of making their choices for care and treatment in the case of a health event that adversely affects their decision-making abilities. She has not completed the Advance Care Directives. She does not have an active health care agent at this time. 1. Goals of medical treatment were not reviewed . 2. Patient's stated goal/treatment preference is rehab at d/c.  3. Others present during the discussion no   4. The discussion lasted 20 minutes. Palliative Care Initial Note  Palliative Care Admit date:07/20/2022    Advance Directives:Full Code    Plan of care/goals:Reviewed chart. Pt is POD #22,hx of colon cancer and pt states she is \"sick of being sick\" and struggling with depression. Met with pt at bedside for support. Pt states for writer to touch base with her tomorrow as she is uncomfortable and does not feel like talking at this time. Writer to f/u tomorrow. Social/Spiritual: Prayer Support    Plan: to be decided, awaiting updated therapy notes.     Reason for consult:    ___ Advance Care Planning  _x_ Transition of Care Planning  _x__Psychosocial/Spiritual Support  ___ Symptom Management    Rani Gonzalez 89, [facility]  7/20/2022
pt and family and multiple good questions asked and answered. Pt states her goal is ARU,however, she is concerned at her age that she may not be able to meet expectations of the PT required there. Awaiting CT results and pt hopeful that NG tube will be removed in the next 1-2 days. ARU staff coming in today to meet with the pt and can further explain PT requirements. NICOLE Mathias updated on above POC. Following. Social/Spiritual: Prayer Support    Plan: ARU for rehab vrs SNF for short term rehab.     Reason for consult:    ___ Advance Care Planning  _x_ Transition of Care Planning  _x_ Psychosocial/Spiritual Support  ___ Symptom Management      [facility], RN  7/25/2022

## 2022-07-25 NOTE — PLAN OF CARE
Problem: Discharge Planning  Goal: Discharge to home or other facility with appropriate resources  Outcome: Progressing Towards Goal     Problem: Pain  Goal: Verbalizes/displays adequate comfort level or baseline comfort level  Outcome: Progressing Towards Goal     Problem: Safety - Adult  Goal: Free from fall injury  Outcome: Progressing Towards Goal     Problem: Skin/Tissue Integrity  Goal: Absence of new skin breakdown  Description: 1. Monitor for areas of redness and/or skin breakdown  2. Assess vascular access sites hourly  3. Every 4-6 hours minimum:  Change oxygen saturation probe site  4. Every 4-6 hours:  If on nasal continuous positive airway pressure, respiratory therapy assess nares and determine need for appliance change or resting period.   Outcome: Progressing Towards Goal     Problem: ABCDS Injury Assessment  Goal: Absence of physical injury  Outcome: Progressing Towards Goal     Problem: Nutrition Deficit:  Goal: Optimize nutritional status  Outcome: Progressing Towards Goal

## 2022-07-25 NOTE — CARE COORDINATION
INTERDISCIPLINARY PLAN OF CARE CONFERENCE    Date/Time: 7/25/2022 3:50 PM  Completed by: Edil Hernandez RN, Case Management      Patient Name:  Angel Beard  YOB: 1950  Admitting Diagnosis: Gastric perforation, acute [K31.89]  Perforated peptic ulcer ([facility] [address] [K27.5]     Admit Date/Time:  6/28/2022  6:58 AM    Chart reviewed. Interdisciplinary team contacted or reviewed plan related to patient progress and discharge plans. Disciplines included Case Management, Nursing, and Dietitian. Current Status:INPT   PT/OT recommendation for discharge plan of care: SNF    Expected D/C Disposition:  Rehab     Discharge Plan Comments: Pt cont to plan for ARU if appropriate. ARU cont to follow pt/ot recommendations. CM following.       Home O2 in place on admit: No  Pt informed of need to bring portable home O2 tank on day of discharge for nursing to connect prior to leaving:  Not Indicated  Verbalized agreement/Understanding:  Not Indicated

## 2022-07-25 NOTE — FLOWSHEET NOTE
07/25/22 0815   Vital Signs   Temp 98.5 °F (36.9 °C)   Temp Source Oral   Heart Rate 75   Heart Rate Source Monitor   Resp 16   /75   BP Location Right upper arm   BP Method Automatic   MAP (Calculated) 91.67   Patient Position Semi fowlers   Level of Consciousness Alert (0)   MEWS Score 1   Pain Assessment   Pain Assessment 0-10   Pain Level 0  (resting with RR greater than 14)   Oxygen Therapy   SpO2 94 %   O2 Device None (Room air)   Height and Weight   Height 5' 4\" (1.626 m)   Weight 141 lb (64 kg)   Weight Method Bed scale   BSA (Calculated - sq m) 1.7 sq meters   BMI (Calculated) 24.3   AM assessment completed, see flow sheet. Pt is resting, responds to voice - is oriented. Vital signs are WNL. Respirations are even & easy. No complaints voiced. Pt denies needs at this time. SR up x 2, and bed in low position. Call light is within reach.

## 2022-07-25 NOTE — PROGRESS NOTES
General Surgery   Daily Progress Note    Pt Name: Kishan Mcqueen Record Number: [id number]  Date of Birth 1950   Today's Date: 7/25/2022    ASSESSMENT  POD #27 s/p repair of perf ulcer, j-tube insertion  UGI 7/19: moderate amount of contrast extravasation  ABD: J-tube in place, JERRI drain in place, midline dressing changed: by RN, + diffuse tenderness which appears improved and unchanged, +mild distention, NGT in place and has been clamped, no N/V, +flatus, + BMs     ARF improving: Cr 1.3  K+ 4.6  Leuks 10.6  VSS  NGT clamped  UO OK  JERRI with minimal drainage in the bulb: thick tan/yellow  Pt states \"I just want this over with already. \"    PLAN  NGT leave clamp but, leave pt NPO  Strict I&Os  Pain control   Zosyn  PT/OT: OOB/Ambulate  Protonix BID  Wound care Rn seeing pt  TF rate at goal  BID dressing changes to midline incision. Pt looks OK POD #27: Pt with post-op ulcer repair leak on UGI which appears to be controlled with the JERRI drain. Will get repeat CT to re evaluate perf ulcer repair. Await results to formulate plan of care. Kendrick Meredith is unchanged from yesterday. Pain is better controlled. She has no nausea and no vomiting. She has passed flatus and has had a bowel movement. She is NPO and tolerating TF. Current activity is up with assistance    OBJECTIVE  VITALS:  height is 5' 4\" (1.626 m) and weight is 141 lb (64 kg). Her oral temperature is 98.5 °F (36.9 °C). Her blood pressure is 125/75 and her pulse is 75. Her respiration is 18 and oxygen saturation is 94%.    VITALS:  /75   Pulse 75   Temp 98.5 °F (36.9 °C) (Oral)   Resp 18   Ht 5' 4\" (1.626 m)   Wt 141 lb (64 kg)   SpO2 94%   BMI 24.20 kg/m²   INTAKE/OUTPUT:      Intake/Output Summary (Last 24 hours) at 7/25/2022 1418  Last data filed at 7/24/2022 2349  Gross per 24 hour   Intake 1000 ml   Output --   Net 1000 ml       URINARY CATHETER OUTPUT (Curiel):     GENERAL: alert, pleasant, in better spirits today I/O last 3 completed shifts: In: 1000 [NG/GT:1000]  Out: -   No intake/output data recorded.     LABS  Recent Labs     07/25/22 0512      K 4.6      CO2 24   BUN 18   CREATININE 1.3*   PHOS 3.3   CALCIUM 8.6       CBC with Differential:    Lab Results   Component Value Date/Time    WBC 10.6 07/21/2022 11:45 AM    RBC 2.47 07/21/2022 11:45 AM    RBC 4.41 08/02/2016 10:34 AM    HGB 7.6 07/21/2022 11:45 AM    HCT 22.1 07/21/2022 11:45 AM     07/21/2022 11:45 AM    MCV 89.2 07/21/2022 11:45 AM    MCH 30.5 07/21/2022 11:45 AM    MCHC 34.2 07/21/2022 11:45 AM    RDW 17.1 07/21/2022 11:45 AM    BANDSPCT 1 07/21/2022 11:45 AM    LYMPHOPCT 14.0 07/21/2022 11:45 AM    LYMPHOPCT 25.3 08/02/2016 10:34 AM    MONOPCT 4.0 07/21/2022 11:45 AM    BASOPCT 0.0 07/21/2022 11:45 AM    MONOSABS 0.4 07/21/2022 11:45 AM    LYMPHSABS 1.7 07/21/2022 11:45 AM    EOSABS 0.2 07/21/2022 11:45 AM    BASOSABS 0.0 07/21/2022 11:45 AM     CMP:    Lab Results   Component Value Date/Time     07/25/2022 05:12 AM    K 4.6 07/25/2022 05:12 AM    K 3.8 06/29/2022 05:19 AM     07/25/2022 05:12 AM    CO2 24 07/25/2022 05:12 AM    BUN 18 07/25/2022 05:12 AM    CREATININE 1.3 07/25/2022 05:12 AM    GFRAA 48 07/25/2022 05:12 AM    AGRATIO 0.8 06/29/2022 05:19 AM    LABGLOM 40 07/25/2022 05:12 AM    GLUCOSE 132 07/25/2022 05:12 AM    GLUCOSE 85 08/02/2016 10:34 AM    PROT 4.7 06/29/2022 05:19 AM    PROT 5.7 08/02/2016 10:34 AM    LABALBU 3.0 07/25/2022 05:12 AM    CALCIUM 8.6 07/25/2022 05:12 AM    BILITOT 0.6 06/29/2022 05:19 AM    ALKPHOS 84 06/29/2022 05:19 AM    AST 49 06/29/2022 05:19 AM    ALT 48 06/29/2022 05:19 AM         January uBrks APRN - CNP  Electronically signed 7/25/2022 at 2:18 PM       I

## 2022-07-26 LAB
ALBUMIN SERPL-MCNC: 3 G/DL (ref 3.4–5)
ANION GAP SERPL CALCULATED.3IONS-SCNC: 9 MMOL/L (ref 3–16)
BUN BLDV-MCNC: 17 MG/DL (ref 7–20)
CALCIUM SERPL-MCNC: 8.4 MG/DL (ref 8.3–10.6)
CHLORIDE BLD-SCNC: 105 MMOL/L (ref 99–110)
CO2: 21 MMOL/L (ref 21–32)
CREAT SERPL-MCNC: 1.1 MG/DL (ref 0.6–1.2)
GFR AFRICAN AMERICAN: 59
GFR NON-AFRICAN AMERICAN: 49
GLUCOSE BLD-MCNC: 102 MG/DL (ref 70–99)
GLUCOSE BLD-MCNC: 131 MG/DL (ref 70–99)
GLUCOSE BLD-MCNC: 131 MG/DL (ref 70–99)
GLUCOSE BLD-MCNC: 133 MG/DL (ref 70–99)
GLUCOSE BLD-MCNC: 137 MG/DL (ref 70–99)
GLUCOSE BLD-MCNC: 84 MG/DL (ref 70–99)
GLUCOSE BLD-MCNC: 98 MG/DL (ref 70–99)
PERFORMED ON: ABNORMAL
PERFORMED ON: NORMAL
PERFORMED ON: NORMAL
PHOSPHORUS: 2.8 MG/DL (ref 2.5–4.9)
POTASSIUM SERPL-SCNC: 4.3 MMOL/L (ref 3.5–5.1)
SODIUM BLD-SCNC: 135 MMOL/L (ref 136–145)

## 2022-07-26 PROCEDURE — 6360000002 HC RX W HCPCS: Performed by: SURGERY

## 2022-07-26 PROCEDURE — 97116 GAIT TRAINING THERAPY: CPT

## 2022-07-26 PROCEDURE — 2580000003 HC RX 258: Performed by: SURGERY

## 2022-07-26 PROCEDURE — 6370000000 HC RX 637 (ALT 250 FOR IP): Performed by: NURSE PRACTITIONER

## 2022-07-26 PROCEDURE — 6360000002 HC RX W HCPCS: Performed by: NURSE PRACTITIONER

## 2022-07-26 PROCEDURE — 97530 THERAPEUTIC ACTIVITIES: CPT

## 2022-07-26 PROCEDURE — 80069 RENAL FUNCTION PANEL: CPT

## 2022-07-26 PROCEDURE — 99024 POSTOP FOLLOW-UP VISIT: CPT | Performed by: NURSE PRACTITIONER

## 2022-07-26 PROCEDURE — 1200000000 HC SEMI PRIVATE

## 2022-07-26 PROCEDURE — C9113 INJ PANTOPRAZOLE SODIUM, VIA: HCPCS | Performed by: NURSE PRACTITIONER

## 2022-07-26 RX ADMIN — PIPERACILLIN AND TAZOBACTAM 3375 MG: 3; .375 INJECTION, POWDER, LYOPHILIZED, FOR SOLUTION INTRAVENOUS at 16:47

## 2022-07-26 RX ADMIN — MORPHINE SULFATE 1 MG: 2 INJECTION, SOLUTION INTRAMUSCULAR; INTRAVENOUS at 00:28

## 2022-07-26 RX ADMIN — MICONAZOLE NITRATE: 2 OINTMENT TOPICAL at 09:16

## 2022-07-26 RX ADMIN — HEPARIN SODIUM 5000 UNITS: 5000 INJECTION INTRAVENOUS; SUBCUTANEOUS at 17:10

## 2022-07-26 RX ADMIN — PANTOPRAZOLE SODIUM 40 MG: 40 INJECTION, POWDER, LYOPHILIZED, FOR SOLUTION INTRAVENOUS at 00:22

## 2022-07-26 RX ADMIN — PANTOPRAZOLE SODIUM 40 MG: 40 INJECTION, POWDER, LYOPHILIZED, FOR SOLUTION INTRAVENOUS at 09:14

## 2022-07-26 RX ADMIN — PIPERACILLIN AND TAZOBACTAM 3375 MG: 3; .375 INJECTION, POWDER, LYOPHILIZED, FOR SOLUTION INTRAVENOUS at 00:23

## 2022-07-26 RX ADMIN — PIPERACILLIN AND TAZOBACTAM 3375 MG: 3; .375 INJECTION, POWDER, LYOPHILIZED, FOR SOLUTION INTRAVENOUS at 09:13

## 2022-07-26 RX ADMIN — MICONAZOLE NITRATE: 2 OINTMENT TOPICAL at 23:06

## 2022-07-26 RX ADMIN — SODIUM CHLORIDE, PRESERVATIVE FREE 10 ML: 5 INJECTION INTRAVENOUS at 09:15

## 2022-07-26 RX ADMIN — OXYCODONE HYDROCHLORIDE 5 MG: 5 SOLUTION ORAL at 17:10

## 2022-07-26 RX ADMIN — HEPARIN SODIUM 5000 UNITS: 5000 INJECTION INTRAVENOUS; SUBCUTANEOUS at 09:15

## 2022-07-26 RX ADMIN — HEPARIN SODIUM 5000 UNITS: 5000 INJECTION INTRAVENOUS; SUBCUTANEOUS at 00:22

## 2022-07-26 RX ADMIN — MICONAZOLE NITRATE: 2 OINTMENT TOPICAL at 00:09

## 2022-07-26 RX ADMIN — ONDANSETRON HYDROCHLORIDE 4 MG: 2 INJECTION, SOLUTION INTRAMUSCULAR; INTRAVENOUS at 13:41

## 2022-07-26 ASSESSMENT — PAIN DESCRIPTION - DESCRIPTORS
DESCRIPTORS: ACHING;BURNING
DESCRIPTORS: ACHING;CRAMPING

## 2022-07-26 ASSESSMENT — PAIN SCALES - GENERAL
PAINLEVEL_OUTOF10: 9
PAINLEVEL_OUTOF10: 7

## 2022-07-26 ASSESSMENT — PAIN DESCRIPTION - LOCATION
LOCATION: ABDOMEN
LOCATION: ABDOMEN

## 2022-07-26 ASSESSMENT — PAIN - FUNCTIONAL ASSESSMENT
PAIN_FUNCTIONAL_ASSESSMENT: INTOLERABLE, UNABLE TO DO ANY ACTIVE OR PASSIVE ACTIVITIES
PAIN_FUNCTIONAL_ASSESSMENT: ACTIVITIES ARE NOT PREVENTED

## 2022-07-26 ASSESSMENT — PAIN DESCRIPTION - ORIENTATION
ORIENTATION: MID
ORIENTATION: MID

## 2022-07-26 ASSESSMENT — PAIN DESCRIPTION - ONSET: ONSET: ON-GOING

## 2022-07-26 ASSESSMENT — PAIN DESCRIPTION - PAIN TYPE: TYPE: SURGICAL PAIN;ACUTE PAIN

## 2022-07-26 ASSESSMENT — PAIN DESCRIPTION - FREQUENCY: FREQUENCY: CONTINUOUS

## 2022-07-26 NOTE — CARE COORDINATION
[facility] Acute Rehab Unit   After review, this patient is felt to be:       []  Appropriate for Acute Inpatient Rehab    []  Appropriate for Acute Inpatient Rehab Pending Insurance Authorization    [x]  Not appropriate for Acute Inpatient Rehab    []  Referral received and ARU reviewing patient; Evaluation ongoing. Met with patient and provided information on ARU and the requirements of ARU to consistently participate in therapy. She verbalized that she is not able to do 3 hours of therapy a day. Discussed with NICOLE, Janes [place].   Thank you for referral.   Justin Heaton RN

## 2022-07-26 NOTE — PROGRESS NOTES
General Surgery   Daily Progress Note    Pt Name: Kishan Mcqueen Record Number: [id number]  Date of Birth 1950   Today's Date: 7/26/2022    ASSESSMENT  POD #28 s/p repair of perf ulcer, j-tube insertion  CT abd and pelvis 7/25: perforated ulcer repair, persistent air fluid collection with drain adjacent, no new collections, see rads report  UGI 7/19: moderate amount of contrast extravasation  ABD: J-tube in place, JERRI drain in place, midline dressing changed: by RN, + diffuse tenderness which appears improved and unchanged, +mild distention, NGT in place and has been clamped, no N/V today (pt with some nausea after drinking contrast yesterday), +flatus, + BMs     ARF improving: Cr 1.3->1.1  K+ 4.3  Leuks 10.6  VSS  NGT clamped, no N/V or increased abdominal pain  UO OK  JERRI 15 ml out: thick tan/yellow  Pt states \"Oh good, what can I drink? .\"    PLAN  NGT leave clamp trial full liquids  Strict I&Os  Pain control   Zosyn  PT/OT: OOB/Ambulate  Protonix BID  Wound care Rn seeing pt  TF rate at goal  BID dressing changes to midline incision. Pt looks OK POD #28: Pt with post-op ulcer repair leak on UGI which appears to be improved: Will trial full liquids today; pt instructed to go slow. Awaiting PT note for today for ARU eval.     Trinna Pitch is unchanged from yesterday. Pain is better controlled. She has no nausea and no vomiting. She has passed flatus and has had a bowel movement. She is NPO and tolerating TF. Current activity is up with assistance    OBJECTIVE  VITALS:  height is 5' 4\" (1.626 m) and weight is 141 lb (64 kg). Her oral temperature is 98.2 °F (36.8 °C). Her blood pressure is 132/74 and her pulse is 76. Her respiration is 16 and oxygen saturation is 96%.    VITALS:  /74   Pulse 76   Temp 98.2 °F (36.8 °C) (Oral)   Resp 16   Ht 5' 4\" (1.626 m)   Wt 141 lb (64 kg)   SpO2 96%   BMI 24.20 kg/m²   INTAKE/OUTPUT:      Intake/Output Summary (Last 24 hours) at 7/26/2022 Extension Lydia Cortez filed at 7/26/2022 6594  Gross per 24 hour   Intake 90 ml   Output 15 ml   Net 75 ml       URINARY CATHETER OUTPUT (Curiel):     GENERAL: alert, pleasant, in better spirits today   I/O last 3 completed shifts: In: 1000 [NG/GT:1000]  Out: -   I/O this shift:   In: 90 [NG/GT:90]  Out: 15 [Drains:15]    LABS  Recent Labs     07/26/22  0507   *   K 4.3      CO2 21   BUN 17   CREATININE 1.1   PHOS 2.8   CALCIUM 8.4       CBC with Differential:    Lab Results   Component Value Date/Time    WBC 10.6 07/21/2022 11:45 AM    RBC 2.47 07/21/2022 11:45 AM    RBC 4.41 08/02/2016 10:34 AM    HGB 7.6 07/21/2022 11:45 AM    HCT 22.1 07/21/2022 11:45 AM     07/21/2022 11:45 AM    MCV 89.2 07/21/2022 11:45 AM    MCH 30.5 07/21/2022 11:45 AM    MCHC 34.2 07/21/2022 11:45 AM    RDW 17.1 07/21/2022 11:45 AM    BANDSPCT 1 07/21/2022 11:45 AM    LYMPHOPCT 14.0 07/21/2022 11:45 AM    LYMPHOPCT 25.3 08/02/2016 10:34 AM    MONOPCT 4.0 07/21/2022 11:45 AM    BASOPCT 0.0 07/21/2022 11:45 AM    MONOSABS 0.4 07/21/2022 11:45 AM    LYMPHSABS 1.7 07/21/2022 11:45 AM    EOSABS 0.2 07/21/2022 11:45 AM    BASOSABS 0.0 07/21/2022 11:45 AM     CMP:    Lab Results   Component Value Date/Time     07/26/2022 05:07 AM    K 4.3 07/26/2022 05:07 AM    K 3.8 06/29/2022 05:19 AM     07/26/2022 05:07 AM    CO2 21 07/26/2022 05:07 AM    BUN 17 07/26/2022 05:07 AM    CREATININE 1.1 07/26/2022 05:07 AM    GFRAA 59 07/26/2022 05:07 AM    AGRATIO 0.8 06/29/2022 05:19 AM    LABGLOM 49 07/26/2022 05:07 AM    GLUCOSE 133 07/26/2022 05:07 AM    GLUCOSE 85 08/02/2016 10:34 AM    PROT 4.7 06/29/2022 05:19 AM    PROT 5.7 08/02/2016 10:34 AM    LABALBU 3.0 07/26/2022 05:07 AM    CALCIUM 8.4 07/26/2022 05:07 AM    BILITOT 0.6 06/29/2022 05:19 AM    ALKPHOS 84 06/29/2022 05:19 AM    AST 49 06/29/2022 05:19 AM    ALT 48 06/29/2022 05:19 AM         ILAN Copeland CNP  Electronically signed 7/26/2022 at 12:24 PM

## 2022-07-26 NOTE — PROGRESS NOTES
Inpatient Physical Therapy Daily Treatment Note    Unit: [address] [place]  Date:  7/26/2022  Patient Name:    Rubens Haddad  Admitting diagnosis:  Gastric perforation, acute [K31.89]  Perforated peptic ulcer ([address] [K27.5]  Admit Date:  6/28/2022  Precautions/Restrictions:  Fall risk, Bed/chair alarm, Lines -IV, NG tube, PICC left, and Drains (JERRI), [place] (hard of hearing), and Telemetry, Jtube      Discharge Recommendations: SNF     Continued discussion about ARU option and the need for consistent participation with therapy. DME needs for discharge: defer to facility       Therapy recommendation for EMS Transport: can transport by wheelchair    Therapy recommendations for staff:   Assist of 1 with use of rolling walker (RW) and gait belt for all transfers to/from [facility]  to/from chair    History of Present Illness:   S/P emergent repair of perf ulcer with j-tube insertion on 6/28 by Dr. Anjelica Tejada, subsequent DA and hypertension. Recent COVID from 6/22-24/22, h/o colon cancer. Home Health S4 Level Recommendation: NA  AM-PAC Mobility Score   AM-PAC Inpatient Mobility Raw Score : [id number] [facility] scored a 15/24 on the AM-PAC short mobility form. Current research shows that an AM-PAC score of 17 or less is typically not associated with a discharge to the patient's home setting. If patient discharges prior to next session this note will serve as a discharge summary. Please see below for the latest assessment towards goals. Treatment Time:  09:15-10:30  Treatment number: 12  Timed Code Treatment Minutes: 75 minutes  Total Treatment Minutes:  75  minutes    Cognition    A&O orientation not directly assessed. Able to follow 2 step commands    Subjective  Patient lying supine in bed with no family present but pt's  arrived during pt's session. Pt agreeable to this PT tx.      Pain   Yes  Location: abdomen  Rating:    mild/10  Pain Medicine Status: Received pain med prior to tx     Bed Mobility   Supine to Sit: SBA  Sit to Supine:   Not Tested  Rolling:   Not Tested  Scooting:   SBA at EOB, BSC, and in recliner. Transfer Training     Sit to stand:   Min A (multiple trials from various surfaces over course of session - EOB, BSC, w/c)  Stand to sit:   Min A  Bed to [facility]:   Min A  with use of  bilat HHA ,plus total assist to manage lines x 2 trials. CGA with rolling walker, plus total A for lines, x 2 trials. Gait Training gait completed as indicated below  Distance:      12 feet from w/c to bedside commode with Min A via HHA + 15 ft from [facility] to recliner with Min A and with use of RW  Deviations (firm surface/linoleum):  decreased yakov, forward flexed posture, and step to pattern  Assistive Device Used:    gait belt and rolling walker (RW)  Level of Assist:    As per above. Comment: Cues for upright posture, continuous step pattern. Stair Training deferred, pt unsafe/not appropriate to complete stairs at this time    Balance  Sitting:  Good ; Supervision  Comments:     Standing: Fair ; Min A   Comments: unsteady standing to complete pericare (total A for pericare from therapist) x 2 minutes with increasing signs of fatigue, then later x 1 minute with fatigue . Pt required seated rest break back onto [facility] after these static standing bouts before transferring to alternative chair. 1 minute static standing bout at EOB while therapist donning depends and 2nd therapist guarding at [facility] 62 - after ~1 minute, pt had an episode of near-knee buckling for which Min A was required to guide her to seated position. Patient Education      Role of PT, POC, Discharge recommendations, DC recommendations, safety awareness, transfer techniques, and calling for assist with mobility. Positioning Needs       Pt up in chair, no alarm needed, positioned in proper neutral alignment and pressure relief provided.    Call light provided and all needs within reach    Activity Tolerance   Pt completed therapy session with SOB noted with activity . HR (bpm) SpO2 (%) BP Comments   Supine, at rest 76 94     Seated after toileting at [facility]  95 148/76 Pt reported mild lightheadedness   End of session 94 95%  Recovered quickly        Other  SpO2 improved this session. Pt did appear fatigued with toileting likely due to prolonged hospital stay. Assessment :  Patient demonstrated good progress during this session. She was able to tolerate 75 minutes of skilled physical therapy treatment. Recommending continued activity progression using RW with careful monitoring especially of SpO2 due to desaturation in previous therapy session. Goals (all goals ongoing unless otherwise indicated)  To be met in 3 visits:  1). Independent with LE Ex x 10 reps  2). Supine to/from sit: Mod A - 7/23 goal met  3). Bed to chair: CGA with walker -7/24 goal met     To be met in 6 visits:  1). Supine to/from sit: Supervision  2). Sit to/from stand: Supervision  3). Bed to chair: Supervision  4). Gait: Ambulate 50 ft.  with  SBA and use of LRAD (least restrictive assistive device)  5). Tolerate B LE exercises 3 sets of 10-15 reps  6). Ascend/descend 2 steps with CGA with use of hand rail unilateral and LRAD (least restrictive assistive device)     Plan   Continue with plan of care. Signature: Ervin Page, [address] [place] [address] # [phone]    If patient discharges from this facility prior to next visit, this note will serve as the Discharge Summary.

## 2022-07-26 NOTE — FLOWSHEET NOTE
07/26/22 0902   Vital Signs   Temp 98.2 °F (36.8 °C)   Temp Source Oral   Heart Rate 76   Heart Rate Source Monitor   Resp 16   /74   BP Location Right upper arm   BP Method Automatic   MAP (Calculated) 93.33   Patient Position Semi fowlers   Level of Consciousness Alert (0)   MEWS Score 1   Oxygen Therapy   SpO2 96 %   O2 Device None (Room air)   AM assessment completed, see flow sheet. Pt is alert and oriented. Vital signs are WNL. Respirations are even & easy. No complaints voiced. Pt denies needs at this time. SR up x 2, and bed in low position. Call light is within reach.

## 2022-07-26 NOTE — CARE COORDINATION
CM met with pt at bedside. Pt pleasant and smiling when speaking with pt until CM asked pt about goals of care and PT recommendation for SNF. Pt immediately got angry and stated that \"everyone keeps changing the [id number] [facility] [address] plan. \" CM attempted to explain that the plan has not changed, the recommendation has consistently been for STR. CM calmly and kindly explained that CM wanted to assist pt with her discharge needs. Pt angrily told CM to go away and that she would just call her dtr and spouse. CM offered to call pt spouse and dtr for her to assist with discussion and pt adamantly refused stating that CM was \"talking too much\" and needed to leave her room. CM left room. Sixto Loera with [facility] here to see pt at this time to meet with pt and discuss care and criteria for ARU.

## 2022-07-26 NOTE — PROGRESS NOTES
Occupational Therapy  Attempted OT treatment and the pt declined OT this afternoon as pt PT. Will attempt again tomorrow.   Donetta Koyanagi OTR/L [id number]

## 2022-07-27 LAB
ALBUMIN SERPL-MCNC: 3.2 G/DL (ref 3.4–5)
ANION GAP SERPL CALCULATED.3IONS-SCNC: 8 MMOL/L (ref 3–16)
BUN BLDV-MCNC: 19 MG/DL (ref 7–20)
CALCIUM SERPL-MCNC: 8.7 MG/DL (ref 8.3–10.6)
CHLORIDE BLD-SCNC: 106 MMOL/L (ref 99–110)
CO2: 22 MMOL/L (ref 21–32)
CREAT SERPL-MCNC: 1 MG/DL (ref 0.6–1.2)
GFR AFRICAN AMERICAN: >60
GFR NON-AFRICAN AMERICAN: 54
GLUCOSE BLD-MCNC: 103 MG/DL (ref 70–99)
GLUCOSE BLD-MCNC: 106 MG/DL (ref 70–99)
GLUCOSE BLD-MCNC: 116 MG/DL (ref 70–99)
GLUCOSE BLD-MCNC: 126 MG/DL (ref 70–99)
GLUCOSE BLD-MCNC: 126 MG/DL (ref 70–99)
GLUCOSE BLD-MCNC: 130 MG/DL (ref 70–99)
GLUCOSE BLD-MCNC: 89 MG/DL (ref 70–99)
PERFORMED ON: ABNORMAL
PERFORMED ON: NORMAL
PHOSPHORUS: 3.1 MG/DL (ref 2.5–4.9)
POTASSIUM SERPL-SCNC: 4.3 MMOL/L (ref 3.5–5.1)
SODIUM BLD-SCNC: 136 MMOL/L (ref 136–145)

## 2022-07-27 PROCEDURE — 6360000002 HC RX W HCPCS: Performed by: NURSE PRACTITIONER

## 2022-07-27 PROCEDURE — 99024 POSTOP FOLLOW-UP VISIT: CPT | Performed by: NURSE PRACTITIONER

## 2022-07-27 PROCEDURE — 36592 COLLECT BLOOD FROM PICC: CPT

## 2022-07-27 PROCEDURE — 6360000002 HC RX W HCPCS: Performed by: SURGERY

## 2022-07-27 PROCEDURE — 1200000000 HC SEMI PRIVATE

## 2022-07-27 PROCEDURE — C9113 INJ PANTOPRAZOLE SODIUM, VIA: HCPCS | Performed by: NURSE PRACTITIONER

## 2022-07-27 PROCEDURE — 80069 RENAL FUNCTION PANEL: CPT

## 2022-07-27 PROCEDURE — 2580000003 HC RX 258: Performed by: SURGERY

## 2022-07-27 RX ADMIN — ONDANSETRON HYDROCHLORIDE 4 MG: 2 INJECTION, SOLUTION INTRAMUSCULAR; INTRAVENOUS at 08:42

## 2022-07-27 RX ADMIN — MICONAZOLE NITRATE: 2 OINTMENT TOPICAL at 22:35

## 2022-07-27 RX ADMIN — MORPHINE SULFATE 1 MG: 2 INJECTION, SOLUTION INTRAMUSCULAR; INTRAVENOUS at 05:48

## 2022-07-27 RX ADMIN — PIPERACILLIN AND TAZOBACTAM 3375 MG: 3; .375 INJECTION, POWDER, LYOPHILIZED, FOR SOLUTION INTRAVENOUS at 17:21

## 2022-07-27 RX ADMIN — PANTOPRAZOLE SODIUM 40 MG: 40 INJECTION, POWDER, LYOPHILIZED, FOR SOLUTION INTRAVENOUS at 00:05

## 2022-07-27 RX ADMIN — HEPARIN SODIUM 5000 UNITS: 5000 INJECTION INTRAVENOUS; SUBCUTANEOUS at 00:05

## 2022-07-27 RX ADMIN — HEPARIN SODIUM 5000 UNITS: 5000 INJECTION INTRAVENOUS; SUBCUTANEOUS at 17:17

## 2022-07-27 RX ADMIN — MICONAZOLE NITRATE: 2 OINTMENT TOPICAL at 08:36

## 2022-07-27 RX ADMIN — MORPHINE SULFATE 1 MG: 2 INJECTION, SOLUTION INTRAMUSCULAR; INTRAVENOUS at 17:18

## 2022-07-27 RX ADMIN — PIPERACILLIN AND TAZOBACTAM 3375 MG: 3; .375 INJECTION, POWDER, LYOPHILIZED, FOR SOLUTION INTRAVENOUS at 00:04

## 2022-07-27 RX ADMIN — ONDANSETRON HYDROCHLORIDE 4 MG: 2 INJECTION, SOLUTION INTRAMUSCULAR; INTRAVENOUS at 14:54

## 2022-07-27 RX ADMIN — HEPARIN SODIUM 5000 UNITS: 5000 INJECTION INTRAVENOUS; SUBCUTANEOUS at 08:34

## 2022-07-27 RX ADMIN — PANTOPRAZOLE SODIUM 40 MG: 40 INJECTION, POWDER, LYOPHILIZED, FOR SOLUTION INTRAVENOUS at 08:34

## 2022-07-27 RX ADMIN — PIPERACILLIN AND TAZOBACTAM 3375 MG: 3; .375 INJECTION, POWDER, LYOPHILIZED, FOR SOLUTION INTRAVENOUS at 08:36

## 2022-07-27 ASSESSMENT — PAIN SCALES - GENERAL
PAINLEVEL_OUTOF10: 6
PAINLEVEL_OUTOF10: 5

## 2022-07-27 ASSESSMENT — PAIN DESCRIPTION - LOCATION: LOCATION: HEAD;ABDOMEN;BACK

## 2022-07-27 NOTE — PROGRESS NOTES
assistance    OBJECTIVE  VITALS:  height is 5' 4\" (1.626 m) and weight is 141 lb (64 kg). Her oral temperature is 98.6 °F (37 °C). Her blood pressure is 146/76 (abnormal) and her pulse is 73. Her respiration is 16 and oxygen saturation is 96%. VITALS:  BP (!) 146/76   Pulse 73   Temp 98.6 °F (37 °C) (Oral)   Resp 16   Ht 5' 4\" (1.626 m)   Wt 141 lb (64 kg)   SpO2 96%   BMI 24.20 kg/m²   INTAKE/OUTPUT:      Intake/Output Summary (Last 24 hours) at 7/27/2022 1422  Last data filed at 7/27/2022 1256  Gross per 24 hour   Intake 5358 ml   Output --   Net 5358 ml       URINARY CATHETER OUTPUT (Curiel):     GENERAL: alert, pleasant, in better spirits today   I/O last 3 completed shifts: In: 180 [NG/GT:180]  Out: 15 [Drains:15]  I/O this shift:   In: 3792 [NG/GT:5268]  Out: -     LABS  Recent Labs     07/27/22  0532      K 4.3      CO2 22   BUN 19   CREATININE 1.0   PHOS 3.1   CALCIUM 8.7       CBC with Differential:    Lab Results   Component Value Date/Time    WBC 10.6 07/21/2022 11:45 AM    RBC 2.47 07/21/2022 11:45 AM    RBC 4.41 08/02/2016 10:34 AM    HGB 7.6 07/21/2022 11:45 AM    HCT 22.1 07/21/2022 11:45 AM     07/21/2022 11:45 AM    MCV 89.2 07/21/2022 11:45 AM    MCH 30.5 07/21/2022 11:45 AM    MCHC 34.2 07/21/2022 11:45 AM    RDW 17.1 07/21/2022 11:45 AM    BANDSPCT 1 07/21/2022 11:45 AM    LYMPHOPCT 14.0 07/21/2022 11:45 AM    LYMPHOPCT 25.3 08/02/2016 10:34 AM    MONOPCT 4.0 07/21/2022 11:45 AM    BASOPCT 0.0 07/21/2022 11:45 AM    MONOSABS 0.4 07/21/2022 11:45 AM    LYMPHSABS 1.7 07/21/2022 11:45 AM    EOSABS 0.2 07/21/2022 11:45 AM    BASOSABS 0.0 07/21/2022 11:45 AM     CMP:    Lab Results   Component Value Date/Time     07/27/2022 05:32 AM    K 4.3 07/27/2022 05:32 AM    K 3.8 06/29/2022 05:19 AM     07/27/2022 05:32 AM    CO2 22 07/27/2022 05:32 AM    BUN 19 07/27/2022 05:32 AM    CREATININE 1.0 07/27/2022 05:32 AM    GFRAA >60 07/27/2022 05:32 AM    AGRATIO 0.8 06/29/2022 05:19 AM    LABGLOM 54 07/27/2022 05:32 AM    GLUCOSE 130 07/27/2022 05:32 AM    GLUCOSE 85 08/02/2016 10:34 AM    PROT 4.7 06/29/2022 05:19 AM    PROT 5.7 08/02/2016 10:34 AM    LABALBU 3.2 07/27/2022 05:32 AM    CALCIUM 8.7 07/27/2022 05:32 AM    BILITOT 0.6 06/29/2022 05:19 AM    ALKPHOS 84 06/29/2022 05:19 AM    AST 49 06/29/2022 05:19 AM    ALT 48 06/29/2022 05:19 AM         ILAN Dasilva CNP  Electronically signed 7/27/2022 at 2:22 PM

## 2022-07-27 NOTE — CARE COORDINATION
[facility]  Spoke with Natalie RIVERA in follow up. Per CM, possible dc home in next 1-2 days. Telephone call to [facility] and spoke with [facility] to give update [phone]. Pt to dc home on TF with Shahab. NICOLE to assist with arranging delivery of hospital bed. Updated [facility] team on POC. Plan to frontload SN, PT/OT, HHA and MSW.  S3.      Electronically signed by Jermaine Arroyo RN on 7/27/2022 at 9:41 AM

## 2022-07-27 NOTE — PROGRESS NOTES
Occupational Therapy  Attempted OT treatment and pts nurse stated that the pt stated that she did not want to participate in therapy today. Yanick Myers OTR/L [id number]     Addendum 12:45 pm: Discussed with OT, will respect patient's wishes and hold PT today. Please call if plan changes and pt would like therapy. I'm happy to discuss with her/family any aspects of discharge to home if desired. As far as DME she will need BSC, reacher, and a manual w/c to facilitate mobility in and out of the house for doctor's appts etc. Old therapy notes indicate she has a rolling walker but this should be confirmed. Front loaded home therapy is appropriate.      Flordia Barthel, PT, DPT

## 2022-07-27 NOTE — FLOWSHEET NOTE
07/27/22 1928   Handoff   Communication Given Shift Handoff   Handoff Given To Contreras Kitchen Received From [facility] Communication Face to Face; At bedside   Time Handoff Given 1929   End of Shift Check Performed Yes

## 2022-07-27 NOTE — CONSULTS
Palliative Care Progress Note              07/27/2022    11:05 AM Writer spoke with [facility] and she states pt has now decided to go home with Ele Hi, front load PT/OT, and family to provide 24/7 assistance. Writer recommends for PC to f/u out-patient with the pt to continue discussion r/t comfort care vrs continuing with treatment options. Johnathan Yates with [facility] aware to inform SW of above rec. At this time pt is hopeful to get stronger in order to pursue further medical treatment. No other needs voiced or identified.

## 2022-07-27 NOTE — FLOWSHEET NOTE
07/27/22 1256   NG/OG/NJ/NE Tube Nasogastric 18 fr Right nostril   Placement Date/Time: 06/28/22 1642   Present on Admission/Arrival: No  Inserted by: RUTH Richards CRNA  Insertion attempts: 1  Tube Type: Nasogastric  Tube Size (fr): 18 fr  Tube Location: Right nostril   Surrounding Skin Clean, dry & intact   Securement device Tape   Gastrostomy/Enterostomy/Jejunostomy Tube PEG-Jejunostomy LLQ   Placement Date/Time: 06/28/22 1800   Present on Admission/Arrival: Yes  Inserted by: OR  Type: PEG-Jejunostomy  Location: LLQ   Site Description Reddened;Clean, dry & intact   J Port Status Infusing   G-Tube Care Completed Yes   Tube Feeding Standard with Fiber  (Jevity 1.2)   Tube feeding/verify rate (mL/hr) 60 mL/hr   Tube Feeding Supplement (Product) Protein Modular   Tube Feeding Intake (mL) 5068 ml   Action Taken Tubing changed; Other (comment)  (New bottle added)     New bottle of Jevity 1.2 added. Order states to run feed for 20 hours and pause for 4 hours.  Time for the 20 hours will start now at [phone] and will need to be stopped at 0855 on 7/28/22

## 2022-07-27 NOTE — PLAN OF CARE
Problem: Discharge Planning  Goal: Discharge to home or other facility with appropriate resources  7/26/2022 2322 by Dru Hutchison RN  Outcome: Progressing Towards Goal  7/26/2022 1246 by Armin Leavitt RN  Outcome: Progressing Towards Goal     Problem: Pain  Goal: Verbalizes/displays adequate comfort level or baseline comfort level  7/26/2022 2322 by Dru Hutchison RN  Outcome: Progressing Towards Goal  7/26/2022 1246 by Armin Leavitt RN  Outcome: Progressing Towards Goal     Problem: Safety - Adult  Goal: Free from fall injury  7/26/2022 2322 by Dru Hutchison RN  Outcome: Progressing Towards Goal  7/26/2022 1246 by Armin Leavitt RN  Outcome: Progressing Towards Goal     Problem: Skin/Tissue Integrity  Goal: Absence of new skin breakdown  Description: 1. Monitor for areas of redness and/or skin breakdown  2. Assess vascular access sites hourly  3. Every 4-6 hours minimum:  Change oxygen saturation probe site  4. Every 4-6 hours:  If on nasal continuous positive airway pressure, respiratory therapy assess nares and determine need for appliance change or resting period.   7/26/2022 2322 by Dru Hutchison RN  Outcome: Progressing Towards Goal  7/26/2022 1246 by Armin Leavitt RN  Outcome: Progressing Towards Goal     Problem: ABCDS Injury Assessment  Goal: Absence of physical injury  7/26/2022 2322 by Dru Hutchison RN  Outcome: Progressing Towards Goal  7/26/2022 1246 by Armin Leavitt RN  Outcome: Progressing Towards Goal     Problem: Nutrition Deficit:  Goal: Optimize nutritional status  7/26/2022 2322 by Dru Hutchison RN  Outcome: Progressing Towards Goal  7/26/2022 1246 by Armin Leavitt RN  Outcome: Progressing Towards Goal

## 2022-07-27 NOTE — PLAN OF CARE
Problem: Discharge Planning  Goal: Discharge to home or other facility with appropriate resources  7/27/2022 1138 by Gaby Gonzáles RN  Outcome: Progressing Towards Goal  Flowsheets (Taken 7/27/2022 4987)  Discharge to home or other facility with appropriate resources:   Identify barriers to discharge with patient and caregiver   Arrange for needed discharge resources and transportation as appropriate   Identify discharge learning needs (meds, wound care, etc)   Arrange for interpreters to assist at discharge as needed   Refer to discharge planning if patient needs post-hospital services based on physician order or complex needs related to functional status, cognitive ability or social support system  7/26/2022 2322 by Chava Dasilva RN  Outcome: Progressing Towards Goal     Problem: Pain  Goal: Verbalizes/displays adequate comfort level or baseline comfort level  7/27/2022 1138 by Gaby Gonzáles RN  Outcome: Progressing Towards Goal  Flowsheets (Taken 7/27/2022 7125)  Verbalizes/displays adequate comfort level or baseline comfort level:   Encourage patient to monitor pain and request assistance   Assess pain using appropriate pain scale   Implement non-pharmacological measures as appropriate and evaluate response   Administer analgesics based on type and severity of pain and evaluate response  7/26/2022 2322 by Chava Dasilva RN  Outcome: Progressing Towards Goal     Problem: Safety - Adult  Goal: Free from fall injury  7/27/2022 1138 by Gaby Gonzáles RN  Outcome: Progressing Towards Goal  7/26/2022 2322 by Chava Dasilva RN  Outcome: Progressing Towards Goal     Problem: Skin/Tissue Integrity  Goal: Absence of new skin breakdown  Description: 1. Monitor for areas of redness and/or skin breakdown  2. Assess vascular access sites hourly  3. Every 4-6 hours minimum:  Change oxygen saturation probe site  4.   Every 4-6 hours:  If on nasal continuous positive airway pressure, respiratory therapy assess narjanee and determine need for appliance change or resting period.   7/27/2022 1138 by Mary Altamirano RN  Outcome: Progressing Towards Goal  7/26/2022 2322 by Earline Brooks RN  Outcome: Progressing Towards Goal     Problem: ABCDS Injury Assessment  Goal: Absence of physical injury  7/27/2022 1138 by Mary Altamirano RN  Outcome: Progressing Towards Goal  7/26/2022 2322 by Earline Brooks RN  Outcome: Progressing Towards Goal     Problem: Nutrition Deficit:  Goal: Optimize nutritional status  7/27/2022 1138 by Mary Altamirano RN  Outcome: Progressing Towards Goal  7/26/2022 2322 by Earline Brooks RN  Outcome: Progressing Towards Goal

## 2022-07-27 NOTE — PROGRESS NOTES
IV Zofran given for mild nausea. Patient requested nausea medication prior to eating to combat anticipated nausea. This was given to encourage patient to eat.

## 2022-07-27 NOTE — FLOWSHEET NOTE
07/27/22 0726   Vital Signs   Temp 98.4 °F (36.9 °C)   Temp Source Oral   Heart Rate 73   Heart Rate Source Monitor   Resp 16   /69   BP Location Right upper arm   BP Method Automatic   MAP (Calculated) 92   Patient Position Semi fowlers   Level of Consciousness Alert (0)   MEWS Score 1   Pain Assessment   Pain Assessment None - Denies Pain   Care Plan - Pain Goals   Verbalizes/displays adequate comfort level or baseline comfort level Encourage patient to monitor pain and request assistance;Assess pain using appropriate pain scale; Implement non-pharmacological measures as appropriate and evaluate response;Administer analgesics based on type and severity of pain and evaluate response   Opioid-Induced Sedation   POSS Score S   RASS   Dueñas Agitation Sedation Scale (RASS) 0   Oxygen Therapy   SpO2 98 %   O2 Device None (Room air)   AM assessment completed and vital signs completed, see flowsheet. Vital signs are WNL. Patient is alert & oriented. No complaints voiced. Patient denies further needs. Side rails up X 2. Bed in the lowest position. Call light within reach.

## 2022-07-27 NOTE — CARE COORDINATION
INTERDISCIPLINARY PLAN OF CARE CONFERENCE    Date/Time: 7/27/2022 2:36 PM  Completed by: Gerome Goodell, RN, Case Management      Patient Name:  Andrey Gómez  YOB: 1950  Admitting Diagnosis: Gastric perforation, acute [K31.89]  Perforated peptic ulcer ([facility] [address] [K27.5]     Admit Date/Time:  6/28/2022  6:58 AM    Chart reviewed. Interdisciplinary team contacted or reviewed plan related to patient progress and discharge plans. Disciplines included Case Management, Nursing, and Dietitian. Current Status: Stable  PT/OT recommendation for discharge plan of care: SNF    Expected D/C Disposition:  Rehab  Confirmed plan with patient and/or family Yes confirmed with: (name)  pt spouse and Aaron Branchmilvianorma  Met with: judson  Discharge Plan Comments:  Reviewed chart and met with pt and spouse at bedside. Discussed poss dc in next few days and pt is now open to discus SNF for rehab. SNF list provided to spouse. Also had long conversation with serina re: dc and answered her questions. Noted after N.P. saw pt spoke with serina 2nd time and choices for SNF Are [facility], and left  and await return call. Also spoke with Joyce at [facility] who will review and return call to [facility]. Will follow and update family an pt re: referral and await return call. Spoke with Rich Candelaria at [facility] who will review and return call to [address] [place] [address].  Also spoke with Giovanna Hensley at [facility] who will review and retun call     Home O2 in place on admit: No  Pt informed of need to bring portable home O2 tank on day of discharge for nursing to connect prior to leaving:  Not Indicated  Verbalized agreement/Understanding:  Not Indicated

## 2022-07-28 VITALS
HEIGHT: 64 IN | HEART RATE: 69 BPM | OXYGEN SATURATION: 97 % | WEIGHT: 141 LBS | DIASTOLIC BLOOD PRESSURE: 76 MMHG | BODY MASS INDEX: 24.07 KG/M2 | RESPIRATION RATE: 12 BRPM | SYSTOLIC BLOOD PRESSURE: 141 MMHG | TEMPERATURE: 97.9 F

## 2022-07-28 PROBLEM — K25.5 GASTRIC PERFORATION (HCC): Status: RESOLVED | Noted: 2022-06-28 | Resolved: 2022-07-28

## 2022-07-28 PROBLEM — K27.5 PERFORATED PEPTIC ULCER (HCC): Status: RESOLVED | Noted: 2022-06-28 | Resolved: 2022-07-28

## 2022-07-28 LAB
ALBUMIN SERPL-MCNC: 3.2 G/DL (ref 3.4–5)
ANION GAP SERPL CALCULATED.3IONS-SCNC: 9 MMOL/L (ref 3–16)
BUN BLDV-MCNC: 16 MG/DL (ref 7–20)
CALCIUM SERPL-MCNC: 8.7 MG/DL (ref 8.3–10.6)
CHLORIDE BLD-SCNC: 104 MMOL/L (ref 99–110)
CO2: 23 MMOL/L (ref 21–32)
CREAT SERPL-MCNC: 1.1 MG/DL (ref 0.6–1.2)
GFR AFRICAN AMERICAN: 59
GFR NON-AFRICAN AMERICAN: 49
GLUCOSE BLD-MCNC: 102 MG/DL (ref 70–99)
GLUCOSE BLD-MCNC: 106 MG/DL (ref 70–99)
GLUCOSE BLD-MCNC: 117 MG/DL (ref 70–99)
GLUCOSE BLD-MCNC: 137 MG/DL (ref 70–99)
GLUCOSE BLD-MCNC: 140 MG/DL (ref 70–99)
PERFORMED ON: ABNORMAL
PHOSPHORUS: 3.1 MG/DL (ref 2.5–4.9)
POTASSIUM SERPL-SCNC: 4.3 MMOL/L (ref 3.5–5.1)
SARS-COV-2, NAAT: NOT DETECTED
SODIUM BLD-SCNC: 136 MMOL/L (ref 136–145)

## 2022-07-28 PROCEDURE — 6360000002 HC RX W HCPCS: Performed by: SURGERY

## 2022-07-28 PROCEDURE — 6370000000 HC RX 637 (ALT 250 FOR IP): Performed by: NURSE PRACTITIONER

## 2022-07-28 PROCEDURE — C9113 INJ PANTOPRAZOLE SODIUM, VIA: HCPCS | Performed by: NURSE PRACTITIONER

## 2022-07-28 PROCEDURE — 80069 RENAL FUNCTION PANEL: CPT

## 2022-07-28 PROCEDURE — 87635 SARS-COV-2 COVID-19 AMP PRB: CPT

## 2022-07-28 PROCEDURE — 2580000003 HC RX 258: Performed by: SURGERY

## 2022-07-28 PROCEDURE — 6360000002 HC RX W HCPCS: Performed by: NURSE PRACTITIONER

## 2022-07-28 PROCEDURE — 99024 POSTOP FOLLOW-UP VISIT: CPT | Performed by: NURSE PRACTITIONER

## 2022-07-28 RX ORDER — PANTOPRAZOLE SODIUM 40 MG/1
40 TABLET, DELAYED RELEASE ORAL
Qty: 180 TABLET | Refills: 1 | Status: SHIPPED | OUTPATIENT
Start: 2022-07-28 | End: 2022-10-25 | Stop reason: SDUPTHER

## 2022-07-28 RX ORDER — CITALOPRAM 20 MG/1
10 TABLET ORAL DAILY
Status: DISCONTINUED | OUTPATIENT
Start: 2022-07-28 | End: 2022-07-28 | Stop reason: HOSPADM

## 2022-07-28 RX ORDER — AMOXICILLIN AND CLAVULANATE POTASSIUM 875; 125 MG/1; MG/1
1 TABLET, FILM COATED ORAL 2 TIMES DAILY
Qty: 14 TABLET | Refills: 0 | Status: SHIPPED | OUTPATIENT
Start: 2022-07-28 | End: 2022-08-04

## 2022-07-28 RX ORDER — LEVOTHYROXINE SODIUM 0.12 MG/1
125 TABLET ORAL DAILY
Status: DISCONTINUED | OUTPATIENT
Start: 2022-07-28 | End: 2022-07-28 | Stop reason: HOSPADM

## 2022-07-28 RX ORDER — OXYCODONE HCL 5 MG/5 ML
5 SOLUTION, ORAL ORAL EVERY 4 HOURS PRN
Qty: 210 ML | Refills: 0 | Status: SHIPPED | OUTPATIENT
Start: 2022-07-28 | End: 2022-08-04

## 2022-07-28 RX ADMIN — CITALOPRAM HYDROBROMIDE 10 MG: 20 TABLET ORAL at 13:28

## 2022-07-28 RX ADMIN — PANTOPRAZOLE SODIUM 40 MG: 40 INJECTION, POWDER, LYOPHILIZED, FOR SOLUTION INTRAVENOUS at 01:00

## 2022-07-28 RX ADMIN — PIPERACILLIN AND TAZOBACTAM 3375 MG: 3; .375 INJECTION, POWDER, LYOPHILIZED, FOR SOLUTION INTRAVENOUS at 00:59

## 2022-07-28 RX ADMIN — ONDANSETRON HYDROCHLORIDE 4 MG: 2 INJECTION, SOLUTION INTRAMUSCULAR; INTRAVENOUS at 08:16

## 2022-07-28 RX ADMIN — HEPARIN SODIUM 5000 UNITS: 5000 INJECTION INTRAVENOUS; SUBCUTANEOUS at 01:00

## 2022-07-28 RX ADMIN — OXYCODONE HYDROCHLORIDE 5 MG: 5 SOLUTION ORAL at 08:13

## 2022-07-28 RX ADMIN — MICONAZOLE NITRATE: 2 OINTMENT TOPICAL at 08:30

## 2022-07-28 RX ADMIN — PIPERACILLIN AND TAZOBACTAM 3375 MG: 3; .375 INJECTION, POWDER, LYOPHILIZED, FOR SOLUTION INTRAVENOUS at 08:33

## 2022-07-28 RX ADMIN — HEPARIN SODIUM 5000 UNITS: 5000 INJECTION INTRAVENOUS; SUBCUTANEOUS at 08:29

## 2022-07-28 RX ADMIN — ONDANSETRON HYDROCHLORIDE 4 MG: 2 INJECTION, SOLUTION INTRAMUSCULAR; INTRAVENOUS at 15:16

## 2022-07-28 RX ADMIN — PANTOPRAZOLE SODIUM 40 MG: 40 INJECTION, POWDER, LYOPHILIZED, FOR SOLUTION INTRAVENOUS at 08:19

## 2022-07-28 ASSESSMENT — PAIN DESCRIPTION - LOCATION: LOCATION: ABDOMEN

## 2022-07-28 ASSESSMENT — PAIN DESCRIPTION - ORIENTATION: ORIENTATION: RIGHT;LEFT

## 2022-07-28 ASSESSMENT — PAIN DESCRIPTION - DESCRIPTORS: DESCRIPTORS: DULL;ACHING

## 2022-07-28 ASSESSMENT — PAIN SCALES - GENERAL: PAINLEVEL_OUTOF10: 8

## 2022-07-28 NOTE — PROGRESS NOTES
via feeding tube)  Water Flushes: 200ml/hr free water q 6 hr   Current TF & Flush Orders Provides: TF is infusing at goal rate and patient is tolerating well  Goal TF & Flush Orders Provides: Jevity 1.2 with a goal rate of 60 ml/hr x 20 hours = 1200 ml TV, 1440 kcals, 67 g protein, and 968 ml free water + 26 g protein and 104 kcals from one proteinex P2Go once daily (93 g protein and 1544 kcals total) + 200 ml/hr free water q 6 hr    Anthropometric Measures:  Height: 5' 4\" (162.6 cm)  Ideal Body Weight (IBW): 120 lbs (55 kg)    Admission Body Weight: 149 lb (67.6 kg) (obtained on 6/29/22; actual weight)  Current Body Weight: 141 lb (64 kg), 130.1 % IBW. Weight Source: Bed Scale  Current BMI (kg/m2): 24.2  Usual Body Weight: 149 lb (67.6 kg) (obtained on 6/29/22; actual weight)  % Weight Change (Calculated): 4.8  Weight Adjustment For: No Adjustment                 BMI Categories: Overweight (BMI 25.0-29. 9)    Estimated Daily Nutrient Needs:  Energy Requirements Based On: Kcal/kg  Weight Used for Energy Requirements: Current  Energy (kcal/day): 1420 - 1633 kcals based on 20-23 kcals/kg/CBW  Weight Used for Protein Requirements: Ideal  Protein (g/day): 77 - 83 g protein based on 1.4-1.5 g/kg/IBW  Method Used for Fluid Requirements: 1 ml/kcal  Fluid (ml/day): 1420 - 1633 ml    Nutrition Diagnosis:   Inadequate oral intake related to altered GI function, altered GI structure, inadequate protein-energy intake as evidenced by NPO or clear liquid status due to medical condition, nutrition support - enteral nutrition, GI abnormality    Nutrition Interventions:   Food and/or Nutrient Delivery: Continue Current Diet, Continue Current Tube Feeding  Nutrition Education/Counseling: No recommendation at this time  Coordination of Nutrition Care: Continue to monitor while inpatient       Goals:  Previous Goal Met: No Progress toward Goal(s)  Goals:  Tolerate nutrition support at goal rate, PO intake 50% or greater, by next RD assessment       Nutrition Monitoring and Evaluation:   Behavioral-Environmental Outcomes: None Identified  Food/Nutrient Intake Outcomes: Diet Advancement/Tolerance, Food and Nutrient Intake, Enteral Nutrition Intake/Tolerance  Physical Signs/Symptoms Outcomes: Biochemical Data, GI Status, Nausea or Vomiting, Weight, Nutrition Focused Physical Findings    Discharge Planning:     Too soon to determine     Hollie Perez, [address] [place] [address],   Contact: [phone]

## 2022-07-28 NOTE — PROGRESS NOTES
General Surgery   Daily Progress Note    Pt Name: Kishan Mcqueen Record Number: [id number]  Date of Birth 1950   Today's Date: 7/28/2022    ASSESSMENT  POD #30 s/p repair of perf ulcer, j-tube insertion  CT abd and pelvis 7/25: perforated ulcer repair, persistent air fluid collection with drain adjacent, no new collections, see rads report  UGI 7/19: moderate amount of contrast extravasation  ABD: J-tube in place, JERRI drain in place, midline dressing changed: per RN, + diffuse tenderness which appears improved and unchanged, +mild distention, intermittent mild nausea, no V, +flatus, + BMs , taking some PO full liquids   ARF resolved  K+ 4.3  Leuks 10.6  VSS  NGT out 7/27, pt is tolerating some full liquids  UO OK  JERRI 5 ml out: thick tan/yellow  Pt states \"I guess I am ready to go, I don't want to talk about this stuff, call Guru Murrieta! \"  Pt refused PT again today: tried to continue to encourage her to participate in order to return home which is the patient and the family's goal. She states \"I am just tired and don't feel like it today. \"    PLAN  Continue fulls. Go slow   Strict I&Os  Pain control   Zosyn  PT/OT: OOB/Ambulate  Protonix BID  Wound care Rn seeing pt  TF rate at goal  BID dressing changes to midline incision. Pt looks OK POD #30: Pt with post-op ulcer repair leak on UGI which appears to be improved: Full liquids. Discussed with discharge planning. Pt and family want [facility], waiting on reply for acceptance. Discharge planning will let me know, planning d/c today. Discussed with patient. Once I have confirmation, will discuss with pt family. Shantanu Sayres is unchanged from yesterday. Pain is better controlled. She has no nausea and no vomiting. She has passed flatus and has had a bowel movement. She is tolerating full liquids and tolerating TF. Current activity is up with assistance    OBJECTIVE  VITALS:  height is 5' 4\" (1.626 m) and weight is 141 lb (64 kg).  Her oral temperature is 98.4 °F (36.9 °C). Her blood pressure is 135/70 and her pulse is 72. Her respiration is 16 and oxygen saturation is 95%. VITALS:  /70   Pulse 72   Temp 98.4 °F (36.9 °C) (Oral)   Resp 16   Ht 5' 4\" (1.626 m)   Wt 141 lb (64 kg)   SpO2 95%   BMI 24.20 kg/m²   INTAKE/OUTPUT:      Intake/Output Summary (Last 24 hours) at 7/28/2022 1241  Last data filed at 7/28/2022 0620  Gross per 24 hour   Intake 6548 ml   Output 405 ml   Net 6143 ml       URINARY CATHETER OUTPUT (Curiel):     GENERAL: alert, pleasant, in better spirits today   I/O last 3 completed shifts: In: 6838 [P.O.:180; NG/GT:6658]  Out: 12 [Urine:400; Drains:5]  No intake/output data recorded.     LABS  Recent Labs     07/28/22  0629      K 4.3      CO2 23   BUN 16   CREATININE 1.1   PHOS 3.1   CALCIUM 8.7       CBC with Differential:    Lab Results   Component Value Date/Time    WBC 10.6 07/21/2022 11:45 AM    RBC 2.47 07/21/2022 11:45 AM    RBC 4.41 08/02/2016 10:34 AM    HGB 7.6 07/21/2022 11:45 AM    HCT 22.1 07/21/2022 11:45 AM     07/21/2022 11:45 AM    MCV 89.2 07/21/2022 11:45 AM    MCH 30.5 07/21/2022 11:45 AM    MCHC 34.2 07/21/2022 11:45 AM    RDW 17.1 07/21/2022 11:45 AM    BANDSPCT 1 07/21/2022 11:45 AM    LYMPHOPCT 14.0 07/21/2022 11:45 AM    LYMPHOPCT 25.3 08/02/2016 10:34 AM    MONOPCT 4.0 07/21/2022 11:45 AM    BASOPCT 0.0 07/21/2022 11:45 AM    MONOSABS 0.4 07/21/2022 11:45 AM    LYMPHSABS 1.7 07/21/2022 11:45 AM    EOSABS 0.2 07/21/2022 11:45 AM    BASOSABS 0.0 07/21/2022 11:45 AM     CMP:    Lab Results   Component Value Date/Time     07/28/2022 06:29 AM    K 4.3 07/28/2022 06:29 AM    K 3.8 06/29/2022 05:19 AM     07/28/2022 06:29 AM    CO2 23 07/28/2022 06:29 AM    BUN 16 07/28/2022 06:29 AM    CREATININE 1.1 07/28/2022 06:29 AM    GFRAA 59 07/28/2022 06:29 AM    AGRATIO 0.8 06/29/2022 05:19 AM    LABGLOM 49 07/28/2022 06:29 AM    GLUCOSE 106 07/28/2022 06:29 AM    GLUCOSE 85 08/02/2016 10:34 AM    PROT 4.7 06/29/2022 05:19 AM    PROT 5.7 08/02/2016 10:34 AM    LABALBU 3.2 07/28/2022 06:29 AM    CALCIUM 8.7 07/28/2022 06:29 AM    BILITOT 0.6 06/29/2022 05:19 AM    ALKPHOS 84 06/29/2022 05:19 AM    AST 49 06/29/2022 05:19 AM    ALT 48 06/29/2022 05:19 AM         ILAN Davies - CNP  Electronically signed 7/28/2022 at 12:41 PM

## 2022-07-28 NOTE — FLOWSHEET NOTE
Shift assessment complete. See doc flow. Nightly medications given see MAR. A/O x4. Midline to LUE. TF infusing. Buttocks cleansed w/ foam cleanser. Miconazole ointment mixed w/ remedy silicon moisturizing cream applied to bilat buttocks and perineum. Patient refused to be turned. Patient with no complaints at this time. Dressing to midline incision dry and intact. Patient repositioned for comfort w/ pillow support. Bed alarm in place. Call light and bedside table within easy reach.     07/27/22 1932   Vital Signs   Temp 98.5 °F (36.9 °C)   Temp Source Oral   Heart Rate 83   Heart Rate Source Monitor   Resp 16   /76   BP Location Right upper arm   BP Method Automatic   MAP (Calculated) 95   Patient Position High fowlers   Level of Consciousness Alert (0)   MEWS Score 1   Oxygen Therapy   SpO2 94 %   O2 Device None (Room air)

## 2022-07-28 NOTE — PLAN OF CARE
Problem: Discharge Planning  Goal: Discharge to home or other facility with appropriate resources  7/28/2022 1044 by James Hendrix RN  Outcome: Progressing  7/27/2022 2245 by Sabrina Moreno RN  Outcome: Progressing     Problem: Pain  Goal: Verbalizes/displays adequate comfort level or baseline comfort level  7/28/2022 1044 by James Hendrix RN  Outcome: Progressing  7/27/2022 2245 by Sabrina Moreno RN  Outcome: Progressing     Problem: Safety - Adult  Goal: Free from fall injury  7/28/2022 1044 by James Hendrix RN  Outcome: Progressing  7/27/2022 2245 by Sabrina Moreno RN  Outcome: Progressing     Problem: Skin/Tissue Integrity  Goal: Absence of new skin breakdown  Description: 1. Monitor for areas of redness and/or skin breakdown  2. Assess vascular access sites hourly  3. Every 4-6 hours minimum:  Change oxygen saturation probe site  4. Every 4-6 hours:  If on nasal continuous positive airway pressure, respiratory therapy assess nares and determine need for appliance change or resting period.   7/28/2022 1044 by James Hendrix RN  Outcome: Progressing  7/27/2022 2245 by Sabrina Moreno RN  Outcome: Progressing     Problem: ABCDS Injury Assessment  Goal: Absence of physical injury  7/28/2022 1044 by James Hendrix RN  Outcome: Progressing  7/27/2022 2245 by Sabrina Moreno RN  Outcome: Progressing  Flowsheets (Taken 7/27/2022 1140 by Gaby Gonzáles RN)  Absence of Physical Injury: Implement safety measures based on patient assessment     Problem: Nutrition Deficit:  Goal: Optimize nutritional status  7/28/2022 1044 by James Hendrix RN  Outcome: Progressing  7/27/2022 2245 by Sabrina Moreno RN  Outcome: Progressing

## 2022-07-28 NOTE — PROGRESS NOTES
New TF hung and infusing for another 2hrs then a 4 hr break. Morning labs drawn from Midline to LUE. Dressing to Midline changed.

## 2022-07-28 NOTE — CARE COORDINATION
DISCHARGE ORDER  Date/Time 2022 3:34 PM  Completed by: Romeo Ibanez RN, Case Management    Patient Name: Ubaldo Medrano    : 1950      Admit order Date and Status: 22 inpt  Noted discharge order. (verify MD's last order for status of admission/Traditional Medicare 3 [place] Inpatient qualifying stay required for SNF)    Confirmed discharge plan with:              Patient:  Yes              When pt confirms DC plan does any support person need to be contacted by CM Yes if yes who serina                     Discharge to Facility: [facility] number for staff giving report: [phone]   Pre-certification completed: [address] Exemption Notification (HENS) completed: Yes   Discharge orders and Continuity of Care faxed to facility:  Facility will pull from [facility]      Transportation:               Medical Transport explained with choice list offered to pt/family. Choice:(no preference)  Agency used: [address] [place] [address] Road up time:   16:00      Pt/family/Nursing/Facility aware of  time:   Yes Names: Sonja Grossman nurse, Pt, Pt serina Arvin Crowder pt spouse and Georgie Gustafson at [facility] form completed:  Yes:      Date Last IMM Given: 22    Comments: Order for dc noted. Spoke with Georgie Gustafson at [facility] who states can accept today for STR. Spoke with pt, spouse and serina and plan remains for [facility] for rehab. Chart reviewed and no other dc needs identified. Pt is being d/c'd to [facility] (SNF)  today. Pt's O2 sats are 97% on RA. Discharge timeout done with  nsg, CM and pt. All discharge needs and concerns addressed. Discharging nurse to complete NERY, reconcile AVS, and place final copy with patient's discharge packet.  Discharging RN to ensure that written prescriptions for  Level II medications are sent with patient to the facility as per protocol.

## 2022-07-28 NOTE — FLOWSHEET NOTE
07/28/22 0724   Vital Signs   Temp 98.4 °F (36.9 °C)   Temp Source Oral   Heart Rate 72   Heart Rate Source Monitor   Resp 16   /70   BP Location Right upper arm   BP Method Automatic   MAP (Calculated) 91.67   Level of Consciousness Alert (0)   MEWS Score 1   Oxygen Therapy   SpO2 95 %   O2 Device None (Room air)     Pt assessment completed, vss, see flow sheet. Pt c/o 8/10 pain in abd. Given Oxycodone through J-tube. Pt also given Zofran IVP x 1 for c/o nausea. Pt denies any other needs at this time.  Bethel Doherty, RN

## 2022-07-28 NOTE — DISCHARGE INSTR - COC
Continuity of Care Form    Patient Name: Madiha Sloan   :  1950  MRN:  [id number]    Admit date:  2022  Discharge date:  22    Code Status Order: Full Code   Advance Directives:     Admitting Physician:  Araceli Leary MD  PCP: Tom Jean, APRN - CNP    Discharging Nurse: [facility] Unit/Room#: 0209/0209-02  Discharging Unit Phone Number: [phone]    Emergency Contact:   Extended Emergency Contact Information  Primary Emergency Contact: [address] Phone: [phone]  Relation: Child  Secondary Emergency Contact: Sanjeev Santana  Address: [address] Phone: [phone]  Mobile Phone: [phone]  Relation: Spouse    Past Surgical History:  Past Surgical History:   Procedure Laterality Date    APPENDECTOMY  2009    CHOLECYSTECTOMY  2009    COLON SURGERY  2009    EPIDURAL STEROID INJECTION Left 4/15/2019    LEFT LUMBAR FIVE SACRAL ONE EPIDURAL STEROID INJECTION SITE CONFIRMED BY FLUOROSCOPY performed by Mio Moss MD at [address] Left 2019    LEFT LUMBAR FIVE SACRAL ONE EPIDURAL STEROID INJECTION SITE CONFIRMED BY FLUOROSCOPY performed by Mio Moss MD at [facility] [place] [facility] CATH  2022    IR MIDLINE CATH 2022 [address] PROCEDURES    KIDNEY STONE SURGERY      LAPAROTOMY N/A 2022    LAPAROTOMY, REPAIR OF PERFORATED ULCER with jejunostomy insertion performed by Araceli Leary MD at [address]         Immunization History:   Immunization History   Administered Date(s) Administered    COVID-19, MODERNA BLUE border, Primary or Immunocompromised, (age 12y+), IM, 100 mcg/0.5mL 2021    Tdap (Boostrix, Adacel) 2018       Active Problems:  Patient Active Problem List   Diagnosis Code    Pneumonia due to 2019 novel coronavirus U07.1, J12.82    Hypothyroidism E03.9    Chronic back pain M54.9, G89.29    Hypoxia R09.02    COVID-19 U07.1    Pneumonia due to COVID-19 virus U07.1, J12.82    Gastric perforation (HCC) K25.5    Perforated peptic ulcer ([facility] [address] K27.5    Atrial fibrillation ([facility]) I48.91       Isolation/Infection:   Isolation            No Isolation          Patient Infection Status       Infection Onset Added Last Indicated Last Indicated By Review Planned Expiration Resolved Resolved By    None active    Resolved    COVID-19 06/20/22 06/24/22 06/28/22 COVID-19, Rapid   07/01/22 Jarod De Anda RN            Nurse Assessment:  Last Vital Signs: BP (!) 141/76   Pulse 69   Temp 97.9 °F (36.6 °C) (Oral)   Resp 12   Ht 5' 4\" (1.626 m)   Wt 141 lb (64 kg)   SpO2 97%   BMI 24.20 kg/m²     Last documented pain score (0-10 scale): Pain Level: 8  Last Weight:   Wt Readings from Last 1 Encounters:   07/25/22 141 lb (64 kg)     Mental Status:  oriented and alert    IV Access:  - None    Nursing Mobility/ADLs:  Walking   Assisted  Transfer  Assisted  Bathing  Assisted  Dressing  Assisted  Toileting  Assisted  Feeding  Independent  Med Admin  Independent  Med Delivery   whole    Wound Care Documentation and Therapy:  Wound 07/08/22 Radial Proximal;Right infiltration that is now open and blisted (Active)   Wound Image    07/08/22 2101   Wound Etiology Other 07/28/22 0811   Dressing Status Clean;Dry; Intact 07/28/22 0811   Wound Cleansed Cleansed with saline 07/23/22 2215   Dressing/Treatment Open to air 07/28/22 0811   Dressing Change Due 07/13/22 07/14/22 2114   Wound Assessment Dry;Pink/red 07/28/22 0811   Drainage Amount None 07/28/22 0811   Odor None 07/28/22 0811   Number of days: 20       Wound 07/08/22 Buttocks (Active)   Wound Image    07/18/22 2235   Wound Etiology Pressure Stage 2 07/28/22 0811   Dressing Status Other (Comment) 07/28/22 0811   Wound Cleansed Not Cleansed 07/28/22 0811   Dressing/Treatment Pharmaceutical agent (see MAR) 07/28/22 0811   Wound Assessment Pink/red;Purple/maroon 22 0811   Drainage Amount Scant 22 0811   Drainage Description Serosanguinous 22 0811   Odor None 22 0811   [place]-wound Assessment Maceration 22 0811   Margins Flat/open edges; Unattached edges 22   Number of days: 20       Incision 22 Abdomen Medial;Upper (Active)   Wound Image   22   Dressing Status Clean;Dry; Intact 22 0811   Dressing Change Due 22 09   Incision Cleansed Cleansed with saline 22   Dressing/Treatment Dry dressing 22 0811   Incision Length (cm) 10.5 22 1546   Incision Width (cm) 1 cm 22 154   Incision Depth (cm) 4 cm 22 154   Closure Staples 22   Margins Other (Comment) 22   Incision Assessment Other (Comment) 22   Drainage Amount None 22 0811   Drainage Description Yellow 22   Odor None 22 0811   [place]-incision Assessment Blanchable erythema; Other (Comment) 22 3608   Number of days: 30        Elimination:  Continence: Bowel: No  Bladder: Yes  Urinary Catheter: None   Colostomy/Ileostomy/Ileal Conduit: No       Date of Last BM: 22    Intake/Output Summary (Last 24 hours) at 2022 1410  Last data filed at 2022 0620  Gross per 24 hour   Intake 1480 ml   Output 405 ml   Net 1075 ml     I/O last 3 completed shifts: In: 6838 [P.O.:180; NG/GT:6658]  Out: 12 [Urine:400; Drains:5]    Safety Concerns: At Risk for Falls    Impairments/Disabilities:      None    Nutrition Therapy:  Current Nutrition Therapy: Jevity 1.2 60ml/hr runs 20hrs on off 4hrs. , 200ml flush every hours,  once daily. Restarted at 12 pm today. Shut off for transport.       Routes of Feeding: Oral and Jejunal Tube  Liquids: No Restrictions  Daily Fluid Restriction: no  Last Modified Barium Swallow with Video (Video Swallowing Test): not done    Treatments at the Time of Hospital Discharge:   Respiratory Treatments: Oxygen Therapy:  is not on home oxygen therapy. Ventilator:    - No ventilator support    Rehab Therapies: Physical Therapy and Occupational Therapy  Weight Bearing Status/Restrictions: No weight bearing restrictions  Other Medical Equipment (for information only, NOT a DME order):  walker  Other Treatments:     Patient's personal belongings (please select all that are sent with patient):  Cell phone and     RN SIGNATURE:  Electronically signed by Gricelda Jacobs RN on 7/28/22 at 3:49 PM EDT    CASE MANAGEMENT/SOCIAL WORK SECTION    Inpatient Status Date: 6/28/22    Readmission Risk Assessment Score:  Readmission Risk              Risk of Unplanned Readmission:  [id number]           Discharging to Facility/ Agency   Name: [facility]  Phone: [phone]  Fax:    / signature: Electronically signed by Shoshana Baker RN on 7/28/22 at 3:33 PM EDT    PHYSICIAN SECTION    Prognosis: Good    Condition at Discharge: Stable    Rehab Potential (if transferring to Rehab): Good    Recommended Labs or Other Treatments After Discharge: Pt can have a full liquid diet go slow. Jevity 1.2 at 60 ml/hr x 20 hours with 200 ml water flushes q 6 hours. J-tube care, JERRI drain care (please record output and send with pt to her follow-up appointment with Dr. Laura Bruce in 1 week), Midline dressing change: please pack open midline wound with wet-to-dry packing and cover with dry gauze. PT/OT. See discharge instructions. Physician Certification: I certify the above information and transfer of Angel Beard  is necessary for the continuing treatment of the diagnosis listed and that she requires [facility] for less 30 days.      Update Admission H&P: No change in H&P    PHYSICIAN SIGNATURE:  Electronically signed by ILAN Cardoso CNP on 7/28/22 at 2:42 PM EDT

## 2022-07-28 NOTE — DISCHARGE SUMMARY
Surgery Discharge Summary    Patient Identification  Ubaldo Medrano is a 74 y.o. female. :  1950  Admit Date:  2022    Discharge date:   2022  4:39 PM                                   Disposition: SNF    Discharge Diagnoses:   Principal Problem (Resolved):    Gastric perforation ([email or web address]  Active Problems:    Atrial fibrillation [facility])  Resolved Problems:    Perforated peptic ulcer ([email or web address]      Discharge condition: good    Discharge Medications:     Discharge Medication List as of 2022  3:50 PM        CONTINUE these medications which have NOT CHANGED    Details   albuterol sulfate HFA (PROVENTIL;VENTOLIN;PROAIR) 108 (90 Base) MCG/ACT inhaler Inhale 2 puffs into the lungs every 6 hours as needed for Wheezing, Disp-18 g, R-3Normal      atorvastatin (LIPITOR) 10 MG tablet Take 20 mg by mouthHistorical Med      levothyroxine (SYNTHROID) 137 MCG tablet 125 mcg Daily Historical Med      citalopram (CELEXA) 20 MG tablet 20 mg daily Historical Med                Discharge Medication List as of 2022  3:50 PM        START taking these medications    Details   pantoprazole (PROTONIX) 40 MG tablet Take 1 tablet by mouth in the morning and 1 tablet in the evening. Take before meals. , Disp-180 tablet, R-1Print      oxyCODONE (ROXICODONE) 5 MG/5ML solution 5 mLs by Per J Tube route every 4 hours as needed for Pain for up to 7 days. , Disp-210 mL, R-0Print      amoxicillin-clavulanate (AUGMENTIN) 875-125 MG per tablet Take 1 tablet by mouth in the morning and 1 tablet before bedtime. Do all this for 7 days. , Disp-14 tablet, R-0Print               Most Recent Labs:    CBC: No results for input(s): WBC, HGB, HCT, PLT in the last 72 hours.   BMP:    Recent Labs     22  0507 22  0532 22  0629   * 136 136   K 4.3 4.3 4.3    106 104   CO2 21 22 23   BUN 17 19 16   CREATININE 1.1 1.0 1.1   GLUCOSE 133* 130* 106*     Hepatic: No results for input(s): AST, ALT, ALB, BILITOT, ALKPHOS in the last 72 hours. PT/INR:  No results for input(s): INR in the last 72 hours. Consults: none    Surgery: Perforated ulcer repair with jejunostomy tube insertion    Patient Instructions: Activity: no heavy lifting, pushing, pulling for 2 weeks, no driving for 1 weeks or while on analgesics  Diet: As tolerated  Follow-up with Dr. Farias Gravely in 2 weeks. The patient and/or family/patient representatives, were provided education regarding discharge instructions, ongoing treatment and follow-up. Details of information given to the patient may be found in the discharge instructions located in the EMR. HPI and Hospital Course:   Patient admitted with perforated prepyloric ulcer and underwent surgery. This subsequently leak. She was treated with nasogastric decompression, tube feedings, and the surgically placed drain to control her leak. Attempts were made to transfer her to acute rehab, but the patient had ongoing refusals to participate in therapy. Ultimately her leak sealed. She is being transferred to skilled nursing facility today for ongoing care.   She had a prolonged hospitalization, please refer to the medical record for complete details      Pinky Felty SHIFF, MD    [address] [facility] Surgery

## 2022-07-28 NOTE — PLAN OF CARE
Problem: Discharge Planning  Goal: Discharge to home or other facility with appropriate resources  7/27/2022 2245 by Tara Harris RN  Outcome: Progressing  7/27/2022 1138 by Lynne Fabian RN  Outcome: Progressing  Flowsheets (Taken 7/27/2022 5335)  Discharge to home or other facility with appropriate resources:   Identify barriers to discharge with patient and caregiver   Arrange for needed discharge resources and transportation as appropriate   Identify discharge learning needs (meds, wound care, etc)   Arrange for interpreters to assist at discharge as needed   Refer to discharge planning if patient needs post-hospital services based on physician order or complex needs related to functional status, cognitive ability or social support system     Problem: Pain  Goal: Verbalizes/displays adequate comfort level or baseline comfort level  7/27/2022 2245 by Tara Harris RN  Outcome: Progressing  7/27/2022 1138 by Lynne Fabian RN  Outcome: Progressing  Flowsheets (Taken 7/27/2022 5384)  Verbalizes/displays adequate comfort level or baseline comfort level:   Encourage patient to monitor pain and request assistance   Assess pain using appropriate pain scale   Implement non-pharmacological measures as appropriate and evaluate response   Administer analgesics based on type and severity of pain and evaluate response     Problem: Safety - Adult  Goal: Free from fall injury  7/27/2022 2245 by Tara Harris RN  Outcome: Progressing  7/27/2022 1138 by Lynne Fabian RN  Outcome: Progressing     Problem: Skin/Tissue Integrity  Goal: Absence of new skin breakdown  Description: 1. Monitor for areas of redness and/or skin breakdown  2. Assess vascular access sites hourly  3. Every 4-6 hours minimum:  Change oxygen saturation probe site  4.   Every 4-6 hours:  If on nasal continuous positive airway pressure, respiratory therapy assess nares and determine need for appliance change or resting

## 2022-08-01 NOTE — TELEPHONE ENCOUNTER
Pt was d/c to [facility]. Called nurse to inform of scheduled appt, but no answer. Will try back at another time.

## 2022-08-04 NOTE — TELEPHONE ENCOUNTER
Given pt had had several interval CT scans, most recently being the abdominal CT on 7/25/22 capturing changes from gastric perforation, keep upcoming appt, but we will tentatively plan to delay the CT to be 6-10 weeks from that visit

## 2022-08-05 ENCOUNTER — TELEPHONE (OUTPATIENT)
Dept: SURGERY | Age: 72
End: 2022-08-05

## 2022-08-05 NOTE — TELEPHONE ENCOUNTER
Radha Muhammad from [facility] calling saying pts JERRI drain somehow was pulled out around 4am.    [phone]

## 2022-08-07 ENCOUNTER — APPOINTMENT (OUTPATIENT)
Dept: GENERAL RADIOLOGY | Age: 72
End: 2022-08-07
Payer: MEDICARE

## 2022-08-07 ENCOUNTER — HOSPITAL ENCOUNTER (EMERGENCY)
Age: 72
Discharge: HOME | End: 2022-08-07
Attending: EMERGENCY MEDICINE
Payer: MEDICARE

## 2022-08-07 VITALS
TEMPERATURE: 98.5 F | BODY MASS INDEX: 24.66 KG/M2 | SYSTOLIC BLOOD PRESSURE: 141 MMHG | HEIGHT: 65 IN | WEIGHT: 148 LBS | HEART RATE: 77 BPM | DIASTOLIC BLOOD PRESSURE: 68 MMHG | RESPIRATION RATE: 14 BRPM | OXYGEN SATURATION: 97 %

## 2022-08-07 DIAGNOSIS — T85.598A FEEDING TUBE DYSFUNCTION, INITIAL ENCOUNTER: Primary | ICD-10-CM

## 2022-08-07 PROBLEM — T85.528A DISLODGED JEJUNOSTOMY TUBE: Status: ACTIVE | Noted: 2022-08-07

## 2022-08-07 PROCEDURE — 74018 RADEX ABDOMEN 1 VIEW: CPT

## 2022-08-07 PROCEDURE — 6360000002 HC RX W HCPCS: Performed by: EMERGENCY MEDICINE

## 2022-08-07 PROCEDURE — 96374 THER/PROPH/DIAG INJ IV PUSH: CPT

## 2022-08-07 PROCEDURE — 2580000003 HC RX 258: Performed by: EMERGENCY MEDICINE

## 2022-08-07 PROCEDURE — 99284 EMERGENCY DEPT VISIT MOD MDM: CPT

## 2022-08-07 PROCEDURE — 96375 TX/PRO/DX INJ NEW DRUG ADDON: CPT

## 2022-08-07 PROCEDURE — 49451 REPLACE DUOD/JEJ TUBE PERC: CPT | Performed by: SURGERY

## 2022-08-07 RX ORDER — ONDANSETRON 2 MG/ML
4 INJECTION INTRAMUSCULAR; INTRAVENOUS ONCE
Status: COMPLETED | OUTPATIENT
Start: 2022-08-07 | End: 2022-08-07

## 2022-08-07 RX ORDER — 0.9 % SODIUM CHLORIDE 0.9 %
1000 INTRAVENOUS SOLUTION INTRAVENOUS ONCE
Status: COMPLETED | OUTPATIENT
Start: 2022-08-07 | End: 2022-08-07

## 2022-08-07 RX ADMIN — HYDROMORPHONE HYDROCHLORIDE 0.5 MG: 1 INJECTION, SOLUTION INTRAMUSCULAR; INTRAVENOUS; SUBCUTANEOUS at 08:58

## 2022-08-07 RX ADMIN — ONDANSETRON HYDROCHLORIDE 4 MG: 2 INJECTION, SOLUTION INTRAMUSCULAR; INTRAVENOUS at 08:57

## 2022-08-07 RX ADMIN — SODIUM CHLORIDE 1000 ML: 9 INJECTION, SOLUTION INTRAVENOUS at 08:56

## 2022-08-07 ASSESSMENT — PAIN SCALES - WONG BAKER
WONGBAKER_NUMERICALRESPONSE: 0
WONGBAKER_NUMERICALRESPONSE: 0

## 2022-08-07 ASSESSMENT — PAIN SCALES - GENERAL
PAINLEVEL_OUTOF10: 8
PAINLEVEL_OUTOF10: 8

## 2022-08-07 ASSESSMENT — PAIN DESCRIPTION - LOCATION
LOCATION: BACK
LOCATION: ABDOMEN

## 2022-08-07 ASSESSMENT — PAIN - FUNCTIONAL ASSESSMENT: PAIN_FUNCTIONAL_ASSESSMENT: 0-10

## 2022-08-07 NOTE — ED NOTES
Updated Nicho Chowdhury, pts  on discharge and transport. Aware RN attempted to call report.       Michel Ballard RN  08/07/22 6216

## 2022-08-07 NOTE — CONSULTS
Called to ED unsuccessful attempt at replacing feeding jejunostomy tube. Per patient and family the tube came out through the night. L side os cannulated and 14 Fr. Red rubber catheter passed easily. Will get contrast injection of tube to confirm placement and then can be discharged and resume feeding if xray is OK.

## 2022-08-07 NOTE — ED NOTES
Dr. Ana Delgado returned call and spoke with Dr. Alla Roger at this time.       Sharon Roth RN  08/07/22 6540

## 2022-08-07 NOTE — ED PROVIDER NOTES
Socioeconomic History    Marital status:      Spouse name: Ajay Sosa    Number of children: 3    Years of education: 12    Highest education level: Not on file   Occupational History    Not on file   Tobacco Use    Smoking status: Former     Types: Cigarettes     Quit date: 3/17/2000     Years since quittin.4    Smokeless tobacco: Never   Vaping Use    Vaping Use: Never used   Substance and Sexual Activity    Alcohol use: Yes     Alcohol/week: 0.0 standard drinks     Comment: socially- glass of wine    Drug use: Never    Sexual activity: Yes     Partners: Male   Other Topics Concern    Not on file   Social History Narrative    Not on file     Social Determinants of Health     Financial Resource Strain: Not on file   Food Insecurity: Not on file   Transportation Needs: Not on file   Physical Activity: Not on file   Stress: Not on file   Social Connections: Not on file   Intimate Partner Violence: Not on file   Housing Stability: Not on file     Current Facility-Administered Medications   Medication Dose Route Frequency Provider Last Rate Last Admin    0.9 % sodium chloride bolus  1,000 mL IntraVENous Once Jorge Tucker MD        ondansetron [facility]) injection 4 mg  4 mg IntraVENous Once Jorge Tucker MD        HYDROmorphone (DILAUDID) injection 0.5 mg  0.5 mg IntraVENous Once Jorge Tucker MD         Current Outpatient Medications   Medication Sig Dispense Refill    pantoprazole (PROTONIX) 40 MG tablet Take 1 tablet by mouth in the morning and 1 tablet in the evening. Take before meals.  180 tablet 1    albuterol sulfate HFA (PROVENTIL;VENTOLIN;PROAIR) 108 (90 Base) MCG/ACT inhaler Inhale 2 puffs into the lungs every 6 hours as needed for Wheezing 18 g 3    atorvastatin (LIPITOR) 10 MG tablet Take 20 mg by mouth      levothyroxine (SYNTHROID) 137 MCG tablet 125 mcg Daily       citalopram (CELEXA) 20 MG tablet 20 mg daily        Allergies   Allergen Reactions    Bee Venom Swelling Attacked by swarm of bees and required er visit    Adhesive Tape Other (See Comments)     Skin redness      Tetracyclines & Related Other (See Comments)     Mouth fungus      Sulfa Antibiotics Nausea And Vomiting       REVIEW OF SYSTEMS  10 systems reviewed, pertinent positives per HPI otherwise noted to be negative. PHYSICAL EXAM  BP (!) 141/89   Pulse 77   Temp 99.5 °F (37.5 °C) (Oral)   Resp 16   Ht 5' 5\" (1.651 m)   Wt 148 lb (67.1 kg)   SpO2 93%   BMI 24.63 kg/m²   GENERAL APPEARANCE: Awake and alert. Cooperative. No acute distress. HEAD: Normocephalic. Atraumatic. EYES: PERRL. EOM's grossly intact. ENT: Mucous membranes are moist.   NECK: Supple, trachea midline. HEART: RRR. Normal S1, S2. No murmurs, rubs or gallops. LUNGS: Respirations unlabored. CTAB. Good air exchange. No wheezes, rales, or rhonchi. Speaking comfortably in full sentences. ABDOMEN: Soft. Non-distended. Non-tender. No guarding or rebound. Normal Bowel sounds. J-tube insertion site is without bleeding or surrounding erythema. EXTREMITIES: No peripheral edema. MAEE. No acute deformities. SKIN: Warm and dry. No acute rashes. NEUROLOGICAL: Alert and oriented X 3. CN II-XII intact. No gross facial drooping. Strength 5/5, sensation intact. No pronator drift. Normal coordination. PSYCHIATRIC: Normal mood and affect. LABS  I have reviewed all labs for this visit. No results found for this visit on 08/07/22. RADIOLOGY  X-RAYS:  I have reviewed radiologic plain film image(s). ALL OTHER NON-PLAIN FILM IMAGES SUCH AS CT, ULTRASOUND AND MRI HAVE BEEN READ BY THE RADIOLOGIST. XR ABDOMEN (KUB) (SINGLE AP VIEW)    (Results Pending)              Rechecks: Physical assessment performed. Resting comfortably      ED COURSE/MDM  Patient seen and evaluated. Old records reviewed. Labs and imaging reviewed and results discussed with patient.      I attempted to maintain the patency of her feeding tube lumen with a red rubber catheter without success. Dr. Carmina Brower was consulted and he replaced the J-tube at bedside. Patient will be discharged home. New Prescriptions    No medications on file       CLINICAL IMPRESSION  No diagnosis found. Blood pressure (!) 141/89, pulse 77, temperature 99.5 °F (37.5 °C), temperature source Oral, resp. rate 16, height 5' 5\" (1.651 m), weight 148 lb (67.1 kg), SpO2 93 %. DISPOSITION  Latrice Davis was discharged to home in stable condition.          Charly Strauss MD  08/07/22 0920

## 2022-08-07 NOTE — ED NOTES
Attempted to call report to the [facility], waited on hold for 7 minutes, no answer.       Nixon Thacker RN  08/07/22 6148       Nixon Thacker RN  08/07/22 1969

## 2022-08-11 ENCOUNTER — OFFICE VISIT (OUTPATIENT)
Dept: SURGERY | Age: 72
End: 2022-08-11

## 2022-08-11 VITALS — DIASTOLIC BLOOD PRESSURE: 70 MMHG | SYSTOLIC BLOOD PRESSURE: 122 MMHG | HEIGHT: 65 IN | BODY MASS INDEX: 24.63 KG/M2

## 2022-08-11 DIAGNOSIS — Z98.890 POST-OPERATIVE STATE: Primary | ICD-10-CM

## 2022-08-11 PROCEDURE — 99024 POSTOP FOLLOW-UP VISIT: CPT | Performed by: SURGERY

## 2022-08-11 NOTE — PROGRESS NOTES
[facility]      S:   Patient presents s/p perforated ulcer repair with subsequent leak. She reports nothing tastes good, and she does not have much appetite due to the tube feedings. O:   Comfortable         Incision site healing well. Staples removed              A:   S/P perforated ulcer repair with subsequent leak    P:   Suggest changing tube feeds to just nocturnal, 60 mL/h from 7 PM to 7 AM.  This will allow her to have a better appetite during the day. Dry dressing daily to the midline wound. Follow up 2 weeks.

## 2022-08-15 ENCOUNTER — TELEPHONE (OUTPATIENT)
Dept: SURGERY | Age: 72
End: 2022-08-15

## 2022-08-15 NOTE — TELEPHONE ENCOUNTER
Lilia De Anda called from [facility], she wants to know if pt can advance to a soft diet instead of puree foods.  Please advise,   Mechanical soft diet-

## 2022-08-17 ENCOUNTER — HOSPITAL ENCOUNTER (EMERGENCY)
Age: 72
Discharge: HOME | End: 2022-08-17
Attending: EMERGENCY MEDICINE
Payer: MEDICARE

## 2022-08-17 ENCOUNTER — APPOINTMENT (OUTPATIENT)
Dept: GENERAL RADIOLOGY | Age: 72
End: 2022-08-17
Payer: MEDICARE

## 2022-08-17 ENCOUNTER — TELEPHONE (OUTPATIENT)
Dept: SURGERY | Age: 72
End: 2022-08-17

## 2022-08-17 VITALS
HEIGHT: 64 IN | OXYGEN SATURATION: 94 % | HEART RATE: 74 BPM | WEIGHT: 145 LBS | TEMPERATURE: 98.5 F | DIASTOLIC BLOOD PRESSURE: 65 MMHG | BODY MASS INDEX: 24.75 KG/M2 | RESPIRATION RATE: 14 BRPM | SYSTOLIC BLOOD PRESSURE: 110 MMHG

## 2022-08-17 DIAGNOSIS — K94.13 JEJUNOSTOMY TUBE LEAK (HCC): Primary | ICD-10-CM

## 2022-08-17 LAB
A/G RATIO: 0.9 (ref 1.1–2.2)
ALBUMIN SERPL-MCNC: 3.3 G/DL (ref 3.4–5)
ALP BLD-CCNC: 107 U/L (ref 40–129)
ALT SERPL-CCNC: 11 U/L (ref 10–40)
ANION GAP SERPL CALCULATED.3IONS-SCNC: 8 MMOL/L (ref 3–16)
AST SERPL-CCNC: 23 U/L (ref 15–37)
BASOPHILS ABSOLUTE: 0.1 K/UL (ref 0–0.2)
BASOPHILS RELATIVE PERCENT: 0.7 %
BILIRUB SERPL-MCNC: 0.4 MG/DL (ref 0–1)
BUN BLDV-MCNC: 17 MG/DL (ref 7–20)
CALCIUM SERPL-MCNC: 8.6 MG/DL (ref 8.3–10.6)
CHLORIDE BLD-SCNC: 95 MMOL/L (ref 99–110)
CO2: 28 MMOL/L (ref 21–32)
CREAT SERPL-MCNC: 1 MG/DL (ref 0.6–1.2)
EOSINOPHILS ABSOLUTE: 0.2 K/UL (ref 0–0.6)
EOSINOPHILS RELATIVE PERCENT: 2 %
GFR AFRICAN AMERICAN: >60
GFR NON-AFRICAN AMERICAN: 54
GLUCOSE BLD-MCNC: 101 MG/DL (ref 70–99)
HCT VFR BLD CALC: 26.1 % (ref 36–48)
HEMOGLOBIN: 8.4 G/DL (ref 12–16)
LYMPHOCYTES ABSOLUTE: 2.1 K/UL (ref 1–5.1)
LYMPHOCYTES RELATIVE PERCENT: 22.6 %
MCH RBC QN AUTO: 30 PG (ref 26–34)
MCHC RBC AUTO-ENTMCNC: 32.2 G/DL (ref 31–36)
MCV RBC AUTO: 93.1 FL (ref 80–100)
MONOCYTES ABSOLUTE: 0.7 K/UL (ref 0–1.3)
MONOCYTES RELATIVE PERCENT: 7.4 %
NEUTROPHILS ABSOLUTE: 6.4 K/UL (ref 1.7–7.7)
NEUTROPHILS RELATIVE PERCENT: 67.3 %
PDW BLD-RTO: 18.4 % (ref 12.4–15.4)
PLATELET # BLD: 275 K/UL (ref 135–450)
PMV BLD AUTO: 8.2 FL (ref 5–10.5)
POTASSIUM SERPL-SCNC: 4.4 MMOL/L (ref 3.5–5.1)
RBC # BLD: 2.8 M/UL (ref 4–5.2)
SODIUM BLD-SCNC: 131 MMOL/L (ref 136–145)
TOTAL PROTEIN: 6.8 G/DL (ref 6.4–8.2)
WBC # BLD: 9.5 K/UL (ref 4–11)

## 2022-08-17 PROCEDURE — 85025 COMPLETE CBC W/AUTO DIFF WBC: CPT

## 2022-08-17 PROCEDURE — 99284 EMERGENCY DEPT VISIT MOD MDM: CPT

## 2022-08-17 PROCEDURE — 6360000002 HC RX W HCPCS: Performed by: NURSE PRACTITIONER

## 2022-08-17 PROCEDURE — 80053 COMPREHEN METABOLIC PANEL: CPT

## 2022-08-17 PROCEDURE — 96375 TX/PRO/DX INJ NEW DRUG ADDON: CPT

## 2022-08-17 PROCEDURE — 71045 X-RAY EXAM CHEST 1 VIEW: CPT

## 2022-08-17 PROCEDURE — 96374 THER/PROPH/DIAG INJ IV PUSH: CPT

## 2022-08-17 RX ORDER — ONDANSETRON 2 MG/ML
4 INJECTION INTRAMUSCULAR; INTRAVENOUS ONCE
Status: COMPLETED | OUTPATIENT
Start: 2022-08-17 | End: 2022-08-17

## 2022-08-17 RX ORDER — MORPHINE SULFATE 4 MG/ML
4 INJECTION, SOLUTION INTRAMUSCULAR; INTRAVENOUS ONCE
Status: COMPLETED | OUTPATIENT
Start: 2022-08-17 | End: 2022-08-17

## 2022-08-17 RX ADMIN — ONDANSETRON 4 MG: 2 INJECTION INTRAMUSCULAR; INTRAVENOUS at 17:17

## 2022-08-17 RX ADMIN — MORPHINE SULFATE 4 MG: 4 INJECTION, SOLUTION INTRAMUSCULAR; INTRAVENOUS at 17:18

## 2022-08-17 ASSESSMENT — PAIN - FUNCTIONAL ASSESSMENT: PAIN_FUNCTIONAL_ASSESSMENT: NONE - DENIES PAIN

## 2022-08-17 ASSESSMENT — PAIN DESCRIPTION - LOCATION: LOCATION: BACK

## 2022-08-17 ASSESSMENT — LIFESTYLE VARIABLES: HOW OFTEN DO YOU HAVE A DRINK CONTAINING ALCOHOL: NEVER

## 2022-08-17 NOTE — TELEPHONE ENCOUNTER
Pt daughter, RN from the nursing home called saying pt J-tube has a [place] drainage\" RN didn't tell daughter the color of the drainage and wants to know what they need to do.  Has an appt on Thursday 8-25

## 2022-08-17 NOTE — DISCHARGE INSTRUCTIONS
Mechanical soft diet. Medications by mouth. Do not use J-tube. Plan is to be removed in the office by general surgery. Return to the emergency department for new or worsening symptoms. Only change dry split gauze when soiled or as needed. Do not remove the remaining dressing or J-tube.

## 2022-08-17 NOTE — ED NOTES
Cleansed around J tube site with normal saline soaked guaze. Applied dry split dressing. Pt is in bed comfortably with call light in reach.       Cr Villanueva RN  08/17/22 0473

## 2022-08-17 NOTE — ED PROVIDER NOTES
[facility] Emergency Department    CHIEF COMPLAINT  Other (Per Alex pt from [facility] with reported drgerry from [place] site. Denies fevers)      HISTORY OF PRESENT ILLNESS  Latrice Reyes is a 74 y.o. female who presents to the ED complaining of drainage around her J-tube. Patient recently had a J-tube changed approximately 2 weeks ago. Patient is at the [facility] and nursing staff noticed dried drainage surrounding the tube today. Patient was recently switched to a modified soft diet by mouth and has been tolerating that well so they only use the J-tube for nightly feedings. Patient otherwise has no complaints denies abdominal pain, vomiting, fevers, chills or body aches. No other complaints, modifying factors or associated symptoms. Nursing notes reviewed.    Past Medical History:   Diagnosis Date    Arthritis     Colon cancer ([address]     Hyperlipidemia     Thyroid disease      Past Surgical History:   Procedure Laterality Date    APPENDECTOMY  2009    CHOLECYSTECTOMY  2009    COLON SURGERY  2009    EPIDURAL STEROID INJECTION Left 4/15/2019    LEFT LUMBAR FIVE SACRAL ONE EPIDURAL STEROID INJECTION SITE CONFIRMED BY FLUOROSCOPY performed by Ivone Nguyen MD at [address] Left 11/5/2019    LEFT LUMBAR FIVE SACRAL ONE EPIDURAL STEROID INJECTION SITE CONFIRMED BY FLUOROSCOPY performed by Ivone Nguyen MD at [facility] CATH  7/20/2022    IR MIDLINE CATH 7/20/2022 [facility] SPECIAL PROCEDURES    KIDNEY STONE SURGERY      LAPAROTOMY N/A 6/28/2022    LAPAROTOMY, REPAIR OF PERFORATED ULCER with jejunostomy insertion performed by Onofre Jean MD at [address]       Family History   Problem Relation Age of Onset    High Blood Pressure Sister     Cancer Sister     High Blood Pressure Brother     Heart Attack Brother     Cancer Brother      Social History     Socioeconomic History    Marital status:  Spouse name: Tracie Santacruz    Number of children: 3    Years of education: 12    Highest education level: Not on file   Occupational History    Not on file   Tobacco Use    Smoking status: Former     Types: Cigarettes     Quit date: 3/17/2000     Years since quittin.4    Smokeless tobacco: Never   Vaping Use    Vaping Use: Never used   Substance and Sexual Activity    Alcohol use: Yes     Alcohol/week: 0.0 standard drinks     Comment: socially- glass of wine    Drug use: Never    Sexual activity: Yes     Partners: Male   Other Topics Concern    Not on file   Social History Narrative    Not on file     Social Determinants of Health     Financial Resource Strain: Not on file   Food Insecurity: Not on file   Transportation Needs: Not on file   Physical Activity: Not on file   Stress: Not on file   Social Connections: Not on file   Intimate Partner Violence: Not on file   Housing Stability: Not on file     No current facility-administered medications for this encounter. Current Outpatient Medications   Medication Sig Dispense Refill    pantoprazole (PROTONIX) 40 MG tablet Take 1 tablet by mouth in the morning and 1 tablet in the evening. Take before meals.  180 tablet 1    albuterol sulfate HFA (PROVENTIL;VENTOLIN;PROAIR) 108 (90 Base) MCG/ACT inhaler Inhale 2 puffs into the lungs every 6 hours as needed for Wheezing 18 g 3    atorvastatin (LIPITOR) 10 MG tablet Take 20 mg by mouth      levothyroxine (SYNTHROID) 137 MCG tablet 125 mcg Daily       citalopram (CELEXA) 20 MG tablet 20 mg daily        Allergies   Allergen Reactions    Bee Venom Swelling     Attacked by swarm of bees and required er visit    Adhesive Tape Other (See Comments)     Skin redness      Tetracyclines & Related Other (See Comments)     Mouth fungus      Sulfa Antibiotics Nausea And Vomiting       REVIEW OF SYSTEMS  10 systems reviewed, pertinent positives per HPI otherwise noted to be negative    PHYSICAL EXAM  /65   Pulse 74   Temp 98.5 °F (36.9 °C) (Oral)   Resp 14   Ht 5' 4\" (1.626 m)   Wt 145 lb (65.8 kg)   SpO2 94%   BMI 24.89 kg/m²   GENERAL APPEARANCE: Awake and alert. Cooperative. No acute distress. Vital signs are stable. Well appearing and non toxic. HEAD: Normocephalic. Atraumatic. EYES: PERRL. EOM's grossly intact. ENT: Mucous membranes are moist.   NECK: Supple. Normal ROM. HEART: RRR. Distal pulses are equal and intact. Cap refill less than 2 seconds. LUNGS: Respirations unlabored. CTAB. Good air exchange. Speaking comfortably in full sentences. No wheezing, rhonchi, rales, stridor. ABDOMEN: Soft. Non-distended. Non-tender. J-tube in place in the left lower quadrant with a clean dry dressing. Dark dried drainage around the J-tube, once dried drainage was removed there was a small slow amount gray-brown drainage. No surrounding erythema, induration or tenderness. No guarding or rebound. No rigidity. Bowel sounds are present. Negative landon's. Negative McBurney's point. Negative CVA tenderness. EXTREMITIES: No peripheral edema. Moves all extremities equally. All extremities neurovascularly intact. SKIN: Warm and dry. No acute rashes. NEUROLOGICAL: Alert and oriented. No gross facial drooping. Strength 5/5, sensation intact. Speech is clear. PSYCHIATRIC: Normal mood and affect. SCREENINGS           CONSULTS  IP CONSULT TO GENERAL SURGERY    ED COURSE/MDM  Patient seen and evaluated. Old records reviewed. Diagnostic testing reviewed and results discussed. I have seen this patient in collaboration with supervising physician Dr. Paulette Rodrigues. We thoroughly discussed the history, physical exam, diagnostic testing and emergency department course. Kalen Slater presented to the ED today with above noted complaints. Upon arrival to the emergency department patient resting comfortably in stretcher. Vital signs stable. Hemodynamically stable nontoxic appearance.   Initially consulted general surgery and given that patient is taking food and liquids by mouth they felt that this can be managed outpatient in the office and they can assess possibly removing it he recommended placing a dry gauze for drainage. Daughter is agreeable with this plan of care. J tube was flushed easily with 30 mL of saline. No resistance. Drainage did not worsen after flushed. Blood work unremarkable no leukocytosis or DA. Anemia at patient's baseline. Chest x-ray shows no acute process. Patient discharged back to the [facility] in stable condition. While in ED patient received   Medications   morphine sulfate (PF) injection 4 mg (4 mg IntraVENous Given 8/17/22 1718)   ondansetron (ZOFRAN) injection 4 mg (4 mg IntraVENous Given 8/17/22 1717)             At this point I do not feel the patient requires further work up and it is reasonable to discharge the patient. A discussion was had with the patient and/or their surrogate regarding diagnosis, diagnostic testing results, treatment/ plan of care, and follow up. There was shared decision-making between myself as well as the patient and/or their surrogate and we are all in agreement with discharge home. There was an opportunity for questions and all questions were answered to the best of my ability and to the satisfaction of the patient and/or patient family. Patient will follow up with general surgery for further evaluation/treatment. The patient was given strict return precautions as we discussed symptoms that would necessitate return to the ED. Patient will return to ED for new/worsening symptoms. The patient verbalized their understanding and agreement with the above plan. Please refer to AVS for further details regarding discharge instructions.       Results for orders placed or performed during the hospital encounter of 08/17/22   CBC with Auto Differential   Result Value Ref Range    WBC 9.5 4.0 - 11.0 K/uL    RBC 2.80 (L) 4.00 - 5.20 M/uL    Hemoglobin 8.4 (L) pressure 110/65, pulse 74, temperature 98.5 °F (36.9 °C), temperature source Oral, resp. rate 14, height 5' 4\" (1.626 m), weight 145 lb (65.8 kg), SpO2 94 %. mdm    Patient was sent home with a prescription for below medication/s. I did [place] patient on appropriate use of these medication. New Prescriptions    No medications on file           FOLLOW MD Marlene  [address] [place] [address]  [phone]    Call   follow up    [facility]  ED  [address]        DISPOSITION  Patient was discharged to home in good condition. Comment: Please note this report has been produced using speech recognition software and may contain errors related to that system including errors in grammar, punctuation, and spelling, as well as words and phrases that may be inappropriate. If there are any questions or concerns please feel free to contact the dictating provider for clarification.            ILAN Harrison - MARIA INES  08/17/22 4323

## 2022-08-17 NOTE — ED NOTES
Attempt to flush PEG tube, flush easily, unable to pull anything back, Ara NP at bedside and is aware      Ajith Carrero RN  08/17/22 0598

## 2022-08-17 NOTE — TELEPHONE ENCOUNTER
I called Guru Murrieta and left her a detailed message to call me back, looks like pt made it the ED for treatment, call me back with any concerns.

## 2022-08-17 NOTE — ED NOTES
Called general surgery consult @6975  Re: j tube concerns per NP-Ara Nuñez returned call @0849     Yetta Eaves Naegele  08/17/22 7106

## 2022-08-17 NOTE — ED NOTES
Gave report to the Josse Fink, talked to RN, answered all questions     Sarah Ramsey RN  08/17/22 9841

## 2022-08-18 ENCOUNTER — OFFICE VISIT (OUTPATIENT)
Dept: SURGERY | Age: 72
End: 2022-08-18

## 2022-08-18 VITALS — HEIGHT: 64 IN | SYSTOLIC BLOOD PRESSURE: 124 MMHG | DIASTOLIC BLOOD PRESSURE: 70 MMHG | BODY MASS INDEX: 24.89 KG/M2

## 2022-08-18 DIAGNOSIS — Z98.890 POST-OPERATIVE STATE: Primary | ICD-10-CM

## 2022-08-18 PROCEDURE — 99024 POSTOP FOLLOW-UP VISIT: CPT | Performed by: SURGERY

## 2022-08-18 NOTE — PROGRESS NOTES
[facility] SURGERY      S:   Patient presents s/p perforated ulcer repair. She reports having some drainage around the J-tube. She was seen in the emergency room last night. O:   Comfortable         Incision site healing well. It is clean and granulating. There is some drainage around the J-tube but no sign of infection. The tube is functioning well. A:   S/P perforated ulcer repair    P:   Encourage oral intake. She may have a soft diet but please do not purée her food. No medications per jejunostomy tube. She may take these orally. Please also do good. Care with barrier cream after bowel movements. Follow up 2 weeks.

## 2022-08-18 NOTE — ED PROVIDER NOTES
Emergency Department Attending Provider Note  Location: [address] [place] [address]  ED  8/17/2022     Patient Identification  Ricky Mistry is a 74 y.o. female      Ricky Mistry was evaluated in the Emergency Department for evaluation of G-tube site. Patient states that she has noted drainage over the left G-tube site over the last 24 hours. No additional complaints. No abdominal pain, nausea, or vomiting. . Although initial history and physical exam information was obtained by SHERICE/NPP/MD/ (who also dictated a record of this visit), I personally saw the patient and performed a substantive portion of the visit including all aspects of the medical decision making. Physical exam: General: No acute distress. Head: Normocephalic/atraumatic. Heart: Regular in rhythm. Abdomen: Soft. Nontender, nondistended. No rebound, guarding. J-tube is noted on the left upper abdomen. Dried discharge noted. With gentle pressure, there is dark brown discharge noted. No surrounding erythema. Patient seen and evaluated. Relevant records reviewed. General surgery consulted. Patient stable. Rebeca Bragg DO am the primary clinician of record. I personally saw the patient and independently provided 0   minutes of non-concurrent critical care out of the total shared critical care time provided. This chart was generated in part by using Dragon Dictation system and may contain errors related to that system including errors in grammar, punctuation, and spelling, as well as words and phrases that may be inappropriate. If there are any questions or concerns please feel free to contact the dictating provider for clarification.      Tree Cruz DO   [employer]        Tree Cruz DO  08/17/22 5123

## 2022-08-21 ENCOUNTER — HOSPITAL ENCOUNTER (EMERGENCY)
Age: 72
Discharge: HOME | End: 2022-08-21
Attending: EMERGENCY MEDICINE
Payer: MEDICARE

## 2022-08-21 VITALS
DIASTOLIC BLOOD PRESSURE: 69 MMHG | SYSTOLIC BLOOD PRESSURE: 115 MMHG | RESPIRATION RATE: 16 BRPM | OXYGEN SATURATION: 93 % | HEART RATE: 71 BPM | TEMPERATURE: 99.1 F

## 2022-08-21 DIAGNOSIS — K94.13 MALFUNCTION OF JEJUNOSTOMY TUBE (HCC): Primary | ICD-10-CM

## 2022-08-21 PROCEDURE — 99283 EMERGENCY DEPT VISIT LOW MDM: CPT

## 2022-08-21 ASSESSMENT — ENCOUNTER SYMPTOMS
COLOR CHANGE: 1
ABDOMINAL PAIN: 1
SHORTNESS OF BREATH: 0
VOMITING: 0

## 2022-08-21 ASSESSMENT — PAIN SCALES - GENERAL: PAINLEVEL_OUTOF10: 8

## 2022-08-21 ASSESSMENT — PAIN - FUNCTIONAL ASSESSMENT: PAIN_FUNCTIONAL_ASSESSMENT: 0-10

## 2022-08-21 NOTE — ED NOTES
@1952 Called Gen Surg Per Toyin Iqbal  RE:J tube not flushing  @0804  Called back and spoke to Toyin Iqbal

## 2022-08-21 NOTE — DISCHARGE INSTRUCTIONS
Call your general surgeon tomorrow for appointment in the next couple of days due to concern for J-tube not working. Return to the emergency department for worsening oral intake with lightheadedness, increasing abdominal pain from your baseline with vomiting, fever, or any other concerns. Otherwise, see your primary doctor for any other concerns.

## 2022-08-21 NOTE — ED NOTES
Patient discharged with all belongings, discharge paperwork  . Patient verbalized understanding of discharge instructions and follow up with Dr. Tk Rivas. . Patient wheelchaired out of ED with family and pt back to [facility].         Queen Jhonatan RN  08/21/22 0349

## 2022-08-21 NOTE — ED PROVIDER NOTES
[facility]  ED  EMERGENCY DEPARTMENT ENCOUNTER        Pt Name: Angel Beard  MRN: [id number]  Rosemariegfkaty 1950  Date of evaluation: 8/21/2022  Provider: Kayla Galloway MD  PCP: Harley Ohara, APRN - [facility]       Chief Complaint   Patient presents with    Other     J tube clogged        HISTORY OFPRESENT ILLNESS   (Location/Symptom, Timing/Onset, Context/Setting, Quality, Duration, Modifying Factors,Severity)  Note limiting factors. Angel Beard is a 74 y.o. female presenting today due to concern for being brought to the emergency department since her G-tube is not flushing and coming from the [address] [place] [address] home. She reports some chronic abdominal pain but denies any changes with that today. She also reports some chronic neck discomfort but denies any chest pain or shortness of breath. She does report her buttocks is uncomfortable at this time and thinks that she has some skin breakdown. She denies any fever. She was unaware that she was even being transported to the emergency department due to trouble with her J-tube during the initial transport per patient. She had initial J-tube placed late June of 2022 due to a perforated ulcer needing surgery. REVIEW OF SYSTEMS    (2-9 systems for level 4, 10 or more for level 5)     Review of Systems   Constitutional:  Negative for fever. Respiratory:  Negative for shortness of breath. Cardiovascular:  Negative for chest pain. Gastrointestinal:  Positive for abdominal pain (chronic per patient). Negative for vomiting. Musculoskeletal:  Positive for neck pain. Skin:  Positive for color change (buttocks per patient). Neurological:  Positive for weakness (generalized). Negative for headaches. Psychiatric/Behavioral:  The patient is not nervous/anxious. Positives and Pertinent negatives as per HPI.       PASTMEDICAL HISTORY     Past Medical History:   Diagnosis Date    Arthritis     Colon cancer [facility])     Hyperlipidemia     Thyroid disease          SURGICAL HISTORY       Past Surgical History:   Procedure Laterality Date    APPENDECTOMY  2009    CHOLECYSTECTOMY  2009    COLON SURGERY  2009    EPIDURAL STEROID INJECTION Left 4/15/2019    LEFT LUMBAR FIVE SACRAL ONE EPIDURAL STEROID INJECTION SITE CONFIRMED BY FLUOROSCOPY performed by Emigdio Mohan MD at [address] Left 11/5/2019    LEFT LUMBAR FIVE SACRAL ONE EPIDURAL STEROID INJECTION SITE CONFIRMED BY FLUOROSCOPY [facility]  7/20/2022    IR MIDLINE CATH 7/20/2022 [facility] SPECIAL PROCEDURES    KIDNEY STONE SURGERY      LAPAROTOMY N/A 6/28/2022    LAPAROTOMY, REPAIR OF PERFORATED ULCER with jejunostomy insertion performed by Kirstin Nixon MD at [facility]       Discharge Medication List as of 8/21/2022 10:05 AM        CONTINUE these medications which have NOT CHANGED    Details   pantoprazole (PROTONIX) 40 MG tablet Take 1 tablet by mouth in the morning and 1 tablet in the evening. Take before meals. , Disp-180 tablet, R-1Print      albuterol sulfate HFA (PROVENTIL;VENTOLIN;PROAIR) 108 (90 Base) MCG/ACT inhaler Inhale 2 puffs into the lungs every 6 hours as needed for Wheezing, Disp-18 g, R-3Normal      atorvastatin (LIPITOR) 10 MG tablet Take 20 mg by mouthHistorical Med      levothyroxine (SYNTHROID) 137 MCG tablet 125 mcg Daily Historical Med      citalopram (CELEXA) 20 MG tablet 20 mg daily Historical Med             ALLERGIES     Bee venom, Adhesive tape, Tetracyclines & related, and Sulfa antibiotics    FAMILY HISTORY       Family History   Problem Relation Age of Onset    High Blood Pressure Sister     Cancer Sister     High Blood Pressure Brother     Heart Attack Brother     Cancer Brother           SOCIAL HISTORY       Social History     Socioeconomic History    Marital status:  Spouse name: Jason Escalona    Number of children: 3    Years of education: 12   Tobacco Use    Smoking status: Former     Types: Cigarettes     Quit date: 3/17/2000     Years since quittin.4    Smokeless tobacco: Never   Vaping Use    Vaping Use: Never used   Substance and Sexual Activity    Alcohol use: Yes     Alcohol/week: 0.0 standard drinks     Comment: socially- glass of wine    Drug use: Never    Sexual activity: Yes     Partners: Male       SCREENINGS    Omar Coma Scale  Eye Opening: Spontaneous  Best Verbal Response: Oriented  Best Motor Response: Obeys commands  Omar Coma Scale Score: 15           PHYSICAL EXAM    (up to 7 for level 4, 8 or more for level 5)     ED Triage Vitals [22 0449]   BP Temp Temp Source Heart Rate Resp SpO2 Height Weight   124/67 98.4 °F (36.9 °C) Oral 88 16 98 % -- --       Physical Exam  Vitals and nursing note reviewed. Constitutional:       General: She is awake. She is not in acute distress. Appearance: Normal appearance. She is well-developed, well-groomed and normal weight. She is not ill-appearing, toxic-appearing or diaphoretic. HENT:      Head: Normocephalic and atraumatic. Right Ear: External ear normal.      Left Ear: External ear normal.   Eyes:      General:         Right eye: No discharge. Left eye: No discharge. Cardiovascular:      Rate and Rhythm: Normal rate. Pulses: Normal pulses. Pulmonary:      Effort: Pulmonary effort is normal.      Breath sounds: Normal breath sounds. Abdominal:      General: Abdomen is flat. Bowel sounds are normal. There is no distension. Palpations: Abdomen is soft. Tenderness: There is no abdominal tenderness. There is no guarding or rebound. Negative signs include Morales's sign and McBurney's sign. Musculoskeletal:      Cervical back: Normal range of motion and neck supple. No rigidity. Skin:     General: Skin is warm and dry. Coloration: Skin is not jaundiced. Neurological:      General: No focal deficit present. Mental Status: She is alert and oriented to person, place, and time. Mental status is at baseline. Psychiatric:         Attention and Perception: Attention normal.         Mood and Affect: Mood and affect normal.         Speech: Speech normal. Speech is not slurred. Behavior: Behavior normal. Behavior is cooperative. DIAGNOSTIC RESULTS   :    Labs Reviewed - No data to display    All other labs were within normal range or not returned asof this dictation. EKG: All EKG's are interpreted by the Emergency Department Physician who either signs or Co-signs this chart in the absence of a cardiologist.        RADIOLOGY:   Non-plain film images such as CT, Ultrasound and MRI are read by the radiologist. Rondel Sol images are visualized and preliminarily interpreted by the  ED Provider with the belowfindings:        Interpretation per the Radiologist below, if available at the time of this note:    No orders to display         PROCEDURES   Unless otherwise noted below, none     Procedures    CRITICAL CARE TIME   N/A    CONSULTS: Spoke with Dr. Maurice Hook at 9519 and he states that since the patient is still able to tolerate p.o., this would not be considered an emergency and that she can follow-up urgently with her regular surgeon over the next couple of days in the office and to just call to make an appointment. He did not recommend any additional attempts to clear the blockage in the emergency department at this point since our initial attempts were futile. He did not recommend changing it out at this point and that she can follow up in the office.     IP CONSULT TO GENERAL SURGERY    EMERGENCY DEPARTMENT COURSE and DIFFERENTIAL DIAGNOSIS/MDM:   Vitals:    Vitals:    08/21/22 0449 08/21/22 0708 08/21/22 0844 08/21/22 0959   BP: 124/67 (!) 131/50 117/60 115/69   Pulse: 88 70  71   Resp: 16 16  16   Temp: 98.4 °F (36.9 °C)   99.1 °F (37.3 °C)   TempSrc: Oral   Oral   SpO2: 98% 94%  93%       Patient was given the following medications:  Medications - No data to display    Patient was evaluated due to clogged J-tube. Night nursing staff reportedly attempted to clear the blockage without any success and then day team also attempted to clear the blockage but was unsuccessful. I did contact general surgery for further recommendations. He did not provide any additional recommendation at this point other than was reassured that she could tolerate p.o. and felt that she could follow-up urgently as an outpatient with her surgeon. I did ultimately reevaluate the patient and with [address] [place] [address] as nurse chaperone evaluated her gluteal region and did see minimal skin breakdown consistent with stage I decubitus ulcer but I did not see any obvious deep ulcer at this point although there was some powder on it and therefore complete area was not evaluated but overall it did seem to be getting appropriate care. She denied any new abdominal pain today (states she always has some chronic discomfort) and abdominal exam was benign with no tenderness to palpation and therefore I do not feel need for further labs or imaging at this time. She can follow-up with this with her primary doctor. The patient and her family were informed that if she does develop any decreased eating with lightheadedness, confusion, new abdominal pain, vomiting, or any other concerns related to lack of appetite, then return to the emergency department but otherwise follow-up with her general surgeon over the next couple of days for repeat assessment. She was well-appearing and in no acute distress at time of discharge and felt comfortable with this plan. The patient tolerated their visit well. The patient and / or the family were informed of the results of any tests, a time was given to answer questions. FINAL IMPRESSION      1.  Malfunction of jejunostomy tube ([address] DISPOSITION/PLAN   DISPOSITION Decision To Discharge 08/21/2022 09:27:04 AM      [facility]:  [facility]  ED  [facility] [address]  Go to   If symptoms worsen    ILAN Espino CNP  [address] [facility] [address] [facility]  [phone]    Call in 2 days  As needed    Devan Kimble MD  [address] [place] [address] [facility]  [phone]    Call in 1 day  For appointment related to J tube concerns in the next couple of days    DISCHARGEMEDICATIONS:  Discharge Medication List as of 8/21/2022 10:05 AM          DISCONTINUED MEDICATIONS:  Discharge Medication List as of 8/21/2022 10:05 AM        STOP taking these medications       ascorbic acid (VITAMIN C) 500 MG tablet Comments:   Reason for Stopping:         zinc sulfate (ZINCATE) 220 (50 Zn) MG capsule Comments:   Reason for Stopping:         naproxen (NAPROSYN) 500 MG tablet Comments:   Reason for Stopping:         sucralfate (CARAFATE) 1 GM tablet Comments:   Reason for Stopping:         famotidine (PEPCID) 10 MG tablet Comments:   Reason for Stopping:         butalbital-acetaminophen-caffeine (FIORICET, ESGIC) -40 MG per tablet Comments:   Reason for Stopping:         Omega-3 Fatty Acids (FISH OIL) 500 MG CAPS Comments:   Reason for Stopping:                      (Please note that portions of this note were completed with a voicerecognition program.  Efforts were made to edit the dictations but occasionally words are mis-transcribed.)    Tonie Marsh MD (electronically signed)            Tonie Marsh MD  08/21/22 1126

## 2022-08-21 NOTE — ED NOTES
Pt turned on left side and provided cushion for buttocks. Pt c/o discomfort at buttocks, Pt states \"I have a pressure wound from laying at [facility].'. This RN assessed area and found pressure wound with medication on it from facility. This RN will continue to monitor and turn pt every two hours. Pt In room resting comfortably with call light in reach and  at bedside.       Minerva Perez RN  08/21/22 7354

## 2022-08-21 NOTE — ED NOTES
Pt arrives with red rubber J tube in place. Pt post op perforated ulcer repair, pt receiving nocturnal tube feeds 60 ml/hr [id number]. Only feeds are to be going through J tube, no meds through tube per Dr. Carlos Avalos on 8/18 note. This RN attempted to flush J tube. Unable to flush at this time. MD aware.        Dave Mccall RN  08/21/22 0518       Dave Mccall RN  08/21/22 5516

## 2022-08-22 ENCOUNTER — TELEPHONE (OUTPATIENT)
Dept: PULMONOLOGY | Age: 72
End: 2022-08-22

## 2022-08-22 ENCOUNTER — TELEPHONE (OUTPATIENT)
Dept: SURGERY | Age: 72
End: 2022-08-22

## 2022-08-22 NOTE — LETTER
[facility] Pulmonary, Critical Care, and Sleep  [address], [address] [place] [address]  Phone: [phone]  Fax: [phone]    Manisha White      September 13, 2022    [facility] [id number]      Dear Silver Avila:    I am writing concerning your recent discharge from [facility] on 7/28/2022 where you were instructed to schedule a follow up appointment to see me to discuss findings on your recent Chest CT scan. My staff has been informed that you have other pressing appointments preventing you from being able to keep this appointment. We care about you and the management of your healthcare and want to make sure that you follow up as recommended. As Pulmonary provider, I must stress to you how important it is for you to have the tests I order as well to see me in follow up. The tests are necessary so that I can monitor your chest CT findings for any changes that could be cancer. I have to also mention, that in an effort to provide quality care and timely appointments to all my patients, it is our policy that patients be discharged from the practice if they do not show for three scheduled appointments. You would be informed of this termination by mail, and would have 30 days from the date of discharge to locate another physician. I hope you are doing well and urge you to call my office at your earliest convenience so that we can keep you informed on your care.     Sincerely,      [place], [place]

## 2022-08-22 NOTE — TELEPHONE ENCOUNTER
Please call back if she doesn't reschedule within the next 4 weeks with a CT Chest.  Will need letter if unable to schedule

## 2022-08-22 NOTE — TELEPHONE ENCOUNTER
Patient daughter Guru Murrieta cancelled appointment on 8/23/22 with LENNOX Goodwin for hospital f/u-CT follow up. Reason: daughter wasn't aware of appt, and pt has other health issues taking priority right now    Patient did not reschedule appointment. Daughter declined to r/s, stating that she will call back. Appointment rescheduled for . Hospital consult 6/24/22:     ASSESSMENT:  COVID-19 pneumonia in partially vaccinated patient (x2), sx onset 6/14/22  Acute hypoxemic respiratory failure; baseline RA  Pulmonary emphysema on imaging  Abnormal CT CHEST   H/O colon cancer s/p chemo, radiation & surgery 2009  Former smoker, quit 1989     PLAN:  COVID-19 isolation, droplet plus  Decadron D#3/10, 6 mg daily   Finish full Paxlovid    Inhaled bronchodilators only as needed   D/C planning okay with me, f/u CT CHEST no IV dye in 10-12 weeks - our office will arrange. I d/w Dr. Fifi Vera.   Call with questions

## 2022-08-23 ENCOUNTER — TELEPHONE (OUTPATIENT)
Dept: SURGERY | Age: 72
End: 2022-08-23

## 2022-08-23 ENCOUNTER — OFFICE VISIT (OUTPATIENT)
Dept: SURGERY | Age: 72
End: 2022-08-23

## 2022-08-23 VITALS — HEIGHT: 64 IN | SYSTOLIC BLOOD PRESSURE: 120 MMHG | BODY MASS INDEX: 24.89 KG/M2 | DIASTOLIC BLOOD PRESSURE: 74 MMHG

## 2022-08-23 DIAGNOSIS — Z98.890 POST-OPERATIVE STATE: Primary | ICD-10-CM

## 2022-08-23 PROCEDURE — 99024 POSTOP FOLLOW-UP VISIT: CPT | Performed by: SURGERY

## 2022-08-23 RX ORDER — AMOXICILLIN AND CLAVULANATE POTASSIUM 875; 125 MG/1; MG/1
1 TABLET, FILM COATED ORAL 2 TIMES DAILY
Status: ON HOLD | COMMUNITY
End: 2022-08-31 | Stop reason: HOSPADM

## 2022-08-23 NOTE — PROGRESS NOTES
[facility] GENERAL SURGERY      S:   Patient presents s/p perforated ulcer repair with jejunostomy tube insertion. She reports her J-tube is clogged. O:   Comfortable         Incision site healing well. J-tube was flushed opened easily with small syringe flush              A:   S/P perforated ulcer repair with jejunostomy tube insertion    P:   Continue nocturnal tube feeds until better oral intake. Follow up next week scheduled.

## 2022-08-23 NOTE — TELEPHONE ENCOUNTER
Igor Huitron,  from [facility] is wanting orders for new tube feed to be faxed to [phone]: [phone]

## 2022-08-24 ENCOUNTER — TELEPHONE (OUTPATIENT)
Dept: SURGERY | Age: 72
End: 2022-08-24

## 2022-08-24 NOTE — TELEPHONE ENCOUNTER
I called and spoke with Lilia De Anda- gave her verbal order for Tylenol PRN and Zofran PRN just until patient is discharged home tomorrow. Fyi- no other orders.

## 2022-08-24 NOTE — TELEPHONE ENCOUNTER
I called and spoke to David Dillon pt is going home tomorrow and she ran out of Zofran today, I called the [facility] and was on hold for 20 minutes, will try again.

## 2022-08-24 NOTE — TELEPHONE ENCOUNTER
Pt called saying nursing home stopped orders for Zofran per Dr Randi Vora orders, and she is still nauseous and needing Zofran   [phone]

## 2022-08-26 ENCOUNTER — TELEPHONE (OUTPATIENT)
Dept: SURGERY | Age: 72
End: 2022-08-26

## 2022-08-26 ENCOUNTER — HOSPITAL ENCOUNTER (INPATIENT)
Age: 72
LOS: 3 days | Discharge: HOME HEALTH | DRG: 384 | End: 2022-08-31
Attending: STUDENT IN AN ORGANIZED HEALTH CARE EDUCATION/TRAINING PROGRAM | Admitting: INTERNAL MEDICINE
Payer: MEDICARE

## 2022-08-26 ENCOUNTER — APPOINTMENT (OUTPATIENT)
Dept: CT IMAGING | Age: 72
DRG: 384 | End: 2022-08-26
Payer: MEDICARE

## 2022-08-26 DIAGNOSIS — R19.7 DIARRHEA, UNSPECIFIED TYPE: ICD-10-CM

## 2022-08-26 DIAGNOSIS — R10.84 GENERALIZED ABDOMINAL PAIN: Primary | ICD-10-CM

## 2022-08-26 DIAGNOSIS — L89.159 PRESSURE INJURY OF SKIN OF SACRAL REGION, UNSPECIFIED INJURY STAGE: ICD-10-CM

## 2022-08-26 DIAGNOSIS — R93.5 ABNORMAL ABDOMINAL CT SCAN: ICD-10-CM

## 2022-08-26 PROBLEM — R10.9 ABDOMINAL PAIN: Status: ACTIVE | Noted: 2022-08-26

## 2022-08-26 LAB
A/G RATIO: 0.9 (ref 1.1–2.2)
ALBUMIN SERPL-MCNC: 3.4 G/DL (ref 3.4–5)
ALP BLD-CCNC: 99 U/L (ref 40–129)
ALT SERPL-CCNC: 12 U/L (ref 10–40)
ANION GAP SERPL CALCULATED.3IONS-SCNC: 11 MMOL/L (ref 3–16)
AST SERPL-CCNC: 26 U/L (ref 15–37)
BASOPHILS ABSOLUTE: 0 K/UL (ref 0–0.2)
BASOPHILS RELATIVE PERCENT: 0.4 %
BILIRUB SERPL-MCNC: 0.3 MG/DL (ref 0–1)
BUN BLDV-MCNC: 9 MG/DL (ref 7–20)
CALCIUM SERPL-MCNC: 9.2 MG/DL (ref 8.3–10.6)
CHLORIDE BLD-SCNC: 101 MMOL/L (ref 99–110)
CO2: 26 MMOL/L (ref 21–32)
CREAT SERPL-MCNC: 0.9 MG/DL (ref 0.6–1.2)
EOSINOPHILS ABSOLUTE: 0.2 K/UL (ref 0–0.6)
EOSINOPHILS RELATIVE PERCENT: 1.8 %
GFR AFRICAN AMERICAN: >60
GFR NON-AFRICAN AMERICAN: >60
GLUCOSE BLD-MCNC: 92 MG/DL (ref 70–99)
HCT VFR BLD CALC: 28.5 % (ref 36–48)
HEMOGLOBIN: 9.4 G/DL (ref 12–16)
LACTIC ACID: 1.2 MMOL/L (ref 0.4–2)
LIPASE: 61 U/L (ref 13–60)
LYMPHOCYTES ABSOLUTE: 2.3 K/UL (ref 1–5.1)
LYMPHOCYTES RELATIVE PERCENT: 24.1 %
MCH RBC QN AUTO: 29.4 PG (ref 26–34)
MCHC RBC AUTO-ENTMCNC: 33 G/DL (ref 31–36)
MCV RBC AUTO: 89.2 FL (ref 80–100)
MONOCYTES ABSOLUTE: 0.5 K/UL (ref 0–1.3)
MONOCYTES RELATIVE PERCENT: 5 %
NEUTROPHILS ABSOLUTE: 6.5 K/UL (ref 1.7–7.7)
NEUTROPHILS RELATIVE PERCENT: 68.7 %
OCCULT BLOOD DIAGNOSTIC: NORMAL
PDW BLD-RTO: 18.4 % (ref 12.4–15.4)
PLATELET # BLD: 402 K/UL (ref 135–450)
PMV BLD AUTO: 7.8 FL (ref 5–10.5)
POTASSIUM REFLEX MAGNESIUM: 3.7 MMOL/L (ref 3.5–5.1)
RBC # BLD: 3.2 M/UL (ref 4–5.2)
SODIUM BLD-SCNC: 138 MMOL/L (ref 136–145)
TOTAL PROTEIN: 7.3 G/DL (ref 6.4–8.2)
WBC # BLD: 9.4 K/UL (ref 4–11)

## 2022-08-26 PROCEDURE — 96376 TX/PRO/DX INJ SAME DRUG ADON: CPT

## 2022-08-26 PROCEDURE — 83690 ASSAY OF LIPASE: CPT

## 2022-08-26 PROCEDURE — 2580000003 HC RX 258: Performed by: STUDENT IN AN ORGANIZED HEALTH CARE EDUCATION/TRAINING PROGRAM

## 2022-08-26 PROCEDURE — 82270 OCCULT BLOOD FECES: CPT

## 2022-08-26 PROCEDURE — 6360000002 HC RX W HCPCS: Performed by: STUDENT IN AN ORGANIZED HEALTH CARE EDUCATION/TRAINING PROGRAM

## 2022-08-26 PROCEDURE — 96374 THER/PROPH/DIAG INJ IV PUSH: CPT

## 2022-08-26 PROCEDURE — 96375 TX/PRO/DX INJ NEW DRUG ADDON: CPT

## 2022-08-26 PROCEDURE — 85025 COMPLETE CBC W/AUTO DIFF WBC: CPT

## 2022-08-26 PROCEDURE — 99285 EMERGENCY DEPT VISIT HI MDM: CPT

## 2022-08-26 PROCEDURE — 83605 ASSAY OF LACTIC ACID: CPT

## 2022-08-26 PROCEDURE — 80053 COMPREHEN METABOLIC PANEL: CPT

## 2022-08-26 PROCEDURE — 6360000002 HC RX W HCPCS

## 2022-08-26 PROCEDURE — 74177 CT ABD & PELVIS W/CONTRAST: CPT

## 2022-08-26 PROCEDURE — 96361 HYDRATE IV INFUSION ADD-ON: CPT

## 2022-08-26 PROCEDURE — 6360000004 HC RX CONTRAST MEDICATION: Performed by: STUDENT IN AN ORGANIZED HEALTH CARE EDUCATION/TRAINING PROGRAM

## 2022-08-26 RX ORDER — MORPHINE SULFATE 2 MG/ML
INJECTION, SOLUTION INTRAMUSCULAR; INTRAVENOUS
Status: COMPLETED
Start: 2022-08-26 | End: 2022-08-26

## 2022-08-26 RX ORDER — MORPHINE SULFATE 2 MG/ML
2 INJECTION, SOLUTION INTRAMUSCULAR; INTRAVENOUS
Status: COMPLETED | OUTPATIENT
Start: 2022-08-26 | End: 2022-08-26

## 2022-08-26 RX ORDER — ONDANSETRON 2 MG/ML
INJECTION INTRAMUSCULAR; INTRAVENOUS
Status: COMPLETED
Start: 2022-08-26 | End: 2022-08-26

## 2022-08-26 RX ORDER — 0.9 % SODIUM CHLORIDE 0.9 %
1000 INTRAVENOUS SOLUTION INTRAVENOUS ONCE
Status: COMPLETED | OUTPATIENT
Start: 2022-08-26 | End: 2022-08-27

## 2022-08-26 RX ORDER — ONDANSETRON 2 MG/ML
4 INJECTION INTRAMUSCULAR; INTRAVENOUS ONCE
Status: COMPLETED | OUTPATIENT
Start: 2022-08-26 | End: 2022-08-26

## 2022-08-26 RX ADMIN — ONDANSETRON 4 MG: 2 INJECTION INTRAMUSCULAR; INTRAVENOUS at 18:19

## 2022-08-26 RX ADMIN — ONDANSETRON HYDROCHLORIDE 4 MG: 2 INJECTION, SOLUTION INTRAMUSCULAR; INTRAVENOUS at 18:19

## 2022-08-26 RX ADMIN — MORPHINE SULFATE 2 MG: 2 INJECTION, SOLUTION INTRAMUSCULAR; INTRAVENOUS at 20:25

## 2022-08-26 RX ADMIN — SODIUM CHLORIDE 1000 ML: 9 INJECTION, SOLUTION INTRAVENOUS at 22:52

## 2022-08-26 RX ADMIN — ONDANSETRON HYDROCHLORIDE 4 MG: 2 INJECTION, SOLUTION INTRAMUSCULAR; INTRAVENOUS at 20:25

## 2022-08-26 RX ADMIN — ONDANSETRON 4 MG: 2 INJECTION INTRAMUSCULAR; INTRAVENOUS at 20:25

## 2022-08-26 RX ADMIN — IOPAMIDOL 75 ML: 755 INJECTION, SOLUTION INTRAVENOUS at 20:33

## 2022-08-26 RX ADMIN — MORPHINE SULFATE 2 MG: 2 INJECTION, SOLUTION INTRAMUSCULAR; INTRAVENOUS at 18:20

## 2022-08-26 ASSESSMENT — PAIN - FUNCTIONAL ASSESSMENT: PAIN_FUNCTIONAL_ASSESSMENT: 0-10

## 2022-08-26 ASSESSMENT — PAIN SCALES - GENERAL
PAINLEVEL_OUTOF10: 10
PAINLEVEL_OUTOF10: 10
PAINLEVEL_OUTOF10: 2

## 2022-08-26 ASSESSMENT — PAIN DESCRIPTION - INTENSITY: RATING_2: 7

## 2022-08-26 ASSESSMENT — PAIN DESCRIPTION - LOCATION
LOCATION: ABDOMEN
LOCATION_2: ABDOMEN
LOCATION: BUTTOCKS
LOCATION: ABDOMEN

## 2022-08-26 ASSESSMENT — PAIN DESCRIPTION - ORIENTATION: ORIENTATION: LOWER

## 2022-08-26 ASSESSMENT — PAIN DESCRIPTION - DESCRIPTORS: DESCRIPTORS: CRAMPING

## 2022-08-26 NOTE — ED PROVIDER NOTES
[address] ED      CHIEF COMPLAINT  Abdominal pain, diarrhea       HISTORY OF PRESENT ILLNESS  Latrice Roach is a 74 y.o. female with past medical history of recent gastric ulcer perforation, atrial fibrillation, hypothyroidism, hyperlipidemia, colon cancer who presents to the ED complaining of lower abdominal pain. Onset of pain: 3-4d ago  Location: generalized  Radiation: denies  Quality: aching  Severity: 10/10  Timing: constant today  Palliative Factors: denies  Provocative Factors: denies    Nausea/Vomiting: nausea  Diarrhea/Constipation: diarrhea, nonbloody  Vaginal Discharge/Bleeding/: denies  Dysuria/urinary frequency: denies  Fever: denies  Previous abdominal surgeries: Appendectomy, cholecystectomy, colon surgery, has J tube    Has sacral skin breakdown    Have been improving while in the hospital but then worsened about the normal.    Old records reviewed:   CT abd/pelvis: 7/25/22  Impression   1. Interval surgical repair of a perforated gastric ulcer. 2. Persistent collection of air and a small amount of fluid at the operative   site with a surgical drain immediately adjacent. Additional tiny focus of   air lateral to the gastric antrum. 3. No new or additional collections are identified. Moderate mesenteric   inflammation at the prior operative site. 4. Asymmetric left pleural effusion with dense opacification in the   visualized left lower lobe. Correlate with any pulmonary symptoms. Edema or   potential aspiration may be considered. No other complaints, modifying factors or associated symptoms. I have reviewed the following from the nursing documentation.     Past Medical History:   Diagnosis Date    Arthritis     Colon cancer ([facility] [address]     Hyperlipidemia     Thyroid disease      Past Surgical History:   Procedure Laterality Date    APPENDECTOMY  2009    CHOLECYSTECTOMY  2009    COLON SURGERY  2009    EPIDURAL STEROID INJECTION Left 4/15/2019    LEFT LUMBAR FIVE SACRAL ONE EPIDURAL STEROID INJECTION SITE CONFIRMED BY FLUOROSCOPY performed by Delfina Barbosa MD at [place] Left 2019    LEFT LUMBAR FIVE SACRAL ONE EPIDURAL STEROID INJECTION SITE CONFIRMED BY FLUOROSCOPY performed by Delfina Barbosa MD at [facility] CATH  2022    IR MIDLINE CATH 2022 [address] SPECIAL PROCEDURES    KIDNEY STONE SURGERY      LAPAROTOMY N/A 2022    LAPAROTOMY, REPAIR OF PERFORATED ULCER with jejunostomy insertion performed by Sabrina Robles MD at [address]       Family History   Problem Relation Age of Onset    High Blood Pressure Sister     Cancer Sister     High Blood Pressure Brother     Heart Attack Brother     Cancer Brother      Social History     Socioeconomic History    Marital status:      Spouse name: Jaye Duenas    Number of children: 3    Years of education: 12    Highest education level: Not on file   Occupational History    Not on file   Tobacco Use    Smoking status: Former     Types: Cigarettes     Quit date: 3/17/2000     Years since quittin.4    Smokeless tobacco: Never   Vaping Use    Vaping Use: Never used   Substance and Sexual Activity    Alcohol use: Not Currently     Comment: socially- glass of wine    Drug use: Never    Sexual activity: Yes     Partners: Male   Other Topics Concern    Not on file   Social History Narrative    Not on file     Social Determinants of Health     Financial Resource Strain: Not on file   Food Insecurity: Not on file   Transportation Needs: Not on file   Physical Activity: Not on file   Stress: Not on file   Social Connections: Not on file   Intimate Partner Violence: Not on file   Housing Stability: Not on file     No current facility-administered medications for this encounter.      Current Outpatient Medications   Medication Sig Dispense Refill    Mirtazapine (REMERON PO) Take by mouth      amoxicillin-clavulanate (AUGMENTIN) 875-125 PSYCHIATRIC: Normal mood and affect. LABS  I have reviewed all labs for this visit.    Results for orders placed or performed during the hospital encounter of 08/26/22   COVID-19, Rapid    Specimen: Nasopharyngeal Swab   Result Value Ref Range    SARS-CoV-2, NAAT Not Detected Not Detected   Blood occult stool #1   Result Value Ref Range    Occult Blood Diagnostic       Result: Negative  Normal range: Negative   08/26/2022  17:04     CBC with Auto Differential   Result Value Ref Range    WBC 9.4 4.0 - 11.0 K/uL    RBC 3.20 (L) 4.00 - 5.20 M/uL    Hemoglobin 9.4 (L) 12.0 - 16.0 g/dL    Hematocrit 28.5 (L) 36.0 - 48.0 %    MCV 89.2 80.0 - 100.0 fL    MCH 29.4 26.0 - 34.0 pg    MCHC 33.0 31.0 - 36.0 g/dL    RDW 18.4 (H) 12.4 - 15.4 %    Platelets 906 313 - 143 K/uL    MPV 7.8 5.0 - 10.5 fL    Neutrophils % 68.7 %    Lymphocytes % 24.1 %    Monocytes % 5.0 %    Eosinophils % 1.8 %    Basophils % 0.4 %    Neutrophils Absolute 6.5 1.7 - 7.7 K/uL    Lymphocytes Absolute 2.3 1.0 - 5.1 K/uL    Monocytes Absolute 0.5 0.0 - 1.3 K/uL    Eosinophils Absolute 0.2 0.0 - 0.6 K/uL    Basophils Absolute 0.0 0.0 - 0.2 K/uL   CMP w/ Reflex to MG   Result Value Ref Range    Sodium 138 136 - 145 mmol/L    Potassium reflex Magnesium 3.7 3.5 - 5.1 mmol/L    Chloride 101 99 - 110 mmol/L    CO2 26 21 - 32 mmol/L    Anion Gap 11 3 - 16    Glucose 92 70 - 99 mg/dL    BUN 9 7 - 20 mg/dL    Creatinine 0.9 0.6 - 1.2 mg/dL    GFR Non-African American >60 >60    GFR African American >60 >60    Calcium 9.2 8.3 - 10.6 mg/dL    Total Protein 7.3 6.4 - 8.2 g/dL    Albumin 3.4 3.4 - 5.0 g/dL    Albumin/Globulin Ratio 0.9 (L) 1.1 - 2.2    Total Bilirubin 0.3 0.0 - 1.0 mg/dL    Alkaline Phosphatase 99 40 - 129 U/L    ALT 12 10 - 40 U/L    AST 26 15 - 37 U/L   Lipase   Result Value Ref Range    Lipase 61.0 (H) 13.0 - 60.0 U/L   Lactic Acid   Result Value Ref Range    Lactic Acid 1.2 0.4 - 2.0 mmol/L       RADIOLOGY  CT ABDOMEN PELVIS W IV CONTRAST Additional Contrast? None   Final Result   Continued marked mural thickening of the gastric antrum and pylorus, which   appears increased when compared to the previous exam, compatible with severe   gastritis. There is a persistent gas and fluid collection seen at the level of the   pylorus near the surgical margin, not substantially changed when compared to   the previous exam.  A fistulous connection between this collection and the   stomach would be difficult to exclude. There is intra and extrahepatic biliary dilation, which may be increased when   compared to the previous exam.  Please correlate with any clinical evidence   of biliary obstruction. Fluid within the large bowel, which presumably reflects diarrheal disease. Small left pleural effusion decreased when compared to the previous exam.   Adjacent airspace disease is likely atelectasis, but pneumonia and aspiration   remain in the differential.                 ED COURSE / MDM  Patient seen and evaluated. Old records reviewed and pertinent information included in HPI. Labs and imaging reviewed and results discussed with patient. Overall, uncomfortable appearing patient in acute distress, presenting for abdominal pain. Physical exam remarkable for diffuse abdominal tenderness. Differential diagnosis includes but is not limited to: Appendicitis, bowel obstruction,  diverticulitis, hernia, UTI, AAA, gastroenteritis, peptic ulcer disease, cholecystitis, pancreatitis, hepatitis or other liver disease, C. difficile    Laboratory studies and imaging obtained.       During the patient's ED course, the patient was given:  Medications   morphine (PF) injection 2 mg (2 mg IntraVENous Given 8/26/22 1820)   iopamidol (ISOVUE-370) 76 % injection 75 mL (75 mLs IntraVENous Given 8/26/22 2033)   ondansetron (ZOFRAN) injection 4 mg (4 mg IntraVENous Given 8/26/22 1819)   morphine (PF) injection 2 mg (2 mg IntraVENous Given 8/26/22 2025) ondansetron [facility]) injection 4 mg (4 mg IntraVENous Given 8/26/22 2025)   0.9 % sodium chloride bolus (0 mLs IntraVENous Stopped 8/27/22 0257)        ED Course as of 08/28/22 0039   Fri Aug 26, 2022   1715 Stool occult negative [ER]   2020 No electrolyte abnormalities or evidence of kidney dysfunction [ER]   2020 Liver function testing unremarkable [ER]   2020 Lipase minimally elevated at 61 [ER]   2020 Lactate within normal limits [ER]   2020 Anemia is improved from previous, hemoglobin currently 9.4. No leukocytosis or thrombocytopenia [ER]   2151 CT abd/pelvis: IMPRESSION:  Continued marked mural thickening of the gastric antrum and pylorus, which  appears increased when compared to the previous exam, compatible with severe  gastritis. There is a persistent gas and fluid collection seen at the level of the  pylorus near the surgical margin, not substantially changed when compared to  the previous exam.  A fistulous connection between this collection and the  stomach would be difficult to exclude. There is intra and extrahepatic biliary dilation, which may be increased when  compared to the previous exam.  Please correlate with any clinical evidence  of biliary obstruction. Fluid within the large bowel, which presumably reflects diarrheal disease. Small left pleural effusion decreased when compared to the previous exam.  Adjacent airspace disease is likely atelectasis, but pneumonia and aspiration  remain in the differential.  =====  Imaging discussed with patient and family [ER]      ED Course User Index  [ER] Deborah Briscoe MD      Is this patient to be included in the SEP-1 Core Measure due to severe sepsis or septic shock? No   Exclusion criteria - the patient is NOT to be included for SEP-1 Core Measure due to:  2+ SIRS criteria are not met    Stool studies to be collected.   Given that patient has been in the emergency department for upwards of 5 hours and has not had a bowel movement is reassuring that it is less likely C. difficile colitis. Furthermore, no colitis noted on CT. However, do feel that studies are still warranted given reports of copious diarrhea and recent antibiotic administration. Did discuss CT findings with Dr. Bebeto Rosenbaum. He did not have any acute concerns regarding findings at this time. Low suspicion for fistula given that patient has had previous contrasted studies that did not show communication with the stomach. Did not feel that the patient required surgical intervention. Felt that much of the patient's symptoms are to be expected after recent hospital course. Patient is more comfortable after treatment in the emergency department, however she was in significant discomfort upon arrival.  At this time, do feel the patient requires admission for further work-up and management. Discussed the patient with hospital team, Dr Akiko Torres, patient to be admitted to [facility]. CLINICAL IMPRESSION  1. Generalized abdominal pain    2. Diarrhea, unspecified type    3. Pressure injury of skin of sacral region, unspecified injury stage    4. Abnormal abdominal CT scan        Blood pressure (!) 123/58, pulse 67, temperature 98.4 °F (36.9 °C), temperature source Oral, resp. rate 16, height 5' 4\" (1.626 m), weight 137 lb 6.4 oz (62.3 kg), SpO2 96 %. [address] was admitted in stable condition. DISCLAIMER: This chart was created using Dragon dictation software. Efforts were made by me to ensure accuracy, however some errors may be present due to limitations of this technology and occasionally words are not transcribed correctly.         Lissette Edmonds MD  08/28/22 8456

## 2022-08-26 NOTE — TELEPHONE ENCOUNTER
Phoebe Giles called in stating Dante Parkinson has had large bowel movements, and still having some nausea, she was asking to discontinue tube feeds for a few nights, or to decrease amount, I advised her to keep everything the same as it was, she is only getting them at night and she is eating small frequent meals. Advised to keep same regimen until they come in for her follow up appointment.

## 2022-08-27 PROBLEM — E44.1 MILD PROTEIN-CALORIE MALNUTRITION (HCC): Status: ACTIVE | Noted: 2022-08-27

## 2022-08-27 PROBLEM — K29.50 CHRONIC GASTRITIS WITHOUT BLEEDING: Status: ACTIVE | Noted: 2022-08-27

## 2022-08-27 LAB — SARS-COV-2, NAAT: NOT DETECTED

## 2022-08-27 PROCEDURE — 99220 PR INITIAL OBSERVATION CARE/DAY 70 MINUTES: CPT | Performed by: INTERNAL MEDICINE

## 2022-08-27 PROCEDURE — 96375 TX/PRO/DX INJ NEW DRUG ADDON: CPT

## 2022-08-27 PROCEDURE — 99204 OFFICE O/P NEW MOD 45 MIN: CPT | Performed by: SURGERY

## 2022-08-27 PROCEDURE — 87635 SARS-COV-2 COVID-19 AMP PRB: CPT

## 2022-08-27 PROCEDURE — 96376 TX/PRO/DX INJ SAME DRUG ADON: CPT

## 2022-08-27 PROCEDURE — 2500000003 HC RX 250 WO HCPCS: Performed by: INTERNAL MEDICINE

## 2022-08-27 PROCEDURE — 6360000002 HC RX W HCPCS: Performed by: INTERNAL MEDICINE

## 2022-08-27 PROCEDURE — C9113 INJ PANTOPRAZOLE SODIUM, VIA: HCPCS | Performed by: INTERNAL MEDICINE

## 2022-08-27 PROCEDURE — 96372 THER/PROPH/DIAG INJ SC/IM: CPT

## 2022-08-27 PROCEDURE — G0378 HOSPITAL OBSERVATION PER HR: HCPCS

## 2022-08-27 PROCEDURE — 2580000003 HC RX 258: Performed by: INTERNAL MEDICINE

## 2022-08-27 PROCEDURE — 6370000000 HC RX 637 (ALT 250 FOR IP): Performed by: SURGERY

## 2022-08-27 PROCEDURE — 6370000000 HC RX 637 (ALT 250 FOR IP): Performed by: INTERNAL MEDICINE

## 2022-08-27 RX ORDER — DEXTROSE, SODIUM CHLORIDE, AND POTASSIUM CHLORIDE 5; .45; .15 G/100ML; G/100ML; G/100ML
INJECTION INTRAVENOUS CONTINUOUS
Status: DISCONTINUED | OUTPATIENT
Start: 2022-08-27 | End: 2022-08-28

## 2022-08-27 RX ORDER — HYDROCODONE BITARTRATE AND ACETAMINOPHEN 5; 325 MG/1; MG/1
2 TABLET ORAL EVERY 4 HOURS PRN
Status: DISCONTINUED | OUTPATIENT
Start: 2022-08-27 | End: 2022-08-31 | Stop reason: HOSPADM

## 2022-08-27 RX ORDER — CITALOPRAM 20 MG/1
20 TABLET ORAL DAILY
Status: DISCONTINUED | OUTPATIENT
Start: 2022-08-27 | End: 2022-08-31 | Stop reason: HOSPADM

## 2022-08-27 RX ORDER — POLYETHYLENE GLYCOL 3350 17 G/17G
17 POWDER, FOR SOLUTION ORAL DAILY PRN
Status: DISCONTINUED | OUTPATIENT
Start: 2022-08-27 | End: 2022-08-31 | Stop reason: HOSPADM

## 2022-08-27 RX ORDER — ENOXAPARIN SODIUM 100 MG/ML
40 INJECTION SUBCUTANEOUS DAILY
Status: DISCONTINUED | OUTPATIENT
Start: 2022-08-27 | End: 2022-08-31 | Stop reason: HOSPADM

## 2022-08-27 RX ORDER — MORPHINE SULFATE 4 MG/ML
4 INJECTION, SOLUTION INTRAMUSCULAR; INTRAVENOUS
Status: DISCONTINUED | OUTPATIENT
Start: 2022-08-27 | End: 2022-08-31 | Stop reason: HOSPADM

## 2022-08-27 RX ORDER — SODIUM CHLORIDE 0.9 % (FLUSH) 0.9 %
5-40 SYRINGE (ML) INJECTION EVERY 12 HOURS SCHEDULED
Status: DISCONTINUED | OUTPATIENT
Start: 2022-08-27 | End: 2022-08-31 | Stop reason: HOSPADM

## 2022-08-27 RX ORDER — ONDANSETRON 2 MG/ML
4 INJECTION INTRAMUSCULAR; INTRAVENOUS EVERY 6 HOURS PRN
Status: DISCONTINUED | OUTPATIENT
Start: 2022-08-27 | End: 2022-08-31 | Stop reason: HOSPADM

## 2022-08-27 RX ORDER — MIRTAZAPINE 15 MG/1
15 TABLET, FILM COATED ORAL NIGHTLY
Status: DISCONTINUED | OUTPATIENT
Start: 2022-08-27 | End: 2022-08-31 | Stop reason: HOSPADM

## 2022-08-27 RX ORDER — SUCRALFATE 1 G/1
1 TABLET ORAL EVERY 6 HOURS SCHEDULED
Status: DISCONTINUED | OUTPATIENT
Start: 2022-08-27 | End: 2022-08-29

## 2022-08-27 RX ORDER — ACETAMINOPHEN 650 MG/1
650 SUPPOSITORY RECTAL EVERY 6 HOURS PRN
Status: DISCONTINUED | OUTPATIENT
Start: 2022-08-27 | End: 2022-08-31 | Stop reason: HOSPADM

## 2022-08-27 RX ORDER — PANTOPRAZOLE SODIUM 40 MG/1
40 TABLET, DELAYED RELEASE ORAL
Status: DISCONTINUED | OUTPATIENT
Start: 2022-08-27 | End: 2022-08-27 | Stop reason: SDUPTHER

## 2022-08-27 RX ORDER — ACETAMINOPHEN 325 MG/1
650 TABLET ORAL EVERY 6 HOURS PRN
Status: DISCONTINUED | OUTPATIENT
Start: 2022-08-27 | End: 2022-08-31 | Stop reason: HOSPADM

## 2022-08-27 RX ORDER — SODIUM CHLORIDE 9 MG/ML
INJECTION, SOLUTION INTRAVENOUS PRN
Status: DISCONTINUED | OUTPATIENT
Start: 2022-08-27 | End: 2022-08-31 | Stop reason: HOSPADM

## 2022-08-27 RX ORDER — HYDROCODONE BITARTRATE AND ACETAMINOPHEN 5; 325 MG/1; MG/1
1 TABLET ORAL EVERY 4 HOURS PRN
Status: DISCONTINUED | OUTPATIENT
Start: 2022-08-27 | End: 2022-08-31 | Stop reason: HOSPADM

## 2022-08-27 RX ORDER — MORPHINE SULFATE 2 MG/ML
2 INJECTION, SOLUTION INTRAMUSCULAR; INTRAVENOUS
Status: DISCONTINUED | OUTPATIENT
Start: 2022-08-27 | End: 2022-08-31 | Stop reason: HOSPADM

## 2022-08-27 RX ORDER — SODIUM CHLORIDE 0.9 % (FLUSH) 0.9 %
5-40 SYRINGE (ML) INJECTION PRN
Status: DISCONTINUED | OUTPATIENT
Start: 2022-08-27 | End: 2022-08-31 | Stop reason: HOSPADM

## 2022-08-27 RX ORDER — ONDANSETRON 4 MG/1
4 TABLET, ORALLY DISINTEGRATING ORAL EVERY 8 HOURS PRN
Status: DISCONTINUED | OUTPATIENT
Start: 2022-08-27 | End: 2022-08-31 | Stop reason: HOSPADM

## 2022-08-27 RX ORDER — PANTOPRAZOLE SODIUM 40 MG/10ML
40 INJECTION, POWDER, LYOPHILIZED, FOR SOLUTION INTRAVENOUS DAILY
Status: DISCONTINUED | OUTPATIENT
Start: 2022-08-27 | End: 2022-08-29

## 2022-08-27 RX ADMIN — POTASSIUM CHLORIDE, DEXTROSE MONOHYDRATE AND SODIUM CHLORIDE: 150; 5; 450 INJECTION, SOLUTION INTRAVENOUS at 20:24

## 2022-08-27 RX ADMIN — PANTOPRAZOLE SODIUM 40 MG: 40 INJECTION, POWDER, FOR SOLUTION INTRAVENOUS at 09:48

## 2022-08-27 RX ADMIN — MORPHINE SULFATE 2 MG: 2 INJECTION, SOLUTION INTRAMUSCULAR; INTRAVENOUS at 18:41

## 2022-08-27 RX ADMIN — HYDROCODONE BITARTRATE AND ACETAMINOPHEN 1 TABLET: 5; 325 TABLET ORAL at 09:48

## 2022-08-27 RX ADMIN — ENOXAPARIN SODIUM 40 MG: 100 INJECTION SUBCUTANEOUS at 09:50

## 2022-08-27 RX ADMIN — ONDANSETRON HYDROCHLORIDE 4 MG: 2 INJECTION, SOLUTION INTRAMUSCULAR; INTRAVENOUS at 18:41

## 2022-08-27 RX ADMIN — Medication 10 ML: at 09:50

## 2022-08-27 RX ADMIN — SUCRALFATE 1 G: 1 TABLET ORAL at 18:38

## 2022-08-27 RX ADMIN — POTASSIUM CHLORIDE, DEXTROSE MONOHYDRATE AND SODIUM CHLORIDE: 150; 5; 450 INJECTION, SOLUTION INTRAVENOUS at 02:58

## 2022-08-27 RX ADMIN — CITALOPRAM HYDROBROMIDE 20 MG: 20 TABLET ORAL at 09:49

## 2022-08-27 RX ADMIN — SUCRALFATE 1 G: 1 TABLET ORAL at 13:40

## 2022-08-27 RX ADMIN — ONDANSETRON HYDROCHLORIDE 4 MG: 2 INJECTION, SOLUTION INTRAMUSCULAR; INTRAVENOUS at 09:51

## 2022-08-27 RX ADMIN — MIRTAZAPINE 15 MG: 15 TABLET, FILM COATED ORAL at 20:45

## 2022-08-27 RX ADMIN — MORPHINE SULFATE 2 MG: 2 INJECTION, SOLUTION INTRAMUSCULAR; INTRAVENOUS at 12:22

## 2022-08-27 ASSESSMENT — PAIN DESCRIPTION - DESCRIPTORS
DESCRIPTORS: ACHING

## 2022-08-27 ASSESSMENT — PAIN SCALES - GENERAL
PAINLEVEL_OUTOF10: 7
PAINLEVEL_OUTOF10: 6
PAINLEVEL_OUTOF10: 6
PAINLEVEL_OUTOF10: 0

## 2022-08-27 ASSESSMENT — LIFESTYLE VARIABLES
HOW MANY STANDARD DRINKS CONTAINING ALCOHOL DO YOU HAVE ON A TYPICAL DAY: PATIENT DOES NOT DRINK
HOW OFTEN DO YOU HAVE A DRINK CONTAINING ALCOHOL: NEVER

## 2022-08-27 ASSESSMENT — PAIN DESCRIPTION - ORIENTATION
ORIENTATION: MID

## 2022-08-27 ASSESSMENT — PAIN DESCRIPTION - LOCATION
LOCATION: ABDOMEN
LOCATION: ABDOMEN
LOCATION: ABDOMEN;HEAD

## 2022-08-27 ASSESSMENT — PAIN DESCRIPTION - FREQUENCY: FREQUENCY: CONTINUOUS

## 2022-08-27 ASSESSMENT — PAIN - FUNCTIONAL ASSESSMENT
PAIN_FUNCTIONAL_ASSESSMENT: ACTIVITIES ARE NOT PREVENTED

## 2022-08-27 ASSESSMENT — PAIN DESCRIPTION - PAIN TYPE: TYPE: ACUTE PAIN

## 2022-08-27 ASSESSMENT — PAIN DESCRIPTION - ONSET: ONSET: GRADUAL

## 2022-08-27 NOTE — CONSULTS
Department of General Surgery Consult    PATIENT NAME: Avel Alvarado OF BIRTH: 1950    ADMISSION DATE: 8/26/2022  4:31 PM      TODAY'S DATE: 8/27/2022    Reason for Consult: Abdominal pain    Chief Complaint: Abdominal pain    Requesting Physician: Jerald Smith    HISTORY OF PRESENT ILLNESS:              The patient is a 74 y.o. female who presents with abdominal pain. She has a significant history of perforated prepyloric ulcer 2 months ago. She had a postoperative leak. This subsequently sealed with conservative management. She had a prolonged hospital stay and has subsequently been in a skilled nursing facility. The family took her home Thursday. They brought her in last night for abdominal pain. She states her pain is not really different, however, she was not sent home with any pain medication. She is tolerating her tube feeds.     Past Medical History:        Diagnosis Date    Arthritis     Colon cancer ([address]     Hyperlipidemia     Thyroid disease        Past Surgical History:        Procedure Laterality Date    APPENDECTOMY  2009    CHOLECYSTECTOMY  2009    COLON SURGERY  2009    EPIDURAL STEROID INJECTION Left 4/15/2019    LEFT LUMBAR FIVE SACRAL ONE EPIDURAL STEROID INJECTION SITE CONFIRMED BY FLUOROSCOPY performed by Ivone Nguyen MD at [address] Left 11/5/2019    LEFT LUMBAR FIVE SACRAL ONE EPIDURAL STEROID INJECTION SITE CONFIRMED BY FLUOROSCOPY performed by Ivone Nguyen MD at [facility] CATH  7/20/2022    IR MIDLINE CATH 7/20/2022 [address] SPECIAL PROCEDURES    KIDNEY STONE SURGERY      LAPAROTOMY N/A 6/28/2022    LAPAROTOMY, REPAIR OF PERFORATED ULCER with jejunostomy insertion performed by Onofre Jean MD at [address]         Current Medications:   Current Facility-Administered Medications: citalopram (CELEXA) tablet 20 mg, 20 mg, Oral, Daily  mirtazapine (REMERON) tablet 15 mg, 15 mg, Oral, Nightly  sodium chloride flush 0.9 % injection 5-40 mL, 5-40 mL, IntraVENous, 2 times per day  sodium chloride flush 0.9 % injection 5-40 mL, 5-40 mL, IntraVENous, PRN  0.9 % sodium chloride infusion, , IntraVENous, PRN  enoxaparin (LOVENOX) injection 40 mg, 40 mg, SubCUTAneous, Daily  ondansetron (ZOFRAN-ODT) disintegrating tablet 4 mg, 4 mg, Oral, Q8H PRN **OR** ondansetron (ZOFRAN) injection 4 mg, 4 mg, IntraVENous, Q6H PRN  polyethylene glycol (GLYCOLAX) packet 17 g, 17 g, Oral, Daily PRN  acetaminophen (TYLENOL) tablet 650 mg, 650 mg, Oral, Q6H PRN **OR** acetaminophen (TYLENOL) suppository 650 mg, 650 mg, Rectal, Q6H PRN  dextrose 5 % and 0.45 % NaCl with KCl 20 mEq infusion, , IntraVENous, Continuous  HYDROcodone-acetaminophen (NORCO) 5-325 MG per tablet 1 tablet, 1 tablet, Oral, Q4H PRN **OR** HYDROcodone-acetaminophen (NORCO) 5-325 MG per tablet 2 tablet, 2 tablet, Oral, Q4H PRN  morphine (PF) injection 2 mg, 2 mg, IntraVENous, Q2H PRN **OR** morphine sulfate (PF) injection 4 mg, 4 mg, IntraVENous, Q2H PRN  pantoprazole (PROTONIX) injection 40 mg, 40 mg, IntraVENous, Daily  sucralfate (CARAFATE) tablet 1 g, 1 g, Oral, 4 times per day  Prior to Admission medications    Medication Sig Start Date End Date Taking? Authorizing Provider   Mirtazapine (REMERON PO) Take by mouth    Historical Provider, MD   amoxicillin-clavulanate (AUGMENTIN) 875-125 MG per tablet Take 1 tablet by mouth 2 times daily  Patient not taking: Reported on 8/26/2022    Historical Provider, MD   pantoprazole (PROTONIX) 40 MG tablet Take 1 tablet by mouth in the morning and 1 tablet in the evening. Take before meals.  7/28/22   January Garcia, APRN - CNP   ascorbic acid (VITAMIN C) 500 MG tablet Take 1 tablet by mouth 2 times daily for 7 days 6/27/22 7/28/22  Maryse Lawrence MD   zinc sulfate (ZINCATE) 220 (50 Zn) MG capsule Take 1 capsule by mouth daily for 7 days 6/27/22 7/28/22  Maryse Lawrence MD   naproxen (NAPROSYN) 500 MG tablet All other ROS reviewed and negative. PHYSICAL EXAM:  VITALS:  /61   Pulse 67   Temp 99 °F (37.2 °C) (Oral)   Resp 16   Ht 5' 4\" (1.626 m)   Wt 137 lb 6.4 oz (62.3 kg)   SpO2 96%   BMI 23.58 kg/m²   24HR INTAKE/OUTPUT:    No intake/output data recorded. No intake/output data recorded. CONSTITUTIONAL:  alert, no apparent distress and normal weight  EYES:  PERRL, sclera clear  ENT:  Normocephalic,atraumatic, without obvious abnormality  NECK:  supple, symmetrical, trachea midline  LUNGS: Resp effort easy and unlabored, clear to auscultation  CARDIOVASCULAR:  NO JVD, regular rate and rhythm and no murmur noted  ABDOMEN:  scars noted upper midline which is well-healed, normal bowel sounds, soft, non-distended, tenderness noted in the epigastric region, voluntary guarding absent, no masses palpated and hernia absent  MUSCULOSKELETAL: No clubbing or cyanosis, 0+ pitting edema lower extremities  NEUROLOGIC:  Mental Status Exam:  Level of Alertness:   awake  PSYCHIATRIC:   person, place, time  SKIN:  no bruising or bleeding, normal skin color, texture, turgor, and no redness, warmth, or swelling    DATA:    CBC:   Recent Labs     08/26/22 1946   WBC 9.4   HGB 9.4*   HCT 28.5*        BMP:    Recent Labs     08/26/22 1946      K 3.7      CO2 26   BUN 9   CREATININE 0.9   GLUCOSE 92     Hepatic:   Recent Labs     08/26/22 1946   AST 26   ALT 12   BILITOT 0.3   ALKPHOS 99     Mag:    No results for input(s): MG in the last 72 hours. Phos:   No results for input(s): PHOS in the last 72 hours. INR: No results for input(s): INR in the last 72 hours. Radiology Review: Images personally reviewed by me. CT images reviewed and show persistent gas and fluid collection adjacent to the colon with thickening of the gastric antrum      IMPRESSION/RECOMMENDATIONS:    Abdominal pain. I suspect she has persistent pain related to the ongoing gastritis.   The longstanding air and fluid collection has been shown on contrast studies do not communicate with the stomach. Lack of leukocytosis or fever suggest this is not an abscess. I have added Carafate to help with the gastritis along with her proton pump inhibitor. No acute surgical issues at this time, so no plans for surgery. Okay for discharge when medically stable.   She already has follow-up scheduled next week in the office    Electronically signed by Geraldine Goodson MD     [address]

## 2022-08-27 NOTE — PLAN OF CARE
Abdominal pain / diarrhea   - Post op   - Pain control, r/o c.diff   - Surgery consult   - Obs admit

## 2022-08-27 NOTE — PLAN OF CARE
Problem: Discharge Planning  Goal: Discharge to home or other facility with appropriate resources  Outcome: Progressing  Flowsheets (Taken 8/27/2022 0236 by Ирина Hanna RN)  Discharge to home or other facility with appropriate resources:   Identify barriers to discharge with patient and caregiver   Identify discharge learning needs (meds, wound care, etc)   Refer to discharge planning if patient needs post-hospital services based on physician order or complex needs related to functional status, cognitive ability or social support system     Problem: Pain  Goal: Verbalizes/displays adequate comfort level or baseline comfort level  Outcome: Progressing     Problem: Safety - Adult  Goal: Free from fall injury  Outcome: Progressing     Problem: Skin/Tissue Integrity  Goal: Absence of new skin breakdown  Description: 1. Monitor for areas of redness and/or skin breakdown  2. Assess vascular access sites hourly  3. Every 4-6 hours minimum:  Change oxygen saturation probe site  4. Every 4-6 hours:  If on nasal continuous positive airway pressure, respiratory therapy assess nares and determine need for appliance change or resting period.   Outcome: Progressing

## 2022-08-27 NOTE — PROGRESS NOTES
4 Eyes Skin Assessment     The patient is being assess for   Admission    I agree that 2 RN's have performed a thorough Head to Toe Skin Assessment on the patient. ALL assessment sites listed below have been assessed. Areas assessed for pressure by both nurses:   [x]   Head, Face, and Ears   [x]   Shoulders, Back, and Chest, Abdomen  [x]   Arms, Elbows, and Hands   [x]   Coccyx, Sacrum, and Ischium  [x]   Legs, Feet, and Heels        Skin Assessed Under all Medical Devices by both nurses:  NA       All Mepilex Borders were peeled back and area peeked at by both nurses:  Yes  Please list where Mepilex Borders are located:  [place]             **SHARE this note so that the co-signing nurse is able to place an eSignature**    Co-signer eSignature: {Esignature:[id number]}    Does the Patient have Skin Breakdown related to pressure?   Yes [facility] WOUND CARE was Initiated documentation include the [place]-wound, Wound Assessment, Measurements, Dressing Treatment, Drainage, and Color\",                Will Prevention initiated:  Yes   Wound Care Orders initiated:  No      WOC nurse consulted for Pressure Injury (Stage 3,4, Unstageable, DTI, NWPT, Complex wounds)and New or Established Ostomies:  Yes      Primary Nurse eSignature: Electronically signed by Ulysses Sil, RN on 8/27/22 at 7:13 AM EDT

## 2022-08-27 NOTE — H&P
Hospital Medicine History & Physical      PCP: Cordell Han, APRN - CNP    Date of Admission: 8/26/2022    Date of Service: Pt seen/examined on 8/27/2022 and Placed in Observation. Chief Complaint:  abd pain. History Of Present Illness: The patient is a 74 y.o. female s/p recent prolonged hospitalization admitted for perforated prepyloric ulcer s/p surgical repair and J tube insertion, discharged to [facility], in recovery for 3 weeks and returned home Thursday and back to ED Friday with complaint of abdominal pain. Pt reports + BM. No emesis. Tolerating PO intake. Reports her abdominal pain was uncontrolled at home and she came back to ED. This am reports epigastric abd pain 7/10 intensity. Past Medical History:        Diagnosis Date    Arthritis     Colon cancer ([address]     Hyperlipidemia     Thyroid disease        Past Surgical History:        Procedure Laterality Date    APPENDECTOMY  2009    CHOLECYSTECTOMY  2009    COLON SURGERY  2009    EPIDURAL STEROID INJECTION Left 4/15/2019    LEFT LUMBAR FIVE SACRAL ONE EPIDURAL STEROID INJECTION SITE CONFIRMED BY FLUOROSCOPY performed by Emanuel Martinez MD at [address] Left 11/5/2019    LEFT LUMBAR FIVE SACRAL ONE EPIDURAL STEROID INJECTION SITE CONFIRMED BY FLUOROSCOPY performed by Emanuel Martinez MD at [facility] [place] [facility] CATH  7/20/2022    IR MIDLINE CATH 7/20/2022 [address] SPECIAL PROCEDURES    KIDNEY STONE SURGERY      LAPAROTOMY N/A 6/28/2022    LAPAROTOMY, REPAIR OF PERFORATED ULCER with jejunostomy insertion performed by Lovely Kussmaul, MD at [facility]         Medications Prior to Admission:    Prior to Admission medications    Medication Sig Start Date End Date Taking?  Authorizing Provider   Mirtazapine (REMERON PO) Take by mouth Historical Provider, MD   amoxicillin-clavulanate (AUGMENTIN) 875-125 MG per tablet Take 1 tablet by mouth 2 times daily  Patient not taking: Reported on 8/26/2022    Historical Provider, MD   pantoprazole (PROTONIX) 40 MG tablet Take 1 tablet by mouth in the morning and 1 tablet in the evening. Take before meals.  7/28/22   January Landa APRN - CNP   ascorbic acid (VITAMIN C) 500 MG tablet Take 1 tablet by mouth 2 times daily for 7 days 6/27/22 7/28/22  Reyes Brisker, MD   zinc sulfate (ZINCATE) 220 (50 Zn) MG capsule Take 1 capsule by mouth daily for 7 days 6/27/22 7/28/22  Reyes Brisker, MD   naproxen (NAPROSYN) 500 MG tablet Take 1 tablet by mouth 2 times daily as needed for Pain 6/27/22 7/28/22  Reyes Brisker, MD   sucralfate (CARAFATE) 1 GM tablet Take 1 tablet by mouth 4 times daily for 15 days 6/27/22 7/28/22  Reyes Brisker, MD   famotidine (PEPCID) 10 MG tablet Take 2 tablets by mouth 2 times daily 6/27/22 7/28/22  Reyes Brisker, MD   butalbital-acetaminophen-caffeine (FIORIC, [facility]) -97 MG per tablet Take 1 tablet by mouth every 6 hours as needed for Headaches 6/27/22 7/28/22  Reyes Brisker, MD   albuterol sulfate HFA (PROVENTIL;VENTOLIN;PROAIR) 108 (90 Base) MCG/ACT inhaler Inhale 2 puffs into the lungs every 6 hours as needed for Wheezing 6/24/22   Willian Pinon MD   atorvastatin (LIPITOR) 10 MG tablet Take 20 mg by mouth  Patient not taking: Reported on 8/26/2022 7/17/17   Historical Provider, MD   levothyroxine (SYNTHROID) 137 MCG tablet 125 mcg Daily   Patient not taking: Reported on 8/26/2022 6/28/15   Historical Provider, MD   citalopram (CELEXA) 20 MG tablet 20 mg daily  5/8/15   Historical Provider, MD   Omega-3 Fatty Acids (FISH OIL) 500 MG CAPS Take 1 g by mouth daily   7/28/22  Historical Provider, MD       Allergies:  Bee venom, Adhesive tape, Tetracyclines & related, and Sulfa antibiotics    Social History:  The patient currently lives at home    TOBACCO:   reports that she quit smoking about 22 years ago. Her smoking use included cigarettes. She has never used smokeless tobacco.  ETOH:   reports that she does not currently use alcohol. Family History:  Reviewed in detail and negative for DM, Early CAD, Cancer, CVA. Positive as follows:        Problem Relation Age of Onset    High Blood Pressure Sister     Cancer Sister     High Blood Pressure Brother     Heart Attack Brother     Cancer Brother        REVIEW OF SYSTEMS:   As noted in the HPI. All other systems reviewed and negative. PHYSICAL EXAM:    /61   Pulse 67   Temp 99 °F (37.2 °C) (Oral)   Resp 16   Ht 5' 4\" (1.626 m)   Wt 137 lb 6.4 oz (62.3 kg)   SpO2 96%   BMI 23.58 kg/m²     General appearance: No apparent distress appears stated age and cooperative. HEENT Normal cephalic, atraumatic without obvious deformity. Pupils equal, round, and reactive to light. Extra ocular muscles intact. Conjunctivae/corneas clear. Neck: Supple, No jugular venous distention/bruits. Trachea midline without thyromegaly or adenopathy with full range of motion. Lungs: Clear to auscultation, bilaterally without Rales/Wheezes/Rhonchi with good respiratory effort. Heart: Regular rate and rhythm with Normal S1/S2 without murmurs, rubs or gallops, point of maximum impulse non-displaced  Abdomen: Soft, non-tender or non-distended without rigidity or guarding and positive bowel sounds all four quadrants. +J tube with thick exudate around the entry stoma. Extremities: No clubbing, cyanosis, or edema bilaterally. Full range of motion without deformity and normal gait intact. Skin: Skin color, texture, turgor normal.  No rashes or lesions. Neurologic: Alert and oriented X 3, neurovascularly intact with sensory/motor intact upper extremities/lower extremities, bilaterally. Cranial nerves: II-XII intact, grossly non-focal.  Mental status: Alert, oriented, thought content appropriate.   Capillary Refill: Acceptable  < 3 seconds  Peripheral Pulses: +3 Easily felt, not easily obliterated with pressure        CBC   Recent Labs     08/26/22 1946   WBC 9.4   HGB 9.4*   HCT 28.5*         RENAL  Recent Labs     08/26/22 1946      K 3.7      CO2 26   BUN 9   CREATININE 0.9     LFT'S  Recent Labs     08/26/22 1946   AST 26   ALT 12   BILITOT 0.3   ALKPHOS 99     COAG  No results for input(s): INR in the last 72 hours. CARDIAC ENZYMES  No results for input(s): CKTOTAL, CKMB, CKMBINDEX, TROPONINI in the last 72 hours. U/A:    Lab Results   Component Value Date/Time    COLORU Yellow 06/30/2022 01:11 PM    WBCUA 10-20 06/30/2022 01:11 PM    RBCUA >100 06/30/2022 01:11 PM    MUCUS 2+ 06/27/2022 11:12 AM    BACTERIA 1+ 06/30/2022 01:11 PM    CLARITYU SL CLOUDY 06/30/2022 01:11 PM    SPECGRAV 1.020 06/30/2022 01:11 PM    LEUKOCYTESUR Negative 06/30/2022 01:11 PM    BLOODU LARGE 06/30/2022 01:11 PM    GLUCOSEU Negative 06/30/2022 01:11 PM       ABG  No results found for: AUP2ZHS, BEART, D1LQJHSX, PHART, THGBART, BFP9PBT, PO2ART, BSB7KLJ        Active Hospital Problems    Diagnosis Date Noted    Abdominal pain [R10.9] 08/26/2022     Priority: Medium         PHYSICIANS CERTIFICATION:    I certify that Brennen Otero is expected to be hospitalized for less than 2 midnights based on the following assessment and plan:      ASSESSMENT/PLAN:      Abd Pain  S/p recent repair of prepyloric perforated ulcer post op course complicated by leak. Has J tube in place.    - gen surgery consult. Hx of Thyroid Disease. Pt has synthroid listed at 137mcg - has not been taking. Her last TSH on 6/30/22 was normal.   Monitor. DVT Prophylaxis: lovenox  Diet: ADULT DIET; Regular  Code Status: Full Code    Start PTOT. Dispo - obs pending surgery eval.        Erma Paiz MD    Thank you ILAN Blair - MARIA INES for the opportunity to be involved in this patient's care.  If you have any questions or concerns

## 2022-08-27 NOTE — PROGRESS NOTES
Patient is not able to demonstrate the ability to move from a reclining position to an upright position within the recliner due to refusal.    Bedside Mobility Assessment Tool (BMAT):     Assessment Level 1- Sit and Shake    1. From a semi-reclined position, ask patient to sit up and rotate to a seated position at the side of the bed. Can use the bedrail. 2. Ask patient to reach out and grab your hand and shake making sure patient reaches across his/her midline. Fail- Patient is unable to perform tasks, patient is MOBILITY LEVEL 1. Assessment Level 2- Stretch and Point   1. With patient in seated position at the side of the bed, have patient place both feet on the floor (or stool) with knees no higher than hips. 2. Ask patient to stretch one leg and straighten the knee, then bend the ankle/flex and point the toes. If appropriate, repeat with the other leg. Fail- Patient is unable to complete task. Patient is MOBILITY LEVEL 2. Assessment Level 3- Stand   1. Ask patient to elevate off the bed or chair (seated to standing) using an assistive device (cane, bedrail). 2. Patient should be able to raise buttocks off be and hold for a count of five. May repeat once. Fail- Patient unable to demonstrate standing stability. Patient is MOBILITY LEVEL 3. Assessment Level 4- Walk   1. Ask patient to march in place at bedside. 2. Then ask patient to advance step and return each foot. Some medical conditions may render a patient from stepping backwards, use your best clinical judgement. Fail- Patient not able to complete tasks OR requires use of assistive device. Patient is MOBILITY LEVEL 3.      Pt refused assessment  Mobility Level- 1

## 2022-08-27 NOTE — PROGRESS NOTES
Comprehensive Nutrition Assessment    Type and Reason for Visit:  Initial, Consult, Positive Nutrition Screen (Nocturnal TF)    Nutrition Recommendations/Plan:   Continue Regular Diet  Start Nocturnal Feeding Jevity 1.5 at 25 ml/hr and increase by 15 ml q 4 hr until goal rate  50 ml/hr x  12 hrs (8p-8a); flush with 60 mls q 3 hr while running      Malnutrition Assessment:  Malnutrition Status:  Mild malnutrition (08/27/22 1906)    Context:  Acute Illness     Findings of the 6 clinical characteristics of malnutrition:  Energy Intake:  Mild decrease in energy intake (Comment)  Weight Loss:  Greater than 7.5% over 3 months     Body Fat Loss:  Unable to assess     Muscle Mass Loss:  Unable to assess    Fluid Accumulation:  Unable to assess     Strength:  Not Performed    Nutrition Assessment:    Pt. nutritionally compromised AEB pt admitted with c/o of abdominal pain and was admitted with dx of C-diff. At risk for further nutrition compromise r/t has h/o GI dysfunction and colon cancer. Will start nocturnal feeding and continue Regular diet . Nutrition Related Findings:    pt A & O x 4; sitting up in bed ; does not wnat TF started d/t she was nauseated at home when it was started; she has TF placed about 30 days ago d/t  GI dysfunction and was d/c to rehab with nocturnal feeds;  returned home 8/25; admited with abdominalpain and currently being treated for C-Diff Wound Type: None       Current Nutrition Intake & Therapies:    Average Meal Intake: 26-50%  Average Supplements Intake: None Ordered  ADULT DIET; Regular    Anthropometric Measures:  Height: 5' 4\" (162.6 cm)  Ideal Body Weight (IBW): 120 lbs (55 kg)    Admission Body Weight: 137 lb (62.1 kg)  Current Body Weight: 137 lb 6.4 oz (62.3 kg), 114.5 % IBW. Weight Source: Bed Scale  Current BMI (kg/m2): 23.6  Usual Body Weight: 141 lb (64 kg)  % Weight Change (Calculated): -2.6                    BMI Categories: Normal Weight (BMI 18.5-24. 9)    Estimated Daily Nutrient Needs:  Energy Requirements Based On: Kcal/kg  Weight Used for Energy Requirements: Current  Energy (kcal/day): 4444-7554 based ~ 25-30 kcal/kg cbw  Weight Used for Protein Requirements: Current  Protein (g/day): 74-81 based ~ 1.2-1.3 gr/kg cbw  Method Used for Fluid Requirements: 1 ml/kcal  Fluid (ml/day): 8378-9096    Nutrition Diagnosis:   Inadequate oral intake related to altered GI function, altered GI structure as evidenced by intake 26-50%, GI abnormality, vomiting, diarrhea, nausea    Nutrition Interventions:   Food and/or Nutrient Delivery: Continue Current Diet, Start Tube Feeding  Nutrition Education/Counseling: No recommendation at this time  Coordination of Nutrition Care: Continue to monitor while inpatient       Goals:     Goals: PO intake 50% or greater, Tolerate nutrition support at goal rate, by next RD assessment       Nutrition Monitoring and Evaluation:   Behavioral-Environmental Outcomes: None Identified  Food/Nutrient Intake Outcomes: Food and Nutrient Intake, Enteral Nutrition Intake/Tolerance  Physical Signs/Symptoms Outcomes: Nutrition Focused Physical Findings, Biochemical Data, Weight, GI Status, Nausea or Vomiting, Diarrhea    Discharge Planning:     Too soon to determine     Magnus Evans, [address] [place] [address],   Contact: [phone]

## 2022-08-27 NOTE — FLOWSHEET NOTE
08/27/22 0930   Vital Signs   Temp 99 °F (37.2 °C)   Temp Source Oral   Heart Rate 67   Resp 16   /61   BP Location Right upper arm   BP Method Automatic   MAP (Calculated) 77.33   Patient Position Semi fowlers   Level of Consciousness 0   MEWS Score 1   Pain Assessment   Pain Assessment 0-10   Pain Level 6   Pain Location Abdomen   Pain Orientation Mid   Pain Descriptors Aching   Functional Pain Assessment Activities are not prevented   Pain Type Acute pain   Pain Frequency Continuous   Pain Onset Gradual   Oxygen Therapy   SpO2 96 %   O2 Device None (Room air)   AM assessment completed, see flow sheet. Pt is alert and oriented. Vital signs are WNL. Respirations are even & easy. No complaints voiced. Pt denies needs at this time. SR up x 2, and bed in low position. Call light is within reach.

## 2022-08-28 LAB
ALBUMIN SERPL-MCNC: 2.6 G/DL (ref 3.4–5)
ALP BLD-CCNC: 81 U/L (ref 40–129)
ALT SERPL-CCNC: 10 U/L (ref 10–40)
ANION GAP SERPL CALCULATED.3IONS-SCNC: 9 MMOL/L (ref 3–16)
AST SERPL-CCNC: 22 U/L (ref 15–37)
BASOPHILS ABSOLUTE: 0 K/UL (ref 0–0.2)
BASOPHILS RELATIVE PERCENT: 0.6 %
BILIRUB SERPL-MCNC: <0.2 MG/DL (ref 0–1)
BILIRUBIN DIRECT: <0.2 MG/DL (ref 0–0.3)
BILIRUBIN, INDIRECT: ABNORMAL MG/DL (ref 0–1)
BUN BLDV-MCNC: 6 MG/DL (ref 7–20)
CALCIUM SERPL-MCNC: 8.2 MG/DL (ref 8.3–10.6)
CHLORIDE BLD-SCNC: 105 MMOL/L (ref 99–110)
CO2: 22 MMOL/L (ref 21–32)
CREAT SERPL-MCNC: 0.7 MG/DL (ref 0.6–1.2)
EOSINOPHILS ABSOLUTE: 0.3 K/UL (ref 0–0.6)
EOSINOPHILS RELATIVE PERCENT: 3.3 %
GFR AFRICAN AMERICAN: >60
GFR NON-AFRICAN AMERICAN: >60
GLUCOSE BLD-MCNC: 129 MG/DL (ref 70–99)
HCT VFR BLD CALC: 24.7 % (ref 36–48)
HEMOGLOBIN: 8.1 G/DL (ref 12–16)
LYMPHOCYTES ABSOLUTE: 1.7 K/UL (ref 1–5.1)
LYMPHOCYTES RELATIVE PERCENT: 21.1 %
MCH RBC QN AUTO: 29.6 PG (ref 26–34)
MCHC RBC AUTO-ENTMCNC: 32.9 G/DL (ref 31–36)
MCV RBC AUTO: 90 FL (ref 80–100)
MONOCYTES ABSOLUTE: 0.5 K/UL (ref 0–1.3)
MONOCYTES RELATIVE PERCENT: 6.7 %
NEUTROPHILS ABSOLUTE: 5.5 K/UL (ref 1.7–7.7)
NEUTROPHILS RELATIVE PERCENT: 68.3 %
PDW BLD-RTO: 18.5 % (ref 12.4–15.4)
PHOSPHORUS: 3 MG/DL (ref 2.5–4.9)
PLATELET # BLD: 370 K/UL (ref 135–450)
PMV BLD AUTO: 8.2 FL (ref 5–10.5)
POTASSIUM SERPL-SCNC: 4.3 MMOL/L (ref 3.5–5.1)
RBC # BLD: 2.75 M/UL (ref 4–5.2)
SODIUM BLD-SCNC: 136 MMOL/L (ref 136–145)
TOTAL PROTEIN: 5.5 G/DL (ref 6.4–8.2)
WBC # BLD: 8 K/UL (ref 4–11)

## 2022-08-28 PROCEDURE — 80069 RENAL FUNCTION PANEL: CPT

## 2022-08-28 PROCEDURE — 96376 TX/PRO/DX INJ SAME DRUG ADON: CPT

## 2022-08-28 PROCEDURE — 1200000000 HC SEMI PRIVATE

## 2022-08-28 PROCEDURE — 96372 THER/PROPH/DIAG INJ SC/IM: CPT

## 2022-08-28 PROCEDURE — 99233 SBSQ HOSP IP/OBS HIGH 50: CPT | Performed by: INTERNAL MEDICINE

## 2022-08-28 PROCEDURE — 97162 PT EVAL MOD COMPLEX 30 MIN: CPT

## 2022-08-28 PROCEDURE — 2580000003 HC RX 258: Performed by: INTERNAL MEDICINE

## 2022-08-28 PROCEDURE — 6360000002 HC RX W HCPCS: Performed by: INTERNAL MEDICINE

## 2022-08-28 PROCEDURE — 6370000000 HC RX 637 (ALT 250 FOR IP): Performed by: SURGERY

## 2022-08-28 PROCEDURE — 80076 HEPATIC FUNCTION PANEL: CPT

## 2022-08-28 PROCEDURE — 6370000000 HC RX 637 (ALT 250 FOR IP): Performed by: INTERNAL MEDICINE

## 2022-08-28 PROCEDURE — 99024 POSTOP FOLLOW-UP VISIT: CPT | Performed by: SURGERY

## 2022-08-28 PROCEDURE — 97530 THERAPEUTIC ACTIVITIES: CPT

## 2022-08-28 PROCEDURE — 85025 COMPLETE CBC W/AUTO DIFF WBC: CPT

## 2022-08-28 PROCEDURE — C9113 INJ PANTOPRAZOLE SODIUM, VIA: HCPCS | Performed by: INTERNAL MEDICINE

## 2022-08-28 RX ORDER — FENTANYL 25 UG/H
1 PATCH TRANSDERMAL
Status: DISCONTINUED | OUTPATIENT
Start: 2022-08-28 | End: 2022-08-31 | Stop reason: HOSPADM

## 2022-08-28 RX ADMIN — SUCRALFATE 1 G: 1 TABLET ORAL at 06:44

## 2022-08-28 RX ADMIN — Medication 10 ML: at 10:15

## 2022-08-28 RX ADMIN — CITALOPRAM HYDROBROMIDE 20 MG: 20 TABLET ORAL at 10:11

## 2022-08-28 RX ADMIN — Medication 10 ML: at 21:18

## 2022-08-28 RX ADMIN — SUCRALFATE 1 G: 1 TABLET ORAL at 01:25

## 2022-08-28 RX ADMIN — ENOXAPARIN SODIUM 40 MG: 100 INJECTION SUBCUTANEOUS at 10:15

## 2022-08-28 RX ADMIN — MORPHINE SULFATE 2 MG: 2 INJECTION, SOLUTION INTRAMUSCULAR; INTRAVENOUS at 10:49

## 2022-08-28 RX ADMIN — MIRTAZAPINE 15 MG: 15 TABLET, FILM COATED ORAL at 21:18

## 2022-08-28 RX ADMIN — ACETAMINOPHEN 650 MG: 325 TABLET ORAL at 10:14

## 2022-08-28 RX ADMIN — SUCRALFATE 1 G: 1 TABLET ORAL at 10:48

## 2022-08-28 RX ADMIN — ONDANSETRON HYDROCHLORIDE 4 MG: 2 INJECTION, SOLUTION INTRAMUSCULAR; INTRAVENOUS at 11:09

## 2022-08-28 RX ADMIN — PANTOPRAZOLE SODIUM 40 MG: 40 INJECTION, POWDER, FOR SOLUTION INTRAVENOUS at 10:11

## 2022-08-28 RX ADMIN — SUCRALFATE 1 G: 1 TABLET ORAL at 18:41

## 2022-08-28 ASSESSMENT — PAIN DESCRIPTION - ORIENTATION: ORIENTATION: MID

## 2022-08-28 ASSESSMENT — PAIN DESCRIPTION - LOCATION: LOCATION: ABDOMEN

## 2022-08-28 ASSESSMENT — PAIN DESCRIPTION - DESCRIPTORS: DESCRIPTORS: ACHING;CRAMPING

## 2022-08-28 ASSESSMENT — PAIN SCALES - GENERAL
PAINLEVEL_OUTOF10: 0
PAINLEVEL_OUTOF10: 7

## 2022-08-28 ASSESSMENT — PAIN - FUNCTIONAL ASSESSMENT: PAIN_FUNCTIONAL_ASSESSMENT: PREVENTS OR INTERFERES SOME ACTIVE ACTIVITIES AND ADLS

## 2022-08-28 NOTE — PROGRESS NOTES
Inpatient Physical Therapy Evaluation and Treatment    Unit: [facility]  Date:  8/28/2022  Patient Name:    Williams Gates  Admitting diagnosis:  Abdominal pain [R10.9]  Admit Date:  8/26/2022  Precautions/Restrictions/WB Status/ Lines/ Wounds/ Oxygen: Fall risk, Bed/chair alarm, Lines -IV, and Isolation Precautions: Contact Plus  J-tube  Treatment Time:  1130-12:10  Treatment Number:  1   Timed Code Treatment Minutes: 30 minutes  Total Treatment Minutes:  40  minutes    Patient Goals for Therapy: \" wants to sleep \"          Discharge Recommendations:  Likely home-ongoing assessment  DME needs for discharge: Needs Met       Therapy recommendation for EMS Transport: can transport by wheelchair    Therapy recommendations for staff:   Evaluation incomplete at this time  History Of Present Illness:    Sarah Callejas  \"The patient is a 74 y.o. female s/p recent prolonged hospitalization admitted for perforated prepyloric ulcer s/p surgical repair and J tube insertion, discharged to [facility], in recovery for 3 weeks and returned home Thursday and back to ED Friday with complaint of abdominal pain. Pt reports + BM. No emesis. Tolerating PO intake. Reports her abdominal pain was uncontrolled at home and she came back to ED. This am reports epigastric abd pain 7/10 intensity. \"    Home Health S4 Level Recommendation:  Level 1 Standard  AM-PAC Mobility Score    AM-PAC Inpatient Mobility Raw Score : 18       Preadmission Environment    Pt. Lives with spouse. Daughter and KARISSA live next door and are there every day. Daughter works from home. Home environment:            one story home  Steps to enter first floor: 2 steps to enter and hand rail unilateral  Steps to second floor:         N/A  Bathroom: walk in shower and standard height commode. Handrails in shower.    Equipment owned: RW, SW, shower chair/bench and raised toilet  Pt sleeps in flat non-adjustable bed., BSC, Manual wc,     Preadmission Status:  Pt. Able to drive: not since illness  Pt Fully independent with ADLs: daughter provides assist with bathing and dressing since dc from rehab  Pt. Required assistance from family for: Cooking (daughter cooks 3 meals/day; pt is able to get into kitchen for simple things if needed)  Pt. was SBA for transfers and gait and walked with RW  History of falls          No      Pain   Yes  Location: abdomen  Rating: moderate /10  Pain Medicine Status: Received pain med prior to tx-per RN,patient received morphine    Cognition    A&O Person, Place, Time, and Situation   Able to follow 2 step commands    Subjective  Patient lying supine in bed with family at bedside. Pt agreeable to bed level activity to assist with brief change;requested that this therapist return later to transfer and ambulate, declined OOB assessment x 2 attempts    Upper Extremity ROM/Strength  Please see OT evaluation. Lower Extremity ROM / Strength   AROM WFL: Yes  ROM limitations:     Strength Assessment (measured on a 0-5 scale): and BLE strength WFL, but not formally assessed with MMT. Balance  Sitting:  Not tested; Not Tested  Comments: declined    Standing: Not tested; Not Tested  Comments: declined    Bed Mobility   Supine to Sit:    Not Tested  Sit to Supine:   Not Tested  Rolling:   Independent  Scooting in sitting: Not Tested  Scooting in supine: Total A  Bridging: independent  Transfer Training  Patient declined   Sit to stand:   Not Tested  Stand to sit:   Not Tested  Bed to Chair:   Not Tested with use of N/A    Gait gait deferred due to patient declined; pt ambulated 0 ft. Activity Tolerance   Pt completed therapy session with Pain noted with activity    Pt in bed, alarm set, positioned in proper neutral alignment and pressure relief provided.    Call light provided and all needs within reach    Other  RN aware of outcome of this evaaluation    Patient/Family Education   Pt educated on role of inpatient PT, POC, importance of continued activity    Assessment  Pt seen for Physical Therapy evaluation in acute care setting. Current transfer and ambulation abilities are unknown as patient declined to get to EOB or OOB this date. Discharge plans are incomplete at this time. Goals : To be met in 3 visits:  1). Independent with LE Ex x 10 reps  2.) Bed to chair: CGA    To be met in 6 visits:  1). Supine to/from sit: Independent  2). Sit to/from stand: Independent  3). Bed to chair: Independent  4). Gait: Ambulate  125 ft.   with  SBA and use of rolling walker (RW)  5). Tolerate B LE exercises 3 sets of 10-15 reps  6). Ascend/descend 2 steps with CGA with use of hand rail unilateral and LRAD (least restrictive assistive device)    Rehabilitation Potential: Fair  Strengths for achieving goals include:   Family Support   Barriers to achieving goals include:    Complexity of condition    Plan    To be seen 2-3 x / week  while in acute care setting for therapeutic exercises, bed mobility, transfers, progressive gait training, balance training, and family/patient education. Signature: Mara Gillette, PT #[id number]     If patient discharges from this facility prior to next visit, this note will serve as the Discharge Summary.

## 2022-08-28 NOTE — PROGRESS NOTES
Hospitalist Progress Note      PCP: Fallon Ndiaye, APRN - CNP    Date of Admission: 8/26/2022    Chief Complaint: severe abd pain. 74 y.o. female s/p recent prolonged hospitalization admitted for perforated prepyloric ulcer s/p surgical repair and J tube insertion, discharged to [facility], in recovery for 3 weeks and returned home Thursday and back to ED Friday with complaint of abdominal pain. Subjective:     Pt very emotional and in severe pain.  at bedside. Pt is about to give up - not participating with therapy due to severe abdominal pain. Family is giving me a very clear message - they are more then willing and capable of helping her at home and would not want to consider returning to [facility]. But the main struggle at home was severe persisting abdominal pain when she tries to ambulate - and this discourages her from even trying. No BM - diarrhea resolved.            Medications:  Reviewed    Infusion Medications    sodium chloride       Scheduled Medications    fentaNYL  1 patch TransDERmal Q72H    citalopram  20 mg Oral Daily    mirtazapine  15 mg Oral Nightly    sodium chloride flush  5-40 mL IntraVENous 2 times per day    enoxaparin  40 mg SubCUTAneous Daily    pantoprazole  40 mg IntraVENous Daily    sucralfate  1 g Oral 4 times per day     PRN Meds: sodium chloride flush, sodium chloride, ondansetron **OR** ondansetron, polyethylene glycol, acetaminophen **OR** acetaminophen, HYDROcodone 5 mg - acetaminophen **OR** HYDROcodone 5 mg - acetaminophen, morphine **OR** morphine      Intake/Output Summary (Last 24 hours) at 8/28/2022 1248  Last data filed at 8/28/2022 0900  Gross per 24 hour   Intake 120 ml   Output 1450 ml   Net -1330 ml       Physical Exam Performed:    /69   Pulse 74   Temp (!) 100.8 °F (38.2 °C) (Oral)   Resp 16   Ht 5' 4\" (1.626 m)   Wt 137 lb 6.4 oz (62.3 kg)   SpO2 98%   BMI 23.58 kg/m²     General appearance: No apparent distress, appears stated age and cooperative. HEENT: Pupils equal, round, and reactive to light. Conjunctivae/corneas clear. Neck: Supple, with full range of motion. No jugular venous distention. Trachea midline. Respiratory:  Normal respiratory effort. Clear to auscultation, bilaterally without Rales/Wheezes/Rhonchi. Cardiovascular: Regular rate and rhythm with normal S1/S2 without murmurs, rubs or gallops. Abdomen: Soft, non-tender, non-distended with normal bowel sounds. Musculoskeletal: No clubbing, cyanosis or edema bilaterally. Full range of motion without deformity. Skin: Skin color, texture, turgor normal.  No rashes or lesions. Neurologic:  Neurovascularly intact without any focal sensory/motor deficits. Cranial nerves: II-XII intact, grossly non-focal.  Psychiatric: Alert and oriented, thought content appropriate, normal insight  Capillary Refill: Brisk,3 seconds, normal   Peripheral Pulses: +2 palpable, equal bilaterally       Labs:   Recent Labs     08/26/22 1946 08/28/22  0549   WBC 9.4 8.0   HGB 9.4* 8.1*   HCT 28.5* 24.7*    370     Recent Labs     08/26/22 1946 08/28/22  0549    136   K 3.7 4.3    105   CO2 26 22   BUN 9 6*   CREATININE 0.9 0.7   CALCIUM 9.2 8.2*   PHOS  --  3.0     Recent Labs     08/26/22 1946 08/28/22  0549   AST 26 22   ALT 12 10   BILIDIR  --  <0.2   BILITOT 0.3 <0.2   ALKPHOS 99 81     No results for input(s): INR in the last 72 hours. No results for input(s): Ellis Savage in the last 72 hours.     Urinalysis:      Lab Results   Component Value Date/Time    NITRU Negative 06/30/2022 01:11 PM    WBCUA 10-20 06/30/2022 01:11 PM    BACTERIA 1+ 06/30/2022 01:11 PM    RBCUA >100 06/30/2022 01:11 PM    BLOODU LARGE 06/30/2022 01:11 PM    SPECGRAV 1.020 06/30/2022 01:11 PM    GLUCOSEU Negative 06/30/2022 01:11 PM       Radiology:  CT ABDOMEN PELVIS W IV CONTRAST Additional Contrast? None   Final Result   Continued marked mural thickening of the gastric antrum and pylorus, which   appears increased when compared to the previous exam, compatible with severe   gastritis. There is a persistent gas and fluid collection seen at the level of the   pylorus near the surgical margin, not substantially changed when compared to   the previous exam.  A fistulous connection between this collection and the   stomach would be difficult to exclude. There is intra and extrahepatic biliary dilation, which may be increased when   compared to the previous exam.  Please correlate with any clinical evidence   of biliary obstruction. Fluid within the large bowel, which presumably reflects diarrheal disease. Small left pleural effusion decreased when compared to the previous exam.   Adjacent airspace disease is likely atelectasis, but pneumonia and aspiration   remain in the differential.                 Assessment/Plan:    Active Hospital Problems    Diagnosis     Chronic gastritis without bleeding [K29.50]      Priority: Medium    Mild protein-calorie malnutrition ([facility] [address] [E44.1]      Priority: Medium    Abdominal pain [R10.9]      Priority: Medium         Abd Pain  S/p recent repair of prepyloric perforated ulcer post op course complicated by leak. Has J tube in place - overnight J feed and PO intake during the day - though very limited. Recent long admission for perforated prepyloric ulcer s/p surgical repair complicated by post op leak. - gen surgery consult - they report fluid collection and air has been stable and there are no active surgical plans at this time. Pain control has been challenging.   - she was sent home from [facility] with only tylenol.   - she reports morphine helps some. - reports norco ineffective.    Plan:   - will start fentanyl patch today - this may allow for a steady state pain control whiel her bowel recover   - will cont with bridge pain control (morphine and norco) while the fentanyl patch becomes effective with plan to discontinue PO

## 2022-08-28 NOTE — FLOWSHEET NOTE
08/28/22 0927   Vital Signs   Temp (!) 100.8 °F (38.2 °C)   Temp Source Oral   Heart Rate 74   Heart Rate Source Monitor   Resp 20   /69   BP Location Right upper arm   BP Method Automatic   MAP (Calculated) 86.67   Patient Position Semi fowlers   Level of Consciousness 1   MEWS Score 2   Oxygen Therapy   SpO2 98 %   O2 Device None (Room air)   AM assessment completed, see flow sheet. Pt is alert and oriented. Vital signs are WNL. Respirations are even & easy. No complaints voiced. Pt denies needs at this time. SR up x 2, and bed in low position. Call light is within reach.

## 2022-08-28 NOTE — PROGRESS NOTES
[facility] SURGERY    PATIENT NAME: Kalen Slater     TODAY'S DATE: 8/28/2022    CHIEF COMPLAINT: Abdominal pain    INTERVAL HISTORY/HPI:    Pt still with epigastric pain but states it is a little bit better today. REVIEW OF SYSTEMS:  Pertinent positives and negatives as per interval history section    OBJECTIVE:  VITALS:  BP (!) 143/76   Pulse 73   Temp 98.2 °F (36.8 °C) (Oral)   Resp 16   Ht 5' 4\" (1.626 m)   Wt 137 lb 6.4 oz (62.3 kg)   SpO2 99%   BMI 23.58 kg/m²     INTAKE/OUTPUT:    I/O last 3 completed shifts: In: 240 [P.O.:240]  Out: 1100 [Urine:1100]  No intake/output data recorded. CONSTITUTIONAL:  awake and alert  LUNGS:  Respirations easy and unlabored  CARD:  regular rate and rhythm  ABDOMEN:  normal bowel sounds, soft, non-distended, mild tenderness noted in the epigastric region     Data:  CBC:   Recent Labs     08/26/22 1946 08/28/22  0549   WBC 9.4 8.0   HGB 9.4* 8.1*   HCT 28.5* 24.7*    370     BMP:    Recent Labs     08/26/22 1946 08/28/22  0549    136   K 3.7 4.3    105   CO2 26 22   BUN 9 6*   CREATININE 0.9 0.7   GLUCOSE 92 129*     Hepatic:   Recent Labs     08/26/22 1946 08/28/22  0549   AST 26 22   ALT 12 10   BILITOT 0.3 <0.2   ALKPHOS 99 81     Mag:    No results for input(s): MG in the last 72 hours. Phos:     Recent Labs     08/28/22  0549   PHOS 3.0      INR: No results for input(s): INR in the last 72 hours. Radiology Review:  *Imaging personally reviewed by me. N/A      ASSESSMENT AND PLAN:  Abdominal pain, likely due to ongoing gastritis. She is a little bit better today. Okay for discharge when medically stable on PPI and oral Carafate.      Electronically signed by Erwin Doss MD     [id number]

## 2022-08-29 LAB
ALBUMIN SERPL-MCNC: 2.6 G/DL (ref 3.4–5)
ANION GAP SERPL CALCULATED.3IONS-SCNC: 9 MMOL/L (ref 3–16)
BASOPHILS ABSOLUTE: 0 K/UL (ref 0–0.2)
BASOPHILS RELATIVE PERCENT: 0.6 %
BUN BLDV-MCNC: 7 MG/DL (ref 7–20)
CALCIUM SERPL-MCNC: 8.6 MG/DL (ref 8.3–10.6)
CHLORIDE BLD-SCNC: 106 MMOL/L (ref 99–110)
CO2: 22 MMOL/L (ref 21–32)
CREAT SERPL-MCNC: 0.6 MG/DL (ref 0.6–1.2)
EOSINOPHILS ABSOLUTE: 0.2 K/UL (ref 0–0.6)
EOSINOPHILS RELATIVE PERCENT: 3.4 %
GFR AFRICAN AMERICAN: >60
GFR NON-AFRICAN AMERICAN: >60
GLUCOSE BLD-MCNC: 111 MG/DL (ref 70–99)
HCT VFR BLD CALC: 28.2 % (ref 36–48)
HEMOGLOBIN: 9.1 G/DL (ref 12–16)
LYMPHOCYTES ABSOLUTE: 1.8 K/UL (ref 1–5.1)
LYMPHOCYTES RELATIVE PERCENT: 25.3 %
MCH RBC QN AUTO: 29.6 PG (ref 26–34)
MCHC RBC AUTO-ENTMCNC: 32.3 G/DL (ref 31–36)
MCV RBC AUTO: 91.5 FL (ref 80–100)
MONOCYTES ABSOLUTE: 0.5 K/UL (ref 0–1.3)
MONOCYTES RELATIVE PERCENT: 6.6 %
NEUTROPHILS ABSOLUTE: 4.5 K/UL (ref 1.7–7.7)
NEUTROPHILS RELATIVE PERCENT: 64.1 %
PDW BLD-RTO: 18.7 % (ref 12.4–15.4)
PHOSPHORUS: 3.6 MG/DL (ref 2.5–4.9)
PLATELET # BLD: 380 K/UL (ref 135–450)
PMV BLD AUTO: 7.6 FL (ref 5–10.5)
POTASSIUM SERPL-SCNC: 4.4 MMOL/L (ref 3.5–5.1)
RBC # BLD: 3.08 M/UL (ref 4–5.2)
SODIUM BLD-SCNC: 137 MMOL/L (ref 136–145)
WBC # BLD: 7 K/UL (ref 4–11)

## 2022-08-29 PROCEDURE — C9113 INJ PANTOPRAZOLE SODIUM, VIA: HCPCS | Performed by: INTERNAL MEDICINE

## 2022-08-29 PROCEDURE — 80069 RENAL FUNCTION PANEL: CPT

## 2022-08-29 PROCEDURE — 1200000000 HC SEMI PRIVATE

## 2022-08-29 PROCEDURE — C9113 INJ PANTOPRAZOLE SODIUM, VIA: HCPCS | Performed by: PHYSICIAN ASSISTANT

## 2022-08-29 PROCEDURE — 85025 COMPLETE CBC W/AUTO DIFF WBC: CPT

## 2022-08-29 PROCEDURE — 6370000000 HC RX 637 (ALT 250 FOR IP): Performed by: PHYSICIAN ASSISTANT

## 2022-08-29 PROCEDURE — 6360000002 HC RX W HCPCS: Performed by: INTERNAL MEDICINE

## 2022-08-29 PROCEDURE — 6370000000 HC RX 637 (ALT 250 FOR IP): Performed by: SURGERY

## 2022-08-29 PROCEDURE — 6370000000 HC RX 637 (ALT 250 FOR IP): Performed by: INTERNAL MEDICINE

## 2022-08-29 PROCEDURE — 36415 COLL VENOUS BLD VENIPUNCTURE: CPT

## 2022-08-29 PROCEDURE — 97530 THERAPEUTIC ACTIVITIES: CPT

## 2022-08-29 PROCEDURE — 6360000002 HC RX W HCPCS: Performed by: PHYSICIAN ASSISTANT

## 2022-08-29 PROCEDURE — 97116 GAIT TRAINING THERAPY: CPT

## 2022-08-29 PROCEDURE — 97165 OT EVAL LOW COMPLEX 30 MIN: CPT

## 2022-08-29 PROCEDURE — 97535 SELF CARE MNGMENT TRAINING: CPT

## 2022-08-29 PROCEDURE — 2580000003 HC RX 258: Performed by: INTERNAL MEDICINE

## 2022-08-29 PROCEDURE — 97110 THERAPEUTIC EXERCISES: CPT

## 2022-08-29 PROCEDURE — 99232 SBSQ HOSP IP/OBS MODERATE 35: CPT | Performed by: INTERNAL MEDICINE

## 2022-08-29 RX ORDER — PANTOPRAZOLE SODIUM 40 MG/10ML
40 INJECTION, POWDER, LYOPHILIZED, FOR SOLUTION INTRAVENOUS
Status: DISCONTINUED | OUTPATIENT
Start: 2022-08-29 | End: 2022-08-31 | Stop reason: HOSPADM

## 2022-08-29 RX ORDER — SUCRALFATE 1 G/1
1 TABLET ORAL
Status: DISCONTINUED | OUTPATIENT
Start: 2022-08-29 | End: 2022-08-31 | Stop reason: HOSPADM

## 2022-08-29 RX ADMIN — HYDROCODONE BITARTRATE AND ACETAMINOPHEN 1 TABLET: 5; 325 TABLET ORAL at 14:23

## 2022-08-29 RX ADMIN — PANTOPRAZOLE SODIUM 40 MG: 40 INJECTION, POWDER, FOR SOLUTION INTRAVENOUS at 08:58

## 2022-08-29 RX ADMIN — Medication 10 ML: at 21:05

## 2022-08-29 RX ADMIN — SUCRALFATE 1 G: 1 TABLET ORAL at 06:25

## 2022-08-29 RX ADMIN — ENOXAPARIN SODIUM 40 MG: 100 INJECTION SUBCUTANEOUS at 08:58

## 2022-08-29 RX ADMIN — PANTOPRAZOLE SODIUM 40 MG: 40 INJECTION, POWDER, FOR SOLUTION INTRAVENOUS at 16:55

## 2022-08-29 RX ADMIN — MIRTAZAPINE 15 MG: 15 TABLET, FILM COATED ORAL at 21:04

## 2022-08-29 RX ADMIN — SUCRALFATE 1 G: 1 TABLET ORAL at 21:04

## 2022-08-29 RX ADMIN — CITALOPRAM HYDROBROMIDE 20 MG: 20 TABLET ORAL at 08:58

## 2022-08-29 RX ADMIN — Medication 10 ML: at 08:58

## 2022-08-29 RX ADMIN — SUCRALFATE 1 G: 1 TABLET ORAL at 16:55

## 2022-08-29 RX ADMIN — SUCRALFATE 1 G: 1 TABLET ORAL at 00:33

## 2022-08-29 ASSESSMENT — PAIN SCALES - GENERAL: PAINLEVEL_OUTOF10: 6

## 2022-08-29 ASSESSMENT — PAIN DESCRIPTION - LOCATION: LOCATION: ABDOMEN

## 2022-08-29 ASSESSMENT — PAIN DESCRIPTION - DESCRIPTORS: DESCRIPTORS: THROBBING

## 2022-08-29 NOTE — PLAN OF CARE
Problem: Discharge Planning  Goal: Discharge to home or other facility with appropriate resources  8/28/2022 2327 by Jason Gilliam RN  Outcome: Progressing  8/28/2022 1846 by Anneliese Samson RN  Outcome: Progressing     Problem: Pain  Goal: Verbalizes/displays adequate comfort level or baseline comfort level  8/28/2022 2327 by Jason Gilliam RN  Outcome: Progressing  8/28/2022 1846 by Anneliese Samson RN  Outcome: Progressing     Problem: Safety - Adult  Goal: Free from fall injury  8/28/2022 2327 by Jason Gilliam RN  Outcome: Progressing  8/28/2022 1846 by Anneliese Samson RN  Outcome: Progressing     Problem: Skin/Tissue Integrity  Goal: Absence of new skin breakdown  Description: 1. Monitor for areas of redness and/or skin breakdown  2. Assess vascular access sites hourly  3. Every 4-6 hours minimum:  Change oxygen saturation probe site  4. Every 4-6 hours:  If on nasal continuous positive airway pressure, respiratory therapy assess nares and determine need for appliance change or resting period.   8/28/2022 2327 by Jason Gilliam RN  Outcome: Progressing  8/28/2022 1846 by Anneliese Samson RN  Outcome: Progressing     Problem: ABCDS Injury Assessment  Goal: Absence of physical injury  8/28/2022 2327 by Jason Gilliam RN  Outcome: Progressing  8/28/2022 1846 by Anneliese Samson RN  Outcome: Progressing     Problem: Nutrition Deficit:  Goal: Optimize nutritional status  8/28/2022 2327 by Jason Gilliam RN  Outcome: Progressing  8/28/2022 1846 by Anneliese Samson RN  Outcome: Progressing

## 2022-08-29 NOTE — PROGRESS NOTES
Hospitalist Progress Note      PCP: Harley Ohara, ILAN - CNP    Date of Admission: 8/26/2022    Chief Complaint: severe abd pain. 74 y.o. female s/p recent prolonged hospitalization admitted for perforated prepyloric ulcer s/p surgical repair and J tube insertion, discharged to [facility], in recovery for 3 weeks and returned home Thursday and back to ED Friday with complaint of abdominal pain. Subjective:     Patient has a persistent abdominal pain.   -Seen by GI   mostly will get an endoscopy in AM .  She has J-tube in place. . . Surgery team PA to look at the J-tube site , and change dressing tomorrow . I have discussed with them . Medications:  Reviewed    Infusion Medications    sodium chloride       Scheduled Medications    pantoprazole  40 mg IntraVENous BID AC    sucralfate  1 g Oral 4x Daily AC & HS    fentaNYL  1 patch TransDERmal Q72H    citalopram  20 mg Oral Daily    mirtazapine  15 mg Oral Nightly    sodium chloride flush  5-40 mL IntraVENous 2 times per day    enoxaparin  40 mg SubCUTAneous Daily     PRN Meds: sodium chloride flush, sodium chloride, ondansetron **OR** ondansetron, polyethylene glycol, acetaminophen **OR** acetaminophen, HYDROcodone 5 mg - acetaminophen **OR** HYDROcodone 5 mg - acetaminophen, morphine **OR** morphine      Intake/Output Summary (Last 24 hours) at 8/29/2022 1537  Last data filed at 8/29/2022 1004  Gross per 24 hour   Intake 120 ml   Output --   Net 120 ml         Physical Exam Performed:    BP (!) 145/77   Pulse 73   Temp 98.6 °F (37 °C) (Oral)   Resp 16   Ht 5' 4\" (1.626 m)   Wt 137 lb 6.4 oz (62.3 kg)   SpO2 100%   BMI 23.58 kg/m²     General appearance: No apparent distress, appears stated age and cooperative. Looks ill and fatigued  HEENT: Pupils equal, round, and reactive to light. Conjunctivae/corneas clear. Neck: Supple, with full range of motion. No jugular venous distention. Trachea midline. Respiratory:  Normal respiratory effort. Clear to auscultation, bilaterally without Rales/Wheezes/Rhonchi. Cardiovascular: Regular rate and rhythm with normal S1/S2 without murmurs, rubs or gallops. Abdomen: Soft, epigastric and mid abdomen tender, non-distended with normal bowel sounds. J-tube in place  Musculoskeletal: No clubbing, cyanosis or edema bilaterally. Full range of motion without deformity. Skin: Skin color, texture, turgor normal.  No rashes or lesions. Neurologic:  Neurovascularly intact without any focal sensory/motor deficits. Cranial nerves: II-XII intact, grossly non-focal.  Psychiatric: Alert and oriented, thought content appropriate, normal insight  Capillary Refill: Brisk,3 seconds, normal   Peripheral Pulses: +2 palpable, equal bilaterally       Labs:   Recent Labs     08/26/22 1946 08/28/22  0549 08/29/22  0515   WBC 9.4 8.0 7.0   HGB 9.4* 8.1* 9.1*   HCT 28.5* 24.7* 28.2*    370 380       Recent Labs     08/26/22 1946 08/28/22  0549 08/29/22  0515    136 137   K 3.7 4.3 4.4    105 106   CO2 26 22 22   BUN 9 6* 7   CREATININE 0.9 0.7 0.6   CALCIUM 9.2 8.2* 8.6   PHOS  --  3.0 3.6       Recent Labs     08/26/22 1946 08/28/22  0549   AST 26 22   ALT 12 10   BILIDIR  --  <0.2   BILITOT 0.3 <0.2   ALKPHOS 99 81       No results for input(s): INR in the last 72 hours. No results for input(s): Patrisha Meigs in the last 72 hours.     Urinalysis:      Lab Results   Component Value Date/Time    NITRU Negative 06/30/2022 01:11 PM    WBCUA 10-20 06/30/2022 01:11 PM    BACTERIA 1+ 06/30/2022 01:11 PM    RBCUA >100 06/30/2022 01:11 PM    BLOODU LARGE 06/30/2022 01:11 PM    SPECGRAV 1.020 06/30/2022 01:11 PM    GLUCOSEU Negative 06/30/2022 01:11 PM       Cultures  COVID not detected  Stool studies pending    Radiology:  CT ABDOMEN PELVIS W IV CONTRAST Additional Contrast? None   Final Result   Continued marked mural thickening of the gastric antrum and pylorus, which   appears increased when compared to the previous exam, compatible with severe   gastritis. There is a persistent gas and fluid collection seen at the level of the   pylorus near the surgical margin, not substantially changed when compared to   the previous exam.  A fistulous connection between this collection and the   stomach would be difficult to exclude. There is intra and extrahepatic biliary dilation, which may be increased when   compared to the previous exam.  Please correlate with any clinical evidence   of biliary obstruction. Fluid within the large bowel, which presumably reflects diarrheal disease. Small left pleural effusion decreased when compared to the previous exam.   Adjacent airspace disease is likely atelectasis, but pneumonia and aspiration   remain in the differential.                 Assessment/Plan:    Active Hospital Problems    Diagnosis     Chronic gastritis without bleeding [K29.50]      Priority: Medium    Mild protein-calorie malnutrition ([facility] [address] [E44.1]      Priority: Medium    Abdominal pain [R10.9]      Priority: Medium         Abdominal Pain  -S/p recent repair of prepyloric perforated ulcer post op course complicated by leak.   -Has J tube in place - overnight J feed and PO intake during the day - though very limited. -Recent long admission for perforated prepyloric ulcer s/p surgical repair complicated by post op leak. - gen surgery consult - they report fluid collection and air has been stable and there are no active surgical plans at this time. J-tube dressing will be changed  Pain control has been challenging.   - she was sent home from [facility] with only tylenol.   - she reports morphine helps some. - reports norco ineffective.    Plan:   -started on  fentanyl patch now - this may allow for a steady state pain control while her bowel recover   - will cont with bridge pain control (morphine and norco) while the fentanyl patch becomes effective with plan to discontinue PO and IV opiate in the next 24-48hrs and discharge home with fentanyl patch and ongoing rehab efforts at home.   - GI eval  - pt has long established Hx of follow up with Dr Janet Anderson   will likely need EGD in a.m.  - cont PPI - Hx of severe gastritis and PUD with perforated ulcer as above. - continue Carafate      Hx of Thyroid Disease.   -Pt has synthroid listed at [facility] - has not been taking.   -Her last TSH on 6/30/22 was normal.   -Monitor.        Depression  - continue Celexa    Generalized weakness  -Consult PT OT      DVT: Ferdinand Martínez MD

## 2022-08-29 NOTE — ACP (ADVANCE CARE PLANNING)
Advance Care Planning     General Advance Care Planning (ACP) Conversation    Date of Conversation: 8/26/2022  Conducted with: Patient with Decision Making Capacity    Healthcare Decision Maker:    Primary Decision Maker: Gosia Trujillo - Spouse - [phone]  Click here to complete Healthcare Decision Makers including selection of the Healthcare Decision Maker Relationship (ie \"Primary\"). Today we documented Decision Maker(s) consistent with Legal Next of Kin hierarchy.  Pt's  stated he will bring copies of their  living will the next time he is here visiting     Content/Action Overview:    Reviewed DNR/DNI and patient elects Full Code (Attempt Resuscitation)    Length of Voluntary ACP Conversation in minutes:  <16 minutes (Non-Billable)    Radha Davey MSW, MELI-S

## 2022-08-29 NOTE — FLOWSHEET NOTE
/66   Pulse 64   Temp 97.4 °F (36.3 °C) (Oral)   Resp 18   Ht 5' 4\" (1.626 m)   Wt 137 lb 6.4 oz (62.3 kg)   SpO2 96%   BMI 23.58 kg/m²     Shift assessment complete; see flowsheets. PM medications administered; see MAR. Pt AOx4 resting in bed. Respirations even and unlabored. Nocturnal tube feed started. Pt denies any further needs or pain at this time. Call light in reach, bed locked in lowest position. Bed alarm in place for patient safety.

## 2022-08-29 NOTE — PROGRESS NOTES
Inpatient Occupational Therapy  Evaluation and Treatment    Unit: [address] [place]  Date:  8/29/2022  Patient Name:    James Wallace  Admitting diagnosis:  Abdominal pain [R10.9]  Admit Date:  8/26/2022  Precautions/Restrictions/WB Status/ Lines/ Wounds/ Oxygen: fall risk, IV, and bed/chair alarm, j-tube    Treatment Time:  10:31-11:09  Treatment Number: 1     Billable Treatment Time: 28 minutes   Total Treatment Time:  38  minutes    Patient Goals for Therapy:  \" to go home \"      Discharge Recommendations: Home with 24/7 assist and home therapy  DME needs for discharge: Needs Met       Therapy recommendations for staff:   Assist of 1 with use of rolling walker (RW) for all transfers to/from BSC/chair    History of Present Illness:  74 y.o. female s/p recent prolonged hospitalization admitted for perforated prepyloric ulcer s/p surgical repair and J tube insertion, discharged to [facility], in recovery for 3 weeks and returned home Thursday and back to ED Friday with complaint of abdominal pain. Home Health S4 Level Recommendation:  Level 1 Standard  AM-PAC Score: AM-PAC Inpatient Daily Activity Raw Score: 16    Preadmission Environment    Pt. Lives with spouse. Daughter and KARISSA live next door and are there every day. Daughter works from home. Home environment:            one story home  Steps to enter first floor: 2 steps to enter and hand rail unilateral  Steps to second floor:         N/A  Bathroom: walk in shower and standard height commode. Handrails in shower. Equipment owned: RW, SW, shower chair/bench and raised toilet  Pt sleeps in flat non-adjustable bed., BSC, Manual wc,     Preadmission Status:  Pt. Able to drive: not since illness  Pt Fully independent with ADLs: daughter provides assist with bathing and dressing since dc from rehab  Pt.  Required assistance from family for: Cooking (daughter cooks 3 meals/day; pt is able to get into kitchen for simple things if needed)  Pt. was SBA for transfers and gait and skilled occupational therapy services to maximize safety and independence. Goal(s) : To be met in 3 Visits:  1). Bed to toilet/BSC: Supervision    To be met in 5 Visits:  1). Supine to/from Sit:  Supervision  2). Upper Body Bathing:   Supervision  3). Lower Body Bathing:   Min A  4). Upper Body Dressing:  Supervision  5). Lower Body Dressing:  Min A  6). Pt to demonstrate UE exs x 15 reps with minimal cues    Rehabilitation Potential:  Good for goals listed above. Strengths for achieving goals include: Pt motivated, PLOF, and Pt cooperative  Barriers to achieving goals include:  Complexity of condition     Plan: To be seen 3-5 x/wk while in acute care setting for therapeutic exercises, bed mobility, transfers, dressing, bathing, family/patient education, ADL/IADL retraining, energy conservation training.        JOHN PavonR/L #[id number]      If patient discharges from this facility prior to next visit, this note will serve as the Discharge Summary

## 2022-08-29 NOTE — PROGRESS NOTES
Inpatient Physical Therapy Daily Treatment Note    Unit: 2 [place]  Date:  8/29/2022  Patient Name:    Kendrick Larsen  Admitting diagnosis:  Abdominal pain [R10.9]  Admit Date:  8/26/2022  Precautions/Restrictions:  Fall risk, Bed/chair alarm, Lines -IV and Purewick catheter, and Isolation Precautions: Contact      Discharge Recommendations: Home 24 hr assist and with home PT   DME needs for discharge: Needs Met       Therapy recommendation for EMS Transport: can transport by wheelchair    Therapy recommendations for staff:   Assist of 1 with use of rolling walker (RW) and gait belt for all transfers and ambulation to/from [facility]  to/from chair  within room (10 ft)    History of Present Illness: H & P as per Stephon Singh MD's note dated 8/27/2022  The patient is a 74 y.o. female s/p recent prolonged hospitalization admitted for perforated prepyloric ulcer s/p surgical repair and J tube insertion, discharged to [facility], in recovery for 3 weeks and returned home Thursday and back to ED Friday with complaint of abdominal pain. Pt reports + BM. No emesis. Tolerating PO intake. Reports her abdominal pain was uncontrolled at home and she came back to ED. This am reports epigastric abd pain 7/10 intensity. Home Health S4 Level Recommendation: Level 3 Safety  AM-PAC Mobility Score   AM-PAC Inpatient Mobility Raw Score : 18    Treatment Time:  9943 - 4112  Treatment number: 2  Timed Code Treatment Minutes: 43 minutes  Total Treatment Minutes:  43  minutes    Cognition    A&O x4   Able to follow 2 step commands    Subjective  Patient lying supine in bed with no family present   Pt agreeable to this PT tx.      Pain   Yes  Location: abdomen and tail bone  Rating:    moderate/10  Pain Medicine Status: RN notified     Bed Mobility   Supine to Sit:    CGA  Sit to Supine:   Not Tested  Rolling:   Not Tested  Scooting:   SBA in sitting    Transfer Training     Sit to stand:   CGA  Stand to sit:   CGA  Bed to Chair: CGA with use of gait belt and rolling walker (RW)    Gait Training gait completed as indicated below  Distance:      10 ft  Deviations (firm surface/linoleum):  decreased yakov, increased JOSE, decreased step length bilaterally, and decreased stance time bilaterally  Assistive Device Used:    gait belt and rolling walker (RW)  Level of Assist:    CGA  Comment: Patient was limited in walking distance due to fatigue and weakness in the LEs. Patient did not show any buckling but mild shakiness in the LEs. Stair Training deferred, pt unsafe/not appropriate to complete stairs at this time  # of Steps:   N/A  Level of Assist:  Not Tested  UE Support:  NA  Assistive Device:  N/A  Pattern:   N/A  Comments:     Therapeutic Exercise all completed bilaterally unless indicated  Ankle Pumps x 10 reps  LAQ x 10 reps    Balance  Sitting:  Fair +; SBA  Comments:     Standing: Fair -; CGA  Comments: ~4 min with RW    Patient Education      Role of PT, POC, Discharge recommendations, DC recommendations, safety awareness, transfer techniques, and calling for assist with mobility. Positioning Needs       Pt up in chair, alarm set, positioned in proper neutral alignment and pressure relief provided. Call light provided and all needs within reach    ROM Measurements N/A  Knee Flexion:  Knee Extension:     Activity Tolerance   Pt completed therapy session with pain and fatigue noted with all functional mobility. No overt SOB noticed. BP: 156/75  HR: 78 bpm  SpO2: 98% on RA    Other  N/A    Assessment :  Patient tolerated the session with regular rest breaks today. Patient presented with decreased standing tolerance but was able to tolerate walking 10 ft with RW without LOB or knee buckling. Patient was educated about regular low intensity movements and time out of the bed with staff and at home to improve activity tolerance. Patient verbalized understanding.      Recommending Home 24 hr assist and with home PT upon discharge as patient functioning below baseline level and would benefit from continued therapy services    Goals (all goals ongoing unless otherwise indicated)  To be met in 3 visits:  1). Independent with LE Ex x 10 reps  2.) Bed to chair: CGA     To be met in 6 visits:  1). Supine to/from sit: Independent  2). Sit to/from stand: Independent  3). Bed to chair: Independent  4). Gait: Ambulate  125 ft.   with  SBA and use of rolling walker (RW)  5). Tolerate B LE exercises 3 sets of 10-15 reps  6). Ascend/descend 2 steps with CGA with use of hand rail unilateral and LRAD (least restrictive assistive device)    Plan   Continue with plan of care. Signature: Madhav Willoughby MS PT, # [id number]    If patient discharges from this facility prior to next visit, this note will serve as the Discharge Summary.

## 2022-08-29 NOTE — PROGRESS NOTES
Physician Progress Note      Elsy Farley  CSN #:                  [id number]  :                       1950  ADMIT DATE:       2022 4:31 PM  [address] [place] DATE:  RESPONDING  PROVIDER #:        Germain Crouch MD          QUERY TEXT:    Pt admitted with abdominal pain. Noted documentation of mild malnutrition by   ordered Dietary consultant. If possible, please document in progress notes   and discharge summary:    The medical record reflects the following:  Risk Factors: poor PO intake, recent abdominal surgery  Clinical Indicators: J-tube in place, BMI 23, 13 lb weight loss since 2022,   sacral wound  Treatment: nocturnal feeding per J-tube, Regular diet, Dietary consult, daily   weights, I&O's, supportive care    Thank you,  Lidia Garcia RN, [email or web address].  Options provided:  -- Mild malnutrition confirmed present on admission  -- Mild malnutrition ruled out  -- Other - I will add my own diagnosis  -- Disagree - Not applicable / Not valid  -- Disagree - Clinically unable to determine / Unknown  -- Refer to Clinical Documentation Reviewer    PROVIDER RESPONSE TEXT:    The diagnosis of mild malnutrition was confirmed as present on admission.     Query created by: Ekaterina Santa on 2022 4:17 PM      Electronically signed by:  Germain Crouch MD 2022 9:27 AM

## 2022-08-29 NOTE — CONSULTS
SURGERY      LAPAROTOMY N/A 2022    LAPAROTOMY, REPAIR OF PERFORATED ULCER with jejunostomy insertion performed by Vanda Booth MD [facility]:   Social History     Tobacco Use    Smoking status: Former     Types: Cigarettes     Quit date: 3/17/2000     Years since quittin.4    Smokeless tobacco: Never   Substance Use Topics    Alcohol use: Not Currently     Comment: socially- glass of wine     Family:   Family History   Problem Relation Age of Onset    High Blood Pressure Sister     Cancer Sister     High Blood Pressure Brother     Heart Attack Brother     Cancer Brother      No current facility-administered medications on file prior to encounter. Current Outpatient Medications on File Prior to Encounter   Medication Sig Dispense Refill    Mirtazapine (REMERON PO) Take by mouth      amoxicillin-clavulanate (AUGMENTIN) 875-125 MG per tablet Take 1 tablet by mouth 2 times daily (Patient not taking: Reported on 2022)      pantoprazole (PROTONIX) 40 MG tablet Take 1 tablet by mouth in the morning and 1 tablet in the evening. Take before meals.  180 tablet 1    [DISCONTINUED] ascorbic acid (VITAMIN C) 500 MG tablet Take 1 tablet by mouth 2 times daily for 7 days 14 tablet 0    [DISCONTINUED] zinc sulfate (ZINCATE) 220 (50 Zn) MG capsule Take 1 capsule by mouth daily for 7 days 7 capsule 0    [DISCONTINUED] naproxen (NAPROSYN) 500 MG tablet Take 1 tablet by mouth 2 times daily as needed for Pain 20 tablet 0    [DISCONTINUED] sucralfate (CARAFATE) 1 GM tablet Take 1 tablet by mouth 4 times daily for 15 days 60 tablet 0    [DISCONTINUED] famotidine (PEPCID) 10 MG tablet Take 2 tablets by mouth 2 times daily 120 tablet 0    [DISCONTINUED] butalbital-acetaminophen-caffeine (FIORICET, ESGIC) -40 MG per tablet Take 1 tablet by mouth every 6 hours as needed for Headaches 12 tablet 0    albuterol sulfate HFA (PROVENTIL;VENTOLIN;PROAIR) 108 (90 Base) MCG/ACT inhaler Inhale 2 puffs into the lungs every 6 hours as needed for Wheezing 18 g 3    atorvastatin (LIPITOR) 10 MG tablet Take 20 mg by mouth (Patient not taking: Reported on 8/26/2022)      levothyroxine (SYNTHROID) 137 MCG tablet 125 mcg Daily  (Patient not taking: Reported on 8/26/2022)      citalopram (CELEXA) 20 MG tablet 20 mg daily       [DISCONTINUED] Omega-3 Fatty Acids (FISH OIL) 500 MG CAPS Take 1 g by mouth daily         Infusions:    sodium chloride       PRN Medications: sodium chloride flush, sodium chloride, ondansetron **OR** ondansetron, polyethylene glycol, acetaminophen **OR** acetaminophen, HYDROcodone 5 mg - acetaminophen **OR** HYDROcodone 5 mg - acetaminophen, morphine **OR** morphine  Allergies: Allergies   Allergen Reactions    Bee Venom Swelling     Attacked by swarm of bees and required er visit    Adhesive Tape Other (See Comments)     Skin redness      Tetracyclines & Related Other (See Comments)     Mouth fungus      Sulfa Antibiotics Nausea And Vomiting       ROS:   Constitutional: negative for chills, fevers and sweats  Eyes: negative for cataracts, icterus and redness  Ears, nose, mouth, throat, and face: negative for epistaxis, hearing loss and sore throat  Respiratory: negative for cough, hemoptysis and sputum  Cardiovascular: negative for chest pain, dyspnea and lower extremity edema  Gastrointestinal: as per HPI  Genitourinary:negative for dysuria, frequency and hematuria  Neurological: negative for coordination problems, dizziness and gait problems  Behavioral/Psych: negative for anxiety, depression and mood swings    Physical Exam   /70   Pulse 68   Temp 99 °F (37.2 °C) (Oral)   Resp 16   Ht 5' 4\" (1.626 m)   Wt 137 lb 6.4 oz (62.3 kg)   SpO2 95%   BMI 23.58 kg/m²       General appearance: alert, appears stated age, cooperative, and no distress  Head: Normocephalic, without obvious abnormality, atraumatic  Eyes: conjunctivae/corneas clear. PERRL, EOM's intact.  Fundi benign. Neck: supple, symmetrical, trachea midline  Lungs: clear to auscultation bilaterally  Heart: regular rate and rhythm, S1, S2 normal, no murmur, click, rub or gallop  Abdomen: normal findings: bowel sounds normal, no masses palpable, and symmetric and abnormal findings:  tenderness mild in the upper abdomen and J-tube site bandaged and dry in LUQ  Extremities: edema 1+ BLE    Lab and Imaging Review   Labs:  CBC:   Recent Labs     08/26/22 1946 08/28/22 0549 08/29/22  0515   WBC 9.4 8.0 7.0   HGB 9.4* 8.1* 9.1*   HCT 28.5* 24.7* 28.2*   MCV 89.2 90.0 91.5    370 380     BMP:   Recent Labs     08/26/22 1946 08/28/22 0549 08/29/22  0515    136 137   K 3.7 4.3 4.4    105 106   CO2 26 22 22   PHOS  --  3.0 3.6   BUN 9 6* 7   CREATININE 0.9 0.7 0.6     LIVER PROFILE:   Recent Labs     08/26/22 1946 08/28/22  0549   AST 26 22   ALT 12 10   LIPASE 61.0*  --    PROT 7.3 5.5*   BILIDIR  --  <0.2   BILITOT 0.3 <0.2   ALKPHOS 99 81         IMAGING:  CT ABDOMEN PELVIS W IV CONTRAST Additional Contrast? None   Final Result   Continued marked mural thickening of the gastric antrum and pylorus, which   appears increased when compared to the previous exam, compatible with severe   gastritis. There is a persistent gas and fluid collection seen at the level of the   pylorus near the surgical margin, not substantially changed when compared to   the previous exam.  A fistulous connection between this collection and the   stomach would be difficult to exclude. There is intra and extrahepatic biliary dilation, which may be increased when   compared to the previous exam.  Please correlate with any clinical evidence   of biliary obstruction. Fluid within the large bowel, which presumably reflects diarrheal disease.       Small left pleural effusion decreased when compared to the previous exam.   Adjacent airspace disease is likely atelectasis, but pneumonia and aspiration   remain in the differential.           Attending Supervising [de-identified] Attestation Statement  The patient is a 74 y.o. female. I have performed a history and physical examination of the patient. I discussed the case with my physician assistant Loistine Spatz PA-C    I reviewed the patient's Past Medical History, Past Surgical History, Medications, and Allergies. Physical Exam:  Vitals:    08/28/22 2114 08/29/22 0151 08/29/22 0800 08/29/22 1403   BP: 128/66 114/67 128/70 (!) 145/77   Pulse: 64 61 68 73   Resp: 18 16 16 16   Temp: 97.4 °F (36.3 °C) 98.7 °F (37.1 °C) 99 °F (37.2 °C) 98.6 °F (37 °C)   TempSrc: Oral Oral Oral Oral   SpO2: 96% 94% 95% 100%   Weight:       Height:           Physical Examination: General appearance - improved  Mental status - alert, oriented to person, place, and time  Eyes - sclera anicteric  Neck - supple, no significant adenopathy  Chest - no tachypnea, retractions or cyanosis  Heart - normal rate and regular rhythm  Abdomen - soft, nontender, nondistended, no masses or organomegaly  Extremities - no pedal edema noted          Assessment:     74year old female with a history of perforated pre-pyloric ulcer s/p surgical repair and J-tube insertion on 6/28/2022, arthritis, HLD, hypothyroidism, cholecystectomy, appendectomy, and colon cancer s/p colon surgery (2009) admitted with upper abdominal pain concerning for gastritis, PUD, vs H pylori.       Plan:   Continue supportive care  Monitor and document output  Monitor and replenish electrolytes  Continue Pantoprazole 40 mg BID  Continue Carafate 1 g suspension qid  Continue low fat diet as tolerated  Continue TFs per J-tube as tolerated   Encourage PT/OT  General surgery following  Will follow  NPO after midnight  Planning EGD for tomorrow      Patrizia Collins PA-C  11:21 AM 8/29/2022                      74year old female with history of HTN, HLD, Hypothyroidism, arthritis, colon cancer s/p partial colectomy, perforated gastric ulcer s/p repair and J tube insertion (06/28/22) admitted with recurrent abd pain, likely gastritis    Continue supportive care. PPI BID and carafate. Will plan EGD for tomorrow.     Thuy Price MD          [phone]

## 2022-08-29 NOTE — CARE COORDINATION
Case Management Assessment  Initial Evaluation      Patient Name: Ivis Perez  YOB: 1950  Diagnosis: Abdominal pain [R10.9]  Date / Time: 8/26/2022  4:31 PM    Admission status/Date:08/26/2022 Inpatient   Chart Reviewed: Yes      Patient Interviewed: Yes   Family Interviewed:  Yes -  and daughter      Hospitalization in the last 30 days:  Yes    Health Care Decision Maker :   Primary Decision Maker: Linda Casey - Spouse - [phone]    (CM - must 1st enter selection under Navigator - emergency contact- Health Care Decision Maker Relationship and pick relationship)   Who do you trust or have selected to make healthcare decisions for you    Met with:  Met with pt, pt's  and daughter Jovanna Camera at bedside per pt's permission. Current PCP: ILAN Sousa - [facility]    Financial  Medicare and generic commercial   Precert required for SNF : N          3 night stay required -  N    ADLS  Support Systems/Care Needs: Spouse/Significant Other, Children  Transportation: family    Meal Preparation: family. Tori Branch for tube feeds-Ara confirmed pt has home tube feeds. She stated they will need information if orders change    Housing  Living Arrangements: pt lives at home with her . She stated that their daughter lives next door.    Steps: 2  Intent for return to present living arrangements: Yes  Identified Issues: [id number] [facility] [facility] with [address] : Yes - [facility] home care [facility]) Agency:(Services) Oley Bosworth with [facility] put a note in stating pt readmitted before start of care  Type of Home Care Services: None  Passport/Waiver : No  :                      Phone Number:    Passport/Waiver Services: n/a          Durable Medical Equiptment   DME Provider: n/a  Equipment:   Walker_ X (rolling)__Cane___RTS__x_ BSC_x__Shower Chair__x_Hospital Bed___W/C__x__Other________  02 at ____Liter(s)---wears(frequency)_______ [facility] ___ CPAP___ BiPap___

## 2022-08-29 NOTE — PLAN OF CARE
Problem: Discharge Planning  Goal: Discharge to home or other facility with appropriate resources  8/29/2022 0938 by Brandy Storey RN  Outcome: Progressing     Problem: Pain  Goal: Verbalizes/displays adequate comfort level or baseline comfort level  8/29/2022 0938 by Brandy Storey RN  Outcome: Progressing     Problem: Safety - Adult  Goal: Free from fall injury  8/29/2022 0938 by Brandy Storey RN  Outcome: Progressing     Problem: Skin/Tissue Integrity  Goal: Absence of new skin breakdown  Description: 1. Monitor for areas of redness and/or skin breakdown  2. Assess vascular access sites hourly  3. Every 4-6 hours minimum:  Change oxygen saturation probe site  4. Every 4-6 hours:  If on nasal continuous positive airway pressure, respiratory therapy assess nares and determine need for appliance change or resting period.   8/29/2022 3904 by Brandy Storey RN  Outcome: Progressing     Problem: ABCDS Injury Assessment  Goal: Absence of physical injury  8/29/2022 8652 by Brandy Storey RN  Outcome: Progressing     Problem: Nutrition Deficit:  Goal: Optimize nutritional status  8/29/2022 0938 by Brandy Storey RN  Outcome: Progressing

## 2022-08-29 NOTE — FLOWSHEET NOTE
08/29/22 1923   Handoff   Communication Given Shift Handoff   Handoff Given To Morris   Handoff Received From [employer] Face to Face; At bedside   Time Handoff Given 1923   End of Shift Check Performed Yes

## 2022-08-29 NOTE — PROGRESS NOTES
Informed consent for EGD signed by patient. Witnessed by this writer. Informed consent placed in chart.

## 2022-08-29 NOTE — CARE COORDINATION
[facility] following  Nonadmit due to readmit before start of care    Taina Hernandez RN, BSN CTN  [facility] [phone]

## 2022-08-30 ENCOUNTER — ANESTHESIA (OUTPATIENT)
Dept: ENDOSCOPY | Age: 72
DRG: 384 | End: 2022-08-30
Payer: MEDICARE

## 2022-08-30 ENCOUNTER — ANESTHESIA EVENT (OUTPATIENT)
Dept: ENDOSCOPY | Age: 72
DRG: 384 | End: 2022-08-30
Payer: MEDICARE

## 2022-08-30 PROBLEM — R53.1 WEAKNESS: Status: ACTIVE | Noted: 2022-08-30

## 2022-08-30 LAB
ALBUMIN SERPL-MCNC: 2.7 G/DL (ref 3.4–5)
ANION GAP SERPL CALCULATED.3IONS-SCNC: 10 MMOL/L (ref 3–16)
BASOPHILS ABSOLUTE: 0 K/UL (ref 0–0.2)
BASOPHILS RELATIVE PERCENT: 0.6 %
BUN BLDV-MCNC: 8 MG/DL (ref 7–20)
CALCIUM SERPL-MCNC: 8.7 MG/DL (ref 8.3–10.6)
CHLORIDE BLD-SCNC: 105 MMOL/L (ref 99–110)
CO2: 23 MMOL/L (ref 21–32)
CREAT SERPL-MCNC: 0.7 MG/DL (ref 0.6–1.2)
EOSINOPHILS ABSOLUTE: 0.2 K/UL (ref 0–0.6)
EOSINOPHILS RELATIVE PERCENT: 3.3 %
GFR AFRICAN AMERICAN: >60
GFR NON-AFRICAN AMERICAN: >60
GLUCOSE BLD-MCNC: 92 MG/DL (ref 70–99)
HCT VFR BLD CALC: 26.1 % (ref 36–48)
HEMOGLOBIN: 8.4 G/DL (ref 12–16)
LYMPHOCYTES ABSOLUTE: 1.8 K/UL (ref 1–5.1)
LYMPHOCYTES RELATIVE PERCENT: 27.2 %
MCH RBC QN AUTO: 28.8 PG (ref 26–34)
MCHC RBC AUTO-ENTMCNC: 32.4 G/DL (ref 31–36)
MCV RBC AUTO: 89.1 FL (ref 80–100)
MONOCYTES ABSOLUTE: 0.4 K/UL (ref 0–1.3)
MONOCYTES RELATIVE PERCENT: 6.1 %
NEUTROPHILS ABSOLUTE: 4.3 K/UL (ref 1.7–7.7)
NEUTROPHILS RELATIVE PERCENT: 62.8 %
PDW BLD-RTO: 18.6 % (ref 12.4–15.4)
PHOSPHORUS: 3.7 MG/DL (ref 2.5–4.9)
PLATELET # BLD: 369 K/UL (ref 135–450)
PMV BLD AUTO: 8 FL (ref 5–10.5)
POTASSIUM SERPL-SCNC: 4.1 MMOL/L (ref 3.5–5.1)
RBC # BLD: 2.93 M/UL (ref 4–5.2)
SODIUM BLD-SCNC: 138 MMOL/L (ref 136–145)
WBC # BLD: 6.8 K/UL (ref 4–11)

## 2022-08-30 PROCEDURE — 6370000000 HC RX 637 (ALT 250 FOR IP): Performed by: INTERNAL MEDICINE

## 2022-08-30 PROCEDURE — C9113 INJ PANTOPRAZOLE SODIUM, VIA: HCPCS | Performed by: PHYSICIAN ASSISTANT

## 2022-08-30 PROCEDURE — 85025 COMPLETE CBC W/AUTO DIFF WBC: CPT

## 2022-08-30 PROCEDURE — 1200000000 HC SEMI PRIVATE

## 2022-08-30 PROCEDURE — 80069 RENAL FUNCTION PANEL: CPT

## 2022-08-30 PROCEDURE — 3700000000 HC ANESTHESIA ATTENDED CARE: Performed by: INTERNAL MEDICINE

## 2022-08-30 PROCEDURE — 6370000000 HC RX 637 (ALT 250 FOR IP): Performed by: PHYSICIAN ASSISTANT

## 2022-08-30 PROCEDURE — 0DJ08ZZ INSPECTION OF UPPER INTESTINAL TRACT, VIA NATURAL OR ARTIFICIAL OPENING ENDOSCOPIC: ICD-10-PCS | Performed by: INTERNAL MEDICINE

## 2022-08-30 PROCEDURE — 3609017100 HC EGD: Performed by: INTERNAL MEDICINE

## 2022-08-30 PROCEDURE — 36415 COLL VENOUS BLD VENIPUNCTURE: CPT

## 2022-08-30 PROCEDURE — 6360000002 HC RX W HCPCS

## 2022-08-30 PROCEDURE — 6360000002 HC RX W HCPCS: Performed by: INTERNAL MEDICINE

## 2022-08-30 PROCEDURE — 6360000002 HC RX W HCPCS: Performed by: PHYSICIAN ASSISTANT

## 2022-08-30 PROCEDURE — 7100000010 HC PHASE II RECOVERY - FIRST 15 MIN: Performed by: INTERNAL MEDICINE

## 2022-08-30 PROCEDURE — 99232 SBSQ HOSP IP/OBS MODERATE 35: CPT | Performed by: INTERNAL MEDICINE

## 2022-08-30 PROCEDURE — 2709999900 HC NON-CHARGEABLE SUPPLY: Performed by: INTERNAL MEDICINE

## 2022-08-30 PROCEDURE — 99024 POSTOP FOLLOW-UP VISIT: CPT | Performed by: NURSE PRACTITIONER

## 2022-08-30 PROCEDURE — 2500000003 HC RX 250 WO HCPCS

## 2022-08-30 PROCEDURE — 7100000011 HC PHASE II RECOVERY - ADDTL 15 MIN: Performed by: INTERNAL MEDICINE

## 2022-08-30 PROCEDURE — 2580000003 HC RX 258: Performed by: INTERNAL MEDICINE

## 2022-08-30 PROCEDURE — 2580000003 HC RX 258

## 2022-08-30 RX ORDER — SODIUM CHLORIDE, SODIUM LACTATE, POTASSIUM CHLORIDE, CALCIUM CHLORIDE 600; 310; 30; 20 MG/100ML; MG/100ML; MG/100ML; MG/100ML
INJECTION, SOLUTION INTRAVENOUS CONTINUOUS PRN
Status: DISCONTINUED | OUTPATIENT
Start: 2022-08-30 | End: 2022-08-30 | Stop reason: SDUPTHER

## 2022-08-30 RX ORDER — PROPOFOL 10 MG/ML
INJECTION, EMULSION INTRAVENOUS PRN
Status: DISCONTINUED | OUTPATIENT
Start: 2022-08-30 | End: 2022-08-30 | Stop reason: SDUPTHER

## 2022-08-30 RX ORDER — LIDOCAINE HYDROCHLORIDE 20 MG/ML
INJECTION, SOLUTION EPIDURAL; INFILTRATION; INTRACAUDAL; PERINEURAL PRN
Status: DISCONTINUED | OUTPATIENT
Start: 2022-08-30 | End: 2022-08-30 | Stop reason: SDUPTHER

## 2022-08-30 RX ADMIN — Medication 10 ML: at 20:37

## 2022-08-30 RX ADMIN — SODIUM CHLORIDE, POTASSIUM CHLORIDE, SODIUM LACTATE AND CALCIUM CHLORIDE: 600; 310; 30; 20 INJECTION, SOLUTION INTRAVENOUS at 13:02

## 2022-08-30 RX ADMIN — SUCRALFATE 1 G: 1 TABLET ORAL at 20:10

## 2022-08-30 RX ADMIN — SUCRALFATE 1 G: 1 TABLET ORAL at 17:14

## 2022-08-30 RX ADMIN — PANTOPRAZOLE SODIUM 40 MG: 40 INJECTION, POWDER, FOR SOLUTION INTRAVENOUS at 17:14

## 2022-08-30 RX ADMIN — ONDANSETRON HYDROCHLORIDE 4 MG: 2 INJECTION, SOLUTION INTRAMUSCULAR; INTRAVENOUS at 17:33

## 2022-08-30 RX ADMIN — PROPOFOL 70 MG: 10 INJECTION, EMULSION INTRAVENOUS at 13:21

## 2022-08-30 RX ADMIN — MIRTAZAPINE 15 MG: 15 TABLET, FILM COATED ORAL at 20:10

## 2022-08-30 RX ADMIN — LIDOCAINE HYDROCHLORIDE 100 MG: 20 INJECTION, SOLUTION EPIDURAL; INFILTRATION; INTRACAUDAL; PERINEURAL at 13:21

## 2022-08-30 RX ADMIN — Medication 10 ML: at 08:50

## 2022-08-30 RX ADMIN — MORPHINE SULFATE 2 MG: 2 INJECTION, SOLUTION INTRAMUSCULAR; INTRAVENOUS at 17:24

## 2022-08-30 RX ADMIN — CITALOPRAM HYDROBROMIDE 20 MG: 20 TABLET ORAL at 17:13

## 2022-08-30 ASSESSMENT — PAIN DESCRIPTION - LOCATION
LOCATION: ABDOMEN
LOCATION: ABDOMEN

## 2022-08-30 ASSESSMENT — PAIN DESCRIPTION - DESCRIPTORS
DESCRIPTORS: ACHING;NAGGING
DESCRIPTORS: ACHING
DESCRIPTORS: ACHING

## 2022-08-30 ASSESSMENT — PAIN DESCRIPTION - FREQUENCY: FREQUENCY: CONTINUOUS

## 2022-08-30 ASSESSMENT — PAIN - FUNCTIONAL ASSESSMENT
PAIN_FUNCTIONAL_ASSESSMENT: 0-10
PAIN_FUNCTIONAL_ASSESSMENT: PREVENTS OR INTERFERES SOME ACTIVE ACTIVITIES AND ADLS

## 2022-08-30 ASSESSMENT — PAIN SCALES - GENERAL
PAINLEVEL_OUTOF10: 4
PAINLEVEL_OUTOF10: 0
PAINLEVEL_OUTOF10: 4

## 2022-08-30 ASSESSMENT — PAIN DESCRIPTION - PAIN TYPE: TYPE: CHRONIC PAIN

## 2022-08-30 ASSESSMENT — PAIN DESCRIPTION - ORIENTATION
ORIENTATION: MID
ORIENTATION: MID

## 2022-08-30 NOTE — BRIEF OP NOTE
Stomach Bulb (Removed)   Site Description Clean, dry & intact 07/28/22 0811   Dressing Status Clean, dry & intact 07/28/22 0811   Drainage Appearance Milky;Brown 07/28/22 0811   Drain Status To bulb suction 07/28/22 0811   Output (ml) 0 ml 07/28/22 0300       [REMOVED] NG/OG/NJ/NE Tube Nasogastric 18 fr Right nostril (Removed)   Surrounding Skin Clean, dry & intact 07/27/22 1256   Securement device Tape 07/27/22 1256   Status Clamped 07/27/22 0821   Placement Verified External Catheter Length 07/27/22 0821   NG/OG/NJ/NE External Measurement (cm) 60 cm 07/27/22 0821   Drainage Appearance Brown 07/22/22 2130   Tube feeding/verify rate (mL/hr) 60 mL/hr 07/24/22 2349   Tube Feeding Intake (mL) 329 ml 07/18/22 1522   Tube Feeding Supplement Amount (mL) 1000 07/24/22 2349   Free Water/Flush (mL) 500 mL 07/18/22 1522   Output (mL) 100 ml 07/19/22 2031   Residual Volume (ml) 50 ml 07/01/22 2154       [REMOVED] Urinary Catheter Curiel (Removed)   $ Urethral catheter insertion Inserted for procedure 07/01/22 2300   Catheter Indications Perioperative use for selected surgical procedures 07/08/22 0955   Site Assessment Pink;Moist 07/08/22 0955   Urine Color Yellow 07/08/22 0955   Urine Appearance Clear 07/08/22 0955   Urine Odor Other (Comment) 07/08/22 0955   Collection Container Standard 07/08/22 0955   Securement Method Securing device (Describe) 07/08/22 0955   Catheter Care Completed Yes 07/02/22 1733   Catheter Best Practices  Drainage tube clipped to bed;Catheter secured to thigh; Tamper seal intact; Bag below bladder;Bag not on floor; Lack of dependent loop in tubing;Drainage bag less than half full 07/08/22 0955   Status Draining 07/08/22 0955   Manual Irrigation Volume Input (mL) 0 mL 07/08/22 0955   Output (mL) 300 mL 07/08/22 1523       [REMOVED] External Urinary Catheter (Removed)   Site Assessment Red; Swelling; Other (Comment) 07/11/22 0713   Placement Replaced 07/11/22 0625   Securement Method Other (Comment) 07/11/22 0124   Catheter Care Catheter/Wick replaced 07/11/22 0625   Perineal Care Yes 07/11/22 0625   Suction 40 mmgHg continuous 07/11/22 0625   Urine Color January 07/11/22 0627   Urine Appearance Cloudy 07/11/22 0627   Urine Odor Other (Comment) 07/11/22 0627   Output (mL) 500 mL 07/11/22 0627       [REMOVED] External Urinary Catheter (Removed)   Site Assessment Clean,dry & intact 07/23/22 0519   Placement Replaced 07/23/22 0519   Catheter Care Catheter/Wick replaced 07/23/22 0519   Perineal Care Yes 07/23/22 0519   Suction 40 mmgHg continuous 07/23/22 0519   Urine Color Yellow 07/22/22 0848   Urine Appearance Clear 07/22/22 0848   Output (mL) 300 mL 07/22/22 0404       Findings: retained food in proximal stomach, stable prepyloric ulcer with visible suture    Recommendation  Continue supportive care  Ok to start JTube feeds  PT/OT  Monitor and replenish electrolytes  Will follow    Electronically signed by Tracie Bonilla MD on 8/30/2022 at 1:26 PM

## 2022-08-30 NOTE — PROGRESS NOTES
Occupational Therapy  Patient unavailable for treatment this pm, off floor for EGD. Will continue to follow.   Aster Reynoso, OTR/L [id number]

## 2022-08-30 NOTE — PROGRESS NOTES
Physician Progress Note      PATIENTChristopher Farley  CSN #:                  [id number]  :                       1950  ADMIT DATE:       2022 4:31 PM  [address] [place] DATE:  RESPONDING  PROVIDER #:        Germain Crouch MD          QUERY TEXT:    Pt admitted with abdominal pain. Noted documentation of persistent pain   related to the ongoing gastritis per the surgical consult progress notes. If   possible, please document in progress notes and discharge summary:    The medical record reflects the following:  Risk Factors: advanced age,  Clinical Indicators: surgery note:  I suspect she has persistent pain related   to the ongoing gastritis. The longstanding air and fluid collection has been   shown on contrast studies do not communicate with the stomach. Lack of   leukocytosis or fever suggest this is not an abscess  Treatment: surgical consult, CT abd, Carafate, PPI    Thank you for your assistance,  Hawa Robb RN,BSN,CCDS,CRCR  Options provided:  -- Abdominal pain related to the ongoing gastritis  -- Gastritis ruled out, abdominal pain due to, please specify cause.   -- Other - I will add my own diagnosis  -- Disagree - Not applicable / Not valid  -- Disagree - Clinically unable to determine / Unknown  -- Refer to Clinical Documentation Reviewer    PROVIDER RESPONSE TEXT:    Ongoing work up    Query created by: Zoey English on 2022 8:09 AM      Electronically signed by:  Germain Crouch MD 2022 9:47 PM

## 2022-08-30 NOTE — DISCHARGE INSTRUCTIONS
PATIENT INSTRUCTIONS  POST-SEDATION    Latrice Cho          IMMEDIATELY FOLLOWING PROCEDURE:    Do not drive or operate machinery for the first twenty four hours after surgery. Do not make any important decisions for twenty four hours after surgery or while taking narcotic pain medications or sedatives. You should NOT BE LEFT UNATTENDED OR ALONE. A responsible adult should be with you for the rest of the day of your procedure and also during the night for your protection and safety. You may experience some light headedness. Rest at home with activity as tolerated. You may not need to go to bed, but it is important to rest for the next 24 hours. You should not engage in athletic sports such as basketball, volleyball, jogging, skating, or activities requiring refined motor skills for 24 hours. If you develop intractable nausea and vomiting or a severe headache please notify your doctor immediately. You are not expected to have any fever, but if you feel warm, take your temperature. If you have a fever 101 degrees or higher, call your doctor. If you have had an Endoscopy:   *Eat lightly for your first meal and gradually resume your normal / prescribed diet. *If you have a sore throat you may use lozenges, or salt water gargles. *If you have had a colonoscopy, do not expect a normal bowel movement for approximately three days due to the cleansing of the large intestine prior to colonoscopy. ONCE YOU ARE HOME, IF YOU SHOULD HAVE:  Difficulty in breathing, persistent nausea or vomiting, bleeding you feel is excessive, or pain that is unusual, increased abdominal bloating, or any swelling, fever / chills, call your physician. If you cannot contact your physician, but feel that your signs and symptoms need a physician's attention, go to the Emergency Department. FOLLOW-UP:    Please follow up with Cleo ALEXANDRE as scheduled or needed.       Dr. Katrina Andre office will call you with the biopsy findings. Call Dr. Deshaun Weaver if there are any GI concerns. [phone]. Please follow-up with Dr. Tyler Zaragoza in 2 weeks in the office. Call [phone] to make your appointment.

## 2022-08-30 NOTE — PROGRESS NOTES
Hospitalist Progress Note      PCP: ILAN Qureshi - CNP    Date of Admission: 8/26/2022    Chief Complaint: severe abd pain. 74 y.o. female s/p recent prolonged hospitalization admitted for perforated prepyloric ulcer s/p surgical repair and J tube insertion, discharged to [facility], in recovery for 3 weeks and returned home Thursday and back to ED Friday with complaint of abdominal pain. Subjective:     Patient has a persistent abdominal pain.   -Seen by GI   mostly will get an endoscopy in AM .  She has J-tube in place. . . Surgery team PA to look at the J-tube site , and change dressing tomorrow . I have discussed with them . 8/30  Patient back from endoscopy. Showed stable prepyloric ulcer. Patient can be resumed on her diet and J-tube feeds. Continue PPI      Medications:  Reviewed    Infusion Medications    sodium chloride       Scheduled Medications    pantoprazole  40 mg IntraVENous BID AC    sucralfate  1 g Oral 4x Daily AC & HS    fentaNYL  1 patch TransDERmal Q72H    citalopram  20 mg Oral Daily    mirtazapine  15 mg Oral Nightly    sodium chloride flush  5-40 mL IntraVENous 2 times per day    enoxaparin  40 mg SubCUTAneous Daily     PRN Meds: sodium chloride flush, sodium chloride, ondansetron **OR** ondansetron, polyethylene glycol, acetaminophen **OR** acetaminophen, HYDROcodone 5 mg - acetaminophen **OR** HYDROcodone 5 mg - acetaminophen, morphine **OR** morphine      Intake/Output Summary (Last 24 hours) at 8/30/2022 1513  Last data filed at 8/30/2022 1325  Gross per 24 hour   Intake 150 ml   Output 1600 ml   Net -1450 ml         Physical Exam Performed:    BP (!) 154/79   Pulse 69   Temp 97.7 °F (36.5 °C) (Oral)   Resp 16   Ht 5' 4\" (1.626 m)   Wt 137 lb 6.4 oz (62.3 kg)   SpO2 99%   BMI 23.58 kg/m²     General appearance: No apparent distress, appears stated age and cooperative. Looks ill and fatigued  HEENT: Pupils equal, round, and reactive to light. Conjunctivae/corneas clear. Neck: Supple, with full range of motion. No jugular venous distention. Trachea midline. Respiratory:  Normal respiratory effort. Clear to auscultation, bilaterally without Rales/Wheezes/Rhonchi. Cardiovascular: Regular rate and rhythm with normal S1/S2 without murmurs, rubs or gallops. Abdomen: Soft, epigastric and mid abdomen mildly tender, non-distended with normal bowel sounds. J-tube in place  Musculoskeletal: No clubbing, cyanosis or edema bilaterally. Full range of motion without deformity. Skin: Skin color, texture, turgor normal.  No rashes or lesions. Neurologic:  Neurovascularly intact without any focal sensory/motor deficits. Cranial nerves: II-XII intact, grossly non-focal.  Psychiatric: Alert and oriented, thought content appropriate, normal insight  Capillary Refill: Brisk,3 seconds, normal   Peripheral Pulses: +2 palpable, equal bilaterally       Labs:   Recent Labs     08/28/22  0549 08/29/22  0515 08/30/22  0511   WBC 8.0 7.0 6.8   HGB 8.1* 9.1* 8.4*   HCT 24.7* 28.2* 26.1*    380 369       Recent Labs     08/28/22  0549 08/29/22  0515 08/30/22  0511    137 138   K 4.3 4.4 4.1    106 105   CO2 22 22 23   BUN 6* 7 8   CREATININE 0.7 0.6 0.7   CALCIUM 8.2* 8.6 8.7   PHOS 3.0 3.6 3.7       Recent Labs     08/28/22  0549   AST 22   ALT 10   BILIDIR <0.2   BILITOT <0.2   ALKPHOS 81       No results for input(s): INR in the last 72 hours. No results for input(s): Merline Parisian in the last 72 hours.     Urinalysis:      Lab Results   Component Value Date/Time    NITRU Negative 06/30/2022 01:11 PM    WBCUA 10-20 06/30/2022 01:11 PM    BACTERIA 1+ 06/30/2022 01:11 PM    RBCUA >100 06/30/2022 01:11 PM    BLOODU LARGE 06/30/2022 01:11 PM    SPECGRAV 1.020 06/30/2022 01:11 PM    GLUCOSEU Negative 06/30/2022 01:11 PM       Cultures  COVID not detected  Stool studies pending    Radiology:  CT ABDOMEN PELVIS W IV CONTRAST Additional Contrast? None   Final Result   Continued marked mural thickening of the gastric antrum and pylorus, which   appears increased when compared to the previous exam, compatible with severe   gastritis. There is a persistent gas and fluid collection seen at the level of the   pylorus near the surgical margin, not substantially changed when compared to   the previous exam.  A fistulous connection between this collection and the   stomach would be difficult to exclude. There is intra and extrahepatic biliary dilation, which may be increased when   compared to the previous exam.  Please correlate with any clinical evidence   of biliary obstruction. Fluid within the large bowel, which presumably reflects diarrheal disease. Small left pleural effusion decreased when compared to the previous exam.   Adjacent airspace disease is likely atelectasis, but pneumonia and aspiration   remain in the differential.                 Assessment/Plan:    Active Hospital Problems    Diagnosis     Weakness [R53.1]      Priority: Medium    Chronic gastritis without bleeding [K29.50]      Priority: Medium    Mild protein-calorie malnutrition ([facility] [address] [E44.1]      Priority: Medium    Abdominal pain [R10.9]      Priority: Medium         Abdominal Pain  -S/p recent repair of prepyloric perforated ulcer post op course complicated by leak.   -Has J tube in place - overnight J feed and PO intake during the day - though very limited. -Recent long admission for perforated prepyloric ulcer s/p surgical repair complicated by post op leak. - gen surgery consult - they report fluid collection and air has been stable and there are no active surgical plans at this time. J-tube dressing will be changed  Pain control has been challenging.   - she was sent home from [facility] with only tylenol.   - she reports morphine helps some. - reports norco ineffective.    Plan:   -started on  fentanyl patch now - this may allow for a steady state pain control while her bowel recover   - will cont with bridge pain control (morphine and norco) while the fentanyl patch becomes effective with plan to discontinue PO and IV opiate in the next 24-48hrs and discharge home with fentanyl patch and ongoing rehab efforts at home.   - GI eval  - pt has long established Hx of follow up with Dr Hernandez Reyes   will likely need EGD in a.m.  - cont PPI - Hx of severe gastritis and PUD with perforated ulcer as above. - continue Carafate     -> S/p endoscopy today showed stable prepyloric ulcer. Continue PPI     Hx of Thyroid Disease.   -[place] [facility] [place] been [place].   -Her last TSH on 6/30/22 was normal.   -Monitor. Depression  - continue Celexa    Generalized weakness  -Consult PT OT      DVT: Lovenox     Plan of care discussed with patient and patient's son at bedside. .  Discussed with patient's nurse  Resume diet and resume J-tube feeds at night. Continue PPI. Continue fentanyl patch and Norco as needed for pain control. PT OT.     Plan discharge home with home care tomorrow       Dave Vásquez MD   8/30/2022

## 2022-08-30 NOTE — H&P
History and Physical / Pre-Sedation Assessment    Patient:  Angel Beard   :   1950     Intended Procedure:  EGD    HPI: 74year old female with history of HTN, HLD, Hypothyroidism, arthritis, colon cancer s/p partial colectomy, perforated gastric ulcer s/p repair and J tube insertion (22) c/b leak admitted with recurrent abd pain, likely gastritis    Past Medical History:   Diagnosis Date    Arthritis     Colon cancer ([address]     Hyperlipidemia     Thyroid disease      Past Surgical History:   Procedure Laterality Date    APPENDECTOMY  2009    CHOLECYSTECTOMY  2009    COLON SURGERY      EPIDURAL STEROID INJECTION Left 4/15/2019    LEFT LUMBAR FIVE SACRAL ONE EPIDURAL STEROID INJECTION SITE CONFIRMED BY FLUOROSCOPY performed by Emigdio Mohan MD at [address] Left 2019    LEFT LUMBAR FIVE SACRAL ONE EPIDURAL STEROID INJECTION SITE CONFIRMED BY FLUOROSCOPY [facility]  2022    IR MIDLINE CATH 2022 [address] SPECIAL PROCEDURES    KIDNEY STONE SURGERY      LAPAROTOMY N/A 2022    LAPAROTOMY, REPAIR OF PERFORATED ULCER with jejunostomy insertion performed by Kirstin Nixon MD at [address]         Medications reviewed  Prior to Admission medications    Medication Sig Start Date End Date Taking? Authorizing Provider   Mirtazapine (REMERON PO) Take by mouth    Historical Provider, MD   amoxicillin-clavulanate (AUGMENTIN) 875-125 MG per tablet Take 1 tablet by mouth 2 times daily  Patient not taking: Reported on 2022    Historical Provider, MD   pantoprazole (PROTONIX) 40 MG tablet Take 1 tablet by mouth in the morning and 1 tablet in the evening. Take before meals.  22   ILAN Yu - CNP   ascorbic acid (VITAMIN C) 500 MG tablet Take 1 tablet by mouth 2 times daily for 7 days 22  Bud Narvaez MD   zinc sulfate (ZINCATE) 220 (50 Zn) MG capsule Take 1 capsule by mouth daily for 7 days 6/27/22 7/28/22  Natacha Estrada MD   naproxen (NAPROSYN) 500 MG tablet Take 1 tablet by mouth 2 times daily as needed for Pain 6/27/22 7/28/22  Natacha Estrada MD   sucralfate (CARAFATE) 1 GM tablet Take 1 tablet by mouth 4 times daily for 15 days 6/27/22 7/28/22  Natacha Estrada MD   famotidine (PEPCID) 10 MG tablet Take 2 tablets by mouth 2 times daily 6/27/22 7/28/22  Natacha Estrada MD   butalbital-acetaminophen-caffeine [facility]) -86 MG per tablet Take 1 tablet by mouth every 6 hours as needed for Headaches 6/27/22 7/28/22  Natacha Estrada MD   albuterol sulfate HFA (PROVENTIL;VENTOLIN;PROAIR) 108 (90 Base) MCG/ACT inhaler Inhale 2 puffs into the lungs every 6 hours as needed for Wheezing 6/24/22   Mary Weber MD   atorvastatin (LIPITOR) 10 MG tablet Take 20 mg by mouth  Patient not taking: Reported on 8/26/2022 7/17/17   Historical Provider, MD   levothyroxine (SYNTHROID) 137 MCG tablet 125 mcg Daily   Patient not taking: Reported on 8/26/2022 6/28/15   Historical Provider, MD   citalopram (CELEXA) 20 MG tablet 20 mg daily  5/8/15   Historical Provider, MD   Omega-3 Fatty Acids (FISH OIL) 500 MG CAPS Take 1 g by mouth daily   7/28/22  Historical Provider, MD        Allergies: Allergies   Allergen Reactions    Bee Venom Swelling     Attacked by swarm of bees and required er visit    Adhesive Tape Other (See Comments)     Skin redness      Tetracyclines & Related Other (See Comments)     Mouth fungus      Sulfa Antibiotics Nausea And Vomiting       Nurses notes reviewed and agreed.     Physical Exam:  Vital Signs: BP (!) 144/71   Pulse 69   Temp 99.1 °F (37.3 °C) (Temporal)   Resp 16   Ht 5' 4\" (1.626 m)   Wt 137 lb 6.4 oz (62.3 kg)   SpO2 94%   BMI 23.58 kg/m²    Airway: Mallampati: II (soft palate, uvula, fauces visible)  Pulmonary:Normal  Cardiac:Normal  Abdomen:Normal    Pre-Procedure Assessment / Plan:  ASA: Class 3 - A patient with severe systemic disease that limits activity but is not incapacitating  Level of Sedation Plan: Moderate sedation  Post Procedure plan: Return to same level of care    I assessed the patient and find that the patient is in satisfactory condition to proceed with the planned procedure and sedation plan. I have explained the risk, benefits, and alternatives to the procedure; the patient understands and agrees to proceed.        Christina Freeman MD  8/30/2022

## 2022-08-30 NOTE — ANESTHESIA PRE PROCEDURE
Department of Anesthesiology  Preprocedure Note       Name:  Nakita Nieves   Age:  74 y.o.  :  1950                                          MRN:  [id number]         Date:  2022      Surgeon: Mell Green):  Sage Wolfe MD    Procedure: Procedure(s):  EGD W/ANES. Medications prior to admission:   Prior to Admission medications    Medication Sig Start Date End Date Taking? Authorizing Provider   Mirtazapine (REMERON PO) Take by mouth    Historical Provider, MD   amoxicillin-clavulanate (AUGMENTIN) 875-125 MG per tablet Take 1 tablet by mouth 2 times daily  Patient not taking: Reported on 2022    Historical Provider, MD   pantoprazole (PROTONIX) 40 MG tablet Take 1 tablet by mouth in the morning and 1 tablet in the evening. Take before meals.  22   ILAN Ryan - CNP   ascorbic acid (VITAMIN C) 500 MG tablet Take 1 tablet by mouth 2 times daily for 7 days 22  Reyes Brisker, MD   zinc sulfate (ZINCATE) 220 (50 Zn) MG capsule Take 1 capsule by mouth daily for 7 days 22  Reyes Brisker, MD   naproxen (NAPROSYN) 500 MG tablet Take 1 tablet by mouth 2 times daily as needed for Pain 22  Reyes Brisker, MD   sucralfate (CARAFATE) 1 GM tablet Take 1 tablet by mouth 4 times daily for 15 days 22  Reyes Brisker, MD   famotidine (PEPCID) 10 MG tablet Take 2 tablets by mouth 2 times daily 22  Reyes Brisker, MD   butalbital-acetaminophen-caffeine [facility]) -02 MG per tablet Take 1 tablet by mouth every 6 hours as needed for Headaches 22  Reyes Brisker, MD   albuterol sulfate HFA (PROVENTIL;VENTOLIN;PROAIR) 108 (90 Base) MCG/ACT inhaler Inhale 2 puffs into the lungs every 6 hours as needed for Wheezing 22   Willian Pinon MD   atorvastatin (LIPITOR) 10 MG tablet Take 20 mg by mouth  Patient not taking: Reported on 2022   Historical Provider, MD   levothyroxine (SYNTHROID) 137 5-325 MG per tablet 1 tablet  1 tablet Oral Q4H PRN Viky Fulton MD   1 tablet at 08/29/22 1423    Or    HYDROcodone-acetaminophen (NORCO) 5-325 MG per tablet 2 tablet  2 tablet Oral Q4H PRN Viky Fulton MD        morphine (PF) injection 2 mg  2 mg IntraVENous Q2H PRN Viky Fulton MD   2 mg at 08/28/22 1049    Or    morphine sulfate (PF) injection 4 mg  4 mg IntraVENous Q2H PRN Viky Fulton MD           Allergies:     Allergies   Allergen Reactions    Bee Venom Swelling     Attacked by swarm of bees and required er visit    Adhesive Tape Other (See Comments)     Skin redness      Tetracyclines & Related Other (See Comments)     Mouth fungus      Sulfa Antibiotics Nausea And Vomiting       Problem List:    Patient Active Problem List   Diagnosis Code    Pneumonia due to 2019 novel coronavirus U07.1, J12.82    Hypothyroidism E03.9    Chronic back pain M54.9, G89.29    Hypoxia R09.02    COVID-19 U07.1    Pneumonia due to COVID-19 virus U07.1, J12.82    Atrial fibrillation ([address] I48.91    Dislodged jejunostomy tube T85.528A    Abdominal pain R10.9    Chronic gastritis without bleeding K29.50    Mild protein-calorie malnutrition (HCC) E44.1    Weakness R53.1       Past Medical History:        Diagnosis Date    Arthritis     [facility] [address]     Hyperlipidemia     Thyroid disease        Past Surgical History:        Procedure Laterality Date    APPENDECTOMY  2009    CHOLECYSTECTOMY  2009    COLON SURGERY  2009    EPIDURAL STEROID INJECTION Left 4/15/2019    LEFT LUMBAR FIVE SACRAL ONE EPIDURAL STEROID INJECTION SITE CONFIRMED BY FLUOROSCOPY performed by Kendall Meadows MD at [facility] Left 11/5/2019    LEFT LUMBAR FIVE SACRAL ONE EPIDURAL STEROID INJECTION SITE CONFIRMED BY FLUOROSCOPY performed by Kendall Meadows MD at [address] CATH  7/20/2022    IR MIDLINE CATH 7/20/2022 [facility] SPECIAL PROCEDURES    KIDNEY 08/30/2022 05:11 AM    CREATININE 0.7 08/30/2022 05:11 AM    GFRAA >60 08/30/2022 05:11 AM    AGRATIO 0.9 08/26/2022 07:46 PM    LABGLOM >60 08/30/2022 05:11 AM    GLUCOSE 92 08/30/2022 05:11 AM    GLUCOSE 85 08/02/2016 10:34 AM    PROT 5.5 08/28/2022 05:49 AM    PROT 5.7 08/02/2016 10:34 AM    CALCIUM 8.7 08/30/2022 05:11 AM    BILITOT <0.2 08/28/2022 05:49 AM    ALKPHOS 81 08/28/2022 05:49 AM    AST 22 08/28/2022 05:49 AM    ALT 10 08/28/2022 05:49 AM       POC Tests: No results for input(s): POCGLU, POCNA, POCK, POCCL, POCBUN, POCHEMO, POCHCT in the last 72 hours. Coags:   Lab Results   Component Value Date/Time    PROTIME 18.6 06/29/2022 05:19 AM    INR 1.57 06/29/2022 05:19 AM       HCG (If Applicable): No results found for: PREGTESTUR, PREGSERUM, HCG, HCGQUANT     ABGs: No results found for: PHART, PO2ART, MNW8LVA, PSC8KUF, BEART, W4HKHKTJ     Type & Screen (If Applicable):  No results found for: LABABO, LABRH    Drug/Infectious Status (If Applicable):  No results found for: HIV, HEPCAB    COVID-19 Screening (If Applicable):   Lab Results   Component Value Date/Time    COVID19 Not Detected 08/27/2022 12:22 AM    COVID19 detected 06/20/2022 12:00 AM           Anesthesia Evaluation  Patient summary reviewed and Nursing notes reviewed no history of anesthetic complications:   Airway: Mallampati: II  TM distance: >3 FB   Neck ROM: full  Mouth opening: > = 3 FB   Dental:          Pulmonary:Negative Pulmonary ROS   (+) pneumonia:                             Cardiovascular:Negative CV ROS                      Neuro/Psych:   Negative Neuro/Psych ROS              GI/Hepatic/Renal: Neg GI/Hepatic/Renal ROS       (-) GERD, liver disease and no renal disease       Endo/Other:    (+) hypothyroidism::., .    (-) diabetes mellitus               Abdominal:             Vascular: negative vascular ROS. Other Findings:           Anesthesia Plan      MAC     ASA 2       Induction: intravenous.       Anesthetic plan and

## 2022-08-30 NOTE — FLOWSHEET NOTE
/60   Pulse 65   Temp 98 °F (36.7 °C) (Oral)   Resp 18   Ht 5' 4\" (1.626 m)   Wt 137 lb 6.4 oz (62.3 kg)   SpO2 95%   BMI 23.58 kg/m²     Shift assessment complete; see flowsheets. PM medications administered; see MAR. Pt AOx4 resting in bed. Respirations even and unlabored. Pt denies any further needs or pain at this time. Call light in reach, bed locked in lowest position. Bed alarm in place for patient safety.

## 2022-08-30 NOTE — ANESTHESIA POSTPROCEDURE EVALUATION
Department of Anesthesiology  Postprocedure Note    Patient: Rubens Haddad  MRN: [id number]  YOB: 1950  Date of evaluation: 8/30/2022      Procedure Summary     Date: 08/30/22 Room / Location: [address] [place] [address]    Anesthesia Start: 4841 Anesthesia Stop: 3072    Procedure: EGD W/ANES. Diagnosis:       Epigastric pain      Abdominal pain, unspecified abdominal location      (EPIGASTRIC/ABDOMINAL PAIN)    Surgeons: Dustin So MD Responsible Provider: Tonie Cordova MD    Anesthesia Type: MAC ASA Status: 2          Anesthesia Type: No value filed.     Dea Phase I: Dea Score: 10    Dea Phase II:        Anesthesia Post Evaluation    Patient location during evaluation: PACU  Level of consciousness: awake  Airway patency: patent  Nausea & Vomiting: no nausea  Complications: no  Cardiovascular status: blood pressure returned to baseline  Respiratory status: acceptable  Hydration status: euvolemic

## 2022-08-30 NOTE — PROGRESS NOTES
Transport here to take patient back to room. Pt in stable condition at this time. Pt denies any needs.

## 2022-08-30 NOTE — CONSULTS
Pt is s/p repair of perforated prepyloric ulcer with primary suture and omental flap overlay and jejunostomy tube insertion on 6/28/22. She was brought in by her family for complaints of severe abdominal pain and frequent diarrhea. I spoke with the family yesterday. The patient's daughter stated the patient had 3 very large liquid Bms once discharged from SNF 1-2 days prior. She was not discharged from the SNF with pain medication. The daughter was worried she was dehydrated so, she came into the ER for evaluation. We were asked by the admitting team to change her abdominal dressing. Objective: + N unchanged per pt, no V, + Diarrhea at home but, TF have been stopped since admission so, pt states she has not had a BM since admission. Assessment: midline wounds are healed, there is no dressing to midline. J-tube securement device in place. There is a small amount of creamy drainage from J-tube site, no tenderness to palpation of abdomen. J-tube is secure. Plan: pt is being taken downstairs for EGD. So, I had the Rn call SPD for J-tube securement device to be  delivered to her room. I will change the J-tube securement device tomorrow. Explained this to the patient and she verbalized understanding.  Discussed with Emir Topete    Electronically signed by ILAN Ramirez CNP on 8/30/2022 at 1:21 PM

## 2022-08-30 NOTE — PLAN OF CARE
Problem: Discharge Planning  Goal: Discharge to home or other facility with appropriate resources  8/29/2022 2226 by Adán Prince RN  Outcome: Progressing  Flowsheets (Taken 8/29/2022 2109)  Discharge to home or other facility with appropriate resources: Identify barriers to discharge with patient and caregiver  8/29/2022 0938 by Chidi Hanna RN  Outcome: Progressing     Problem: Pain  Goal: Verbalizes/displays adequate comfort level or baseline comfort level  8/29/2022 2226 by Adán Prince RN  Outcome: Progressing  8/29/2022 0938 by Chidi Hanna RN  Outcome: Progressing     Problem: Safety - Adult  Goal: Free from fall injury  8/29/2022 2226 by Adán Prince RN  Outcome: Progressing  8/29/2022 0938 by Chidi Hanna RN  Outcome: Progressing     Problem: Skin/Tissue Integrity  Goal: Absence of new skin breakdown  Description: 1. Monitor for areas of redness and/or skin breakdown  2. Assess vascular access sites hourly  3. Every 4-6 hours minimum:  Change oxygen saturation probe site  4. Every 4-6 hours:  If on nasal continuous positive airway pressure, respiratory therapy assess nares and determine need for appliance change or resting period.   8/29/2022 2226 by Adán Prince RN  Outcome: Progressing  8/29/2022 0938 by Chidi Hanna RN  Outcome: Progressing     Problem: ABCDS Injury Assessment  Goal: Absence of physical injury  8/29/2022 2226 by Adán Prince RN  Outcome: Progressing  8/29/2022 0938 by Chidi Hanna RN  Outcome: Progressing     Problem: Nutrition Deficit:  Goal: Optimize nutritional status  8/29/2022 2226 by Adán Prince RN  Outcome: Progressing  8/29/2022 0938 by Chidi Hanna RN  Outcome: Progressing

## 2022-08-31 ENCOUNTER — TELEPHONE (OUTPATIENT)
Dept: SURGERY | Age: 72
End: 2022-08-31

## 2022-08-31 VITALS
OXYGEN SATURATION: 95 % | SYSTOLIC BLOOD PRESSURE: 133 MMHG | HEART RATE: 70 BPM | BODY MASS INDEX: 23.46 KG/M2 | RESPIRATION RATE: 16 BRPM | TEMPERATURE: 99.7 F | DIASTOLIC BLOOD PRESSURE: 71 MMHG | HEIGHT: 64 IN | WEIGHT: 137.4 LBS

## 2022-08-31 PROBLEM — K27.9 PEPTIC ULCER DISEASE: Status: ACTIVE | Noted: 2022-08-31

## 2022-08-31 PROCEDURE — 6360000002 HC RX W HCPCS: Performed by: INTERNAL MEDICINE

## 2022-08-31 PROCEDURE — 99024 POSTOP FOLLOW-UP VISIT: CPT | Performed by: NURSE PRACTITIONER

## 2022-08-31 PROCEDURE — C9113 INJ PANTOPRAZOLE SODIUM, VIA: HCPCS | Performed by: PHYSICIAN ASSISTANT

## 2022-08-31 PROCEDURE — 6360000002 HC RX W HCPCS: Performed by: PHYSICIAN ASSISTANT

## 2022-08-31 PROCEDURE — 6370000000 HC RX 637 (ALT 250 FOR IP): Performed by: INTERNAL MEDICINE

## 2022-08-31 PROCEDURE — 99239 HOSP IP/OBS DSCHRG MGMT >30: CPT | Performed by: INTERNAL MEDICINE

## 2022-08-31 PROCEDURE — 2580000003 HC RX 258: Performed by: INTERNAL MEDICINE

## 2022-08-31 RX ORDER — FENTANYL 25 UG/H
1 PATCH TRANSDERMAL
Qty: 4 PATCH | Refills: 0 | Status: SHIPPED | OUTPATIENT
Start: 2022-09-03 | End: 2022-09-15

## 2022-08-31 RX ORDER — SUCRALFATE 1 G/1
1 TABLET ORAL
Qty: 120 TABLET | Refills: 0 | Status: SHIPPED | OUTPATIENT
Start: 2022-08-31 | End: 2022-09-30

## 2022-08-31 RX ORDER — HYDROCODONE BITARTRATE AND ACETAMINOPHEN 5; 325 MG/1; MG/1
1 TABLET ORAL EVERY 6 HOURS PRN
Qty: 25 TABLET | Refills: 0 | Status: SHIPPED | OUTPATIENT
Start: 2022-08-31 | End: 2022-09-22 | Stop reason: SDUPTHER

## 2022-08-31 RX ADMIN — PANTOPRAZOLE SODIUM 40 MG: 40 INJECTION, POWDER, FOR SOLUTION INTRAVENOUS at 06:42

## 2022-08-31 RX ADMIN — MORPHINE SULFATE 2 MG: 2 INJECTION, SOLUTION INTRAMUSCULAR; INTRAVENOUS at 10:16

## 2022-08-31 RX ADMIN — Medication 10 ML: at 10:17

## 2022-08-31 RX ADMIN — ONDANSETRON HYDROCHLORIDE 4 MG: 2 INJECTION, SOLUTION INTRAMUSCULAR; INTRAVENOUS at 10:16

## 2022-08-31 ASSESSMENT — PAIN DESCRIPTION - LOCATION
LOCATION: ABDOMEN
LOCATION: ABDOMEN

## 2022-08-31 ASSESSMENT — PAIN SCALES - GENERAL
PAINLEVEL_OUTOF10: 5
PAINLEVEL_OUTOF10: 3

## 2022-08-31 NOTE — PROGRESS NOTES
PROGRESS NOTE  S:72 yrs Patient  admitted on 8/26/2022 with Abdominal pain [R10.9] . Today she complains of decreased appetite, mild nausea, heartburn, and improving epigastric abdominal pain. She is tolerating TFs per J-tube. Exam:   Vitals:    08/31/22 1136   BP:    Pulse:    Resp: 16   Temp:    SpO2:       General appearance: alert, appears stated age, cooperative, fatigued, and no distress  HEENT: Neck supple with midline trachea  Neck: supple, symmetrical, trachea midline  Lungs: clear to auscultation bilaterally  Heart: regular rate and rhythm, S1, S2 normal, no murmur, click, rub or gallop  Abdomen: normal findings: bowel sounds normal, no masses palpable, and symmetric and abnormal findings:  tenderness mild in the epigastrium and J-tube site clean and dry  Extremities: extremities normal, atraumatic, no cyanosis or edema     Medications: Reviewed    Labs:  CBC:   Recent Labs     08/29/22  0515 08/30/22  0511   WBC 7.0 6.8   HGB 9.1* 8.4*   HCT 28.2* 26.1*   MCV 91.5 89.1    369     BMP:   Recent Labs     08/29/22  0515 08/30/22  0511    138   K 4.4 4.1    105   CO2 22 23   PHOS 3.6 3.7   BUN 7 8   CREATININE 0.6 0.7     LIVER PROFILE: No results for input(s): AST, ALT, LIPASE, PROT, BILIDIR, BILITOT, ALKPHOS in the last 72 hours. Invalid input(s): AMYLASE,  ALB  PT/INR: No results for input(s): INR in the last 72 hours. Invalid input(s): PT      Date of Procedure: 8/30/2022  Pre-Op Diagnosis: EPIGASTRIC/ABDOMINAL PAIN  Post-Op Diagnosis:  retained food in proximal stomach, stable prepyloric ulcer with visible suture  Procedure(s): EGD W/ANES.   Surgeon(s): Tristin Johnson MD      Impression: 74year old female with a history of perforated pre-pyloric ulcer s/p surgical repair and J-tube insertion on 6/28/2022, arthritis, HLD, hypothyroidism, cholecystectomy, appendectomy, and colon cancer s/p colon surgery (2009) admitted with upper abdominal pain. EGD showed stable pre-pyloric ulcer with visible suture and retained food in stomach. Recommendation:  Continue supportive care  Monitor and document output  Monitor and replenish electrolytes  Continue Pantoprazole 40 mg BID  Continue Carafate 1 g suspension qid  Continue diet as tolerated  Continue TFs per J-tube as tolerated   Encourage PT/OT  General surgery following  Ok to d/c from GI standpoint      Moises Levy PA-C  1:47 PM 8/31/2022                      74year old female with history of HTN, HLD, Hypothyroidism, arthritis, colon cancer s/p partial colectomy, perforated gastric ulcer s/p repair and J tube insertion (06/28/22) c/b leak admitted with recurrent abd pain. EGD showed prepyloric ulcer and retained gastric contents    Continue spportive care. Resume TF per J tube. Continue PPI BID and carafate.  OK to d/c from GI standpoint      Alfredo Marie MD          [phone]  Abdelrahman Saldaña

## 2022-08-31 NOTE — PROGRESS NOTES
PM Shift assessment completed. Pt is alert and oriented. Vital signs are WNL. Respirations are even & easy. No complaints voiced. Pt denies needs at this time. Patient tube feed started at this time; 60 ml/hr with 30 q 4 flush. Patient tolerated well. SR up x 2 and bed in low position. Call light is within reach. Will monitor. .Bedside Mobility Assessment Tool (BMAT):     Assessment Level 1- Sit and Shake    1. From a semi-reclined position, ask patient to sit up and rotate to a seated position at the side of the bed. Can use the bedrail. 2. Ask patient to reach out and grab your hand and shake making sure patient reaches across his/her midline. Pass- Patient is able to come to a seated position, maintain core strength. Maintains seated balance while reaching across midline. Move on to Assessment Level 2. Assessment Level 2- Stretch and Point   1. With patient in seated position at the side of the bed, have patient place both feet on the floor (or stool) with knees no higher than hips. 2. Ask patient to stretch one leg and straighten the knee, then bend the ankle/flex and point the toes. If appropriate, repeat with the other leg. Pass- Patient is able to demonstrate appropriate quad strength on intended weight bearing limb(s). Move onto Assessment Level 3. Assessment Level 3- Stand   1. Ask patient to elevate off the bed or chair (seated to standing) using an assistive device (cane, bedrail). 2. Patient should be able to raise buttocks off be and hold for a count of five. May repeat once. Fail- Patient unable to demonstrate standing stability. Patient is MOBILITY LEVEL 3. Assessment Level 4- Walk   1. Ask patient to march in place at bedside. 2. Then ask patient to advance step and return each foot. Some medical conditions may render a patient from stepping backwards, use your best clinical judgement. Fail- Patient not able to complete tasks OR requires use of assistive device. Patient is MOBILITY LEVEL 3.        Mobility Level- 2

## 2022-08-31 NOTE — PROGRESS NOTES
AM Shift report given to Julian Saucedo RN at [facility]. Patient is stable. All end of shift needs have been met. No further assistance needed at this time.

## 2022-08-31 NOTE — DISCHARGE SUMMARY
Name:  Sarah Carrasquillo  Room:  4130/5435-11  MRN:    [id number]    Discharge Summary      This discharge summary is in conjunction with a complete physical exam done on the day of discharge. Attending Physician: Dr. Jonathon Wagner  Discharging Physician: Dr. Mario Loosen: 8/26/2022  Discharge:  8/31/2022    HPI:  The patient is a 74 y.o. female s/p recent prolonged hospitalization admitted for perforated prepyloric ulcer s/p surgical repair and J tube insertion, discharged to [facility], in recovery for 3 weeks and returned home Thursday and back to ED Friday with complaint of abdominal pain. Pt reports + BM. No emesis. Tolerating PO intake. Reports her abdominal pain was uncontrolled at home and she came back to ED. This am reports epigastric abd pain 7/10 intensity. Diagnoses this Admission and Hospital Course:    Abdominal Pain  -S/p recent repair of prepyloric perforated ulcer post op course complicated by leak.   -Has J tube in place - overnight J feed and PO intake during the day - though very limited. -Recent long admission for perforated prepyloric ulcer s/p surgical repair complicated by post op leak. - gen surgery consult - they report fluid collection and air has been stable and there are no active surgical plans at this time. J-tube dressing will be changed  Pain control has been challenging.   - she was sent home from [facility] with only tylenol.   - she reports morphine helps some. - reports norco ineffective.    Plan:   -started on  fentanyl patch now - this may allow for a steady state pain control while her bowel recover   - will cont with bridge pain control (morphine and norco) while the fentanyl patch becomes effective with plan to discontinue PO and IV opiate in the next 24-48hrs and discharge home with fentanyl patch and ongoing rehab efforts at home.   - GI eval  - pt has long established Hx of follow up with Dr Caceres Nim   will likely need EGD in a.m.  - cont PPI - Hx of severe gastritis and PUD with perforated ulcer as above. - continue Carafate     -> S/p endoscopy 8/30  showed stable prepyloric ulcer. Continued PPI  -> Diet resumed, resumed J-tube feedings and she is tolerating it well. Continue fentanyl patch and Norco as needed for pain control on discharge     Hx of Thyroid Disease.   -Pt has synthroid listed at 137mcg - has not been taking.   -Her last TSH on 6/30/22 was normal.   -Monitored. Depression  - continued Celexa     Generalized weakness  -Consulted PT OT      DVT: Lovenox      Plan of care discussed with patient and patient's son at bedside. .  Discussed with patient's nurse  Resumed diet and resumed J-tube feeds at night. Continued PPI. Continued fentanyl patch and Norco as needed for pain control. PT OT. Patient stable. Abdominal pain controlled. Discharged home with home health care. Procedures (Please Review Full Report for Details)  DATE OF PROCEDURE:  08/30/2022     PERIPROCEDURAL DIAGNOSES:  1. Abdominal pain. 2.  History of perforated peptic ulcer. PROCEDURE:  EGD. POSTPROCEDURE DIAGNOSES:  1.  Large prepyloric ulcer with visible sutures. 2.  Retained contents in the proximal stomach. 3.  Otherwise normal EGD. Consults    General surgery  GI      Physical Exam at Discharge:    /71   Pulse 70   Temp 99.7 °F (37.6 °C) (Oral)   Resp 16   Ht 5' 4\" (1.626 m)   Wt 137 lb 6.4 oz (62.3 kg)   SpO2 95%   BMI 23.58 kg/m²     General appearance: No apparent distress, appears stated age and cooperative. Looks ill and fatigued  HEENT: Pupils equal, round, and reactive to light. Conjunctivae/corneas clear. Neck: Supple, with full range of motion. No jugular venous distention. Trachea midline. Respiratory:  Normal respiratory effort. Clear to auscultation, bilaterally without Rales/Wheezes/Rhonchi. Cardiovascular: Regular rate and rhythm with normal S1/S2 without murmurs, rubs or gallops.   Abdomen: Soft, epigastric and mid abdomen mildly tender, non-distended with normal bowel sounds. J-tube in place  Musculoskeletal: No clubbing, cyanosis or edema bilaterally. Full range of motion without deformity. Skin: Skin color, texture, turgor normal.  No rashes or lesions. Neurologic:  Neurovascularly intact without any focal sensory/motor deficits. Cranial nerves: II-XII intact, grossly non-focal.  Psychiatric: Alert and oriented, thought content appropriate, normal insight  Capillary Refill: Brisk,3 seconds, normal   Peripheral Pulses: +2 palpable, equal bilaterally     CBC:   Recent Labs     08/29/22 0515 08/30/22  0511   WBC 7.0 6.8   HGB 9.1* 8.4*   HCT 28.2* 26.1*   MCV 91.5 89.1    369     BMP:   Recent Labs     08/29/22 0515 08/30/22 0511    138   K 4.4 4.1    105   CO2 22 23   PHOS 3.6 3.7   BUN 7 8   CREATININE 0.6 0.7       U/A:    Lab Results   Component Value Date/Time    COLORU Yellow 06/30/2022 01:11 PM    WBCUA 10-20 06/30/2022 01:11 PM    RBCUA >100 06/30/2022 01:11 PM    MUCUS 2+ 06/27/2022 11:12 AM    BACTERIA 1+ 06/30/2022 01:11 PM    CLARITYU SL CLOUDY 06/30/2022 01:11 PM    SPECGRAV 1.020 06/30/2022 01:11 PM    LEUKOCYTESUR Negative 06/30/2022 01:11 PM    BLOODU LARGE 06/30/2022 01:11 PM    GLUCOSEU Negative 06/30/2022 01:11 PM         CULTURES  COVID not detected         RADIOLOGY  CT ABDOMEN PELVIS W IV CONTRAST Additional Contrast? None   Final Result   Continued marked mural thickening of the gastric antrum and pylorus, which   appears increased when compared to the previous exam, compatible with severe   gastritis. There is a persistent gas and fluid collection seen at the level of the   pylorus near the surgical margin, not substantially changed when compared to   the previous exam.  A fistulous connection between this collection and the   stomach would be difficult to exclude.       There is intra and extrahepatic biliary dilation, which may be increased when   compared to the previous exam. Please correlate with any clinical evidence   of biliary obstruction. Fluid within the large bowel, which presumably reflects diarrheal disease. Small left pleural effusion decreased when compared to the previous exam.   Adjacent airspace disease is likely atelectasis, but pneumonia and aspiration   remain in the differential.               Discharge Medications     Medication List        START taking these medications      fentaNYL 25 MCG/HR  Commonly known as: Piilostentie 53 1 patch onto the skin every 72 hours for 12 days. Start taking on: September 3, 2022     HYDROcodone-acetaminophen 5-325 MG per tablet  Commonly known as: NORCO  Take 1 tablet by mouth every 6 hours as needed for Pain for up to 7 days. CHANGE how you take these medications      sucralfate 1 GM tablet  Commonly known as: CARAFATE  Take 1 tablet by mouth 4 times daily (before meals and nightly)  What changed: when to take this            CONTINUE taking these medications      albuterol sulfate  (90 Base) MCG/ACT inhaler  Commonly known as: PROVENTIL;VENTOLIN;PROAIR  Inhale 2 puffs into the lungs every 6 hours as needed for Wheezing     citalopram 20 MG tablet  Commonly known as: CELEXA     pantoprazole 40 MG tablet  Commonly known as: PROTONIX  Take 1 tablet by mouth in the morning and 1 tablet in the evening. Take before meals.      REMERON PO            STOP taking these medications      amoxicillin-clavulanate 875-125 MG per tablet  Commonly known as: AUGMENTIN     ascorbic acid 500 MG tablet  Commonly known as: VITAMIN C     atorvastatin 10 MG tablet  Commonly known as: LIPITOR     butalbital-acetaminophen-caffeine -40 MG per tablet  Commonly known as: FIORICET, ESGIC     famotidine 10 MG tablet  Commonly known as: PEPCID     Fish Oil 500 MG Caps     levothyroxine 137 MCG tablet  Commonly known as: SYNTHROID     naproxen 500 MG tablet  Commonly known as: NAPROSYN     zinc sulfate 220 (50 Zn) MG capsule  Commonly known as: ZINCATE               Where to Get Your Medications        You can get these medications from any pharmacy    Bring a paper prescription for each of these medications  fentaNYL 25 MCG/HR  HYDROcodone-acetaminophen 5-325 MG per tablet  sucralfate 1 GM tablet           Discharged in stable condition to home. D/C home with [facility]. Total time 40 minutes. > 50%  dominated by counseling and coordination of care. Plan of care discussed with patient and patient's son. Discussed with RN and case management      Follow Up: Follow up with PCP.     Hetal Coats MD

## 2022-08-31 NOTE — PROGRESS NOTES
Patient discharged to home in stable condition. J tube dressing changed today. Patient received three scripts to take to patients pharmacy.

## 2022-08-31 NOTE — DISCHARGE INSTR - COC
Continuity of Care Form    Patient Name: Arun Salinas   :  1950  MRN:  [id number]    Admit date:  2022  Discharge date:  22    Code Status Order: Full Code   Advance Directives:   Advance Care Flowsheet Documentation       Date/Time Healthcare Directive Type of Healthcare Directive Copy in [address] Agent's Name Healthcare Agent's Phone Number    22 1236 No, patient does not have an advance directive for healthcare treatment -- -- -- -- --            Admitting Physician:  Lori Charles MD  PCP: ILAN Jones CNP    Discharging Nurse: Dick Mendoza Unit/Room#: 5360/6646-45  Discharging Unit Phone Number: [phone]    Emergency Contact:   Extended Emergency Contact Information  Primary Emergency Contact: [facility] [employer] Phone: [phone]  Relation: Child  Secondary Emergency Contact: Nayeli Shin  Address: [address] Phone: [phone]  Mobile Phone: [phone]  Relation: Spouse    Past Surgical History:  Past Surgical History:   Procedure Laterality Date    APPENDECTOMY  2009    CHOLECYSTECTOMY  2009    COLON SURGERY  2009    EPIDURAL STEROID INJECTION Left 04/15/2019    LEFT LUMBAR FIVE SACRAL ONE EPIDURAL STEROID INJECTION SITE CONFIRMED BY FLUOROSCOPY performed by Haily Houston MD at [address] Left 2019    LEFT LUMBAR FIVE SACRAL ONE EPIDURAL STEROID INJECTION SITE CONFIRMED BY FLUOROSCOPY performed by Haily Houston MD at [place] [address]  2022    IR MIDLINE CATH  2022    IR MIDLINE CATH 2022 [facility] SPECIAL PROCEDURES    KIDNEY STONE SURGERY      LAPAROTOMY N/A 2022    LAPAROTOMY, REPAIR OF PERFORATED ULCER with jejunostomy insertion performed by Cinda Norman MD at [address] [place] [address] GASTROINTESTINAL ENDOSCOPY N/A 8/30/2022    EGD W/ANES.  performed by Janelle Mata MD at [address] [facility]       Immunization History:   Immunization History   Administered Date(s) Administered    COVID-19, [address] [facility] [address], Primary or Immunocompromised, (age 12y+), IM, 100 mcg/0.5mL 03/18/2021    Tdap (Boostrix, Adacel) 07/05/2018       Active Problems:  Patient Active Problem List   Diagnosis Code    Pneumonia due to 2019 novel coronavirus U07.1, J12.82    Hypothyroidism E03.9    Chronic back pain M54.9, G89.29    Hypoxia R09.02    COVID-19 U07.1    Pneumonia due to COVID-19 virus U07.1, J12.82    Atrial fibrillation (HCC) I48.91    Dislodged jejunostomy tube T85.528A    Abdominal pain R10.9    Chronic gastritis without bleeding K29.50    Mild protein-calorie malnutrition (HCC) E44.1    Weakness R53.1    Peptic ulcer disease K27.9       Isolation/Infection:   Isolation            C Diff Contact          Patient Infection Status       Infection Onset Added Last Indicated Last Indicated By Review Planned Expiration Resolved Resolved By    C-diff Rule Out 08/26/22 08/26/22 08/27/22 Clostridium difficile toxin/antigen (Ordered) 09/03/22 09/06/22      Resolved    COVID-19 (Rule Out) 08/26/22 08/26/22 08/27/22 COVID-19, Rapid (Ordered)   08/27/22 Rule-Out Test Resulted    COVID-19 06/20/22 06/24/22 06/28/22 COVID-19, Rapid   07/01/22 Floresita Larsen RN            Nurse Assessment:  Last Vital Signs: /71   Pulse 70   Temp 99.7 °F (37.6 °C) (Oral)   Resp 16   Ht 5' 4\" (1.626 m)   Wt 137 lb 6.4 oz (62.3 kg)   SpO2 95%   BMI 23.58 kg/m²     Last documented pain score (0-10 scale): Pain Level: 3  Last Weight:   Wt Readings from Last 1 Encounters:   08/27/22 137 lb 6.4 oz (62.3 kg)     Mental Status:  oriented and alert    IV Access:  - None    Nursing Mobility/ADLs:  Walking   Assisted  Transfer  Independent  Bathing  Assisted  Dressing  Independent  Toileting  Independent  Feeding Independent  Med Admin  Independent  Med Delivery   whole    Wound Care Documentation and Therapy:  Wound 07/08/22 Radial Proximal;Right infiltration that is now open and blisted (Active)   Wound Image    07/08/22 2101   Wound Etiology Other 07/28/22 0811   Dressing Status Clean;Dry; Intact 07/28/22 0811   Wound Cleansed Cleansed with saline 07/23/22 2215   Dressing/Treatment Open to air 07/28/22 0811   Dressing Change Due 07/13/22 07/14/22 2114   Wound Assessment Dry;Pink/red 07/28/22 0811   Drainage Amount None 07/28/22 0811   Odor None 07/28/22 0811   Number of days: 53       Wound 07/08/22 Buttocks (Active)   Wound Image    07/18/22 2235   Wound Etiology Pressure Stage 2 08/30/22 2030   Dressing Status Other (Comment) 07/28/22 0811   Wound Cleansed Soap and water 08/30/22 2030   Dressing/Treatment Zinc paste 08/30/22 2030   Wound Assessment Erythema 08/30/22 2030   Drainage Amount None 08/30/22 2030   Drainage Description Serosanguinous 07/28/22 0811   Odor None 07/28/22 0811   [place]-wound Assessment Maceration 07/28/22 0811   Margins Flat/open edges; Unattached edges 07/26/22 2058   Number of days: 54       Incision 06/28/22 Abdomen Medial;Upper (Active)   Wound Image   07/13/22 2024   Dressing Status Clean;Dry; Intact 07/28/22 0811   Dressing Change Due 07/25/22 07/26/22 0906   Incision Cleansed Cleansed with saline 07/23/22 2215   Dressing/Treatment Dry dressing 07/28/22 0811   Incision Length (cm) 10.5 07/11/22 1546   Incision Width (cm) 1 cm 07/11/22 1546   Incision Depth (cm) 4 cm 07/11/22 1546   Closure Staples 07/26/22 2058   Margins Other (Comment) 07/26/22 2058   Incision Assessment Other (Comment) 07/26/22 2058   Drainage Amount None 07/28/22 0811   Drainage Description Yellow 07/26/22 2058   Odor None 07/28/22 0811   [place]-incision Assessment Blanchable erythema; Other (Comment) 07/28/22 9996   Number of days: 64        Elimination:  Continence:    Bowel: Yes  Bladder: Yes  Urinary Catheter: None Colostomy/Ileostomy/Ileal Conduit: No       Date of Last BM: 8/31/22    Intake/Output Summary (Last 24 hours) at 8/31/2022 1235  Last data filed at 8/31/2022 1205  Gross per 24 hour   Intake 150 ml   Output 1600 ml   Net -1450 ml     I/O last 3 completed shifts: In: 150 [I.V.:150]  Out: 1700 [Urine:1700]    Safety Concerns:     None    Impairments/Disabilities:      None    Nutrition Therapy:  Current Nutrition Therapy:   - Oral Diet:  Low Fat  - Tube Feedings:  Standard with fiber    Routes of Feeding: Oral and Jejunal Tube  Liquids: Thin Liquids  Daily Fluid Restriction: no  Last Modified Barium Swallow with Video (Video Swallowing Test): not done    Treatments at the Time of Hospital Discharge:   Respiratory Treatments:   Oxygen Therapy:  is not on home oxygen therapy. Ventilator:    - No ventilator support    Rehab Therapies: Physical Therapy and Occupational Therapy/SN  Weight Bearing Status/Restrictions: No weight bearing restrictions  Other Medical Equipment (for information only, NOT a DME order):  walker  Other Treatments:     Patient's personal belongings (please select all that are sent with patient):  None    RN SIGNATURE:  Electronically signed by Dennise Daniel RN on 8/31/22 at 1:19 PM EDT    CASE MANAGEMENT/SOCIAL WORK SECTION    Inpatient Status Date: ***    Readmission Risk Assessment Score:  Readmission Risk              Risk of Unplanned Readmission:  25           Discharging to Facility/ Agency   Name: Alden Dwyer [facility]  Address: [address] [place] [address],[address] [place] [address]   Phone: [phone]  Fax: [phone]      Per Julian Saucedo RN, tube feed is to remain the same as it was prior to admission. / signature: Electronically signed by Cara Matos RN on 8/31/22 at 1:37 PM EDT    PHYSICIAN SECTION    Prognosis: Good    Condition at Discharge: Stable    Rehab Potential (if transferring to Rehab):  Fair    Recommended Labs or Other Treatments After Discharge: SN, PT/OT    Physician Certification: I certify the above information and transfer of Nimo Menendez  is necessary for the continuing treatment of the diagnosis listed and that she requires Home Care for less 30 days.      Update Admission H&P: No change in H&P    PHYSICIAN SIGNATURE:  Electronically signed by Ro Grady MD on 8/31/22 at 12:35 PM EDT

## 2022-08-31 NOTE — PLAN OF CARE
Problem: Discharge Planning  Goal: Discharge to home or other facility with appropriate resources  Outcome: Progressing     Problem: Pain  Goal: Verbalizes/displays adequate comfort level or baseline comfort level  Outcome: Progressing  Flowsheets (Taken 8/30/2022 2000)  Verbalizes/displays adequate comfort level or baseline comfort level: Encourage patient to monitor pain and request assistance     Problem: Safety - Adult  Goal: Free from fall injury  Outcome: Progressing     Problem: Skin/Tissue Integrity  Goal: Absence of new skin breakdown  Description: 1. Monitor for areas of redness and/or skin breakdown  2. Assess vascular access sites hourly  3. Every 4-6 hours minimum:  Change oxygen saturation probe site  4. Every 4-6 hours:  If on nasal continuous positive airway pressure, respiratory therapy assess nares and determine need for appliance change or resting period.   Outcome: Progressing     Problem: ABCDS Injury Assessment  Goal: Absence of physical injury  Outcome: Progressing     Problem: Nutrition Deficit:  Goal: Optimize nutritional status  Outcome: Progressing

## 2022-08-31 NOTE — CARE COORDINATION
[facility]  Noted discharge order. Following for [facility] [place] services. Notified [facility] of pt plan to discharge home today. Liaison to send referral with orders when received. Per CM, pt has some TF formula cans at home and [facility] is scheduling for delivery. Liaison to follow up with pt prior to discharge. Sent referral to [facility] for [facility].       Electronically signed by Rachna Torres RN on 8/31/2022 at 12:46 PM

## 2022-08-31 NOTE — CARE COORDINATION
DISCHARGE ORDER  Date/Time 2022 1:39 PM  Completed by: Gema Acosta RN, Case Management    Patient Name: Andrey Gómez      : 1950  Admitting Diagnosis: Abdominal pain [R10.9]      Admit order Date and Status:2022  (verify MD's last order for status of admission)      Noted discharge order. If applicable PT/OT recommendation at Discharge: home with 24 hr assist  and home PT/OT  DME recommendation by PT/OT:n/a  Confirmed discharge plan  (pt): Yes  with whom_____________pt__  If pt confirmed DC plan does family need to be contacted by CM No if yes who_____pt's spouse, Kenn_  Discharge Plan: Reviewed chart. Role of discharge planner explained and patient and spouse, Edilberto Estrada, verbalized understanding. Discharge order is noted. Pt is being d/c'd to home today with spouse. Pt is wanting [facility] for [facility]) for PT/OT/SN. +HC orders, +eCOC. Eleni Ortez with [facility] is aware and will obtain eCOC and HC orders from [facility]. Edilberto Estrada (spouse) states that he has 20 cans of Tube feed from [email or web address] left. Cm called Haylie Jack and Madeline Adia with [email or web address] so that they were aware that pt was returning home and to continue her feeds. Elvis Cooper agreed and states that she will ship out the rest of pt's supply. CM informed pt and spouse, Swetha Pena, and Eleni Ortez with [facility] of this. Pt's O2 sats are 95% on RA. Per Gwen Rosen RN, tube feed is to remain the same as it was prior to admission. No further discharge needs needed or noted. Date of Last IMM Given: 2022--pt and spouse are agreeable to the discharge for today    Reviewed chart. Role of discharge planner explained and patient verbalized understanding. Discharge order is noted. Has Home O2 in place on admit:  No  Informed of need to bring portable home O2 tank on day of discharge for nursing to connect prior to leaving:   No  Verbalized agreement/Understanding:   No    Discharge timeout done with Swetha Pena.  All discharge needs and concerns addressed.

## 2022-08-31 NOTE — TELEPHONE ENCOUNTER
Sarah Turner called to schedule 2 week post op with Dr. Nikolai Mendez for her mother Nicholas Jones. Nicholas Jones is being released from [facility] today 08.31.22. Looking at the schedule for Dr. Nikolai Mendez at [place], the 2 week post op falls during the week of 09.12.22 and Dr. Nikolai Mendez is out that week. Please call Sarah Turner and advise what she should do as far as scheduling a post op. Sarah Turner can be reached back at [phone].

## 2022-08-31 NOTE — TELEPHONE ENCOUNTER
I called and spoke to [place]- advised we can make her an appt for the week that Dr. Sara Latif returns if needed, otherwise call me if she needs anything, or has any questions or concerns. She agreed.

## 2022-08-31 NOTE — PROGRESS NOTES
J-tube in place. J-tube securement device in place, has some old drainage on it. J-tube working well. Pt tolerating her J-tube feeds. J-tube securement device removed: there is a small area of granulation tissue which is not painful for the patient. The skin was cleaned and a skin barrier was applied. A new j-tube securement device was placed. The patient tolerated this well. The patient can be discharged home from a surgical standpoint. Discussed with Dr. Pebbles Martinez.      Electronically signed by ILAN Hidalgo CNP on 8/31/2022 at 12:18 PM

## 2022-08-31 NOTE — DISCHARGE INSTR - DIET

## 2022-09-06 ENCOUNTER — OFFICE VISIT (OUTPATIENT)
Dept: SURGERY | Age: 72
End: 2022-09-06
Payer: MEDICARE

## 2022-09-06 ENCOUNTER — TELEPHONE (OUTPATIENT)
Dept: SURGERY | Age: 72
End: 2022-09-06

## 2022-09-06 VITALS
SYSTOLIC BLOOD PRESSURE: 115 MMHG | DIASTOLIC BLOOD PRESSURE: 66 MMHG | HEIGHT: 64 IN | HEART RATE: 79 BPM | BODY MASS INDEX: 23.58 KG/M2

## 2022-09-06 DIAGNOSIS — L92.9 EXCESSIVE GRANULATION: Primary | ICD-10-CM

## 2022-09-06 PROCEDURE — 17250 CHEM CAUT OF GRANLTJ TISSUE: CPT | Performed by: SURGERY

## 2022-09-06 PROCEDURE — 99999 PR OFFICE/OUTPT VISIT,PROCEDURE ONLY: CPT | Performed by: SURGERY

## 2022-09-06 NOTE — TELEPHONE ENCOUNTER
Pt daughter called saying her tube is bleeding with a bright red blood mixed with brown drainage. She states \"this is more blood than it should be\" She said she lifted the bandage up and the \"skin is split\" no fevers, denies N&V. She can not tell if if it hot to touch. She has pushed on the site and pt states that it is just sore when moving around.

## 2022-09-06 NOTE — PROGRESS NOTES
[facility] SURGERY      S:   Patient presents s/p repair of perforated ulcer with jejunostomy tube. She reports some bleeding around her tube. O:   Comfortable         Incision site healing well. There is excessive hypertrophic granulation tissue around the tube exit site with some bleeding              A:   S/P repair of perforated ulcer with jejunostomy tube insertion    P:   The excessive hypertrophic granulation was treated with multiple applications of silver nitrate to help break this down to control the bleeding. A new Ignacia vertical tube attachment device was placed. Local care instructions were given. Follow up 1 month.

## 2022-09-08 NOTE — PROGRESS NOTES
Physician Progress Note      Errol Simms  CSN #:                  [id number]  :                       1950  ADMIT DATE:       2022 4:31 PM  [address] [place] DATE:        2022 2:00 PM  RESPONDING  PROVIDER #:        Bryna Martinez MD          QUERY TEXT:    Pt admitted with abdominal pain. -S/p recent repair of prepyloric perforated   ulcer post op course complicated by leak. S/p endoscopy   showed stable   prepyloric ulcer. Continued PPI. If possible, please document in progress   notes and discharge summary the suspected cause of the abdominal pain:    The medical record reflects the following:  Risk Factors: advanced age,  Clinical Indicators: Pain control has been challenging. - she was sent home   from [facility] with only tylenol. S/p endoscopy   showed stable prepyloric ulcer  Treatment: surgical consult, CT abd, Carafate, PPI, EGD, GI consult, Fentanyl   patch, Norco    Thank you for your assistance,  Boston Valdivia RN,BSN,CCDS,CRCR  Options provided:  -- Suspected acute postoperative pain  -- Abdominal pain due to, please specify suspected cause.   -- Other - I will add my own diagnosis  -- Disagree - Not applicable / Not valid  -- Disagree - Clinically unable to determine / Unknown  -- Refer to Clinical Documentation Reviewer    PROVIDER RESPONSE TEXT:    The abdominal pain due to gastric ulcer    Query created by: Tia Esquivel on 2022 1:08 PM      Electronically signed by:  Bryan Martinez MD 2022 8:34 AM

## 2022-09-09 ENCOUNTER — TELEPHONE (OUTPATIENT)
Dept: SURGERY | Age: 72
End: 2022-09-09

## 2022-09-09 NOTE — TELEPHONE ENCOUNTER
Pt daughter called saying at the bottom of incision site there is a bump, she poked at it and said it wasn't hard, she said the more she pushes on it the higher it raises. No drainage, no N&V, and it is not hot to touch.

## 2022-09-09 NOTE — TELEPHONE ENCOUNTER
[facility] and notified her of all instructions, keep area clean and dry cover it up as needed, call back with any other symptoms or if anything worsens.

## 2022-09-09 NOTE — TELEPHONE ENCOUNTER
[facility] stated pt has a red area around the bottom of her incision, it turned a purple color since yesterday, she is not having any pain, or fevers, no warmth, please advise. See my e-mail.

## 2022-09-12 ENCOUNTER — TELEPHONE (OUTPATIENT)
Dept: SURGERY | Age: 72
End: 2022-09-12

## 2022-09-12 NOTE — TELEPHONE ENCOUNTER
Pt daughter called regarding fentanyl patches that she states were prescribed during admission by Dr. Ashish Laurent. She wants to know if she needs be weaned off the patches of what she is to do. Pt daughter is concerned pt is going to withdrawal. Pt has 1 patch left. She contacted [facility] Duty office, and he stated he would not be prescribing more.

## 2022-09-12 NOTE — TELEPHONE ENCOUNTER
Please send letter acknowledging current circumstances and recommending she f/u for abnormal CT chest

## 2022-09-12 NOTE — TELEPHONE ENCOUNTER
Called and spoke with iris who stated pt would have to wait to come in for her hospital f/u with CT. Stated pt has a f/u next week with her surgeon and has some other things going on so she did not want to reschedule at this time.

## 2022-09-13 NOTE — TELEPHONE ENCOUNTER
Letter drafted, pending Yojana's signature. Will mail 1 via certified mail and 1 via regular mail once signed.

## 2022-09-13 NOTE — TELEPHONE ENCOUNTER
Edna notified pt can finish patch when it is out, she does have supplemental pain medication to take as needed. Keep open wound clean and dry, she is not having much drainage, I advised to call me back if anything changes with wound site.

## 2022-09-19 ENCOUNTER — TELEPHONE (OUTPATIENT)
Dept: SURGERY | Age: 72
End: 2022-09-19

## 2022-09-19 NOTE — TELEPHONE ENCOUNTER
Daughter has questions about her feeding tube. She is wanting to see if she can turn it down. She states she is eating some stuff, and tries to feed her several times a day. Sarah Turner [phone].

## 2022-09-21 ENCOUNTER — TELEPHONE (OUTPATIENT)
Dept: SURGERY | Age: 72
End: 2022-09-21

## 2022-09-21 NOTE — TELEPHONE ENCOUNTER
Edna called in requesting refill on Zofran and Norco 5/325mg only taking 1-2 pain pills a day,   [address] [place]. Please advise.

## 2022-09-21 NOTE — TELEPHONE ENCOUNTER
Home care nurse called saying Zofran isn't working for patient and that she has lost 4 lbs in two weeks

## 2022-09-22 DIAGNOSIS — R10.84 GENERALIZED ABDOMINAL PAIN: ICD-10-CM

## 2022-09-22 RX ORDER — HYDROCODONE BITARTRATE AND ACETAMINOPHEN 5; 325 MG/1; MG/1
1 TABLET ORAL EVERY 6 HOURS PRN
Qty: 25 TABLET | Refills: 0 | Status: SHIPPED | OUTPATIENT
Start: 2022-09-22 | End: 2022-09-29

## 2022-09-22 RX ORDER — PROMETHAZINE HYDROCHLORIDE 12.5 MG/1
12.5 TABLET ORAL 4 TIMES DAILY PRN
Qty: 28 TABLET | Refills: 2 | Status: SHIPPED | OUTPATIENT
Start: 2022-09-22 | End: 2022-09-29

## 2022-09-26 ENCOUNTER — TELEPHONE (OUTPATIENT)
Dept: SURGERY | Age: 72
End: 2022-09-26

## 2022-09-26 NOTE — TELEPHONE ENCOUNTER
Daughter iris called. She states the thick rubbery bandage under her drain is \"looking nasty and needs changed. \". She \" is afraid to mess with the contraption because it might not go back on\". I offer appt today for [facility], but [facility] declined because she does not think they can get there in time for 9:45 appt. So she scheduled appt tomorrow 9/27/22 with Dr. Kaye Razo at [place].
Noted. I will forward to [employer], she was going to contact home nurse today. I will cancel her appt for tomorrow since she will not need to come to office.
RLE 4+/5 LLE 3+,4/5/grossly assessed due to

## 2022-09-27 ENCOUNTER — OFFICE VISIT (OUTPATIENT)
Dept: SURGERY | Age: 72
End: 2022-09-27
Payer: MEDICARE

## 2022-09-27 VITALS
HEIGHT: 64 IN | BODY MASS INDEX: 21.85 KG/M2 | WEIGHT: 128 LBS | DIASTOLIC BLOOD PRESSURE: 78 MMHG | SYSTOLIC BLOOD PRESSURE: 124 MMHG

## 2022-09-27 DIAGNOSIS — L92.9 EXCESSIVE GRANULATION TISSUE: ICD-10-CM

## 2022-09-27 DIAGNOSIS — T81.89XS NON-HEALING SURGICAL WOUND, SEQUELA: Primary | ICD-10-CM

## 2022-09-27 PROCEDURE — G8400 PT W/DXA NO RESULTS DOC: HCPCS | Performed by: SURGERY

## 2022-09-27 PROCEDURE — 1111F DSCHRG MED/CURRENT MED MERGE: CPT | Performed by: SURGERY

## 2022-09-27 PROCEDURE — 1036F TOBACCO NON-USER: CPT | Performed by: SURGERY

## 2022-09-27 PROCEDURE — 1090F PRES/ABSN URINE INCON ASSESS: CPT | Performed by: SURGERY

## 2022-09-27 PROCEDURE — 99213 OFFICE O/P EST LOW 20 MIN: CPT | Performed by: SURGERY

## 2022-09-27 PROCEDURE — G8427 DOCREV CUR MEDS BY ELIG CLIN: HCPCS | Performed by: SURGERY

## 2022-09-27 PROCEDURE — G8420 CALC BMI NORM PARAMETERS: HCPCS | Performed by: SURGERY

## 2022-09-27 PROCEDURE — 1124F ACP DISCUSS-NO DSCNMKR DOCD: CPT | Performed by: SURGERY

## 2022-09-27 PROCEDURE — 3017F COLORECTAL CA SCREEN DOC REV: CPT | Performed by: SURGERY

## 2022-09-27 NOTE — PROGRESS NOTES
[facility] SURGERY    CHIEF COMPLAINT: Tube site is draining    SUBJECTIVE:   Patient presents for follow up of her jejunostomy tube after perforated ulcer repair. She reports her wound seems to be healing well. She is having some drainage from the jejunostomy tube site. She is eating better and does not really tolerate tube feeds at 60 an hour, so her daughter dropped him to 40 an hour but gives him to her longer to continue the same total volume. Allergies   Allergen Reactions    Bee Venom Swelling     Attacked by swarm of bees and required er visit    Adhesive Tape Other (See Comments)     Skin redness      Tetracyclines & Related Other (See Comments)     Mouth fungus      Sulfa Antibiotics Nausea And Vomiting     Outpatient Medications Marked as Taking for the 9/27/22 encounter (Office Visit) with Marco A Llamas MD   Medication Sig Dispense Refill    HYDROcodone-acetaminophen (NORCO) 5-325 MG per tablet Take 1 tablet by mouth every 6 hours as needed for Pain for up to 7 days. 25 tablet 0    promethazine (PHENERGAN) 12.5 MG tablet Take 1 tablet by mouth 4 times daily as needed for Nausea 28 tablet 2    sucralfate (CARAFATE) 1 GM tablet Take 1 tablet by mouth 4 times daily (before meals and nightly) 120 tablet 0    Mirtazapine (REMERON PO) Take by mouth      pantoprazole (PROTONIX) 40 MG tablet Take 1 tablet by mouth in the morning and 1 tablet in the evening. Take before meals.  180 tablet 1    albuterol sulfate HFA (PROVENTIL;VENTOLIN;PROAIR) 108 (90 Base) MCG/ACT inhaler Inhale 2 puffs into the lungs every 6 hours as needed for Wheezing 18 g 3    citalopram (CELEXA) 20 MG tablet 20 mg daily          Past Medical History:   Diagnosis Date    Arthritis     Colon cancer ([facility] [address]     Hyperlipidemia     Thyroid disease      Past Surgical History:   Procedure Laterality Date    APPENDECTOMY  01/01/2009    CHOLECYSTECTOMY  01/01/2009    COLON SURGERY  01/01/2009    EPIDURAL STEROID INJECTION Left 04/15/2019    LEFT LUMBAR FIVE SACRAL ONE EPIDURAL STEROID INJECTION SITE CONFIRMED BY FLUOROSCOPY performed by Ivone Nguyen MD at [address] Left 2019    LEFT LUMBAR FIVE SACRAL ONE EPIDURAL STEROID INJECTION SITE CONFIRMED BY FLUOROSCOPY performed by Ivone Nguyen MD at [facility]  2022    IR MIDLINE CATH  2022    IR MIDLINE CATH 2022 [facility] SPECIAL PROCEDURES    KIDNEY STONE SURGERY      LAPAROTOMY N/A 2022    LAPAROTOMY, REPAIR OF PERFORATED ULCER with jejunostomy insertion performed by Onofre Jean MD at [facility] 2022    EGD W/ANES.  performed by Paris Reynoso MD at [address] ENDOSCOPY     Family History   Problem Relation Age of Onset    High Blood Pressure Sister     Cancer Sister     High Blood Pressure Brother     Heart Attack Brother     Cancer Brother      Social History     Socioeconomic History    Marital status:      Spouse name: Cari Ramsey    Number of children: 3    Years of education: 12    Highest education level: Not on file   Occupational History    Not on file   Tobacco Use    Smoking status: Former     Types: Cigarettes     Quit date: 3/17/2000     Years since quittin.5    Smokeless tobacco: Never   Vaping Use    Vaping Use: Never used   Substance and Sexual Activity    Alcohol use: Not Currently     Comment: socially- glass of wine    Drug use: Never    Sexual activity: Yes     Partners: Male   Other Topics Concern    Not on file   Social History Narrative    Not on file     Social Determinants of Health     Financial Resource Strain: Not on file   Food Insecurity: Not on file   Transportation Needs: Not on file   Physical Activity: Not on file   Stress: Not on file   Social Connections: Not on file   Intimate Partner Violence: Not on file   Housing Stability: Not on file          Vitals: 09/27/22 1000   BP: 124/78   Site: Left Upper Arm   Position: Sitting   Cuff Size: Large Adult   Weight: 128 lb (58.1 kg)   Height: 5' 4\" (1.626 m)     Body mass index is 21.97 kg/m². ROS:  As per HPI, otherwise reviewed and negative    PHYSICAL EXAM:     Constitutional:  Well developed, well nourished, no acute distress, non-toxic appearance   Respiratory:  No respiratory distress, normal breath sounds, no rales, no wheezing   Cardiovascular:  Normal rate, normal rhythm, no murmurs. GI: Bowel sounds positive, soft, nondistended. Her midline wound has 1 small punctate opening with minimal drainage. This is clean and granulating. It was packed. She has some hypertrophic granulation tissue around her jejunostomy tube site. The vertical tube attachment device was changed  Integument: Warm and dry  Neurologic:  Alert & oriented x 3, normal motor function, normal sensory function, no focal deficits noted   Psychiatric:  Speech and behavior appropriate             DATA:  N/A    ASSESSMENT:   1. Non-healing surgical wound, sequela    2. Excessive granulation tissue           PLAN: Continue local wound care. Continue tube feeding until she has better oral intake. We did discuss silver nitrate treatment of the granulation tissue to help break that down and prevent as much drainage. However, the patient did not tolerate it well last time due to discomfort.   She will follow-up in a month for reevaluation or sooner as needed

## 2022-10-06 ENCOUNTER — TELEPHONE (OUTPATIENT)
Dept: SURGERY | Age: 72
End: 2022-10-06

## 2022-10-06 ENCOUNTER — OFFICE VISIT (OUTPATIENT)
Dept: SURGERY | Age: 72
End: 2022-10-06

## 2022-10-06 VITALS
SYSTOLIC BLOOD PRESSURE: 107 MMHG | BODY MASS INDEX: 21.99 KG/M2 | HEIGHT: 64 IN | HEART RATE: 84 BPM | DIASTOLIC BLOOD PRESSURE: 71 MMHG | WEIGHT: 128.8 LBS

## 2022-10-06 DIAGNOSIS — Z98.890 POST-OPERATIVE STATE: Primary | ICD-10-CM

## 2022-10-06 PROCEDURE — 99024 POSTOP FOLLOW-UP VISIT: CPT | Performed by: SURGERY

## 2022-10-06 NOTE — PROGRESS NOTES
[facility] SURGERY      S:   Patient presents s/p perforated ulcer repair and jejunostomy tube insertion. She reports her tube is clogged. They have not been able to get it open for a few days. Her appetite is much better. She is maintaining her weight without tube feeds. O:   Comfortable         Incision site healing well. Tube removed              A:   S/P perforated ulcer repair and jejunostomy tube insertion    P:   I encouraged her to continue to eat small frequent calorie dense meals. They will keep an eye on her weight and follow up 2 weeks.

## 2022-10-06 NOTE — TELEPHONE ENCOUNTER
Pt daughter called saying pt tube is clogged. They tried to unclog it for 3 hours and they are unable to hook her up. Wanting to know if it needs removed.

## 2022-10-06 NOTE — TELEPHONE ENCOUNTER
I called and spoke to Briseida Armenta. She states she no longer has a home health nurse that comes to her house. Her daughter told her the tube is clogged and she has not been able to get it unclogged. Please advise.

## 2022-10-06 NOTE — TELEPHONE ENCOUNTER
Per Dr. Whittaker Record, she needs to be seen in office. I called daughter, Donna Ohs and she will bring her today.

## 2022-10-25 ENCOUNTER — OFFICE VISIT (OUTPATIENT)
Dept: SURGERY | Age: 72
End: 2022-10-25

## 2022-10-25 VITALS
DIASTOLIC BLOOD PRESSURE: 76 MMHG | SYSTOLIC BLOOD PRESSURE: 122 MMHG | BODY MASS INDEX: 22.2 KG/M2 | HEIGHT: 64 IN | WEIGHT: 130 LBS

## 2022-10-25 DIAGNOSIS — Z98.890 POST-OPERATIVE STATE: Primary | ICD-10-CM

## 2022-10-25 PROCEDURE — 99024 POSTOP FOLLOW-UP VISIT: CPT | Performed by: SURGERY

## 2022-10-25 RX ORDER — PANTOPRAZOLE SODIUM 40 MG/1
40 TABLET, DELAYED RELEASE ORAL
Qty: 180 TABLET | Refills: 1 | Status: SHIPPED | OUTPATIENT
Start: 2022-10-25

## 2022-10-25 NOTE — PROGRESS NOTES
[facility]      S:   Patient presents s/p perforated ulcer repair. She reports doing well and is gaining weight. Her bowels are working great. She does report not taking her proton pump inhibitor. O:   Comfortable         Incision sites healing well. A:   S/P perforated ulcer repair    P:   I reassured them that she can take any laxative she wants. I did discuss with the patient and the family that she needs to be on a proton pump inhibitor for life, and I did refill her prescription. Follow up as needed.

## 2022-11-28 ENCOUNTER — TELEPHONE (OUTPATIENT)
Dept: SURGERY | Age: 72
End: 2022-11-28

## 2022-11-28 NOTE — TELEPHONE ENCOUNTER
I called and spoke with pt, advised she can try once a day but really needs the 40mg, she will try this and call back with any concerns

## 2022-11-28 NOTE — TELEPHONE ENCOUNTER
PT called stating she was prescribed 40mg of Protonix. She says this upsets her stomach. She wants to know if she is able to take a lower dose. Barb in [facility].

## 2022-11-29 ENCOUNTER — TELEPHONE (OUTPATIENT)
Dept: INTERNAL MEDICINE CLINIC | Age: 72
End: 2022-11-29

## 2022-11-29 NOTE — TELEPHONE ENCOUNTER
----- Message from Tao Raymundo MD sent at 11/29/2022 10:32 AM EST -----  Contact: Dianne Mckeon  [phone]  Tosha Murcia    ----- Message -----  From: Sonja Groves  Sent: 11/29/2022  10:12 AM EST  To: Tao Raymundo MD    FYI: pt couldn't come in Thursday afternoon. Scheduled to see Cherlynn Ahumada next Tuesday instead. ----- Message -----  From: Tao Raymundo MD  Sent: 11/29/2022   9:25 AM EST  To: Sonja Groves    Thursday afternoon    ----- Message -----  From: Sonja Groves  Sent: 11/28/2022   2:04 PM EST  To: Tao Raymundo MD    Wednesday?  ----- Message -----  From: Tao Raymundo MD  Sent: 11/28/2022  12:34 PM EST  To: Sonja Groves    Will get her in this week    ----- Message -----  From: Cony Madrigal  Sent: 11/28/2022  11:02 AM EST  To: Tao Raymundo MD    Patient appointment was cancelled for today. Patient is going to be a new patient. Have rescheduled and put on the wait list but she would like to be seen sooner that May if at all possible.       [facility] [id number] - [address] [phone] (Ph: [phone])

## 2022-11-29 NOTE — TELEPHONE ENCOUNTER
----- Message from Terrell Virk MD sent at 11/29/2022  1:19 PM EST -----  Contact: Abdiel Guevara  [phone]  Its ok to schedule    ----- Message -----  From: Ari Olivera  Sent: 11/29/2022  11:33 AM EST  To: Terrell Virk MD    We are not allowed to schedule New patients with Katie Syed.  ----- Message -----  From: Terrell Virk MD  Sent: 11/29/2022  11:26 AM EST  To: Ari Olivera    Why not    ----- Message -----  From: Ari Olivera  Sent: 11/29/2022  11:22 AM EST  To: MD Cruz Kaiser see New patients. Please advise on when you would like to see pt. Please advise.  ----- Message -----  From: Terrell Virk MD  Sent: 11/29/2022  10:32 AM EST  To: Brianne Church    ----- Message -----  From: Ari Olivera  Sent: 11/29/2022  10:12 AM EST  To: Terrell Virk MD    FYI: pt couldn't come in Thursday afternoon. Scheduled to see Katie Syed next Tuesday instead. ----- Message -----  From: Terrell Virk MD  Sent: 11/29/2022   9:25 AM EST  To: Ari Olivera    Thursday afternoon    ----- Message -----  From: Ari Olivera  Sent: 11/28/2022   2:04 PM EST  To: Terrell Virk MD    Wednesday?  ----- Message -----  From: Terrell Virk MD  Sent: 11/28/2022  12:34 PM EST  To: Ari Olivera    Will get her in this week    ----- Message -----  From: Derrell Jain  Sent: 11/28/2022  11:02 AM EST  To: Terrell Virk MD    Patient appointment was cancelled for today. Patient is going to be a new patient. Have rescheduled and put on the wait list but she would like to be seen sooner that May if at all possible.       [facility] [id number] - [address] [phone] (Ph: [phone])

## 2022-12-03 RX ORDER — AMOXICILLIN AND CLAVULANATE POTASSIUM 875; 125 MG/1; MG/1
1 TABLET, FILM COATED ORAL 2 TIMES DAILY
Qty: 14 TABLET | Refills: 0 | Status: SHIPPED | OUTPATIENT
Start: 2022-12-03 | End: 2022-12-10

## 2022-12-05 ENCOUNTER — TELEPHONE (OUTPATIENT)
Dept: SURGERY | Age: 72
End: 2022-12-05

## 2022-12-05 NOTE — TELEPHONE ENCOUNTER
PT called stating she called in over the weekend and Dr Patria Fagan called her in an antibiotic, pt states she was to call and see what Dr Vincent Morris wanted her to do for follow up.

## 2022-12-05 NOTE — TELEPHONE ENCOUNTER
PT called back and states the antibiotics were for an infection in her incision site over the weekend.  It was red, hot, and formed a lump

## 2022-12-06 ENCOUNTER — OFFICE VISIT (OUTPATIENT)
Dept: SURGERY | Age: 72
End: 2022-12-06

## 2022-12-06 ENCOUNTER — OFFICE VISIT (OUTPATIENT)
Dept: INTERNAL MEDICINE CLINIC | Age: 72
End: 2022-12-06

## 2022-12-06 VITALS
SYSTOLIC BLOOD PRESSURE: 110 MMHG | DIASTOLIC BLOOD PRESSURE: 68 MMHG | TEMPERATURE: 98 F | OXYGEN SATURATION: 98 % | RESPIRATION RATE: 16 BRPM | BODY MASS INDEX: 20.66 KG/M2 | WEIGHT: 124 LBS | HEART RATE: 74 BPM | HEIGHT: 65 IN

## 2022-12-06 VITALS
DIASTOLIC BLOOD PRESSURE: 70 MMHG | BODY MASS INDEX: 20.66 KG/M2 | WEIGHT: 124 LBS | HEIGHT: 65 IN | SYSTOLIC BLOOD PRESSURE: 124 MMHG

## 2022-12-06 DIAGNOSIS — H40.9 GLAUCOMA, UNSPECIFIED GLAUCOMA TYPE, UNSPECIFIED LATERALITY: ICD-10-CM

## 2022-12-06 DIAGNOSIS — K27.9 PEPTIC ULCER DISEASE: ICD-10-CM

## 2022-12-06 DIAGNOSIS — M54.9 CHRONIC BACK PAIN, UNSPECIFIED BACK LOCATION, UNSPECIFIED BACK PAIN LATERALITY: ICD-10-CM

## 2022-12-06 DIAGNOSIS — E44.1 MILD PROTEIN-CALORIE MALNUTRITION (HCC): ICD-10-CM

## 2022-12-06 DIAGNOSIS — E03.9 ACQUIRED HYPOTHYROIDISM: ICD-10-CM

## 2022-12-06 DIAGNOSIS — Z00.00 ENCOUNTER FOR MEDICAL EXAMINATION TO ESTABLISH CARE: Primary | ICD-10-CM

## 2022-12-06 DIAGNOSIS — E78.5 HYPERLIPIDEMIA, UNSPECIFIED HYPERLIPIDEMIA TYPE: ICD-10-CM

## 2022-12-06 DIAGNOSIS — Z98.890 POST-OPERATIVE STATE: Primary | ICD-10-CM

## 2022-12-06 DIAGNOSIS — R53.1 WEAKNESS: ICD-10-CM

## 2022-12-06 DIAGNOSIS — G89.29 CHRONIC BACK PAIN, UNSPECIFIED BACK LOCATION, UNSPECIFIED BACK PAIN LATERALITY: ICD-10-CM

## 2022-12-06 DIAGNOSIS — Z00.00 ENCOUNTER FOR MEDICAL EXAMINATION TO ESTABLISH CARE: ICD-10-CM

## 2022-12-06 PROBLEM — M51.26 DISPLACEMENT OF LUMBAR INTERVERTEBRAL DISC WITHOUT MYELOPATHY: Status: ACTIVE | Noted: 2020-10-29

## 2022-12-06 LAB
ANION GAP SERPL CALCULATED.3IONS-SCNC: 13 MMOL/L (ref 3–16)
BASOPHILS ABSOLUTE: 0.1 K/UL (ref 0–0.2)
BASOPHILS RELATIVE PERCENT: 0.8 %
BUN BLDV-MCNC: 7 MG/DL (ref 7–20)
CALCIUM SERPL-MCNC: 9.1 MG/DL (ref 8.3–10.6)
CHLORIDE BLD-SCNC: 107 MMOL/L (ref 99–110)
CO2: 24 MMOL/L (ref 21–32)
CREAT SERPL-MCNC: 0.8 MG/DL (ref 0.6–1.2)
CREATININE URINE: 90.9 MG/DL (ref 28–259)
EOSINOPHILS ABSOLUTE: 0.1 K/UL (ref 0–0.6)
EOSINOPHILS RELATIVE PERCENT: 1.7 %
GFR SERPL CREATININE-BSD FRML MDRD: >60 ML/MIN/{1.73_M2}
GLUCOSE BLD-MCNC: 93 MG/DL (ref 70–99)
HCT VFR BLD CALC: 34.8 % (ref 36–48)
HEMOGLOBIN: 10.7 G/DL (ref 12–16)
LYMPHOCYTES ABSOLUTE: 2 K/UL (ref 1–5.1)
LYMPHOCYTES RELATIVE PERCENT: 27 %
MCH RBC QN AUTO: 25.4 PG (ref 26–34)
MCHC RBC AUTO-ENTMCNC: 30.9 G/DL (ref 31–36)
MCV RBC AUTO: 82.3 FL (ref 80–100)
MICROALBUMIN UR-MCNC: 2 MG/DL
MICROALBUMIN/CREAT UR-RTO: 22 MG/G (ref 0–30)
MONOCYTES ABSOLUTE: 0.3 K/UL (ref 0–1.3)
MONOCYTES RELATIVE PERCENT: 4.5 %
NEUTROPHILS ABSOLUTE: 4.8 K/UL (ref 1.7–7.7)
NEUTROPHILS RELATIVE PERCENT: 66 %
PDW BLD-RTO: 21.9 % (ref 12.4–15.4)
PLATELET # BLD: 274 K/UL (ref 135–450)
PMV BLD AUTO: 8.7 FL (ref 5–10.5)
POTASSIUM SERPL-SCNC: 3.8 MMOL/L (ref 3.5–5.1)
RBC # BLD: 4.22 M/UL (ref 4–5.2)
SODIUM BLD-SCNC: 144 MMOL/L (ref 136–145)
T4 FREE: 1.9 NG/DL (ref 0.9–1.8)
TSH REFLEX: 0.19 UIU/ML (ref 0.27–4.2)
URIC ACID, SERUM: 4.8 MG/DL (ref 2.6–6)
WBC # BLD: 7.3 K/UL (ref 4–11)

## 2022-12-06 PROCEDURE — 3017F COLORECTAL CA SCREEN DOC REV: CPT | Performed by: NURSE PRACTITIONER

## 2022-12-06 PROCEDURE — G8428 CUR MEDS NOT DOCUMENT: HCPCS | Performed by: NURSE PRACTITIONER

## 2022-12-06 PROCEDURE — 1124F ACP DISCUSS-NO DSCNMKR DOCD: CPT | Performed by: NURSE PRACTITIONER

## 2022-12-06 PROCEDURE — 1036F TOBACCO NON-USER: CPT | Performed by: NURSE PRACTITIONER

## 2022-12-06 PROCEDURE — G8484 FLU IMMUNIZE NO ADMIN: HCPCS | Performed by: NURSE PRACTITIONER

## 2022-12-06 PROCEDURE — 99024 POSTOP FOLLOW-UP VISIT: CPT | Performed by: SURGERY

## 2022-12-06 PROCEDURE — G8400 PT W/DXA NO RESULTS DOC: HCPCS | Performed by: NURSE PRACTITIONER

## 2022-12-06 PROCEDURE — G8420 CALC BMI NORM PARAMETERS: HCPCS | Performed by: NURSE PRACTITIONER

## 2022-12-06 PROCEDURE — 99215 OFFICE O/P EST HI 40 MIN: CPT | Performed by: NURSE PRACTITIONER

## 2022-12-06 PROCEDURE — 1090F PRES/ABSN URINE INCON ASSESS: CPT | Performed by: NURSE PRACTITIONER

## 2022-12-06 RX ORDER — PROMETHAZINE HYDROCHLORIDE 12.5 MG/1
12.5 TABLET ORAL PRN
COMMUNITY

## 2022-12-06 RX ORDER — HYDROCODONE BITARTRATE AND ACETAMINOPHEN 5; 325 MG/1; MG/1
1 TABLET ORAL PRN
COMMUNITY

## 2022-12-06 RX ORDER — LEVOTHYROXINE SODIUM 0.12 MG/1
125 TABLET ORAL DAILY
COMMUNITY
End: 2022-12-07 | Stop reason: ALTCHOICE

## 2022-12-06 ASSESSMENT — ANXIETY QUESTIONNAIRES
3. WORRYING TOO MUCH ABOUT DIFFERENT THINGS: 1
IF YOU CHECKED OFF ANY PROBLEMS ON THIS QUESTIONNAIRE, HOW DIFFICULT HAVE THESE PROBLEMS MADE IT FOR YOU TO DO YOUR WORK, TAKE CARE OF THINGS AT HOME, OR GET ALONG WITH OTHER PEOPLE: NOT DIFFICULT AT ALL
5. BEING SO RESTLESS THAT IT IS HARD TO SIT STILL: 0
6. BECOMING EASILY ANNOYED OR IRRITABLE: 0
1. FEELING NERVOUS, ANXIOUS, OR ON EDGE: 1
7. FEELING AFRAID AS IF SOMETHING AWFUL MIGHT HAPPEN: 1
4. TROUBLE RELAXING: 0
GAD7 TOTAL SCORE: 4
2. NOT BEING ABLE TO STOP OR CONTROL WORRYING: 1

## 2022-12-06 ASSESSMENT — PATIENT HEALTH QUESTIONNAIRE - PHQ9
2. FEELING DOWN, DEPRESSED OR HOPELESS: 0
DEPRESSION UNABLE TO ASSESS: FUNCTIONAL CAPACITY MOTIVATION LIMITS ACCURACY
SUM OF ALL RESPONSES TO PHQ QUESTIONS 1-9: 0
SUM OF ALL RESPONSES TO PHQ QUESTIONS 1-9: 0
SUM OF ALL RESPONSES TO PHQ9 QUESTIONS 1 & 2: 0
1. LITTLE INTEREST OR PLEASURE IN DOING THINGS: 0
SUM OF ALL RESPONSES TO PHQ QUESTIONS 1-9: 0
SUM OF ALL RESPONSES TO PHQ QUESTIONS 1-9: 0

## 2022-12-06 NOTE — PROGRESS NOTES
[facility] SURGERY      S:   Patient presents s/p perforated ulcer repair. She reports some redness and drainage from the upper midline incision. This is better after taking some antibiotics. She still has a hole. O:   Comfortable         Incision site healing well except for recurrent superficial dehiscence. The base of the wound is clean. A:   S/P perforated ulcer repair    P:   Continue antibiotics and local wound care as instructed. Follow up 3 weeks.

## 2022-12-06 NOTE — PROGRESS NOTES
Establish care visit   2022    Joaquin Soto (:  1950) is a 74 y.o. female, here to establish care. She has a history of prepyloric perforated ulcer  with a complicated admission. and J-tube- follows with Dr. Joesph Li, glaucoma, hypothyroidism, HLD and depression. HPI  Pt had a complicated admission in July and August for perforated prepyloric ulcer. She had a J-tube which has been removed. She has lost around 30 lbs since this admission. She also had COVID and has not regained her taste since then. She stated interferes with her eating. She follows with Dr. Marie Grayson oncology for history of colon cancer. She completed oral chemo and radiation. She recently had iron infusions with Dr. Marie Grayson and is following up with Dr. Marie Grayson at the end of the month She recently was placed on Augmentin for possible infection to surgical site for her perforated ulcer. She has an appointment with Dr. Joesph Li today at 6. Denies fever, cough, chest pain, dyspnea, abdominal pain, nausea, vomiting, or diarrhea. Chief Complaint   Patient presents with    New Patient         ROS  Review of Systems   Constitutional:  Positive for activity change, appetite change, fatigue and unexpected weight change. HENT: Negative. Eyes: Negative. Respiratory: Negative. Cardiovascular: Negative. Gastrointestinal:  Negative for abdominal pain, blood in stool, constipation, diarrhea and nausea. Wound to abdomen    Endocrine: Negative. Genitourinary: Negative. Musculoskeletal:  Positive for arthralgias and back pain. Negative for gait problem. Skin:         Wound abdomen    Neurological:  Positive for weakness. Negative for dizziness, tremors, seizures, syncope, speech difficulty and numbness. Psychiatric/Behavioral: Negative. Negative for agitation, confusion, decreased concentration, dysphoric mood, self-injury and sleep disturbance. The patient is not nervous/anxious.          Assessment and Plan Perforated prepyloric ulcer- s/p repair   - she had a couple complicated admissions for this  - s/p J-tube which has been removed  - she has had ongoing weakness since this  - she follows with Dr. German Cintron   - currently post op recurrent superficial dehiscence and started on Augmentin and is following up with Dr. German Cintron today   - S/p endoscopy 8/30  showed stable prepyloric ulcer, she is to follow up with Dr. Moody Andrade and has not done this yet    Depression  - on Celexa- pt stated this is controlled   PHQ-9 Total Score: 0 (12/6/2022 10:02 AM)     Hypothyroidism   - levothyroxine 125 mg  - check TSH     Ascending Aorta Aneurysm   -3.5 cm   -recommend follow up with PCP or vascular    Osteoarthritis  - followed with ortho  - continue   - NO NSAIDS, discussed with patient       Hx of rectal cancer   -s/p resection 2009  -completed chemo and radiation   - follows with Dr. Georgia Ba     Encounter for medical examination to establish care  - CBC with Auto Differential; Future  - Basic Metabolic Panel; Future  - TSH with Reflex; Future  - MICROALBUMIN / CREATININE URINE RATIO; Future  - Uric Acid; Future  - Hemoglobin A1C; Future  - Lipid, Fasting; Future         Mild protein-calorie malnutrition ([facility] [address]  - encouraged frequent small meals  - discussed increasing calorie intake  - Ensure three times a day      Generalized Weakness  - discussed PT/OT, pt refused  - gait steady    Glaucoma, unspecified glaucoma type, unspecified laterality  - s/p surgery  - follow up with ophthalmology as discussed      Hyperlipidemia, unspecified hyperlipidemia type  - she stated she was taken off of Lipitor while in the hospital  - check lipids today         Return in about 3 months (around 3/6/2023).  With Dr. Baylee Glaser  Current Outpatient Medications on File Prior to Visit   Medication Sig Dispense Refill    promethazine (PHENERGAN) 12.5 MG tablet Take 12.5 mg by mouth as needed for Nausea      HYDROcodone-acetaminophen ([place]) 5-325 MG per tablet Take 1 tablet by mouth as needed for Pain.      amoxicillin-clavulanate (AUGMENTIN) 875-125 MG per tablet Take 1 tablet by mouth 2 times daily for 7 days 14 tablet 0    pantoprazole (PROTONIX) 40 MG tablet Take 1 tablet by mouth 2 times daily (before meals) (Patient taking differently: Take 40 mg by mouth daily) 180 tablet 1    citalopram (CELEXA) 20 MG tablet 20 mg daily       [DISCONTINUED] ascorbic acid (VITAMIN C) 500 MG tablet Take 1 tablet by mouth 2 times daily for 7 days 14 tablet 0    [DISCONTINUED] zinc sulfate (ZINCATE) 220 (50 Zn) MG capsule Take 1 capsule by mouth daily for 7 days 7 capsule 0    [DISCONTINUED] naproxen (NAPROSYN) 500 MG tablet Take 1 tablet by mouth 2 times daily as needed for Pain 20 tablet 0    [DISCONTINUED] famotidine (PEPCID) 10 MG tablet Take 2 tablets by mouth 2 times daily 120 tablet 0    [DISCONTINUED] butalbital-acetaminophen-caffeine (FIORICET, ESGIC) -40 MG per tablet Take 1 tablet by mouth every 6 hours as needed for Headaches 12 tablet 0    [DISCONTINUED] Omega-3 Fatty Acids (FISH OIL) 500 MG CAPS Take 1 g by mouth daily        No current facility-administered medications on file prior to visit.         Allergies   Allergen Reactions    Bee Venom Swelling     Attacked by swarm of bees and required er visit    Adhesive Tape Other (See Comments)     Skin redness      Tetracyclines & Related Other (See Comments)     Mouth fungus      Sulfa Antibiotics Nausea And Vomiting       Past Medical History:   Diagnosis Date    Arthritis     Colon cancer ([facility] [address]     Hyperlipidemia     Thyroid disease        Patient Active Problem List   Diagnosis    Pneumonia due to 2019 novel coronavirus    Hypothyroidism    Chronic back pain    Hypoxia    COVID-19    Pneumonia due to COVID-19 virus    Atrial fibrillation ([facility] [address]    Dislodged jejunostomy tube    Abdominal pain    Chronic gastritis without bleeding    Mild protein-calorie malnutrition (HCC)    Weakness    Peptic ulcer disease       Past Surgical History:   Procedure Laterality Date    APPENDECTOMY  2009    CHOLECYSTECTOMY  2009    COLON SURGERY  2009    EPIDURAL STEROID INJECTION Left 04/15/2019    LEFT LUMBAR FIVE SACRAL ONE EPIDURAL STEROID INJECTION SITE CONFIRMED BY FLUOROSCOPY performed by Idalia Escobedo MD at [address] Left 2019    LEFT LUMBAR FIVE SACRAL ONE EPIDURAL STEROID INJECTION SITE CONFIRMED BY FLUOROSCOPY performed by Idalia Escobedo MD at [facility]  2022    IR MIDLINE CATH  2022    IR MIDLINE CATH 2022 [facility] SPECIAL PROCEDURES    KIDNEY STONE SURGERY      LAPAROTOMY N/A 2022    LAPAROTOMY, REPAIR OF PERFORATED ULCER with jejunostomy insertion performed by Libia Smith MD at [facility] 2022    EGD W/ANES.  performed by Christian Abreu MD at [facility]. [facility] History     Socioeconomic History    Marital status:      Spouse name: Brent Leary    Number of children: 3    Years of education: 12    Highest education level: Not on file   Occupational History    Not on file   Tobacco Use    Smoking status: Former     Types: Cigarettes     Quit date: 3/17/2000     Years since quittin.7    Smokeless tobacco: Never   Vaping Use    Vaping Use: Never used   Substance and Sexual Activity    Alcohol use: Not Currently     Comment: socially- glass of wine    Drug use: Never    Sexual activity: Yes     Partners: Male   Other Topics Concern    Not on file   Social History Narrative    Not on file     Social Determinants of Health     Financial Resource Strain: Not on file   Food Insecurity: Not on file   Transportation Needs: Not on file   Physical Activity: Not on file   Stress: Not on file   Social Connections: Not on file   Intimate Partner Violence: Not on file   Housing Stability: Not on file Family History   Problem Relation Age of Onset    High Blood Pressure Sister     Cancer Sister     High Blood Pressure Brother     Heart Attack Brother     Cancer Brother        PE  Vitals:    12/06/22 0942 12/06/22 1027   BP:  110/68   Site:  Left Upper Arm   Position:  Sitting   Cuff Size:  Medium Adult   Pulse:  74   Resp:  16   Temp:  98 °F (36.7 °C)   TempSrc:  Infrared   SpO2:  98%   Weight: 124 lb (56.2 kg)    Height: 5' 5\" (1.651 m)      Estimated body mass index is 22.31 kg/m² as calculated from the following:    Height as of 10/25/22: 5' 4\" (1.626 m). Weight as of 10/25/22: 130 lb (59 kg). Physical Exam  Constitutional:       General: She is not in acute distress. Appearance: Normal appearance. She is ill-appearing. She is not toxic-appearing or diaphoretic. Comments: Appears chronically ill and thin. No acute distress. HENT:      Head: Normocephalic. Right Ear: Tympanic membrane normal.      Left Ear: Tympanic membrane normal.      Nose: Nose normal.      Mouth/Throat:      Mouth: Mucous membranes are moist.      Pharynx: Oropharynx is clear. Eyes:      Conjunctiva/sclera: Conjunctivae normal.      Pupils: Pupils are equal, round, and reactive to light. Cardiovascular:      Rate and Rhythm: Normal rate and regular rhythm. Pulses: Normal pulses. Heart sounds: Normal heart sounds. No murmur heard. Pulmonary:      Effort: Pulmonary effort is normal.      Breath sounds: Normal breath sounds. Abdominal:      General: Abdomen is flat. A surgical scar is present. Bowel sounds are normal.      Palpations: Abdomen is soft. Tenderness: There is no abdominal tenderness. Comments: Open superficial wound, no redness or drainage noted  Multiple scars on abdomen   Musculoskeletal:         General: Normal range of motion. Cervical back: Normal range of motion. Skin:     General: Skin is warm and dry. Neurological:      General: No focal deficit present. Mental Status: She is alert and oriented to person, place, and time. Sensory: No sensory deficit. Motor: Weakness present. Gait: Gait normal.   Psychiatric:         Mood and Affect: Mood normal.         Behavior: Behavior normal.         Thought Content: Thought content normal.         Judgment: Judgment normal.         Immunization History   Administered Date(s) Administered    COVID-19, MODERNA BLUE border, Primary or Immunocompromised, (age 12y+), IM, 100 mcg/0.5mL 03/18/2021    Tdap (Boostrix, Adacel) 07/05/2018       Health Maintenance   Topic Date Due    Depression Screen  Never done    Hepatitis C screen  Never done    DEXA (modify frequency per FRAX score)  Never done    Shingles vaccine (2 of 3) 06/15/2015    COVID-19 Vaccine (2 - Moderna series) 04/15/2021    Breast cancer screen  06/11/2021    Flu vaccine (1) 08/01/2022    Annual Wellness Visit (AWV)  Never done    Colorectal Cancer Screen  08/26/2023    Lipids  02/16/2027    DTaP/Tdap/Td vaccine (2 - Td or Tdap) 07/05/2028    Pneumococcal 65+ years Vaccine  Completed    Hepatitis A vaccine  Aged Out    Hib vaccine  Aged Out    Meningococcal (ACWY) vaccine  Aged Out       PSH, PMH, SH and FH reviewed and noted. Recent and past labs, tests and consults also reviewed. Recent or new meds also reviewed. Follow up with Dr. Julieta Tinsley in 3 months or sooner if needed    Addendum   After review of EMR from previous admissions noted she had an episode of atrial fibrillation post op. There was mention to start anticoagulation during her admission in August.  She ws unaware of this and did not follow up with cardiology. Discussed with Dr. Julieta Tinsley  Will refer to cardiology for further recommendations. Will have her previous PCP office send over records to review.       Álvaro Pope, ILAN - CNP

## 2022-12-06 NOTE — PATIENT INSTRUCTIONS
Lab work today    Schedule appointment with Dr. Michi Garber    Ensure to help with nutrition    Follow up with Dr. Chayo Lucas today for wound on abdomen    I will get your recent visits and information from previous PCP and update you with further recommendations     Please call when you need refills

## 2022-12-07 DIAGNOSIS — Z00.00 ENCOUNTER FOR MEDICAL EXAMINATION TO ESTABLISH CARE: ICD-10-CM

## 2022-12-07 DIAGNOSIS — Z00.00 ENCOUNTER FOR MEDICAL EXAMINATION TO ESTABLISH CARE: Primary | ICD-10-CM

## 2022-12-07 LAB
ALBUMIN SERPL-MCNC: 3.4 G/DL (ref 3.4–5)
ALP BLD-CCNC: 94 U/L (ref 40–129)
ALT SERPL-CCNC: 13 U/L (ref 10–40)
AST SERPL-CCNC: 20 U/L (ref 15–37)
BILIRUB SERPL-MCNC: <0.2 MG/DL (ref 0–1)
BILIRUBIN DIRECT: <0.2 MG/DL (ref 0–0.3)
BILIRUBIN, INDIRECT: ABNORMAL MG/DL (ref 0–1)
ESTIMATED AVERAGE GLUCOSE: 99.7 MG/DL
HBA1C MFR BLD: 5.1 %
TOTAL PROTEIN: 5.7 G/DL (ref 6.4–8.2)

## 2022-12-07 RX ORDER — LEVOTHYROXINE SODIUM 0.1 MG/1
100 TABLET ORAL DAILY
Qty: 90 TABLET | Refills: 1 | Status: SHIPPED | OUTPATIENT
Start: 2022-12-07

## 2022-12-12 ENCOUNTER — TELEPHONE (OUTPATIENT)
Dept: INTERNAL MEDICINE CLINIC | Age: 72
End: 2022-12-12

## 2022-12-12 RX ORDER — FLUCONAZOLE 100 MG/1
100 TABLET ORAL DAILY
Qty: 3 TABLET | Refills: 0 | Status: SHIPPED | OUTPATIENT
Start: 2022-12-12 | End: 2022-12-15

## 2022-12-12 NOTE — TELEPHONE ENCOUNTER
----- Message from Shivam Tovar MD sent at 12/12/2022  2:22 PM EST -----  Contact: [phone]  Diflucan 100 mg daily x 3 days    ----- Message -----  From: Deven Smith  Sent: 12/12/2022   2:15 PM EST  To: Shivam Tovar MD    Patient states she thinks she has a yeast infection. The patient reports having itching and burning when urinating for about two days. The patient does not have a ride to leave a urine sample and is asking for medication. Please advise.    [address] [place] [address] St

## 2022-12-27 ENCOUNTER — OFFICE VISIT (OUTPATIENT)
Dept: SURGERY | Age: 72
End: 2022-12-27

## 2022-12-27 VITALS
WEIGHT: 122 LBS | HEIGHT: 65 IN | BODY MASS INDEX: 20.33 KG/M2 | SYSTOLIC BLOOD PRESSURE: 122 MMHG | DIASTOLIC BLOOD PRESSURE: 70 MMHG

## 2022-12-27 DIAGNOSIS — Z98.890 POST-OPERATIVE STATE: Primary | ICD-10-CM

## 2022-12-27 DIAGNOSIS — E03.9 ACQUIRED HYPOTHYROIDISM: ICD-10-CM

## 2022-12-27 DIAGNOSIS — Z00.00 ENCOUNTER FOR MEDICAL EXAMINATION TO ESTABLISH CARE: ICD-10-CM

## 2022-12-27 LAB
CHOLESTEROL, FASTING: 202 MG/DL (ref 0–199)
HDLC SERPL-MCNC: 39 MG/DL (ref 40–60)
LDL CHOLESTEROL CALCULATED: 140 MG/DL
T4 FREE: 1.9 NG/DL (ref 0.9–1.8)
TRIGLYCERIDE, FASTING: 115 MG/DL (ref 0–150)
TSH REFLEX: 0.17 UIU/ML (ref 0.27–4.2)
VLDLC SERPL CALC-MCNC: 23 MG/DL

## 2022-12-27 PROCEDURE — 99024 POSTOP FOLLOW-UP VISIT: CPT | Performed by: SURGERY

## 2022-12-27 NOTE — PROGRESS NOTES
[facility] GENERAL SURGERY      S:   Patient presents s/p repair of perforated duodenal ulcer. She reports doing well with just 1 little scab on her incision. O:   Comfortable         Incision site healing well. Was removed to reveal a healed incision              A:   S/P repair of perforated duodenal ulcer    P:   Follow up as needed.

## 2022-12-28 RX ORDER — LEVOTHYROXINE SODIUM 0.07 MG/1
75 TABLET ORAL DAILY
Qty: 30 TABLET | Refills: 1 | Status: SHIPPED | OUTPATIENT
Start: 2022-12-28

## 2023-01-10 ENCOUNTER — OFFICE VISIT (OUTPATIENT)
Dept: CARDIOLOGY CLINIC | Age: 73
End: 2023-01-10
Payer: MEDICARE

## 2023-01-10 ENCOUNTER — TELEPHONE (OUTPATIENT)
Dept: CARDIOLOGY CLINIC | Age: 73
End: 2023-01-10

## 2023-01-10 VITALS
WEIGHT: 123.4 LBS | HEART RATE: 88 BPM | SYSTOLIC BLOOD PRESSURE: 110 MMHG | TEMPERATURE: 98.6 F | OXYGEN SATURATION: 98 % | HEIGHT: 64 IN | DIASTOLIC BLOOD PRESSURE: 73 MMHG | BODY MASS INDEX: 21.07 KG/M2

## 2023-01-10 DIAGNOSIS — I48.0 PAROXYSMAL ATRIAL FIBRILLATION (HCC): Primary | ICD-10-CM

## 2023-01-10 DIAGNOSIS — E78.2 MIXED HYPERLIPIDEMIA: ICD-10-CM

## 2023-01-10 PROCEDURE — 1124F ACP DISCUSS-NO DSCNMKR DOCD: CPT | Performed by: NURSE PRACTITIONER

## 2023-01-10 PROCEDURE — G8420 CALC BMI NORM PARAMETERS: HCPCS | Performed by: NURSE PRACTITIONER

## 2023-01-10 PROCEDURE — 99214 OFFICE O/P EST MOD 30 MIN: CPT | Performed by: NURSE PRACTITIONER

## 2023-01-10 PROCEDURE — 93000 ELECTROCARDIOGRAM COMPLETE: CPT | Performed by: NURSE PRACTITIONER

## 2023-01-10 PROCEDURE — G8400 PT W/DXA NO RESULTS DOC: HCPCS | Performed by: NURSE PRACTITIONER

## 2023-01-10 PROCEDURE — 1036F TOBACCO NON-USER: CPT | Performed by: NURSE PRACTITIONER

## 2023-01-10 PROCEDURE — 3017F COLORECTAL CA SCREEN DOC REV: CPT | Performed by: NURSE PRACTITIONER

## 2023-01-10 PROCEDURE — G8427 DOCREV CUR MEDS BY ELIG CLIN: HCPCS | Performed by: NURSE PRACTITIONER

## 2023-01-10 PROCEDURE — 1090F PRES/ABSN URINE INCON ASSESS: CPT | Performed by: NURSE PRACTITIONER

## 2023-01-10 PROCEDURE — G8484 FLU IMMUNIZE NO ADMIN: HCPCS | Performed by: NURSE PRACTITIONER

## 2023-01-10 ASSESSMENT — ENCOUNTER SYMPTOMS
GASTROINTESTINAL NEGATIVE: 1
RESPIRATORY NEGATIVE: 1

## 2023-01-10 NOTE — PROGRESS NOTES
[facility]   Cardiology Note              Date:  January 10, 2023  Patientname: Sandip Meeks  YOB: 1950    Primary Care physician: Stephanie Patel MD    HISTORY OF PRESENT ILLNESS: Sandip Meeks is a 74 y.o. female with a history of PAF, HLD, thyroid disease, complicated hospital course 6-8/2022 for perforated ulcer repair. She presented 6/28/2022 for abdominal pain and CT showed pneumoperitoneum due to perforated viscus, underwent emergent exp lap. Cardiology consulted for post op AF RVR. Echo showed EF 50%. Post op course also complicated by AD. Today she presents for follow up for PAF. EKG shows SR rate 77. She feels fatigued from prolonged hospitalizations in 2022 but overall improving. Denies chest pain, shortness of breath, palpitations and dizziness. She tries to walk daily, uses a cane. Cardiologist: evaluated by Dr. Edwina Simon 6/2022 while hospitalized    Past Medical History:   has a past medical history of Arthritis, Colon cancer ([address] Hyperlipidemia, and Thyroid disease. Past Surgical History:   has a past surgical history that includes Colon surgery (01/01/2009); Kidney stone surgery; Ovary removal; Appendectomy (01/01/2009); Cholecystectomy (01/01/2009); epidural steroid injection (Left, 04/15/2019); epidural steroid injection (Left, 11/05/2019); laparotomy (N/A, 06/28/2022); IR MIDLINE CATH (07/20/2022); Esophagogastroduodenoscopy (08/30/2022); and Upper gastrointestinal endoscopy (N/A, 8/30/2022). Home Medications:    Prior to Admission medications    Medication Sig Start Date End Date Taking? Authorizing Provider   levothyroxine (SYNTHROID) 75 MCG tablet Take 1 tablet by mouth Daily 12/28/22   Sandip MeeksILAN CNP   promethazine (PHENERGAN) 12.5 MG tablet Take 12.5 mg by mouth as needed for Nausea    Historical Provider, MD   HYDROcodone-acetaminophen (NORCO) 5-325 MG per tablet Take 1 tablet by mouth as needed for Pain.     Historical Provider, MD pantoprazole (PROTONIX) 40 MG tablet Take 1 tablet by mouth 2 times daily (before meals)  Patient taking differently: Take 40 mg by mouth daily 10/25/22   Edy Costello MD   ascorbic acid (VITAMIN C) 500 MG tablet Take 1 tablet by mouth 2 times daily for 7 days 6/27/22 7/28/22  Guera Payne MD   zinc sulfate (ZINCATE) 220 (50 Zn) MG capsule Take 1 capsule by mouth daily for 7 days 6/27/22 7/28/22  Guera Payne MD   naproxen (NAPROSYN) 500 MG tablet Take 1 tablet by mouth 2 times daily as needed for Pain 6/27/22 7/28/22  Guera Payne MD   famotidine (PEPCID) 10 MG tablet Take 2 tablets by mouth 2 times daily 6/27/22 7/28/22  Guera Payne MD   butalbital-acetaminophen-caffeine ([facility]) -60 MG per tablet Take 1 tablet by mouth every 6 hours as needed for Headaches 6/27/22 7/28/22  Guera Payne MD   citalopram (CELEXA) 20 MG tablet 20 mg daily  5/8/15   Historical Provider, MD   Omega-3 Fatty Acids (FISH OIL) 500 MG CAPS Take 1 g by mouth daily   7/28/22  Historical Provider, MD     Allergies:  Bee venom, Adhesive tape, Tetracyclines & related, and Sulfa antibiotics    Social History:   reports that she quit smoking about 23 years ago. Her smoking use included cigarettes. She smoked an average of 1 pack per day. She has never used smokeless tobacco. She reports that she does not currently use alcohol. She reports that she does not use drugs. Family History: family history includes Cancer in her brother, sister, sister, and sister; Coronary Art Dis in her mother; Heart Attack in her brother and mother; Heart Disease in her brother and mother; High Blood Pressure in her brother and sister. Review of Systems   Review of Systems   Constitutional:  Positive for fatigue. Respiratory: Negative. Cardiovascular: Negative. Gastrointestinal: Negative. Neurological:  Positive for weakness.      OBJECTIVE:    Vital signs:    Pulse 88   Temp 98.6 °F (37 °C)   Ht 5' 4\" (1.626 m)   Wt 123 lb 6.4 oz (56 kg)   SpO2 98%   BMI 21.18 kg/m²      Physical Exam:  Constitutional:  Comfortable and alert, NAD, appears stated age, thin  Eyes: PERRL, sclera nonicteric  Neck:  Supple, no masses, no thyroidmegaly, no JVD  Skin:  Warm and dry; no rash or lesions  Heart:  Regular, normal apex, S1 and S2 normal, no M/G/R  Lungs:  Normal respiratory effort; clear; no wheezing/rhonchi/rales  Abdomen: soft, non tender, + bowel sounds  Extremities:  No edema or cyanosis; no clubbing  Neuro: alert and oriented, moves legs and arms equally, normal mood and affect    Data Reviewed:      Echo 7/1/2022:  Left ventricular size is mildly increased. Normal left ventricular wall thickness. Left ventricular systolic function is low normal with ejection fraction   estimated at 50 %. Grade I diastolic dysfunction with normal filling pressure. The right ventricle is normal in size and function. Aortic valve sclerosis without aortic stenosis. Mild aortic root enlargement with trace aortic regurgitation. Normal systolic pulmonary artery pressure (SPAP) estimated at 31 mmHg (RA   pressure 8 mmHg). Cardiology Labs Reviewed:   CBC: No results for input(s): WBC, HGB, HCT, PLT in the last 72 hours. BMP:No results for input(s): NA, K, CO2, BUN, CREATININE, LABGLOM, GLUCOSE in the last 72 hours. PT/INR: No results for input(s): PROTIME, INR in the last 72 hours. APTT:No results for input(s): APTT in the last 72 hours. FASTING LIPID PANEL:  Lab Results   Component Value Date/Time    HDL 39 12/27/2022 09:58 AM    LDLCALC 140 12/27/2022 09:58 AM     LIVER PROFILE:No results for input(s): AST, ALT, ALB in the last 72 hours.   BNP: No results found for: PROBNP    Reviewed all labs and imaging today    Assessment:   Paroxysmal atrial fibrillation: stable, EKG shows sinus              - [id number] score 2 (age, gender)              - noted post op 6/2022 due to below  Perforated viscus with pneumoperitoneum possibly due to PUD s/p emergency exp lap 6/28/2022  HLD: sub optimal, , diet and exercise encouraged  Thyroid disease  Anemia    Plan:   1. She was noted to have afib 6/2022 during post op period from perforated ulcer repair. [id number] score 2 and she was not discharged on anticoagulation due to anemia. Will obtain 30 day monitor to quantify afib burden. Consider anticoagulation if recurrent afib noted now that she has recovered from surgery. 2. Thyroid function per PCP  3. Stay active along with a healthy diet  4.  Follow up with Dr. Meenakshi Dyer in 3 months    ILAN Conroy-MARIA INES  [address]  [phone]

## 2023-01-10 NOTE — TELEPHONE ENCOUNTER
[facility]  Length of monitor 30 days  Monitor ordered by Mesha Capellan ID [id number]  Activation successful prior to pt leaving office? Yes     was present in room when instructions were given.  VU

## 2023-02-15 RX ORDER — LEVOTHYROXINE SODIUM 0.07 MG/1
TABLET ORAL
Qty: 30 TABLET | Refills: 0 | Status: SHIPPED | OUTPATIENT
Start: 2023-02-15

## 2023-03-10 RX ORDER — LEVOTHYROXINE SODIUM 0.07 MG/1
TABLET ORAL
Qty: 30 TABLET | Refills: 0 | Status: SHIPPED | OUTPATIENT
Start: 2023-03-10

## 2023-03-17 RX ORDER — LEVOTHYROXINE SODIUM 0.07 MG/1
TABLET ORAL
Qty: 30 TABLET | Refills: 0 | Status: SHIPPED | OUTPATIENT
Start: 2023-03-17

## 2023-03-21 ENCOUNTER — OFFICE VISIT (OUTPATIENT)
Dept: INTERNAL MEDICINE CLINIC | Age: 73
End: 2023-03-21

## 2023-03-21 VITALS — BODY MASS INDEX: 21.51 KG/M2 | WEIGHT: 126 LBS | HEIGHT: 64 IN

## 2023-03-21 DIAGNOSIS — M54.50 CHRONIC MIDLINE LOW BACK PAIN WITHOUT SCIATICA: ICD-10-CM

## 2023-03-21 DIAGNOSIS — G89.29 CHRONIC MIDLINE LOW BACK PAIN WITHOUT SCIATICA: ICD-10-CM

## 2023-03-21 DIAGNOSIS — E03.9 ACQUIRED HYPOTHYROIDISM: ICD-10-CM

## 2023-03-21 DIAGNOSIS — K27.9 PEPTIC ULCER DISEASE: ICD-10-CM

## 2023-03-21 DIAGNOSIS — E44.1 MILD PROTEIN-CALORIE MALNUTRITION (HCC): ICD-10-CM

## 2023-03-21 DIAGNOSIS — K94.29 OTHER COMPLICATIONS OF GASTROSTOMY (HCC): Primary | ICD-10-CM

## 2023-03-21 PROBLEM — D50.9 IRON DEFICIENCY ANEMIA: Status: ACTIVE | Noted: 2023-03-21

## 2023-03-21 PROBLEM — M48.061 SPINAL STENOSIS AT L4-L5 LEVEL: Status: ACTIVE | Noted: 2020-11-30

## 2023-03-21 PROBLEM — M51.16 LUMBAR DISC HERNIATION WITH RADICULOPATHY: Status: ACTIVE | Noted: 2020-10-29

## 2023-03-21 PROBLEM — M48.062 LUMBAR STENOSIS WITH NEUROGENIC CLAUDICATION: Status: ACTIVE | Noted: 2020-12-21

## 2023-03-21 PROBLEM — H10.10 ALLERGIC CONJUNCTIVITIS: Status: ACTIVE | Noted: 2023-03-21

## 2023-03-21 LAB
BASOPHILS # BLD: 0.1 K/UL (ref 0–0.2)
BASOPHILS NFR BLD: 0.8 %
DEPRECATED RDW RBC AUTO: 14.8 % (ref 12.4–15.4)
EOSINOPHIL # BLD: 0.1 K/UL (ref 0–0.6)
EOSINOPHIL NFR BLD: 0.6 %
HCT VFR BLD AUTO: 41.8 % (ref 36–48)
HGB BLD-MCNC: 13.8 G/DL (ref 12–16)
LYMPHOCYTES # BLD: 2.2 K/UL (ref 1–5.1)
LYMPHOCYTES NFR BLD: 24.7 %
MCH RBC QN AUTO: 29.2 PG (ref 26–34)
MCHC RBC AUTO-ENTMCNC: 33 G/DL (ref 31–36)
MCV RBC AUTO: 88.6 FL (ref 80–100)
MONOCYTES # BLD: 0.6 K/UL (ref 0–1.3)
MONOCYTES NFR BLD: 6.3 %
NEUTROPHILS # BLD: 6.1 K/UL (ref 1.7–7.7)
NEUTROPHILS NFR BLD: 67.6 %
PLATELET # BLD AUTO: 208 K/UL (ref 135–450)
PMV BLD AUTO: 9 FL (ref 5–10.5)
RBC # BLD AUTO: 4.72 M/UL (ref 4–5.2)
T4 FREE SERPL-MCNC: 1.3 NG/DL (ref 0.9–1.8)
TSH SERPL DL<=0.005 MIU/L-ACNC: 4.54 UIU/ML (ref 0.27–4.2)
WBC # BLD AUTO: 9.1 K/UL (ref 4–11)

## 2023-03-21 PROCEDURE — G8400 PT W/DXA NO RESULTS DOC: HCPCS | Performed by: INTERNAL MEDICINE

## 2023-03-21 PROCEDURE — G8484 FLU IMMUNIZE NO ADMIN: HCPCS | Performed by: INTERNAL MEDICINE

## 2023-03-21 PROCEDURE — 1090F PRES/ABSN URINE INCON ASSESS: CPT | Performed by: INTERNAL MEDICINE

## 2023-03-21 PROCEDURE — G8427 DOCREV CUR MEDS BY ELIG CLIN: HCPCS | Performed by: INTERNAL MEDICINE

## 2023-03-21 PROCEDURE — 3017F COLORECTAL CA SCREEN DOC REV: CPT | Performed by: INTERNAL MEDICINE

## 2023-03-21 PROCEDURE — 1036F TOBACCO NON-USER: CPT | Performed by: INTERNAL MEDICINE

## 2023-03-21 PROCEDURE — G8420 CALC BMI NORM PARAMETERS: HCPCS | Performed by: INTERNAL MEDICINE

## 2023-03-21 PROCEDURE — 99213 OFFICE O/P EST LOW 20 MIN: CPT | Performed by: INTERNAL MEDICINE

## 2023-03-21 PROCEDURE — 1124F ACP DISCUSS-NO DSCNMKR DOCD: CPT | Performed by: INTERNAL MEDICINE

## 2023-03-21 RX ORDER — ATORVASTATIN CALCIUM 20 MG/1
20 TABLET, FILM COATED ORAL NIGHTLY
Qty: 90 TABLET | Refills: 3 | Status: SHIPPED | OUTPATIENT
Start: 2023-03-21

## 2023-03-21 ASSESSMENT — PATIENT HEALTH QUESTIONNAIRE - PHQ9
1. LITTLE INTEREST OR PLEASURE IN DOING THINGS: 0
SUM OF ALL RESPONSES TO PHQ QUESTIONS 1-9: 1
SUM OF ALL RESPONSES TO PHQ QUESTIONS 1-9: 1
SUM OF ALL RESPONSES TO PHQ9 QUESTIONS 1 & 2: 1
SUM OF ALL RESPONSES TO PHQ QUESTIONS 1-9: 1
SUM OF ALL RESPONSES TO PHQ QUESTIONS 1-9: 1
2. FEELING DOWN, DEPRESSED OR HOPELESS: 1

## 2023-03-22 ENCOUNTER — TELEPHONE (OUTPATIENT)
Dept: INTERNAL MEDICINE CLINIC | Age: 73
End: 2023-03-22

## 2023-03-22 RX ORDER — LEVOTHYROXINE SODIUM 0.1 MG/1
100 TABLET ORAL DAILY
Qty: 90 TABLET | Refills: 0 | Status: SHIPPED | OUTPATIENT
Start: 2023-03-22

## 2023-03-22 NOTE — TELEPHONE ENCOUNTER
----- Message from Gwyn Vidal MD sent at 3/22/2023 12:39 PM EDT -----  Change synthroid to 100 mcg , take on empty stomach in am ,   Cbc with resolved anemia

## 2023-03-25 DIAGNOSIS — I48.91 ATRIAL FIBRILLATION WITH RVR (HCC): ICD-10-CM

## 2023-03-25 DIAGNOSIS — I48.0 PAROXYSMAL ATRIAL FIBRILLATION (HCC): ICD-10-CM

## 2023-03-27 ENCOUNTER — TELEPHONE (OUTPATIENT)
Dept: CARDIOLOGY CLINIC | Age: 73
End: 2023-03-27

## 2023-03-27 NOTE — TELEPHONE ENCOUNTER
----- Message from Tor Baltazar, ILAN - CNP sent at 3/27/2023  9:14 AM EDT -----  Please let her know that cardiac monitor did not show significant arrhythmia. Follow up as planned. Thanks!

## 2023-04-06 ENCOUNTER — TELEPHONE (OUTPATIENT)
Dept: INTERNAL MEDICINE CLINIC | Age: 73
End: 2023-04-06

## 2023-04-06 NOTE — TELEPHONE ENCOUNTER
----- Message from Pauline Harvey MD sent at 4/6/2023  4:28 PM EDT -----  Contact: [phone]  Ok to stop    ----- Message -----  From: Sandy Oneida  Sent: 4/6/2023   4:17 PM EDT  To: Pauline Harvey MD    FYI - Patient states she has not been taking below medication since March 21 st appointment and has been experiencing less diarrhea.        pantoprazole (PROTONIX) 40 MG tablet

## 2023-04-19 RX ORDER — PANTOPRAZOLE SODIUM 40 MG/1
TABLET, DELAYED RELEASE ORAL
Qty: 180 TABLET | Refills: 1 | OUTPATIENT
Start: 2023-04-19

## 2023-04-21 NOTE — TELEPHONE ENCOUNTER
Inpt. Azelaic Acid Counseling: Patient counseled that medicine may cause skin irritation and to avoid applying near the eyes.  In the event of skin irritation, the patient was advised to reduce the amount of the drug applied or use it less frequently.   The patient verbalized understanding of the proper use and possible adverse effects of azelaic acid.  All of the patient's questions and concerns were addressed.

## 2023-05-04 RX ORDER — CITALOPRAM 20 MG/1
20 TABLET ORAL DAILY
Qty: 90 TABLET | Refills: 0 | Status: SHIPPED | OUTPATIENT
Start: 2023-05-04

## 2023-05-04 NOTE — TELEPHONE ENCOUNTER
----- Message from Tanner Bosworth sent at 5/4/2023  2:44 PM EDT -----  Contact: [phone]  Pt requesting refill, please advise. citalopram (CELEXA) 20 MG tablet         Pharmacy [facility].  [place]  [address] [address], [address] [facility]     [phone]

## 2023-05-12 ENCOUNTER — TELEPHONE (OUTPATIENT)
Dept: INTERNAL MEDICINE CLINIC | Age: 73
End: 2023-05-12

## 2023-05-12 NOTE — TELEPHONE ENCOUNTER
----- Message from ILAN Peterson CNP sent at 5/12/2023  3:37 PM EDT -----  Contact: [phone]  I called patient    ----- Message -----  From: Danna Bhandari  Sent: 5/12/2023  12:16 PM EDT  To: ILAN Peterson CNP    Pt called saying that the meds below helped for a little bit but now it's not helping and she is going to the bathroom too much. 2/3 times a day, sometimes cannot make it to the bathroom before going in pants.  Pt wants something to help with this, please advise     omeprazole (PRILOSEC) 20 MG delayed release capsule       Pharmacy    [facility] [id number] - [facility], [address] Nesha Gaby [phone] [place] [phone]   [address] [address] [place] [address] [address], [address]   Phone:  [phone]  Fax:  [phone]

## 2023-05-22 ENCOUNTER — TELEPHONE (OUTPATIENT)
Dept: INTERNAL MEDICINE CLINIC | Age: 73
End: 2023-05-22

## 2023-05-22 RX ORDER — TIZANIDINE 2 MG/1
2 TABLET ORAL 2 TIMES DAILY PRN
Qty: 20 TABLET | Refills: 0 | Status: SHIPPED | OUTPATIENT
Start: 2023-05-22

## 2023-05-22 NOTE — TELEPHONE ENCOUNTER
----- Message from Veronica Cherry sent at 5/22/2023  4:50 PM EDT -----  Contact: [phone]  Zanaflex 2 mg bid prn, can cause drowsiness. Lidocaine patches for back OTC per Dr. Svetlana Jones  ----- Message -----  From: Veronica Cherry  Sent: 5/22/2023   4:39 PM EDT  To: Gwyn Vidal MD    Patient states she cannot take any NSAIDs due to having a ruptured ulcer in the past.  States she was told this by Dr. Jillian Gregory  ----- Message -----  From: Gwyn Vidal MD  Sent: 5/22/2023   2:38 PM EDT  To: Veronica Cherry    Can try mobic if not tried before  15 mg daily #10    ----- Message -----  From: Haylie Lewis  Sent: 5/22/2023  10:51 AM EDT  To: Gwyn Vidal MD    Pt is having back problems and it hurts while standing and sitting, nothing OTC works and she cannot take. She leaves for vacation tomorrow morning and she states it's a long drive so she wants something to help with back, please advise.         Pharmacy    [facility] [id number] - [facility], [address]   [address] [address] [place] [address] [address], [address] [facility]   Phone:  [phone]  Fax:  [phone]

## 2023-05-22 NOTE — TELEPHONE ENCOUNTER
----- Message from Tracey Montalvo MD sent at 5/22/2023  2:37 PM EDT -----  Contact: [phone]  Can try mobic if not tried before  15 mg daily #10    ----- Message -----  From: Kimi Neema  Sent: 5/22/2023  10:51 AM EDT  To: Tracey Montalvo MD    Pt is having back problems and it hurts while standing and sitting, nothing OTC works and she cannot take. She leaves for vacation tomorrow morning and she states it's a long drive so she wants something to help with back, please advise.         Pharmacy    [facility] [id number] [facility]   [address] [address] [place] [address] [address], [address] [facility] [address]   Phone:  [phone]  Fax:  [phone]

## 2023-06-19 ENCOUNTER — TELEPHONE (OUTPATIENT)
Dept: INTERNAL MEDICINE CLINIC | Age: 73
End: 2023-06-19

## 2023-06-19 RX ORDER — LEVOTHYROXINE SODIUM 0.1 MG/1
TABLET ORAL
Qty: 90 TABLET | Refills: 0 | Status: SHIPPED | OUTPATIENT
Start: 2023-06-19

## 2023-06-19 RX ORDER — AZITHROMYCIN 250 MG/1
TABLET, FILM COATED ORAL
Qty: 1 PACKET | Refills: 0 | Status: SHIPPED | OUTPATIENT
Start: 2023-06-19

## 2023-06-19 NOTE — TELEPHONE ENCOUNTER
----- Message from [facility] sent at 6/19/2023 11:23 AM EDT -----  Contact: Pt [phone]    ----- Message -----  From: Amparo Quan MD  Sent: 6/19/2023   8:12 AM EDT  To: SOPHIA Austin MAGDALENA    Start on z pack if no allergies  Also try mucinex   See us if not better    ----- Message -----  From: Teresa Griffith  Sent: 6/19/2023   8:11 AM EDT  To: Amparo Quan MD    Pt states that she tested for covid,negative. Symptoms include cough and thick sinus drainage. Pt is requesting an z-pack. Please advise.             [facility] [id number] - [place] [address] [address], [address] [facility]   Phone:  [phone]  Fax:  [phone]

## 2023-06-27 ENCOUNTER — TELEPHONE (OUTPATIENT)
Dept: INTERNAL MEDICINE CLINIC | Age: 73
End: 2023-06-27

## 2023-06-27 RX ORDER — DOXYCYCLINE HYCLATE 100 MG
100 TABLET ORAL 2 TIMES DAILY
Qty: 10 TABLET | Refills: 0 | Status: CANCELLED | OUTPATIENT
Start: 2023-06-27 | End: 2023-07-02

## 2023-06-27 RX ORDER — LEVOFLOXACIN 500 MG/1
500 TABLET, FILM COATED ORAL DAILY
Qty: 5 TABLET | Refills: 0 | Status: SHIPPED | OUTPATIENT
Start: 2023-06-27 | End: 2023-07-02

## 2023-07-03 RX ORDER — OMEPRAZOLE 20 MG/1
CAPSULE, DELAYED RELEASE ORAL
Qty: 90 CAPSULE | Refills: 0 | Status: SHIPPED | OUTPATIENT
Start: 2023-07-03

## 2023-07-25 RX ORDER — CITALOPRAM 20 MG/1
TABLET ORAL
Qty: 90 TABLET | Refills: 0 | Status: SHIPPED | OUTPATIENT
Start: 2023-07-25

## 2023-08-01 ENCOUNTER — OFFICE VISIT (OUTPATIENT)
Dept: INTERNAL MEDICINE CLINIC | Age: 73
End: 2023-08-01

## 2023-08-01 ENCOUNTER — TELEPHONE (OUTPATIENT)
Dept: INTERNAL MEDICINE CLINIC | Age: 73
End: 2023-08-01

## 2023-08-01 VITALS
SYSTOLIC BLOOD PRESSURE: 130 MMHG | HEIGHT: 65 IN | BODY MASS INDEX: 19.83 KG/M2 | DIASTOLIC BLOOD PRESSURE: 75 MMHG | RESPIRATION RATE: 18 BRPM | HEART RATE: 70 BPM | WEIGHT: 119 LBS

## 2023-08-01 DIAGNOSIS — G89.29 CHRONIC MIDLINE LOW BACK PAIN WITHOUT SCIATICA: ICD-10-CM

## 2023-08-01 DIAGNOSIS — E78.2 MIXED HYPERLIPIDEMIA: ICD-10-CM

## 2023-08-01 DIAGNOSIS — E03.9 ACQUIRED HYPOTHYROIDISM: Primary | ICD-10-CM

## 2023-08-01 DIAGNOSIS — M54.50 CHRONIC MIDLINE LOW BACK PAIN WITHOUT SCIATICA: ICD-10-CM

## 2023-08-01 DIAGNOSIS — H40.9 GLAUCOMA, UNSPECIFIED GLAUCOMA TYPE, UNSPECIFIED LATERALITY: ICD-10-CM

## 2023-08-01 DIAGNOSIS — E03.9 ACQUIRED HYPOTHYROIDISM: ICD-10-CM

## 2023-08-01 DIAGNOSIS — K27.9 PEPTIC ULCER DISEASE: ICD-10-CM

## 2023-08-01 LAB — TSH SERPL DL<=0.005 MIU/L-ACNC: 3.09 UIU/ML (ref 0.27–4.2)

## 2023-08-01 PROCEDURE — 1090F PRES/ABSN URINE INCON ASSESS: CPT | Performed by: INTERNAL MEDICINE

## 2023-08-01 PROCEDURE — G8420 CALC BMI NORM PARAMETERS: HCPCS | Performed by: INTERNAL MEDICINE

## 2023-08-01 PROCEDURE — 1036F TOBACCO NON-USER: CPT | Performed by: INTERNAL MEDICINE

## 2023-08-01 PROCEDURE — 1124F ACP DISCUSS-NO DSCNMKR DOCD: CPT | Performed by: INTERNAL MEDICINE

## 2023-08-01 PROCEDURE — 99213 OFFICE O/P EST LOW 20 MIN: CPT | Performed by: INTERNAL MEDICINE

## 2023-08-01 PROCEDURE — G8400 PT W/DXA NO RESULTS DOC: HCPCS | Performed by: INTERNAL MEDICINE

## 2023-08-01 PROCEDURE — G8427 DOCREV CUR MEDS BY ELIG CLIN: HCPCS | Performed by: INTERNAL MEDICINE

## 2023-08-01 PROCEDURE — 3017F COLORECTAL CA SCREEN DOC REV: CPT | Performed by: INTERNAL MEDICINE

## 2023-08-01 NOTE — PROGRESS NOTES
Constitutional: Negative for fever or chills  HENT: Negative for sore throat   Eyes: Negative for redness   Respiratory: Negative  for dyspnea, cough   Cardiovascular: Negative for chest pain   Gastrointestinal:neg for abd pain, nausea or vomiting++ diarrhea  No melena or BRPR  Genitourinary: Negative for hematuria   Musculoskeletal: Negative for arthralgias   Skin: Negative for rash   Neurological: Negative for syncope   Hematological: Negative for adenopathy   Psychiatric/Behavorial: Negative for anxiety      EXAM    Vitals:    08/01/23 0906   BP: 130/75   Pulse: 70   Resp: 18         General: thin elderly female, healthy appearing Awake, alert and oriented. Appears to be not in any distress  Mucous Membranes:  Pink , anicteric  Neck: No JVD, no carotid bruit, no thyromegaly  Chest:  Clear to auscultation bilaterally, no added sounds  Cardiovascular:  RRR S1S2 heard, no murmurs or gallops  Abdomen:  Soft, undistended, non tender, large ventral hernia    no organomegaly, BS present  Extremities: No edema or cyanosis. Distal pulses well felt  Neurological : grossly normal  Non focal      Wt Readings from Last 3 Encounters:   08/01/23 119 lb (54 kg)   04/11/23 128 lb 3.2 oz (58.2 kg)   03/21/23 126 lb (57.2 kg)       Assessment and Plan    Diagnosis Orders   1. Acquired hypothyroidism        2. Chronic midline low back pain without sciatica        3. Peptic ulcer disease        4. Glaucoma, unspecified glaucoma type, unspecified laterality        5.  Mixed hyperlipidemia            Perforated prepyloric ulcer- s/p repair 7/22  - she had a couple complicated admissions for this  - diarrhea could be from ppi and ongoing weight loss as pt unable to retain food   - can try daily imodium , if not can add lomotil and see     Chronic Depression  - on Celexa- pt stated this is controlled       Afibb post op , resolved  Not on [facility] for anemia   had 30 day event monitor post op  showed no arrhythmia       Hypothyroidism -

## 2023-08-01 NOTE — TELEPHONE ENCOUNTER
----- Message from Salima Beyer MD sent at 8/1/2023 11:15 AM EDT -----  Zyrtec 5 mg nightly ( half OTC dose )   ----- Message -----  From: Joce Torres  Sent: 8/1/2023   9:38 AM EDT  To: Salima Beyer MD    Pt wants to know what you recommend otc for sinuses. Please advise.

## 2023-08-09 ENCOUNTER — HOSPITAL ENCOUNTER (OUTPATIENT)
Dept: MRI IMAGING | Age: 73
Discharge: HOME | End: 2023-08-09
Payer: MEDICARE

## 2023-08-09 DIAGNOSIS — M43.9 ACQUIRED CURVATURE OF SPINE: ICD-10-CM

## 2023-08-09 DIAGNOSIS — M51.36 DEGENERATION OF LUMBAR INTERVERTEBRAL DISC: ICD-10-CM

## 2023-08-09 DIAGNOSIS — M47.816 LUMBAR SPONDYLOSIS: ICD-10-CM

## 2023-08-09 DIAGNOSIS — M54.50 LOW BACK PAIN, UNSPECIFIED BACK PAIN LATERALITY, UNSPECIFIED CHRONICITY, UNSPECIFIED WHETHER SCIATICA PRESENT: ICD-10-CM

## 2023-08-09 PROCEDURE — 72148 MRI LUMBAR SPINE W/O DYE: CPT

## 2023-10-02 RX ORDER — OMEPRAZOLE 20 MG/1
CAPSULE, DELAYED RELEASE ORAL
Qty: 90 CAPSULE | Refills: 0 | Status: SHIPPED | OUTPATIENT
Start: 2023-10-02

## 2023-10-20 RX ORDER — CITALOPRAM 20 MG/1
20 TABLET ORAL DAILY
Qty: 90 TABLET | Refills: 0 | Status: SHIPPED | OUTPATIENT
Start: 2023-10-20

## 2023-12-04 RX ORDER — LEVOTHYROXINE SODIUM 0.1 MG/1
TABLET ORAL
Qty: 90 TABLET | Refills: 0 | Status: SHIPPED | OUTPATIENT
Start: 2023-12-04

## 2023-12-28 RX ORDER — OMEPRAZOLE 20 MG/1
CAPSULE, DELAYED RELEASE ORAL
Qty: 90 CAPSULE | Refills: 0 | Status: SHIPPED | OUTPATIENT
Start: 2023-12-28

## 2024-01-16 ENCOUNTER — OFFICE VISIT (OUTPATIENT)
Dept: INTERNAL MEDICINE CLINIC | Age: 74
End: 2024-01-16

## 2024-01-16 VITALS
HEART RATE: 65 BPM | BODY MASS INDEX: 21.51 KG/M2 | RESPIRATION RATE: 18 BRPM | SYSTOLIC BLOOD PRESSURE: 120 MMHG | DIASTOLIC BLOOD PRESSURE: 60 MMHG | HEIGHT: 64 IN | WEIGHT: 126 LBS

## 2024-01-16 DIAGNOSIS — Z11.59 NEED FOR HEPATITIS C SCREENING TEST: ICD-10-CM

## 2024-01-16 DIAGNOSIS — Z72.89 OTHER PROBLEMS RELATED TO LIFESTYLE: ICD-10-CM

## 2024-01-16 DIAGNOSIS — K27.9 PEPTIC ULCER DISEASE: ICD-10-CM

## 2024-01-16 DIAGNOSIS — Z71.89 ACP (ADVANCE CARE PLANNING): ICD-10-CM

## 2024-01-16 DIAGNOSIS — E03.9 ACQUIRED HYPOTHYROIDISM: ICD-10-CM

## 2024-01-16 DIAGNOSIS — Z00.00 MEDICARE ANNUAL WELLNESS VISIT, SUBSEQUENT: ICD-10-CM

## 2024-01-16 DIAGNOSIS — Z12.31 BREAST CANCER SCREENING BY MAMMOGRAM: ICD-10-CM

## 2024-01-16 DIAGNOSIS — Z78.0 ASYMPTOMATIC MENOPAUSAL STATE: ICD-10-CM

## 2024-01-16 DIAGNOSIS — I48.0 PAROXYSMAL ATRIAL FIBRILLATION (HCC): ICD-10-CM

## 2024-01-16 DIAGNOSIS — Z00.00 INITIAL MEDICARE ANNUAL WELLNESS VISIT: Primary | ICD-10-CM

## 2024-01-16 DIAGNOSIS — E78.2 MIXED HYPERLIPIDEMIA: ICD-10-CM

## 2024-01-16 DIAGNOSIS — E55.9 VITAMIN D DEFICIENCY: ICD-10-CM

## 2024-01-16 DIAGNOSIS — M54.50 CHRONIC MIDLINE LOW BACK PAIN WITHOUT SCIATICA: ICD-10-CM

## 2024-01-16 DIAGNOSIS — G89.29 CHRONIC MIDLINE LOW BACK PAIN WITHOUT SCIATICA: ICD-10-CM

## 2024-01-16 PROBLEM — R53.1 WEAKNESS: Status: RESOLVED | Noted: 2022-08-30 | Resolved: 2024-01-16

## 2024-01-16 PROBLEM — K94.29: Status: RESOLVED | Noted: 2023-03-21 | Resolved: 2024-01-16

## 2024-01-16 PROBLEM — E44.1 MILD PROTEIN-CALORIE MALNUTRITION (HCC): Status: RESOLVED | Noted: 2022-08-27 | Resolved: 2024-01-16

## 2024-01-16 LAB
25(OH)D3 SERPL-MCNC: 37.6 NG/ML
BASOPHILS # BLD: 0.1 K/UL (ref 0–0.2)
BASOPHILS NFR BLD: 1 %
DEPRECATED RDW RBC AUTO: 14.4 % (ref 12.4–15.4)
EOSINOPHIL # BLD: 0.1 K/UL (ref 0–0.6)
EOSINOPHIL NFR BLD: 2 %
HCT VFR BLD AUTO: 40.9 % (ref 36–48)
HCV AB SERPL QL IA: NORMAL
HGB BLD-MCNC: 13.6 G/DL (ref 12–16)
LYMPHOCYTES # BLD: 2.1 K/UL (ref 1–5.1)
LYMPHOCYTES NFR BLD: 35.8 %
MCH RBC QN AUTO: 30.1 PG (ref 26–34)
MCHC RBC AUTO-ENTMCNC: 33.3 G/DL (ref 31–36)
MCV RBC AUTO: 90.4 FL (ref 80–100)
MONOCYTES # BLD: 0.3 K/UL (ref 0–1.3)
MONOCYTES NFR BLD: 5.9 %
NEUTROPHILS # BLD: 3.3 K/UL (ref 1.7–7.7)
NEUTROPHILS NFR BLD: 55.3 %
PLATELET # BLD AUTO: 202 K/UL (ref 135–450)
PMV BLD AUTO: 8.9 FL (ref 5–10.5)
RBC # BLD AUTO: 4.53 M/UL (ref 4–5.2)
WBC # BLD AUTO: 5.9 K/UL (ref 4–11)

## 2024-01-16 PROCEDURE — 3017F COLORECTAL CA SCREEN DOC REV: CPT | Performed by: INTERNAL MEDICINE

## 2024-01-16 PROCEDURE — G8484 FLU IMMUNIZE NO ADMIN: HCPCS | Performed by: INTERNAL MEDICINE

## 2024-01-16 PROCEDURE — G0438 PPPS, INITIAL VISIT: HCPCS | Performed by: INTERNAL MEDICINE

## 2024-01-16 PROCEDURE — 1124F ACP DISCUSS-NO DSCNMKR DOCD: CPT | Performed by: INTERNAL MEDICINE

## 2024-01-16 ASSESSMENT — PATIENT HEALTH QUESTIONNAIRE - PHQ9
SUM OF ALL RESPONSES TO PHQ QUESTIONS 1-9: 1
SUM OF ALL RESPONSES TO PHQ QUESTIONS 1-9: 0
2. FEELING DOWN, DEPRESSED OR HOPELESS: 0
SUM OF ALL RESPONSES TO PHQ QUESTIONS 1-9: 0
SUM OF ALL RESPONSES TO PHQ QUESTIONS 1-9: 0
SUM OF ALL RESPONSES TO PHQ QUESTIONS 1-9: 1
2. FEELING DOWN, DEPRESSED OR HOPELESS: 0
SUM OF ALL RESPONSES TO PHQ9 QUESTIONS 1 & 2: 0
1. LITTLE INTEREST OR PLEASURE IN DOING THINGS: 1
SUM OF ALL RESPONSES TO PHQ QUESTIONS 1-9: 0
SUM OF ALL RESPONSES TO PHQ QUESTIONS 1-9: 1
1. LITTLE INTEREST OR PLEASURE IN DOING THINGS: 0
SUM OF ALL RESPONSES TO PHQ QUESTIONS 1-9: 1
SUM OF ALL RESPONSES TO PHQ9 QUESTIONS 1 & 2: 1

## 2024-01-16 NOTE — PROGRESS NOTES
Medicare Annual Wellness Visit    Latrice Cho is here for Medicare AWV    Assessment & Plan   Medicare annual wellness visit, subsequent  Paroxysmal atrial fibrillation (HCC)  Mixed hyperlipidemia  Acquired hypothyroidism  Chronic midline low back pain without sciatica  Peptic ulcer disease    Recommendations for Preventive Services Due: see orders and patient instructions/AVS.  Recommended screening schedule for the next 5-10 years is provided to the patient in written form: see Patient Instructions/AVS.     Return in about 6 months (around 7/16/2024) for hypothyroid .         Subjective     73 y.o. female with hx of hypothyroid, chronic anxiety , colon cancer , chronic diarrhea here for medicare wellness visit      In 7/22 - Pt had a complicated admission in July and August 2022 for perforated prepyloric ulcer. She had a J-tube which has been removed. She has lost around 30 lbs during  that admission and eventually did well and slowly gaining weight    Chronic diarrhea has been better with addition of probiotic and now tolerating diet well with no nausea or abd pain   Seen GI and has pending colonoscopy for hx of colon cancer        Hypothyroid - remains on synthroid   No weight loss recently   No night sweats   No constipation issues     Chronic anxiety  - stable on celexa       She follows with Dr. Shahid oncology for history of colon cancer.  S/p oral chemo and radiation tx .   Denies fever, cough, chest pain, dyspnea,     Lives with  and no new depression issues  Independent of ADL and IADL  No recent falls   No sleep issues        Patient's complete Health Risk Assessment and screening values have been reviewed and are found in Flowsheets. The following problems were reviewed today and where indicated follow up appointments were made and/or referrals ordered.    Positive Risk Factor Screenings with Interventions:    Fall Risk:  Do you feel unsteady or are you worried about falling? : (!) yes  2 or

## 2024-01-16 NOTE — PATIENT INSTRUCTIONS
glaucoma; cataracts, macular degeneration, and other eye disorders.  A preventive dental visit is recommended every 6 months.  Try to get at least 150 minutes of exercise per week or 10,000 steps per day on a pedometer .  Order or download the FREE \"Exercise & Physical Activity: Your Everyday Guide\" from [employer]. Call 1-137.220.4688 or search [employer] online.  You need 8521-6319 mg of calcium and 1828-7564 IU of vitamin D per day. It is possible to meet your calcium requirement with diet alone, but a vitamin D supplement is usually necessary to meet this goal.  When exposed to the sun, use a sunscreen that protects against both UVA and UVB radiation with an SPF of 30 or greater. Reapply every 2 to 3 hours or after sweating, drying off with a towel, or swimming.  Always wear a seat belt when traveling in a car. Always wear a helmet when riding a bicycle or motorcycle.

## 2024-01-17 LAB
ALBUMIN SERPL-MCNC: 4.2 G/DL (ref 3.4–5)
ALBUMIN/GLOB SERPL: 2.6 {RATIO} (ref 1.1–2.2)
ALP SERPL-CCNC: 138 U/L (ref 40–129)
ALT SERPL-CCNC: 17 U/L (ref 10–40)
ANION GAP SERPL CALCULATED.3IONS-SCNC: 11 MMOL/L (ref 3–16)
AST SERPL-CCNC: 32 U/L (ref 15–37)
BILIRUB SERPL-MCNC: 0.5 MG/DL (ref 0–1)
BUN SERPL-MCNC: 7 MG/DL (ref 7–20)
CALCIUM SERPL-MCNC: 8.8 MG/DL (ref 8.3–10.6)
CHLORIDE SERPL-SCNC: 105 MMOL/L (ref 99–110)
CHOLEST SERPL-MCNC: 136 MG/DL (ref 0–199)
CO2 SERPL-SCNC: 27 MMOL/L (ref 21–32)
CREAT SERPL-MCNC: 0.8 MG/DL (ref 0.6–1.2)
GFR SERPLBLD CREATININE-BSD FMLA CKD-EPI: >60 ML/MIN/{1.73_M2}
GLUCOSE SERPL-MCNC: 77 MG/DL (ref 70–99)
HDLC SERPL-MCNC: 41 MG/DL (ref 40–60)
LDL CHOLESTEROL CALCULATED: 78 MG/DL
POTASSIUM SERPL-SCNC: 5 MMOL/L (ref 3.5–5.1)
PROT SERPL-MCNC: 5.8 G/DL (ref 6.4–8.2)
SODIUM SERPL-SCNC: 143 MMOL/L (ref 136–145)
TRIGL SERPL-MCNC: 86 MG/DL (ref 0–150)
TSH SERPL DL<=0.005 MIU/L-ACNC: 2.4 UIU/ML (ref 0.27–4.2)
URATE SERPL-MCNC: 4.6 MG/DL (ref 2.6–6)
VLDLC SERPL CALC-MCNC: 17 MG/DL

## 2024-01-17 RX ORDER — CITALOPRAM 20 MG/1
20 TABLET ORAL DAILY
Qty: 90 TABLET | Refills: 0 | Status: SHIPPED | OUTPATIENT
Start: 2024-01-17

## 2024-02-06 ENCOUNTER — HOSPITAL ENCOUNTER (OUTPATIENT)
Dept: WOMENS IMAGING | Age: 74
Discharge: HOME | End: 2024-02-06
Attending: INTERNAL MEDICINE
Payer: MEDICARE

## 2024-02-06 DIAGNOSIS — Z12.31 BREAST CANCER SCREENING BY MAMMOGRAM: ICD-10-CM

## 2024-02-06 DIAGNOSIS — Z78.0 ASYMPTOMATIC MENOPAUSAL STATE: ICD-10-CM

## 2024-02-06 PROCEDURE — 77080 DXA BONE DENSITY AXIAL: CPT

## 2024-02-06 PROCEDURE — 77063 BREAST TOMOSYNTHESIS BI: CPT

## 2024-02-07 ENCOUNTER — TELEPHONE (OUTPATIENT)
Dept: INTERNAL MEDICINE CLINIC | Age: 74
End: 2024-02-07

## 2024-02-07 DIAGNOSIS — M81.0 AGE RELATED OSTEOPOROSIS, UNSPECIFIED PATHOLOGICAL FRACTURE PRESENCE: Primary | ICD-10-CM

## 2024-02-07 DIAGNOSIS — M81.0 OSTEOPOROSIS, UNSPECIFIED OSTEOPOROSIS TYPE, UNSPECIFIED PATHOLOGICAL FRACTURE PRESENCE: Primary | ICD-10-CM

## 2024-02-07 RX ORDER — DENOSUMAB 60 MG/ML
60 INJECTION SUBCUTANEOUS ONCE
Qty: 1 ML | Refills: 1 | Status: SHIPPED | OUTPATIENT
Start: 2024-02-07 | End: 2024-02-07

## 2024-02-07 NOTE — TELEPHONE ENCOUNTER
----- Message from Kyle Chow MD sent at 2/6/2024  8:07 PM EST -----  Osteoporosis noted  Recommend prolia as opposed to fosamax with hx of Peptic ulcer disease  Do PA

## 2024-03-11 RX ORDER — ATORVASTATIN CALCIUM 20 MG/1
20 TABLET, FILM COATED ORAL NIGHTLY
Qty: 90 TABLET | Refills: 0 | Status: SHIPPED | OUTPATIENT
Start: 2024-03-11

## 2024-03-11 RX ORDER — LEVOTHYROXINE SODIUM 0.1 MG/1
TABLET ORAL
Qty: 90 TABLET | Refills: 0 | Status: SHIPPED | OUTPATIENT
Start: 2024-03-11

## 2024-03-14 NOTE — PROGRESS NOTES
Bedside report given and pt care transferred to [address] Pt denies needs at this time. Call light within reach. Orientation to room/Bed in low position, brakes on/Side rails x 2 or 4 up, assess large gaps, such that a patient could get extremity or other body part entrapped, use additional safety procedures/Use of non-skid footwear for ambulating patients, use of appropriate size clothing to prevent risk of tripping/Call light is within reach, educate patient/family on its functionality/Environment clear of unused equipment, furniture's in place, clear of hazards/Patient and family education available to parents and patient

## 2024-03-25 RX ORDER — OMEPRAZOLE 20 MG/1
CAPSULE, DELAYED RELEASE ORAL
Qty: 90 CAPSULE | Refills: 0 | Status: SHIPPED | OUTPATIENT
Start: 2024-03-25

## 2024-04-05 NOTE — PROGRESS NOTES
Unit: 2 [place]  Date:  7/16/2022  Patient Name:    Jazmyn Powell  Admitting diagnosis:  Gastric perforation, acute [K31.89]  Perforated peptic ulcer ([facility] [address] [K27.5]  Admit Date:  6/28/2022     Attempt @ 1347:    Therapist attempted PT session with pt, but pt declined working with therapy despite therapist's requests.        Chantale Ramsay PT, DPT, [facility] #[id number] [phone]  No Charge AGITATED

## 2024-04-15 RX ORDER — CITALOPRAM 20 MG/1
20 TABLET ORAL DAILY
Qty: 90 TABLET | Refills: 0 | Status: SHIPPED | OUTPATIENT
Start: 2024-04-15

## 2024-05-13 ENCOUNTER — TELEPHONE (OUTPATIENT)
Dept: NURSING | Age: 74
End: 2024-05-13

## 2024-05-13 NOTE — TELEPHONE ENCOUNTER
Received call from Latrice who requested that her Prolia appt for 5-16-24 be cancelled as she has decided not to get the injection due to the cost and potential side effects  She reports she will discuss it with Dr. Chow on her next office visit and will call if she changes her mind  Appt will be cancelled per her request

## 2024-05-16 ENCOUNTER — HOSPITAL ENCOUNTER (OUTPATIENT)
Dept: NURSING | Age: 74
Setting detail: INFUSION SERIES
Discharge: HOME | End: 2024-05-16

## 2024-06-06 RX ORDER — ATORVASTATIN CALCIUM 20 MG/1
20 TABLET, FILM COATED ORAL NIGHTLY
Qty: 90 TABLET | Refills: 0 | Status: SHIPPED | OUTPATIENT
Start: 2024-06-06

## 2024-06-06 RX ORDER — LEVOTHYROXINE SODIUM 0.1 MG/1
TABLET ORAL
Qty: 90 TABLET | Refills: 0 | Status: SHIPPED | OUTPATIENT
Start: 2024-06-06

## 2024-06-07 RX ORDER — ATORVASTATIN CALCIUM 20 MG/1
20 TABLET, FILM COATED ORAL NIGHTLY
Qty: 90 TABLET | Refills: 0 | OUTPATIENT
Start: 2024-06-07

## 2024-06-19 RX ORDER — OMEPRAZOLE 20 MG/1
CAPSULE, DELAYED RELEASE ORAL
Qty: 90 CAPSULE | Refills: 0 | Status: SHIPPED | OUTPATIENT
Start: 2024-06-19

## 2024-07-11 RX ORDER — CITALOPRAM 20 MG/1
20 TABLET ORAL DAILY
Qty: 90 TABLET | Refills: 0 | Status: SHIPPED | OUTPATIENT
Start: 2024-07-11

## 2024-07-23 ENCOUNTER — OFFICE VISIT (OUTPATIENT)
Dept: INTERNAL MEDICINE CLINIC | Age: 74
End: 2024-07-23

## 2024-07-23 ENCOUNTER — TELEPHONE (OUTPATIENT)
Dept: INTERNAL MEDICINE CLINIC | Age: 74
End: 2024-07-23

## 2024-07-23 VITALS
WEIGHT: 132 LBS | SYSTOLIC BLOOD PRESSURE: 135 MMHG | RESPIRATION RATE: 18 BRPM | HEART RATE: 65 BPM | HEIGHT: 64 IN | DIASTOLIC BLOOD PRESSURE: 78 MMHG | BODY MASS INDEX: 22.53 KG/M2

## 2024-07-23 DIAGNOSIS — G89.29 CHRONIC MIDLINE LOW BACK PAIN WITHOUT SCIATICA: ICD-10-CM

## 2024-07-23 DIAGNOSIS — E78.2 MIXED HYPERLIPIDEMIA: ICD-10-CM

## 2024-07-23 DIAGNOSIS — F32.A MILD DEPRESSION: ICD-10-CM

## 2024-07-23 DIAGNOSIS — M54.50 CHRONIC MIDLINE LOW BACK PAIN WITHOUT SCIATICA: ICD-10-CM

## 2024-07-23 DIAGNOSIS — M81.0 OSTEOPOROSIS, UNSPECIFIED OSTEOPOROSIS TYPE, UNSPECIFIED PATHOLOGICAL FRACTURE PRESENCE: ICD-10-CM

## 2024-07-23 DIAGNOSIS — C20 PRIMARY MALIGNANT NEOPLASM OF RECTUM (HCC): ICD-10-CM

## 2024-07-23 DIAGNOSIS — E03.9 ACQUIRED HYPOTHYROIDISM: Primary | ICD-10-CM

## 2024-07-23 DIAGNOSIS — E03.9 ACQUIRED HYPOTHYROIDISM: ICD-10-CM

## 2024-07-23 PROCEDURE — 1036F TOBACCO NON-USER: CPT | Performed by: INTERNAL MEDICINE

## 2024-07-23 PROCEDURE — G8427 DOCREV CUR MEDS BY ELIG CLIN: HCPCS | Performed by: INTERNAL MEDICINE

## 2024-07-23 PROCEDURE — G8420 CALC BMI NORM PARAMETERS: HCPCS | Performed by: INTERNAL MEDICINE

## 2024-07-23 PROCEDURE — 99213 OFFICE O/P EST LOW 20 MIN: CPT | Performed by: INTERNAL MEDICINE

## 2024-07-23 PROCEDURE — 1124F ACP DISCUSS-NO DSCNMKR DOCD: CPT | Performed by: INTERNAL MEDICINE

## 2024-07-23 PROCEDURE — G8399 PT W/DXA RESULTS DOCUMENT: HCPCS | Performed by: INTERNAL MEDICINE

## 2024-07-23 PROCEDURE — 1090F PRES/ABSN URINE INCON ASSESS: CPT | Performed by: INTERNAL MEDICINE

## 2024-07-23 NOTE — PROGRESS NOTES
Latrice Cho (:  1950) is a 74 y.o. female, with hx of  prepyloric perforated ulcer  s,p surgery ,  glaucoma, hypothyroidism, HLD and depression here for regular f/w       Pt had a complicated admission in July and 2022 for perforated prepyloric ulcer. She had a J-tube which has been removed. She has lost around 30 lbs during  this admission and now remains stable  Reports ongoing chronic diarrhea since surgery and unable to retain any food. Took imodium with resultant constipation     PPi make her symptoms worse  Appetite remains good    Hypothyroid - remains on synthroid   No weight loss recently   + mild weight gain     Chronic anxiety  - stable on celexa       She follows with Dr. Shahid oncology for history of colon cancer.  She completed oral chemo and radiation.   Denies fever, cough, chest pain, dyspnea,   Has chronic diarrhea  No abdominal pain, nausea, vomiting .       Lives with diarrhea  No sleep issues      Allergies   Allergen Reactions    Bee Venom Swelling     Attacked by swarm of bees and required er visit    Adhesive Tape Other (See Comments) and Itching     Skin redness    Skin redness  Skin redness      Tetracyclines & Related Other (See Comments)     Mouth fungus      Sulfa Antibiotics Nausea And Vomiting           Current Outpatient Medications   Medication Sig Dispense Refill    citalopram (CELEXA) 20 MG tablet TAKE 1 TABLET BY MOUTH DAILY 90 tablet 0    omeprazole (PRILOSEC) 20 MG delayed release capsule TAKE 1 CAPSULE BY MOUTH DAILY 90 capsule 0    levothyroxine (SYNTHROID) 100 MCG tablet TAKE ONE TABLET BY MOUTH EVERY MORNING ON AN EMPTY STOMACH 90 tablet 0    atorvastatin (LIPITOR) 20 MG tablet TAKE 1 TABLET BY MOUTH AT BEDTIME 90 tablet 0    denosumab (PROLIA) 60 MG/ML SOSY SC injection Inject 1 mL into the skin once for 1 dose 1 mL 1     No current facility-administered medications for this visit.     There are no changes to past medical history, family

## 2024-07-23 NOTE — TELEPHONE ENCOUNTER
Dr. Chow gave verbal of recommending patient to do prolia injections, patient wants to think about it then will call us when she decides.

## 2024-07-24 LAB
ALBUMIN SERPL-MCNC: 4.3 G/DL (ref 3.4–5)
ALBUMIN/GLOB SERPL: 2.4 {RATIO} (ref 1.1–2.2)
ALP SERPL-CCNC: 156 U/L (ref 40–129)
ALT SERPL-CCNC: 11 U/L (ref 10–40)
ANION GAP SERPL CALCULATED.3IONS-SCNC: 10 MMOL/L (ref 3–16)
AST SERPL-CCNC: 21 U/L (ref 15–37)
BASOPHILS # BLD: 0.1 K/UL (ref 0–0.2)
BASOPHILS NFR BLD: 0.9 %
BILIRUB SERPL-MCNC: 0.3 MG/DL (ref 0–1)
BUN SERPL-MCNC: 9 MG/DL (ref 7–20)
CALCIUM SERPL-MCNC: 9.1 MG/DL (ref 8.3–10.6)
CHLORIDE SERPL-SCNC: 104 MMOL/L (ref 99–110)
CO2 SERPL-SCNC: 26 MMOL/L (ref 21–32)
CREAT SERPL-MCNC: 0.8 MG/DL (ref 0.6–1.2)
DEPRECATED RDW RBC AUTO: 14.1 % (ref 12.4–15.4)
EOSINOPHIL # BLD: 0.2 K/UL (ref 0–0.6)
EOSINOPHIL NFR BLD: 2.8 %
GFR SERPLBLD CREATININE-BSD FMLA CKD-EPI: 76 ML/MIN/{1.73_M2}
GLUCOSE SERPL-MCNC: 74 MG/DL (ref 70–99)
HCT VFR BLD AUTO: 39.3 % (ref 36–48)
HGB BLD-MCNC: 13.1 G/DL (ref 12–16)
LYMPHOCYTES # BLD: 2.8 K/UL (ref 1–5.1)
LYMPHOCYTES NFR BLD: 37.8 %
MCH RBC QN AUTO: 30.5 PG (ref 26–34)
MCHC RBC AUTO-ENTMCNC: 33.3 G/DL (ref 31–36)
MCV RBC AUTO: 91.7 FL (ref 80–100)
MONOCYTES # BLD: 0.4 K/UL (ref 0–1.3)
MONOCYTES NFR BLD: 5.4 %
NEUTROPHILS # BLD: 4 K/UL (ref 1.7–7.7)
NEUTROPHILS NFR BLD: 53.1 %
PLATELET # BLD AUTO: 195 K/UL (ref 135–450)
PMV BLD AUTO: 9 FL (ref 5–10.5)
POTASSIUM SERPL-SCNC: 4.4 MMOL/L (ref 3.5–5.1)
PROT SERPL-MCNC: 6.1 G/DL (ref 6.4–8.2)
RBC # BLD AUTO: 4.29 M/UL (ref 4–5.2)
SODIUM SERPL-SCNC: 140 MMOL/L (ref 136–145)
TSH SERPL DL<=0.005 MIU/L-ACNC: 1.77 UIU/ML (ref 0.27–4.2)
WBC # BLD AUTO: 7.5 K/UL (ref 4–11)

## 2024-07-25 ENCOUNTER — TELEPHONE (OUTPATIENT)
Dept: INTERNAL MEDICINE CLINIC | Age: 74
End: 2024-07-25

## 2024-07-25 DIAGNOSIS — M81.0 OSTEOPOROSIS, UNSPECIFIED OSTEOPOROSIS TYPE, UNSPECIFIED PATHOLOGICAL FRACTURE PRESENCE: ICD-10-CM

## 2024-07-25 RX ORDER — DENOSUMAB 60 MG/ML
60 INJECTION SUBCUTANEOUS ONCE
Qty: 1 ML | Refills: 1 | Status: SHIPPED | OUTPATIENT
Start: 2024-07-25 | End: 2024-07-25

## 2024-07-25 NOTE — TELEPHONE ENCOUNTER
----- Message from Kyle Chow MD sent at 7/25/2024 12:07 PM EDT -----  Prolia 60 mg subcut x 1    ----- Message -----  From: Renée Turner  Sent: 7/25/2024  11:40 AM EDT  To: Kyle Chow MD    Caller Araceli @ outpatient, states she will need new order for patients prolia. Please advise

## 2024-08-13 NOTE — DISCHARGE INSTRUCTIONS
COMMON BRAND NAME(S): Prolia, XGEVA    What is this medicine?  DENOSUMAB slows bone breakdown. It is used to treat osteoporosis in women after menopause. This medicine is also used to prevent bone fractures and other bone problems caused by cancer bone metastases.  This medicine may be used for other purposes; ask your health care provider or pharmacist if you have questions.    What should I watch for while using this medicine?    Visit your doctor or health care professional for regular checks on your progress. Your doctor or health care professional may order blood tests and other tests to see how you are doing.  Call your doctor or health care professional if you get a cold or other infection while receiving this medicine. Do not treat yourself. This medicine may decrease your body's ability to fight infection.  You should make sure you get enough calcium and vitamin D while you are taking this medicine, unless your doctor tells you not to. Discuss the foods you eat and the vitamins you take with your health care professional.  See your dentist regularly. Brush and floss your teeth as directed. Before you have any dental work done, tell your dentist you are receiving this medicine.     What side effects may I notice from receiving this medicine?    Side effects that you should report to your doctor or health care professional as soon as possible:  -allergic reactions like skin rash, itching or hives, swelling of the face, lips, or tongue  -breathing problems  -chest pain  -fast, irregular heartbeat  -feeling faint or lightheaded, falls  -fever, chills, or any other sign of infection  -muscle spasms, tightening, or twitches  -numbness or tingling  -skin blisters or bumps, or is dry, peels, or red  -slow healing or unexplained pain in the mouth or jaw  -unusual bleeding or bruising  Side effects that usually do not require medical attention (Report these to your doctor or health care professional if they continue  or are bothersome.):  -muscle pain  -stomach upset, gas    RETURN IN 6 MONTHS FEB 20,2025 AT 0930  FOR PROLIA INJECTION    FOLLOW UP WITH DR. SCHMIDT AS NEEDED OR SCHEDULED    CALL US IF YOU NEED TO RESCHEDULE YOUR APPOINTMENT  [phone]

## 2024-08-15 ENCOUNTER — HOSPITAL ENCOUNTER (OUTPATIENT)
Dept: NURSING | Age: 74
Setting detail: INFUSION SERIES
Discharge: HOME | End: 2024-08-15
Payer: MEDICARE

## 2024-08-15 VITALS
RESPIRATION RATE: 16 BRPM | HEART RATE: 65 BPM | BODY MASS INDEX: 22.55 KG/M2 | HEIGHT: 64 IN | SYSTOLIC BLOOD PRESSURE: 142 MMHG | WEIGHT: 132.06 LBS | DIASTOLIC BLOOD PRESSURE: 77 MMHG | TEMPERATURE: 97.8 F

## 2024-08-15 DIAGNOSIS — M81.0 AGE RELATED OSTEOPOROSIS, UNSPECIFIED PATHOLOGICAL FRACTURE PRESENCE: Primary | ICD-10-CM

## 2024-08-15 PROCEDURE — 6360000002 HC RX W HCPCS: Performed by: INTERNAL MEDICINE

## 2024-08-15 PROCEDURE — 99203 OFFICE O/P NEW LOW 30 MIN: CPT

## 2024-08-15 PROCEDURE — 96372 THER/PROPH/DIAG INJ SC/IM: CPT

## 2024-08-15 RX ADMIN — DENOSUMAB 60 MG: 60 INJECTION SUBCUTANEOUS at 09:45

## 2024-08-15 ASSESSMENT — PAIN DESCRIPTION - LOCATION: LOCATION: BACK;HIP

## 2024-08-15 ASSESSMENT — PAIN DESCRIPTION - ORIENTATION: ORIENTATION: LOWER;RIGHT;LEFT

## 2024-08-15 ASSESSMENT — PAIN SCALES - GENERAL: PAINLEVEL_OUTOF10: 7

## 2024-08-15 NOTE — PROGRESS NOTES
Pt here for 1st dose of prolia  Pt reporting discomfort in her lower back and chalo hips rated a 7 out of 10 today  Prolia pamphlet given to pt for her to review  Discussed benefits as well as risks of reactions with pt   Pt receptive to all information I also discussed with pt the importance of taking a calcium with Vitamin D daily  Pt reports that she will start that as soon as she can get the medication  All pt's questions and concerns were addressed and answered to her satisfaction  Prolia 60 mg was given SC left arm  Injection site unremarkable  bandaid applied to site  Will monitor for any signs of adverse reactions

## 2024-08-15 NOTE — PROGRESS NOTES
Pt without c/o's   Resp easy and even  Lungs clear  Discharge instructions reviewed with pt and copy was given  Understanding verbalized then pt was discharged ambulatory  in stable condition

## 2024-08-27 ENCOUNTER — TELEPHONE (OUTPATIENT)
Dept: INTERNAL MEDICINE CLINIC | Age: 74
End: 2024-08-27

## 2024-08-27 NOTE — TELEPHONE ENCOUNTER
----- Message from Dr. Kyle Chow MD sent at 8/27/2024  3:32 PM EDT -----  Contact: Patient [phone]  Can add zanaflex 4 mg nightly , can cause drowsiness  ----- Message -----  From: Demetria Harris  Sent: 8/27/2024   1:43 PM EDT  To: Kyle Chow MD    Patient wanting to know what she can take beside Tylenol for back pain?  Patient states she was told she can't take NSAID?  Please advise        [facility] [id number] - [place], [place] - [address] [facility] [address] - P [phone] - F [phone]  [address] [facility] [address]  Phone: [phone]  Fax: [phone]

## 2024-09-03 RX ORDER — ATORVASTATIN CALCIUM 20 MG/1
20 TABLET, FILM COATED ORAL NIGHTLY
Qty: 90 TABLET | Refills: 1 | Status: SHIPPED | OUTPATIENT
Start: 2024-09-03

## 2024-09-09 RX ORDER — LEVOTHYROXINE SODIUM 100 UG/1
TABLET ORAL
Qty: 90 TABLET | Refills: 1 | Status: SHIPPED | OUTPATIENT
Start: 2024-09-09

## 2024-10-07 RX ORDER — CITALOPRAM HYDROBROMIDE 20 MG/1
20 TABLET ORAL DAILY
Qty: 90 TABLET | Refills: 0 | Status: SHIPPED | OUTPATIENT
Start: 2024-10-07

## 2024-10-29 ENCOUNTER — HOSPITAL ENCOUNTER (OUTPATIENT)
Age: 74
Discharge: HOME | End: 2024-10-29
Payer: MEDICARE

## 2024-10-29 ENCOUNTER — HOSPITAL ENCOUNTER (OUTPATIENT)
Dept: GENERAL RADIOLOGY | Age: 74
Discharge: HOME | End: 2024-10-29
Payer: MEDICARE

## 2024-10-29 DIAGNOSIS — R10.9 ABDOMINAL CRAMPING: ICD-10-CM

## 2024-10-29 PROCEDURE — 74018 RADEX ABDOMEN 1 VIEW: CPT

## 2024-10-30 ENCOUNTER — HOSPITAL ENCOUNTER (OUTPATIENT)
Age: 74
Setting detail: SPECIMEN
Discharge: HOME | End: 2024-10-30
Payer: MEDICARE

## 2024-10-30 LAB — C DIFF TOX A+B STL QL IA: NORMAL

## 2024-10-30 PROCEDURE — 87506 IADNA-DNA/RNA PROBE TQ 6-11: CPT

## 2024-10-30 PROCEDURE — 87324 CLOSTRIDIUM AG IA: CPT

## 2024-10-30 PROCEDURE — 83993 ASSAY FOR CALPROTECTIN FECAL: CPT

## 2024-10-30 PROCEDURE — 87449 NOS EACH ORGANISM AG IA: CPT

## 2024-10-31 LAB — GI PATHOGENS PNL STL NAA+PROBE: NORMAL

## 2024-11-02 LAB — CALPROTECTIN STL-MCNT: 16 UG/G

## 2024-11-04 ENCOUNTER — TELEPHONE (OUTPATIENT)
Dept: INTERNAL MEDICINE CLINIC | Age: 74
End: 2024-11-04

## 2024-11-04 RX ORDER — BENZONATATE 100 MG/1
100 CAPSULE ORAL 3 TIMES DAILY PRN
Qty: 21 CAPSULE | Refills: 0 | Status: SHIPPED | OUTPATIENT
Start: 2024-11-04 | End: 2024-11-11

## 2024-11-04 NOTE — TELEPHONE ENCOUNTER
----- Message from Constance VELAZQUEZ sent at 11/4/2024  2:29 PM EST -----  Contact: EDWARD [phone]    ----- Message -----  From: Kyle Chow MD  Sent: 11/4/2024   2:24 PM EST  To: Zohreh Collins    Paxlovid is the only antiviral med available  Others include - zyrtec, cough med OTC, imodium for diarrhea  Increase hydration   Tessalon pearls for cough  ----- Message -----  From: Rhett Acosta  Sent: 11/4/2024  10:27 AM EST  To: Kyle Chow MD    Patient states she has been having diarrhea for the past 3 weeks which Dr. Garcia took a stool test and xray's but she has not received all of the results. Pt is requesting a script be sent to the below pharmacy to help manage to diarrhea. Pt has now tested positive for Covid over the weekend 11/2 and having the following symptoms; cough, runny nose, headache and some congestion. Pt states she did not do very well in the past when taking Paxlovid, but is requesting medication improve the Covid Symptoms. Please advise     [facility] [id number] - [place] - [address] - P [phone] - F [phone]  [address] [place] [address]  Phone: [phone]  Fax: [phone]

## 2024-11-18 ENCOUNTER — TELEPHONE (OUTPATIENT)
Dept: INTERNAL MEDICINE CLINIC | Age: 74
End: 2024-11-18

## 2024-11-18 RX ORDER — BENZONATATE 100 MG/1
100 CAPSULE ORAL 3 TIMES DAILY PRN
Qty: 30 CAPSULE | Refills: 0 | Status: SHIPPED | OUTPATIENT
Start: 2024-11-18 | End: 2024-11-28

## 2024-11-18 RX ORDER — AZITHROMYCIN 250 MG/1
TABLET, FILM COATED ORAL
Qty: 1 PACKET | Refills: 0 | Status: SHIPPED | OUTPATIENT
Start: 2024-11-18

## 2024-11-18 NOTE — TELEPHONE ENCOUNTER
----- Message from Dr. Kyle Chow MD sent at 11/18/2024  4:04 PM EST -----  Contact: [phone]  Start on z pack if no allergies  Also try mucinex   See us if not better  Tessalon pearls  ----- Message -----  From: Yue Renée  Sent: 11/18/2024   3:54 PM EST  To: Kyle Chow MD    Pt states she has been experiencing a cough with thick yellow phloem, headache. Pt states her and her daughter just got over Covid and unable to get rid of the cough. Caller states daughter, Mckayla Huang, came into office today to see Pam and was given a zpak. Caller asking if she can have the same sent to her pharmacy as well. Please advise         [facility]arm[facility]

## 2025-01-02 RX ORDER — CITALOPRAM HYDROBROMIDE 20 MG/1
20 TABLET ORAL DAILY
Qty: 90 TABLET | Refills: 0 | Status: SHIPPED | OUTPATIENT
Start: 2025-01-02

## 2025-01-15 ENCOUNTER — TELEPHONE (OUTPATIENT)
Dept: INTERNAL MEDICINE CLINIC | Age: 75
End: 2025-01-15

## 2025-01-15 RX ORDER — ONDANSETRON 4 MG/1
4 TABLET, ORALLY DISINTEGRATING ORAL 3 TIMES DAILY PRN
Qty: 20 TABLET | Refills: 0 | Status: SHIPPED | OUTPATIENT
Start: 2025-01-15 | End: 2025-01-15 | Stop reason: CLARIF

## 2025-01-15 RX ORDER — ONDANSETRON 4 MG/1
4 TABLET, ORALLY DISINTEGRATING ORAL 3 TIMES DAILY PRN
Qty: 20 TABLET | Refills: 0 | Status: SHIPPED | OUTPATIENT
Start: 2025-01-15

## 2025-01-15 NOTE — TELEPHONE ENCOUNTER
----- Message from Rhett JETT sent at 1/15/2025  1:19 PM EST -----  Contact: SEAN [phone]  Caller had previously left a message requesting a medication as the patient has been experiencing nausea, vomiting and diarrhea for the past 2-3 days. Caller now requesting the script be transferred to the [facility] pharmacy. Please advise     [facility] [id number] - [facility] [place], [place] - [address] [facility] [address] - P [phone] - F [phone]  [address] [facility] [address]  Phone: [phone]  Fax: [phone]

## 2025-01-15 NOTE — TELEPHONE ENCOUNTER
----- Message from Dr. Kyle Chow MD sent at 1/15/2025  1:29 PM EST -----  Contact: Mckayla JONES  [phone]  Zofran 4 mg ODT tid prn #20  Increase hydration   This will also help diarrhea  ----- Message -----  From: Demetria Harris  Sent: 1/15/2025  12:11 PM EST  To: Kyle Chow MD    Daughter requesting something for patient having vomiting and diarrhea, which started today.  Patients spouse is in hospital (Rm [id number]), and they believe she has caught something from coming in and out of hospital.  Please advise        Outpatient pharmacy - [facility]

## 2025-01-23 ENCOUNTER — OFFICE VISIT (OUTPATIENT)
Dept: INTERNAL MEDICINE CLINIC | Age: 75
End: 2025-01-23

## 2025-01-23 VITALS
WEIGHT: 129 LBS | HEART RATE: 65 BPM | SYSTOLIC BLOOD PRESSURE: 130 MMHG | HEIGHT: 64 IN | RESPIRATION RATE: 18 BRPM | BODY MASS INDEX: 22.02 KG/M2 | DIASTOLIC BLOOD PRESSURE: 78 MMHG

## 2025-01-23 DIAGNOSIS — C20 PRIMARY MALIGNANT NEOPLASM OF RECTUM (HCC): ICD-10-CM

## 2025-01-23 DIAGNOSIS — E55.9 VITAMIN D DEFICIENCY: ICD-10-CM

## 2025-01-23 DIAGNOSIS — M54.50 CHRONIC MIDLINE LOW BACK PAIN WITHOUT SCIATICA: ICD-10-CM

## 2025-01-23 DIAGNOSIS — G89.29 CHRONIC MIDLINE LOW BACK PAIN WITHOUT SCIATICA: ICD-10-CM

## 2025-01-23 DIAGNOSIS — Z00.00 MEDICARE ANNUAL WELLNESS VISIT, SUBSEQUENT: Primary | ICD-10-CM

## 2025-01-23 DIAGNOSIS — F41.9 MILD ANXIETY: ICD-10-CM

## 2025-01-23 DIAGNOSIS — Z00.00 MEDICARE ANNUAL WELLNESS VISIT, SUBSEQUENT: ICD-10-CM

## 2025-01-23 DIAGNOSIS — E03.9 ACQUIRED HYPOTHYROIDISM: ICD-10-CM

## 2025-01-23 DIAGNOSIS — Z12.31 BREAST CANCER SCREENING BY MAMMOGRAM: ICD-10-CM

## 2025-01-23 DIAGNOSIS — E78.2 MIXED HYPERLIPIDEMIA: ICD-10-CM

## 2025-01-23 PROCEDURE — 1159F MED LIST DOCD IN RCRD: CPT | Performed by: INTERNAL MEDICINE

## 2025-01-23 PROCEDURE — G0439 PPPS, SUBSEQ VISIT: HCPCS | Performed by: INTERNAL MEDICINE

## 2025-01-23 PROCEDURE — 81002 URINALYSIS NONAUTO W/O SCOPE: CPT | Performed by: INTERNAL MEDICINE

## 2025-01-23 PROCEDURE — 1124F ACP DISCUSS-NO DSCNMKR DOCD: CPT | Performed by: INTERNAL MEDICINE

## 2025-01-23 PROCEDURE — 1160F RVW MEDS BY RX/DR IN RCRD: CPT | Performed by: INTERNAL MEDICINE

## 2025-01-23 ASSESSMENT — PATIENT HEALTH QUESTIONNAIRE - PHQ9
SUM OF ALL RESPONSES TO PHQ QUESTIONS 1-9: 3
SUM OF ALL RESPONSES TO PHQ QUESTIONS 1-9: 3
4. FEELING TIRED OR HAVING LITTLE ENERGY: SEVERAL DAYS
6. FEELING BAD ABOUT YOURSELF - OR THAT YOU ARE A FAILURE OR HAVE LET YOURSELF OR YOUR FAMILY DOWN: NOT AT ALL
3. TROUBLE FALLING OR STAYING ASLEEP: SEVERAL DAYS
2. FEELING DOWN, DEPRESSED OR HOPELESS: SEVERAL DAYS
7. TROUBLE CONCENTRATING ON THINGS, SUCH AS READING THE NEWSPAPER OR WATCHING TELEVISION: NOT AT ALL
SUM OF ALL RESPONSES TO PHQ QUESTIONS 1-9: 1
SUM OF ALL RESPONSES TO PHQ QUESTIONS 1-9: 1
SUM OF ALL RESPONSES TO PHQ9 QUESTIONS 1 & 2: 1
5. POOR APPETITE OR OVEREATING: NOT AT ALL
1. LITTLE INTEREST OR PLEASURE IN DOING THINGS: SEVERAL DAYS
10. IF YOU CHECKED OFF ANY PROBLEMS, HOW DIFFICULT HAVE THESE PROBLEMS MADE IT FOR YOU TO DO YOUR WORK, TAKE CARE OF THINGS AT HOME, OR GET ALONG WITH OTHER PEOPLE: NOT DIFFICULT AT ALL
8. MOVING OR SPEAKING SO SLOWLY THAT OTHER PEOPLE COULD HAVE NOTICED. OR THE OPPOSITE, BEING SO FIGETY OR RESTLESS THAT YOU HAVE BEEN MOVING AROUND A LOT MORE THAN USUAL: NOT AT ALL
SUM OF ALL RESPONSES TO PHQ QUESTIONS 1-9: 1
SUM OF ALL RESPONSES TO PHQ QUESTIONS 1-9: 3
SUM OF ALL RESPONSES TO PHQ QUESTIONS 1-9: 3
1. LITTLE INTEREST OR PLEASURE IN DOING THINGS: NOT AT ALL
9. THOUGHTS THAT YOU WOULD BE BETTER OFF DEAD, OR OF HURTING YOURSELF: NOT AT ALL
SUM OF ALL RESPONSES TO PHQ QUESTIONS 1-9: 1

## 2025-01-23 NOTE — PROGRESS NOTES
Medicare Annual Wellness Visit    Latrice Cho is here for Medicare AWV    Assessment & Plan   Medicare annual wellness visit, subsequent  -     Vitamin D 25 Hydroxy; Future  Chronic midline low back pain without sciatica  -     TSH reflex to FT4; Future  -     Uric Acid; Future  -     POCT Urinalysis no Micro  Primary malignant neoplasm of rectum (HCC)  -     CBC with Auto Differential; Future  -     Comprehensive Metabolic Panel; Future  -     TSH reflex to FT4; Future  -     Uric Acid; Future  -     POCT Urinalysis no Micro  Mild anxiety  Mixed hyperlipidemia  -     Lipid, Fasting; Future  Acquired hypothyroidism  -     TSH reflex to FT4; Future  -     Vitamin D 25 Hydroxy; Future  Breast cancer screening by mammogram  -     SAWYER DIGITAL SCREEN W OR WO CAD BILATERAL; Future  Vitamin D deficiency  -     Vitamin D 25 Hydroxy; Future       Return in about 6 months (around 2025) for hypothyroid.     Subjective       Latrice Cho (:  1950) is a 74 y.o. female, with hx of  prepyloric perforated ulcer  s,p surgery ,  glaucoma, hypothyroidism, HLD and depression here for medicare wellness visit     Since last visit, pt is stressed from her husbands multiple admissions and worried about cost issues  Reports usual arthritis issues worse due to increased activity        Hx of colon cancer - She follows with Dr. Shahid oncology  She completed oral chemo and radiation.   Denies fever, cough, chest pain, dyspnea,   Has chronic diarrhea  No abdominal pain, nausea, vomiting .      2022- admitted  for perforated prepyloric ulcer. She had a J-tube which has been removed after dc .   She has lost around 30 lbs during  this admission and now remains stable  Reports ongoing chronic diarrhea since surgery and unable to retain any food. Took imodium with resultant constipation   Recent GI stool workup was neg     PPi make her symptoms worse  Appetite remains good      Hypothyroid - remains on synthroid   No

## 2025-01-24 LAB
25(OH)D3 SERPL-MCNC: 31 NG/ML
ALBUMIN SERPL-MCNC: 4 G/DL (ref 3.4–5)
ALBUMIN/GLOB SERPL: 2.4 {RATIO} (ref 1.1–2.2)
ALP SERPL-CCNC: 85 U/L (ref 40–129)
ALT SERPL-CCNC: 19 U/L (ref 10–40)
ANION GAP SERPL CALCULATED.3IONS-SCNC: 8 MMOL/L (ref 3–16)
AST SERPL-CCNC: 30 U/L (ref 15–37)
BASOPHILS # BLD: 0.1 K/UL (ref 0–0.2)
BASOPHILS NFR BLD: 1 %
BILIRUB SERPL-MCNC: 0.3 MG/DL (ref 0–1)
BUN SERPL-MCNC: 7 MG/DL (ref 7–20)
CALCIUM SERPL-MCNC: 8.5 MG/DL (ref 8.3–10.6)
CHLORIDE SERPL-SCNC: 108 MMOL/L (ref 99–110)
CHOLEST SERPL-MCNC: 111 MG/DL (ref 0–199)
CO2 SERPL-SCNC: 25 MMOL/L (ref 21–32)
CREAT SERPL-MCNC: 0.9 MG/DL (ref 0.6–1.2)
DEPRECATED RDW RBC AUTO: 14.6 % (ref 12.4–15.4)
EOSINOPHIL # BLD: 0.2 K/UL (ref 0–0.6)
EOSINOPHIL NFR BLD: 2.4 %
GFR SERPLBLD CREATININE-BSD FMLA CKD-EPI: 66 ML/MIN/{1.73_M2}
GLUCOSE SERPL-MCNC: 90 MG/DL (ref 70–99)
HCT VFR BLD AUTO: 42.5 % (ref 36–48)
HDLC SERPL-MCNC: 29 MG/DL (ref 40–60)
HGB BLD-MCNC: 14 G/DL (ref 12–16)
LDLC SERPL CALC-MCNC: 50 MG/DL
LYMPHOCYTES # BLD: 2.7 K/UL (ref 1–5.1)
LYMPHOCYTES NFR BLD: 38.6 %
MCH RBC QN AUTO: 29.9 PG (ref 26–34)
MCHC RBC AUTO-ENTMCNC: 32.9 G/DL (ref 31–36)
MCV RBC AUTO: 91.1 FL (ref 80–100)
MONOCYTES # BLD: 0.4 K/UL (ref 0–1.3)
MONOCYTES NFR BLD: 5.1 %
NEUTROPHILS # BLD: 3.7 K/UL (ref 1.7–7.7)
NEUTROPHILS NFR BLD: 52.9 %
PLATELET # BLD AUTO: 200 K/UL (ref 135–450)
PMV BLD AUTO: 9.6 FL (ref 5–10.5)
POTASSIUM SERPL-SCNC: 4.9 MMOL/L (ref 3.5–5.1)
PROT SERPL-MCNC: 5.7 G/DL (ref 6.4–8.2)
RBC # BLD AUTO: 4.67 M/UL (ref 4–5.2)
SODIUM SERPL-SCNC: 141 MMOL/L (ref 136–145)
TRIGL SERPL-MCNC: 161 MG/DL (ref 0–150)
TSH SERPL DL<=0.005 MIU/L-ACNC: 2.48 UIU/ML (ref 0.27–4.2)
URATE SERPL-MCNC: 4.6 MG/DL (ref 2.6–6)
VLDLC SERPL CALC-MCNC: 32 MG/DL
WBC # BLD AUTO: 7 K/UL (ref 4–11)

## 2025-02-11 ENCOUNTER — TELEPHONE (OUTPATIENT)
Dept: INTERNAL MEDICINE CLINIC | Age: 75
End: 2025-02-11

## 2025-02-11 NOTE — TELEPHONE ENCOUNTER
----- Message from Dr. Kyle Chow MD sent at 2/10/2025  6:09 PM EST -----  Contact: [phone]  Yes tid  ----- Message -----  From: Zohreh Collins  Sent: 2/10/2025   4:16 PM EST  To: Kyle Chow MD    1 tablet three times daily?  Has Tylenol 500 mg tablets.  ----- Message -----  From: Kyle Chow MD  Sent: 2/10/2025   4:01 PM EST  To: Zohreh Dennis    Increase tylenol to tid scheduled  ----- Message -----  From: Renée Turner  Sent: 2/10/2025   1:52 PM EST  To: Kyle Chow MD    Pt states she has arthritis in her back and hips. Pt asking what do you recommend she take? Pt states is can't take NSAIDs and is currently taking tylenol that is not helping. Please advise       [facility]

## 2025-02-18 ENCOUNTER — HOSPITAL ENCOUNTER (OUTPATIENT)
Dept: NURSING | Age: 75
Setting detail: INFUSION SERIES
Discharge: HOME | End: 2025-02-18
Payer: MEDICARE

## 2025-02-18 VITALS
DIASTOLIC BLOOD PRESSURE: 70 MMHG | HEART RATE: 74 BPM | WEIGHT: 128.97 LBS | SYSTOLIC BLOOD PRESSURE: 134 MMHG | BODY MASS INDEX: 22.02 KG/M2 | TEMPERATURE: 98 F | HEIGHT: 64 IN | RESPIRATION RATE: 16 BRPM

## 2025-02-18 DIAGNOSIS — M81.0 AGE RELATED OSTEOPOROSIS, UNSPECIFIED PATHOLOGICAL FRACTURE PRESENCE: Primary | ICD-10-CM

## 2025-02-18 PROCEDURE — 96372 THER/PROPH/DIAG INJ SC/IM: CPT

## 2025-02-18 PROCEDURE — 99211 OFF/OP EST MAY X REQ PHY/QHP: CPT

## 2025-02-18 PROCEDURE — 6360000002 HC RX W HCPCS: Performed by: INTERNAL MEDICINE

## 2025-02-18 RX ADMIN — DENOSUMAB 60 MG: 60 INJECTION SUBCUTANEOUS at 11:24

## 2025-02-18 ASSESSMENT — PAIN DESCRIPTION - FREQUENCY: FREQUENCY: CONTINUOUS

## 2025-02-18 ASSESSMENT — PAIN SCALES - GENERAL: PAINLEVEL_OUTOF10: 5

## 2025-02-18 ASSESSMENT — PAIN DESCRIPTION - PAIN TYPE: TYPE: CHRONIC PAIN

## 2025-02-18 ASSESSMENT — PAIN DESCRIPTION - ORIENTATION: ORIENTATION: LOWER;RIGHT;LEFT

## 2025-02-18 ASSESSMENT — PAIN DESCRIPTION - LOCATION: LOCATION: BACK;HIP

## 2025-02-18 NOTE — PROGRESS NOTES
Pt here for a Prolia injection   Pt reporting discomfort in her lower back and chalo hips rated a 5 out of 10 today  Pt denies any issues after the 1st Prolia injection  She is wondering if the Prolia could increase her back and leg pain as she thinks her discomfort worsened after the 1st Prolia injection  Encouraged pt to discuss her questions with Dr. Chow  She reports she did ask but was told to take some Tylenol  Encourage her to monitor her discomfort level and if it worsens to talk with him again or discuss it with her pharmacist  Pt stated she will  Pt is taking a daily calcium with vitamin D  No further questions or concerns were voiced  Prolia 60 mg was given SC left arm  Injection site unremarkable  bandaid applied to site  Discharge instructions reviewed with pt and copy was given  Understanding verbalized then pt was discharged ambulatory  in stable condition

## 2025-03-03 RX ORDER — ATORVASTATIN CALCIUM 20 MG/1
20 TABLET, FILM COATED ORAL NIGHTLY
Qty: 90 TABLET | Refills: 1 | Status: SHIPPED | OUTPATIENT
Start: 2025-03-03

## 2025-03-05 RX ORDER — LEVOTHYROXINE SODIUM 100 UG/1
TABLET ORAL
Qty: 90 TABLET | Refills: 1 | Status: SHIPPED | OUTPATIENT
Start: 2025-03-05

## 2025-03-10 RX ORDER — OMEPRAZOLE 20 MG/1
20 CAPSULE, DELAYED RELEASE ORAL DAILY
Qty: 90 CAPSULE | Refills: 1 | Status: SHIPPED | OUTPATIENT
Start: 2025-03-10

## 2025-03-27 ENCOUNTER — TELEPHONE (OUTPATIENT)
Dept: INTERNAL MEDICINE CLINIC | Age: 75
End: 2025-03-27

## 2025-03-27 RX ORDER — AZITHROMYCIN 250 MG/1
250 TABLET, FILM COATED ORAL SEE ADMIN INSTRUCTIONS
Qty: 6 TABLET | Refills: 0 | Status: SHIPPED | OUTPATIENT
Start: 2025-03-27 | End: 2025-04-01

## 2025-03-27 NOTE — TELEPHONE ENCOUNTER
----- Message from Dr. Farrah Ramirez MD sent at 3/27/2025 11:36 AM EDT -----  Contact: EDWARD [phone]  álvaro  ----- Message -----  From: Rhett Acosta  Sent: 3/27/2025   8:40 AM EDT  To: Farrah Ramirez MD    Patient experiencing the following for the past 2-3 weeks; head/chest congestion, productive cough, yellow phlegm, runny nose, and headache. Pt has been taking Mucinex, Coricidin, Zyrtec, and Tylenol but symptoms have not improved. Pt requesting a script to the below pharmacy. Please advise    [facility] [id number] - [place] - [address] - P [phone] - F [phone]  [address] [facility] [place] [address]  Phone: [phone]  Fax: [phone]

## 2025-03-31 RX ORDER — CITALOPRAM HYDROBROMIDE 20 MG/1
20 TABLET ORAL DAILY
Qty: 90 TABLET | Refills: 0 | Status: SHIPPED | OUTPATIENT
Start: 2025-03-31

## 2025-05-13 DIAGNOSIS — M81.0 OSTEOPOROSIS, UNSPECIFIED OSTEOPOROSIS TYPE, UNSPECIFIED PATHOLOGICAL FRACTURE PRESENCE: ICD-10-CM

## 2025-05-13 RX ORDER — DENOSUMAB 60 MG/ML
60 INJECTION SUBCUTANEOUS ONCE
Qty: 1 ML | Refills: 1 | Status: SHIPPED | OUTPATIENT
Start: 2025-05-13 | End: 2025-05-13

## 2025-06-19 ENCOUNTER — TELEPHONE (OUTPATIENT)
Dept: INTERNAL MEDICINE CLINIC | Age: 75
End: 2025-06-19

## 2025-06-19 RX ORDER — AZITHROMYCIN 250 MG/1
TABLET, FILM COATED ORAL
Qty: 1 PACKET | Refills: 0 | Status: SHIPPED | OUTPATIENT
Start: 2025-06-19

## 2025-06-19 NOTE — TELEPHONE ENCOUNTER
----- Message from Dr. Kyle Chow MD sent at 6/19/2025  1:22 PM EDT -----  Contact: Laura [phone]  Start on z pack if no allergies  See us if not better  ----- Message -----  From: Sherita Meade MA  Sent: 6/18/2025   4:29 PM EDT  To: Kyle Chow MD    Pt c/o cough with yellow production X 1 month. No other symptoms. She has tried OTC Zyrtec and coricidin nighttime with no relief. She took a Covid test today and it is negative. She was advised message would be addressed tomorrow.    Pharmacy    [facility] [id number] - [place], [place] - [address] [facility] - P [phone] - F [phone]  [address] [facility] [address]  Phone: [phone]  Fax: [phone]

## 2025-06-25 ENCOUNTER — HOSPITAL ENCOUNTER (OUTPATIENT)
Age: 75
Discharge: HOME | End: 2025-06-25
Payer: MEDICARE

## 2025-06-25 ENCOUNTER — OFFICE VISIT (OUTPATIENT)
Dept: INTERNAL MEDICINE CLINIC | Age: 75
End: 2025-06-25

## 2025-06-25 VITALS
DIASTOLIC BLOOD PRESSURE: 80 MMHG | BODY MASS INDEX: 21.17 KG/M2 | WEIGHT: 124 LBS | HEART RATE: 65 BPM | RESPIRATION RATE: 18 BRPM | HEIGHT: 64 IN | SYSTOLIC BLOOD PRESSURE: 142 MMHG

## 2025-06-25 DIAGNOSIS — E03.9 ACQUIRED HYPOTHYROIDISM: Primary | ICD-10-CM

## 2025-06-25 DIAGNOSIS — C20 PRIMARY MALIGNANT NEOPLASM OF RECTUM (HCC): ICD-10-CM

## 2025-06-25 DIAGNOSIS — E78.2 MIXED HYPERLIPIDEMIA: ICD-10-CM

## 2025-06-25 DIAGNOSIS — Z01.818 PRE-OP EXAM: ICD-10-CM

## 2025-06-25 DIAGNOSIS — K29.50 CHRONIC GASTRITIS WITHOUT BLEEDING, UNSPECIFIED GASTRITIS TYPE: ICD-10-CM

## 2025-06-25 DIAGNOSIS — F41.9 MILD ANXIETY: ICD-10-CM

## 2025-06-25 LAB
EKG ATRIAL RATE: 70 BPM
EKG DIAGNOSIS: NORMAL
EKG P AXIS: 78 DEGREES
EKG P-R INTERVAL: 156 MS
EKG Q-T INTERVAL: 414 MS
EKG QRS DURATION: 80 MS
EKG QTC CALCULATION (BAZETT): 447 MS
EKG R AXIS: 24 DEGREES
EKG T AXIS: 59 DEGREES
EKG VENTRICULAR RATE: 70 BPM

## 2025-06-25 PROCEDURE — G8420 CALC BMI NORM PARAMETERS: HCPCS | Performed by: INTERNAL MEDICINE

## 2025-06-25 PROCEDURE — G8399 PT W/DXA RESULTS DOCUMENT: HCPCS | Performed by: INTERNAL MEDICINE

## 2025-06-25 PROCEDURE — 1124F ACP DISCUSS-NO DSCNMKR DOCD: CPT | Performed by: INTERNAL MEDICINE

## 2025-06-25 PROCEDURE — 1090F PRES/ABSN URINE INCON ASSESS: CPT | Performed by: INTERNAL MEDICINE

## 2025-06-25 PROCEDURE — 99213 OFFICE O/P EST LOW 20 MIN: CPT | Performed by: INTERNAL MEDICINE

## 2025-06-25 PROCEDURE — 1036F TOBACCO NON-USER: CPT | Performed by: INTERNAL MEDICINE

## 2025-06-25 PROCEDURE — G8427 DOCREV CUR MEDS BY ELIG CLIN: HCPCS | Performed by: INTERNAL MEDICINE

## 2025-06-25 PROCEDURE — 1159F MED LIST DOCD IN RCRD: CPT | Performed by: INTERNAL MEDICINE

## 2025-06-25 PROCEDURE — 93010 ELECTROCARDIOGRAM REPORT: CPT | Performed by: INTERNAL MEDICINE

## 2025-06-25 PROCEDURE — 1160F RVW MEDS BY RX/DR IN RCRD: CPT | Performed by: INTERNAL MEDICINE

## 2025-06-25 PROCEDURE — 93005 ELECTROCARDIOGRAM TRACING: CPT

## 2025-06-25 NOTE — PROGRESS NOTES
Subjective:      Latrice Cho is a 75 y.o. female who presents to the office today for a preoperative consultation at the request of surgeon Dr. Jovanny Villarreal  who plans on performing right intraartricular hip steroid injection under MAC .   Planned anesthesia is IV sedation and MAC .           Since last visit, pt has suffered right hip pain , seen orthosurgery and had right greater trochanteric steroid injection with no benefit and now plans for intra artricular steroid injection  as above     pt is stressed from her husbands multiple admissions and worried about cost issues         Hx of colon cancer - She follows with Dr. Shahid oncology  She completed oral chemo and radiation.    Has chronic diarrhea  No abdominal pain, nausea, vomiting .      August 2022- admitted  for perforated prepyloric ulcer. She had a J-tube which has been removed after dc .   She has lost around 30 lbs during  this admission and now remains stable  Reports ongoing chronic diarrhea since above surgery     PPi make her symptoms worse  Appetite remains good      Hypothyroid - remains on synthroid   No weight loss recently   No night sweats, has chronic diarrhea     Chronic anxiety  - stable on celexa       Denies fever, cough, chest pain, dyspnea, pedal edema    No complications from anesthesia before     Lives with    No depression issues  No falls   No sleep issues    Parts of this note is copied from my previous notes and checked thoroughly and updated appropriately to reflect today's exam , findings and treatment plan     Allergies   Allergen Reactions    Bee Venom Swelling     Attacked by swarm of bees and required er visit    Adhesive Tape Other (See Comments) and Itching     Skin redness    Skin redness  Skin redness      Tetracyclines & Related Other (See Comments)     Mouth fungus      Sulfa Antibiotics Nausea And Vomiting     Past Medical History:   Diagnosis Date    Age related osteoporosis 02/07/2024    Arthritis

## 2025-06-26 ENCOUNTER — RESULTS FOLLOW-UP (OUTPATIENT)
Dept: INTERNAL MEDICINE CLINIC | Age: 75
End: 2025-06-26

## 2025-06-27 RX ORDER — CITALOPRAM HYDROBROMIDE 20 MG/1
20 TABLET ORAL DAILY
Qty: 90 TABLET | Refills: 1 | Status: SHIPPED | OUTPATIENT
Start: 2025-06-27

## 2025-07-28 SDOH — ECONOMIC STABILITY: FOOD INSECURITY: WITHIN THE PAST 12 MONTHS, THE FOOD YOU BOUGHT JUST DIDN'T LAST AND YOU DIDN'T HAVE MONEY TO GET MORE.: NEVER TRUE

## 2025-07-28 SDOH — ECONOMIC STABILITY: INCOME INSECURITY: IN THE LAST 12 MONTHS, WAS THERE A TIME WHEN YOU WERE NOT ABLE TO PAY THE MORTGAGE OR RENT ON TIME?: NO

## 2025-07-28 SDOH — ECONOMIC STABILITY: FOOD INSECURITY: WITHIN THE PAST 12 MONTHS, YOU WORRIED THAT YOUR FOOD WOULD RUN OUT BEFORE YOU GOT MONEY TO BUY MORE.: NEVER TRUE

## 2025-07-28 SDOH — ECONOMIC STABILITY: TRANSPORTATION INSECURITY
IN THE PAST 12 MONTHS, HAS LACK OF TRANSPORTATION KEPT YOU FROM MEETINGS, WORK, OR FROM GETTING THINGS NEEDED FOR DAILY LIVING?: NO

## 2025-07-28 SDOH — ECONOMIC STABILITY: TRANSPORTATION INSECURITY
IN THE PAST 12 MONTHS, HAS THE LACK OF TRANSPORTATION KEPT YOU FROM MEDICAL APPOINTMENTS OR FROM GETTING MEDICATIONS?: NO

## 2025-07-29 ENCOUNTER — OFFICE VISIT (OUTPATIENT)
Dept: INTERNAL MEDICINE CLINIC | Age: 75
End: 2025-07-29

## 2025-07-29 VITALS
DIASTOLIC BLOOD PRESSURE: 75 MMHG | SYSTOLIC BLOOD PRESSURE: 130 MMHG | HEIGHT: 64 IN | RESPIRATION RATE: 18 BRPM | HEART RATE: 65 BPM | WEIGHT: 125 LBS | BODY MASS INDEX: 21.34 KG/M2

## 2025-07-29 DIAGNOSIS — E78.2 MIXED HYPERLIPIDEMIA: ICD-10-CM

## 2025-07-29 DIAGNOSIS — K29.50 CHRONIC GASTRITIS WITHOUT BLEEDING, UNSPECIFIED GASTRITIS TYPE: ICD-10-CM

## 2025-07-29 DIAGNOSIS — E03.9 ACQUIRED HYPOTHYROIDISM: Primary | ICD-10-CM

## 2025-07-29 DIAGNOSIS — C20 PRIMARY MALIGNANT NEOPLASM OF RECTUM (HCC): ICD-10-CM

## 2025-07-29 DIAGNOSIS — M81.0 OSTEOPOROSIS, UNSPECIFIED OSTEOPOROSIS TYPE, UNSPECIFIED PATHOLOGICAL FRACTURE PRESENCE: ICD-10-CM

## 2025-07-29 DIAGNOSIS — F41.9 MILD ANXIETY: ICD-10-CM

## 2025-07-29 PROCEDURE — 1160F RVW MEDS BY RX/DR IN RCRD: CPT | Performed by: INTERNAL MEDICINE

## 2025-07-29 PROCEDURE — G8420 CALC BMI NORM PARAMETERS: HCPCS | Performed by: INTERNAL MEDICINE

## 2025-07-29 PROCEDURE — 99213 OFFICE O/P EST LOW 20 MIN: CPT | Performed by: INTERNAL MEDICINE

## 2025-07-29 PROCEDURE — G8399 PT W/DXA RESULTS DOCUMENT: HCPCS | Performed by: INTERNAL MEDICINE

## 2025-07-29 PROCEDURE — 1159F MED LIST DOCD IN RCRD: CPT | Performed by: INTERNAL MEDICINE

## 2025-07-29 PROCEDURE — 1090F PRES/ABSN URINE INCON ASSESS: CPT | Performed by: INTERNAL MEDICINE

## 2025-07-29 PROCEDURE — G8427 DOCREV CUR MEDS BY ELIG CLIN: HCPCS | Performed by: INTERNAL MEDICINE

## 2025-07-29 PROCEDURE — 1036F TOBACCO NON-USER: CPT | Performed by: INTERNAL MEDICINE

## 2025-07-29 PROCEDURE — 1124F ACP DISCUSS-NO DSCNMKR DOCD: CPT | Performed by: INTERNAL MEDICINE

## 2025-07-29 NOTE — PROGRESS NOTES
Latrice Cho (:  1950) is a 75 y.o. female, with hx of  prepyloric perforated ulcer  s,p surgery ,  glaucoma, hypothyroidism, HLD and depression here for regular f/w       Since last visit, pt has suffered right hip pain , seen orthosurgery and had right greater trochanteric steroid injection with no benefit and now had  intra artricular steroid injection  and felt better      pt is stressed from her husbands memory issues      Hx of colon cancer - She follows with Dr. Shahid oncology  She completed oral chemo and radiation.    Has chronic diarrhea  No abdominal pain, nausea, vomiting .      2022- admitted  for perforated prepyloric ulcer. She had a J-tube which has been removed after dc .   She has lost around 30 lbs during  this admission and now remains stable  Reports ongoing chronic diarrhea since above surgery     PPi make her symptoms worse  Appetite remains good      Hypothyroid - remains on synthroid   No weight loss recently   No night sweats, has chronic diarrhea     Chronic anxiety  - stable on celexa       Denies fever, cough, chest pain, dyspnea, pedal edema    No complications from anesthesia before     Lives with    No depression issues  No falls   No sleep issues    Parts of this note is copied from my previous notes and checked thoroughly and updated appropriately to reflect today's exam , findings and treatment plan     Allergies   Allergen Reactions    Bee Venom Swelling     Attacked by swarm of bees and required er visit    Adhesive Tape Other (See Comments) and Itching     Skin redness    Skin redness  Skin redness      Tetracyclines & Related Other (See Comments)     Mouth fungus      Sulfa Antibiotics Nausea And Vomiting           Current Outpatient Medications   Medication Sig Dispense Refill    citalopram (CELEXA) 20 MG tablet TAKE 1 TABLET BY MOUTH DAILY 90 tablet 1    azithromycin (ZITHROMAX) 250 MG tablet Take 2 tabs (500 MG) on Day 1, and take 1 tab

## 2025-08-21 ENCOUNTER — HOSPITAL ENCOUNTER (OUTPATIENT)
Dept: NURSING | Age: 75
Setting detail: INFUSION SERIES
Discharge: HOME | End: 2025-08-21
Payer: MEDICARE

## 2025-08-21 VITALS
RESPIRATION RATE: 16 BRPM | DIASTOLIC BLOOD PRESSURE: 75 MMHG | HEIGHT: 64 IN | WEIGHT: 125 LBS | SYSTOLIC BLOOD PRESSURE: 136 MMHG | HEART RATE: 84 BPM | TEMPERATURE: 97.8 F | BODY MASS INDEX: 21.34 KG/M2

## 2025-08-21 DIAGNOSIS — M81.0 AGE RELATED OSTEOPOROSIS, UNSPECIFIED PATHOLOGICAL FRACTURE PRESENCE: Primary | ICD-10-CM

## 2025-08-21 PROCEDURE — 6360000002 HC RX W HCPCS: Performed by: INTERNAL MEDICINE

## 2025-08-21 PROCEDURE — 99211 OFF/OP EST MAY X REQ PHY/QHP: CPT

## 2025-08-21 PROCEDURE — 96372 THER/PROPH/DIAG INJ SC/IM: CPT

## 2025-08-21 RX ADMIN — DENOSUMAB 60 MG: 60 INJECTION SUBCUTANEOUS at 10:57

## 2025-08-21 ASSESSMENT — PAIN DESCRIPTION - ORIENTATION: ORIENTATION: RIGHT;LEFT;LOWER

## 2025-08-21 ASSESSMENT — PAIN SCALES - GENERAL: PAINLEVEL_OUTOF10: 3

## 2025-08-21 ASSESSMENT — PAIN DESCRIPTION - FREQUENCY: FREQUENCY: CONTINUOUS

## 2025-08-21 ASSESSMENT — PAIN DESCRIPTION - LOCATION: LOCATION: BACK;HIP

## 2025-08-21 ASSESSMENT — PAIN DESCRIPTION - PAIN TYPE: TYPE: CHRONIC PAIN

## 2025-08-25 RX ORDER — LEVOTHYROXINE SODIUM 100 UG/1
100 TABLET ORAL EVERY MORNING
Qty: 90 TABLET | Refills: 1 | Status: SHIPPED | OUTPATIENT
Start: 2025-08-25

## 2025-08-29 RX ORDER — ATORVASTATIN CALCIUM 20 MG/1
20 TABLET, FILM COATED ORAL NIGHTLY
Qty: 90 TABLET | Refills: 1 | Status: SHIPPED | OUTPATIENT
Start: 2025-08-29

## 2025-09-03 RX ORDER — OMEPRAZOLE 20 MG/1
20 CAPSULE, DELAYED RELEASE ORAL DAILY
Qty: 90 CAPSULE | Refills: 1 | Status: SHIPPED | OUTPATIENT
Start: 2025-09-03

## (undated) DEVICE — SHEATH INTRO 17GA L8IN TUNN DISP ON-Q

## (undated) DEVICE — GOWN SIRUS NONREIN XL W/TWL: Brand: MEDLINE INDUSTRIES, INC.

## (undated) DEVICE — ALCOHOL RUBBING 16OZ 70% ISO

## (undated) DEVICE — SOLUTION IV IRRIG 500ML 0.9% SODIUM CHL 2F7123

## (undated) DEVICE — STERILE POLYISOPRENE POWDER-FREE SURGICAL GLOVES: Brand: PROTEXIS

## (undated) DEVICE — CONMED SCOPE SAVER BITE BLOCK, 20X27 MM: Brand: SCOPE SAVER

## (undated) DEVICE — APPLICATOR MEDICATED 26 CC SOLUTION HI LT ORNG CHLORAPREP

## (undated) DEVICE — SUTURE PERMAHAND SZ 2-0 L30IN NONABSORBABLE BLK SILK W/O A305H

## (undated) DEVICE — UNIVERSAL BLOCK TRAY: Brand: MEDLINE INDUSTRIES, INC.

## (undated) DEVICE — BLADE ES L6IN ELASTOMERIC COAT EXT DURABLE BEND UPTO 90DEG

## (undated) DEVICE — CHLORAPREP 26ML ORANGE

## (undated) DEVICE — TOWEL OR BLUEE 16X26IN ST 8 PACK ORB08 16X26ORTWL

## (undated) DEVICE — GAUZE,SPONGE,4"X4",8PLY,STRL,LF,10/TRAY: Brand: MEDLINE

## (undated) DEVICE — SUTURE PERMA-HAND SZ 2-0 L30IN NONABSORBABLE BLK L26MM SH K833H

## (undated) DEVICE — CANNULA,OXY,ADULT,SUPERSOFT,W/7'TUB,SC: Brand: MEDLINE INDUSTRIES, INC.

## (undated) DEVICE — SUTURE PROL SZ 1 L30IN NONABSORBABLE BLU CTX L48MM 1/2 CIR 8455H

## (undated) DEVICE — CIRCUIT ANES L72IN 3L BACT AND VIR FLTR EL CONN SGL LIMB

## (undated) DEVICE — ELECTRODE PT RET AD L9FT HI MOIST COND ADH HYDRGEL CORDED

## (undated) DEVICE — KIT INFUS PMP 270ML 4ML/HR 2ML/SITE SOAK CATH L5IN N NARC

## (undated) DEVICE — TUBING, SUCTION, 3/16" X 10', STRAIGHT: Brand: MEDLINE

## (undated) DEVICE — SUTURE PERMAHAND SZ 2-0 L18IN NONABSORBABLE BLK L26MM SH C012D

## (undated) DEVICE — GLOVE ORANGE PI 7 1/2   MSG9075

## (undated) DEVICE — CATHETER,URETHRAL,REDRUBBER,STRL,14FR: Brand: MEDLINE INDUSTRIES, INC.

## (undated) DEVICE — YANKAUER,BULB TIP,W/O VENT,RIGID,STERILE: Brand: MEDLINE

## (undated) DEVICE — MAJOR SET UP PK

## (undated) DEVICE — TRAY CATH 16FR F INCLUDE LUB DRNGE BG STATLOK STBL DEV

## (undated) DEVICE — SUTURE PERMAHAND SZ 3-0 L18IN NONABSORBABLE BLK L26MM SH C013D

## (undated) DEVICE — ENDOSCOPIC KIT 2 12 FT OP4 DE2 GWN SYR

## (undated) DEVICE — SPONGE LAP W18XL18IN WHT COT 4 PLY FLD STRUNG RADPQ DISP ST

## (undated) DEVICE — DRAIN WND SIL FLAT RADIOPAQUE 10MM FULL FLUTED

## (undated) DEVICE — PACK,UNIVERSAL,SPLIT,II,AURORA: Brand: MEDLINE

## (undated) DEVICE — GAUZE,SPONGE,4"X4",16PLY,STRL,LF,10/TRAY: Brand: MEDLINE

## (undated) DEVICE — RESERVOIR,SUCTION,100CC,SILICONE: Brand: MEDLINE

## (undated) DEVICE — PLUG,CATHETER,DRAINAGE PROTECTOR,TUBE: Brand: MEDLINE

## (undated) DEVICE — STAPLER SKIN H3.9MM WIRE DIA0.58MM CRWN 6.9MM 35 STPL FIX